# Patient Record
Sex: MALE | Race: WHITE | Employment: OTHER | URBAN - METROPOLITAN AREA
[De-identification: names, ages, dates, MRNs, and addresses within clinical notes are randomized per-mention and may not be internally consistent; named-entity substitution may affect disease eponyms.]

---

## 2017-12-04 ENCOUNTER — OFFICE VISIT (OUTPATIENT)
Dept: INTERNAL MEDICINE CLINIC | Age: 72
End: 2017-12-04

## 2017-12-04 VITALS
SYSTOLIC BLOOD PRESSURE: 122 MMHG | HEIGHT: 69 IN | TEMPERATURE: 98.5 F | WEIGHT: 246.6 LBS | DIASTOLIC BLOOD PRESSURE: 80 MMHG | BODY MASS INDEX: 36.53 KG/M2

## 2017-12-04 DIAGNOSIS — J20.9 ACUTE BRONCHITIS, UNSPECIFIED ORGANISM: Primary | ICD-10-CM

## 2017-12-04 RX ORDER — CEFUROXIME AXETIL 250 MG/1
250 TABLET ORAL 2 TIMES DAILY
Qty: 20 TAB | Refills: 0 | Status: SHIPPED | OUTPATIENT
Start: 2017-12-04 | End: 2018-01-10 | Stop reason: ALTCHOICE

## 2017-12-04 NOTE — PROGRESS NOTES
This note will not be viewable in [address] Andres Amos is a 67 y.o. male and presents with Cough  . Subjective:  Mr. Acevedo Aw since with complaints of an upper respiratory infection ongoing over the last week with the development of a deeply congested cough. The cough is been productive of a purulent phlegm. He denies fevers, chills, sore throat, wheezing, shortness of breath or pleuritic pain. The patient denies any rhinorrhea. He is yet to have his influenza vaccination. He has always declined pneumococcal vaccinations. Past Medical History:   Diagnosis Date    Colon polyp     Elevated CPK     Hyperlipidemia     Hypertension     Obesity     Psoriasis      Past Surgical History:   Procedure Laterality Date    HX HERNIA REPAIR       No Known Allergies  Current Outpatient Prescriptions   Medication Sig Dispense Refill    cefUROXime (CEFTIN) 250 mg tablet Take 1 Tab by mouth two (2) times a day. 20 Tab 0    verapamil (CALAN) 240 mg capsule Take 240 mg by mouth daily.  bumetanide (BUMEX) 0.5 mg tablet Take 0.5 mg by mouth daily.  lisinopril (PRINIVIL, ZESTRIL) 20 mg tablet Take 20 mg by mouth daily.  fenofibrate micronized (LOFIBRA) 200 mg capsule Take 200 mg by mouth every morning.  ezetimibe (ZETIA) 10 mg tablet Take 10 mg by mouth.  clobetasol (TEMOVATE) 0.05 % topical cream Apply  to affected area two (2) times a day.  omega-3 fatty acids Cap Take 1,000 mg by mouth.  aspirin 81 mg tablet Take 81 mg by mouth.  trimethobenzamide (TIGAN) 250 mg Cap Take 250 mg by mouth three (3) times daily as needed.          Social History     Social History    Marital status:      Spouse name: N/A    Number of children: N/A    Years of education: N/A     Social History Main Topics    Smoking status: Former Smoker    Smokeless tobacco: Never Used    Alcohol use 0.0 oz/week    Drug use: None    Sexual activity: Not Asked     Other Topics Concern    None     Social History Narrative     Family History   Problem Relation Age of Onset    Cancer Mother      colon       Review of Systems  Constitutional: negative for fevers, chills, anorexia and weight loss  Eyes:   negative for visual disturbance and irritation  ENT:   Positive for some sinus congestion and post nasal drainage. Respiratory:  Positive for cough and chest congestion without wheezing  CV:   negative for chest pain, palpitations, lower extremity edema  GI:   negative for nausea, vomiting, diarrhea, abdominal pain,melena  Integumentary: negative for rash and pruritus  Neurological:  negative for headaches, dizziness, vertigo, memory problems and gait       Objective:  Visit Vitals    /80 (BP 1 Location: Left arm, BP Patient Position: Sitting)    Temp 98.5 °F (36.9 °C) (Oral)    Ht 5' 9\" (1.753 m)    Wt 246 lb 9.6 oz (111.9 kg)    BMI 36.42 kg/m2     Body mass index is 36.42 kg/(m^2). Physical Exam:   General appearance - alert, ill appearing, and in no distress  Mental status - alert, oriented to person, place, and time  EYE-ANNA, EOMI, conjuctiva clear. No lid swelling or purulent drainage  ENT- TM's clear without A/F level. Pharynx slightly erythematous with drainage noted  Nose - normal and patent, no erythema,  Neck - supple, no significant adenopathy   Chest - Coarse upper airway rhonchi present without wheezing   Heart - normal rate, regular rhythm, normal S1, S2, no murmurs, rubs, clicks or gallops   Skin-No rash appreciated  Neuro -alert, oriented, normal speech, no focal findings. Assessment/Plan:  Diagnoses and all orders for this visit:    Acute bronchitis, unspecified organism  -     cefUROXime (CEFTIN) 250 mg tablet; Take 1 Tab by mouth two (2) times a day., Normal, Disp-20 Tab, R-0        Other Instructions: Body mass index is 36.42. Dietary counseling is given along with a printed patient handout.     Mucinex as directed    Increase po fluids    Influenza vaccination to be obtained in 10 days time    Follow-up Disposition:  Return if symptoms worsen or fail to improve. I have reviewed with the patient details of the assessment and plan and all questions were answered. Relevent patient education was performed. An After Visit Summary was printed and given to the patient.     Divya Ried MD

## 2017-12-04 NOTE — MR AVS SNAPSHOT
Visit Information Date & Time Provider Department Dept. Phone Encounter #  
 12/4/2017 10:10 AM Dina Stuart MD [facility] [id number] Follow-up Instructions Return if symptoms worsen or fail to improve. Your Appointments 1/10/2018  1:00 PM  
FOLLOW UP 10 with Dina Stuart MD  
[address] ([address]) Appt Note: 6 mo fu  
 [address] [id number] [address] [facility] [address] Upcoming Health Maintenance Date Due Hepatitis C Screening 1945 DTaP/Tdap/Td series (1 - Tdap) 3/23/1966 FOBT Q 1 YEAR AGE 50-75 3/23/1995 ZOSTER VACCINE AGE 60> 1/23/2005 GLAUCOMA SCREENING Q2Y 3/23/2010 MEDICARE YEARLY EXAM 3/23/2010 Influenza Age 5 to Adult 8/1/2017 Pneumococcal 65+ Low/Medium Risk (2 of 2 - PPSV23) 12/4/2018 Allergies as of 12/4/2017  Review Complete On: 12/4/2017 By: Dina Stuart MD  
 No Known Allergies Current Immunizations  Never Reviewed No immunizations on file. Not reviewed this visit You Were Diagnosed With   
  
 Codes Comments Acute bronchitis, unspecified organism    -  Primary ICD-10-CM: J20.9 ICD-9-CM: 466.0 Vitals BP Temp Height(growth percentile) Weight(growth percentile) BMI Smoking Status 122/80 (BP 1 Location: Left arm, BP Patient Position: Sitting) 98.5 °F (36.9 °C) (Oral) 5' 9\" (1.753 m) 246 lb 9.6 oz (111.9 kg) 36.42 kg/m2 Former Smoker Vitals History BMI and BSA Data Body Mass Index Body Surface Area  
 36.42 kg/m 2 2.33 m 2 Preferred Pharmacy Pharmacy Name Phone [address], [address] [place] [address] [phone] Your Updated Medication List  
  
   
This list is accurate as of: 12/4/17 10:29 AM.  Always use your most recent med list.  
  
  
  
  
 aspirin 81 mg tablet Take 81 mg by mouth. bumetanide 0.5 mg tablet Commonly known as:  Analilia Oas Take 0.5 mg by mouth daily. cefUROXime 250 mg tablet Commonly known as:  CEFTIN Take 1 Tab by mouth two (2) times a day. clobetasol 0.05 % topical cream  
Commonly known as:  Sherryll Stalling Apply  to affected area two (2) times a day. fenofibrate micronized 200 mg capsule Commonly known as:  LOFIBRA Take 200 mg by mouth every morning. lisinopril 20 mg tablet Commonly known as:  Ora Bee Take 20 mg by mouth daily. omega-3 fatty acids Cap Take 1,000 mg by mouth. trimethobenzamide 250 mg Cap Commonly known as:  Regloria Hughs Take 250 mg by mouth three (3) times daily as needed. verapamil  mg ER capsule Commonly known as:  Wiliam Katos Take 240 mg by mouth daily. ZETIA 10 mg tablet Generic drug:  ezetimibe Take 10 mg by mouth. Prescriptions Sent to Pharmacy Refills  
 cefUROXime (CEFTIN) 250 mg tablet 0 Sig: Take 1 Tab by mouth two (2) times a day. Class: Normal  
 Pharmacy: [address], [address] [place] [address] #: [phone] Route: Oral  
  
Follow-up Instructions Return if symptoms worsen or fail to improve. Patient Instructions Learning About Cutting Calories How do calories affect your weight? Food gives your body energy. Energy from the food you eat is measured in calories. This energy keeps your heart beating, your brain active, and your muscles working. Your body needs a certain number of calories each day. After your body uses the calories it needs, it stores extra calories as fat. To lose weight safely, you have to eat fewer calories while eating in a healthy way. How many calories do you need each day? The more active you are, the more calories you need. When you are less active, you need fewer calories.  How many calories you need each day also depends on several things, including your age and whether you are male or female. Here are some general guidelines for adults: 
· Less active women and older adults need 1,600 to 2,000 calories each day. · Active women and less active men need 2,000 to 2,400 calories each day. · Active men need 2,400 to 3,000 calories each day. How can you cut calories and eat healthy meals? Whole grains, vegetables and fruits, and dried beans are good lower-calorie foods. They give you lots of nutrients and fiber. And they fill you up. Sweets, energy drinks, and soda pop are high in calories. They give you few nutrients and no fiber. Try to limit soda pop, fruit juice, and energy drinks. Drink water instead. Some fats can be part of a healthy diet. But cutting back on fats from highly processed foods like fast foods and many snack foods is a good way to lower the calories in your diet. Also, use smaller amounts of fats like butter, margarine, salad dressing, and mayonnaise. Add fresh garlic, lemon, or herbs to your meals to add flavor without adding fat. Meats and dairy products can be a big source of hidden fats. Try to choose lean or low-fat versions of these products. Fat-free cookies, candies, chips, and frozen treats can still be high in sugar and calories. Some fat-free foods have more calories than regular ones. Eat fat-free treats in moderation, as you would other foods. If your favorite foods are high in fat, salt, sugar, or calories, limit how often you eat them. Eat smaller servings, or look for healthy substitutes. Fill up on fruits, vegetables, and whole grains. Eating at home · Use meat as a side dish instead of as the main part of your meal. 
· Try main dishes that use whole wheat pasta, brown rice, dried beans, or vegetables. · Find ways to cook with little or no fat, such as broiling, steaming, or grilling. · Use cooking spray instead of oil.  If you use oil, use a monounsaturated oil, such as canola or olive oil. · Trim fat from meats before you cook them. · Drain off fat after you brown the meat or while you roast it. · Chill soups and stews after you cook them. Then skim the fat off the top after it hardens. Eating out · Order foods that are broiled or poached rather than fried or breaded. · Cut back on the amount of butter or margarine that you use on bread. · Order sauces, gravies, and salad dressings on the side, and use only a little. · When you order pasta, choose tomato sauce rather than cream sauce. · Ask for salsa with your baked potato instead of sour cream, butter, cheese, or rosado. · Order meals in a small size instead of upgrading to a large. · Share an entree, or take part of your food home to eat as another meal. 
· Share appetizers and desserts. Where can you learn more? Go to http://ricci-hector.info/. Enter 99 [id number] in the search box to learn more about \"Learning About Cutting Calories. \" Current as of: May 12, 2017 Content Version: 11.4 © 3932-8777 KISSmetrics. Care instructions adapted under license by OneMorePallet (which disclaims liability or warranty for this information). If you have questions about a medical condition or this instruction, always ask your healthcare professional. [address] any warranty or liability for your use of this information. Introducing [facility] & HEALTH SERVICES! Dear Gennaro Dinero: Thank you for requesting a Magoosh account. Our records indicate that you already have an active Magoosh account. You can access your account anytime at https://Real Estate Direct. Evol[employer]/Real Estate Direct Did you know that you can access your hospital and ER discharge instructions at any time in Magoosh? You can also review all of your test results from your hospital stay or ER visit. Additional Information If you have questions, please visit the Frequently Asked Questions section of the textmetix website at [email or web address] GoGroceries Business [email or web address]/. Remember, textmetix is NOT to be used for urgent needs. For medical emergencies, dial 911. Now available from your iPhone and Android! Please provide this summary of care documentation to your next provider. Your primary care clinician is listed as LOU Marcus. If you have any questions after today's visit, please call [phone].

## 2017-12-04 NOTE — PATIENT INSTRUCTIONS

## 2017-12-04 NOTE — PROGRESS NOTES
Norberto Saldana is a 67 y.o. male presenting for Cough  . 1. Have you been to the ER, urgent care clinic since your last visit? Hospitalized since your last visit? No    2. Have you seen or consulted any other health care providers outside of the [address] [place] [address] since your last visit? Include any pap smears or colon screening. No    Fall Risk Assessment, last 12 mths 12/4/2017   Able to walk? Yes   Fall in past 12 months? No         Abuse Screening Questionnaire 12/4/2017   Do you ever feel afraid of your partner? N   Are you in a relationship with someone who physically or mentally threatens you? N   Is it safe for you to go home? Y       PHQ over the last two weeks 12/4/2017   Little interest or pleasure in doing things Not at all   Feeling down, depressed or hopeless Not at all   Total Score PHQ 2 0       There are no discontinued medications.

## 2018-01-03 PROBLEM — K63.5 COLON POLYP: Status: ACTIVE | Noted: 2018-01-03

## 2018-01-03 PROBLEM — L40.9 PSORIASIS: Status: ACTIVE | Noted: 2018-01-03

## 2018-01-03 PROBLEM — R74.8 ELEVATED CPK: Status: ACTIVE | Noted: 2018-01-03

## 2018-01-03 PROBLEM — I10 HYPERTENSION: Status: ACTIVE | Noted: 2018-01-03

## 2018-01-03 RX ORDER — LANOLIN ALCOHOL/MO/W.PET/CERES
400 CREAM (GRAM) TOPICAL DAILY
COMMUNITY

## 2018-01-03 RX ORDER — METRONIDAZOLE 7.5 MG/G
CREAM TOPICAL 2 TIMES DAILY
Status: ON HOLD | COMMUNITY
End: 2019-05-29

## 2018-01-03 RX ORDER — DOXYCYCLINE 100 MG/1
100 CAPSULE ORAL 2 TIMES DAILY
COMMUNITY
End: 2018-01-10 | Stop reason: ALTCHOICE

## 2018-01-10 ENCOUNTER — OFFICE VISIT (OUTPATIENT)
Dept: INTERNAL MEDICINE CLINIC | Age: 73
End: 2018-01-10

## 2018-01-10 VITALS
SYSTOLIC BLOOD PRESSURE: 122 MMHG | HEART RATE: 91 BPM | HEIGHT: 69 IN | WEIGHT: 249.4 LBS | BODY MASS INDEX: 36.94 KG/M2 | OXYGEN SATURATION: 98 % | DIASTOLIC BLOOD PRESSURE: 68 MMHG

## 2018-01-10 DIAGNOSIS — I10 ESSENTIAL HYPERTENSION: Primary | ICD-10-CM

## 2018-01-10 DIAGNOSIS — E78.2 MIXED HYPERLIPIDEMIA: ICD-10-CM

## 2018-01-10 DIAGNOSIS — E66.9 OBESITY (BMI 30-39.9): ICD-10-CM

## 2018-01-10 DIAGNOSIS — Z87.19 HISTORY OF PANCREATITIS: ICD-10-CM

## 2018-01-10 LAB
BACTERIA UA POCT, BACTPOCT: NORMAL
BILIRUB UR QL STRIP: NEGATIVE
CASTS UA POCT: 0
CLUE CELLS, CLUEPOCT: NEGATIVE
CRYSTALS UA POCT, CRYSPOCT: NEGATIVE
EPITHELIAL CELLS POCT: NORMAL
GLUCOSE UR-MCNC: NEGATIVE MG/DL
KETONES P FAST UR STRIP-MCNC: NEGATIVE MG/DL
MUCUS UA POCT, MUCPOCT: NORMAL
PH UR STRIP: 6 [PH] (ref 5–7)
PROT UR QL STRIP: NORMAL
RBC UA POCT, RBCPOCT: NORMAL
SP GR UR STRIP: 1.02 (ref 1.01–1.02)
TRICH UA POCT, TRICHPOC: NEGATIVE
UA UROBILINOGEN AMB POC: NORMAL (ref 0.2–1)
URINALYSIS CLARITY POC: CLEAR
URINALYSIS COLOR POC: NORMAL
URINE BLOOD POC: NEGATIVE
URINE CULT COMMENT, POCT: NORMAL
URINE LEUKOCYTES POC: NEGATIVE
URINE NITRITES POC: NEGATIVE
WBC UA POCT, WBCPOCT: NORMAL
YEAST UA POCT, YEASTPOC: NEGATIVE

## 2018-01-10 RX ORDER — FENOFIBRATE 160 MG/1
160 TABLET ORAL DAILY
Qty: 90 TAB | Refills: 3 | Status: SHIPPED | OUTPATIENT
Start: 2018-01-10 | End: 2019-01-24 | Stop reason: SDUPTHER

## 2018-01-10 RX ORDER — LISINOPRIL 20 MG/1
20 TABLET ORAL DAILY
Qty: 90 TAB | Refills: 3 | Status: SHIPPED | OUTPATIENT
Start: 2018-01-10 | End: 2019-01-24 | Stop reason: SDUPTHER

## 2018-01-10 RX ORDER — VERAPAMIL HYDROCHLORIDE 240 MG/1
240 TABLET, FILM COATED, EXTENDED RELEASE ORAL DAILY
Qty: 90 TAB | Refills: 3 | Status: SHIPPED | OUTPATIENT
Start: 2018-01-10 | End: 2019-01-24 | Stop reason: SDUPTHER

## 2018-01-10 RX ORDER — BUMETANIDE 0.5 MG/1
1 TABLET ORAL DAILY
Qty: 90 TAB | Refills: 3 | Status: SHIPPED | OUTPATIENT
Start: 2018-01-10 | End: 2019-01-24 | Stop reason: SDUPTHER

## 2018-01-10 NOTE — PATIENT INSTRUCTIONS

## 2018-01-10 NOTE — PROGRESS NOTES
Purvi Cordero is a 67 y.o. male presenting for Hypertension (6 mo fu)  . 1. Have you been to the ER, urgent care clinic since your last visit? Hospitalized since your last visit? No    2. Have you seen or consulted any other health care providers outside of the [address] [place] [address] since your last visit? Include any pap smears or colon screening. No    Fall Risk Assessment, last 12 mths 12/4/2017   Able to walk? Yes   Fall in past 12 months? No         Abuse Screening Questionnaire 12/4/2017   Do you ever feel afraid of your partner? N   Are you in a relationship with someone who physically or mentally threatens you? N   Is it safe for you to go home?  Y       PHQ over the last two weeks 12/4/2017   Little interest or pleasure in doing things Not at all   Feeling down, depressed or hopeless Not at all   Total Score PHQ 2 0       Medications Discontinued During This Encounter   Medication Reason    fenofibrate micronized (LOFIBRA) 200 mg capsule

## 2018-01-10 NOTE — MR AVS SNAPSHOT
Visit Information Date & Time Provider Department Dept. Phone Encounter #  
 1/10/2018  1:00 PM An Richey MD [address] [place] [address] [facility] [phone] [id number] Follow-up Instructions Return in about 6 months (around 7/10/2018). Follow-up and Disposition History Upcoming Health Maintenance Date Due Hepatitis C Screening 1945 DTaP/Tdap/Td series (1 - Tdap) 3/23/1966 FOBT Q 1 YEAR AGE 50-75 3/23/1995 ZOSTER VACCINE AGE 60> 1/23/2005 GLAUCOMA SCREENING Q2Y 3/23/2010 MEDICARE YEARLY EXAM 3/23/2010 Pneumococcal 65+ Low/Medium Risk (2 of 2 - PPSV23) 12/4/2018 Allergies as of 1/10/2018  Review Complete On: 1/10/2018 By: An Richey MD  
 No Known Allergies Current Immunizations  Never Reviewed Name Date Influenza Vaccine 11/1/2015 Not reviewed this visit You Were Diagnosed With   
  
 Codes Comments Essential hypertension    -  Primary ICD-10-CM: I10 
ICD-9-CM: 401.9 Mixed hyperlipidemia     ICD-10-CM: E78.2 ICD-9-CM: 272.2 Obesity (BMI 30-39. 9)     ICD-10-CM: E66.9 ICD-9-CM: 278.00 History of pancreatitis     ICD-10-CM: Z87.19 ICD-9-CM: V12.79 Vitals BP Pulse Height(growth percentile) Weight(growth percentile) SpO2 BMI  
 122/68 (BP 1 Location: Left arm, BP Patient Position: Sitting) 91 5' 9\" (1.753 m) 249 lb 6.4 oz (113.1 kg) 98% 36.83 kg/m2 Smoking Status Former Smoker BMI and BSA Data Body Mass Index Body Surface Area  
 36.83 kg/m 2 2.35 m 2 Preferred Pharmacy Pharmacy Name Phone [address], [address] [place] [address] [phone] Your Updated Medication List  
  
   
This list is accurate as of: 1/10/18  1:24 PM.  Always use your most recent med list.  
  
  
  
  
 aspirin 81 mg tablet Take 81 mg by mouth. bumetanide 0.5 mg tablet Commonly known as:  Velna Neon Take 2 Tabs by mouth daily. clobetasol 0.05 % topical cream  
Commonly known as:  Steff Haughton Apply  to affected area two (2) times a day. fenofibrate 160 mg tablet Commonly known as:  LOFIBRA Take 1 Tab by mouth daily. lisinopril 20 mg tablet Commonly known as:  Kristel Cockayne Take 1 Tab by mouth daily. magnesium oxide 400 mg tablet Commonly known as:  MAG-OX Take 400 mg by mouth daily. METROCREAM 0.75 % topical cream  
Generic drug:  metroNIDAZOLE Apply  to affected area two (2) times a day. Use a thin layer to affected areas after washing  
  
 omega-3 fatty acids Cap Take 1,000 mg by mouth.  
  
 potassium 99 mg tablet Take 99 mg by mouth daily. verapamil  mg CR tablet Commonly known as:  CALAN-SR Take 1 Tab by mouth daily. Prescriptions Sent to Pharmacy Refills  
 verapamil ER (CALAN-SR) 240 mg CR tablet 3 Sig: Take 1 Tab by mouth daily. Class: Normal  
 Pharmacy: [facility] [address] Ph #: [phone] Route: Oral  
 lisinopril (PRINIVIL, ZESTRIL) 20 mg tablet 3 Sig: Take 1 Tab by mouth daily. Class: Normal  
 Pharmacy: [address] Ph #: [phone] Route: Oral  
 fenofibrate (LOFIBRA) 160 mg tablet 3 Sig: Take 1 Tab by mouth daily. Class: Normal  
 Pharmacy: [facility] [address] Ph #: [phone] Route: Oral  
 bumetanide (BUMEX) 0.5 mg tablet 3 Sig: Take 2 Tabs by mouth daily. Class: Normal  
 Pharmacy: [address] Ph #: [phone] Route: Oral  
  
We Performed the Following AMB POC COMPLETE CBC,AUTOMATED ENTER [id number] CPT(R)] AMB POC COMPREHENSIVE METABOLIC PANEL [06341 CPT(R)] AMB POC LIPID PROFILE [44255 CPT(R)] AMB POC TSH [70594 CPT(R)] AMB POC URINALYSIS DIP STICK AUTO W/ MICRO  [75576 CPT(R)] MT COLLECTION VENOUS BLOOD,VENIPUNCTURE [id number] CPT(R)] Follow-up Instructions Return in about 6 months (around 7/10/2018). Patient Instructions Learning About Cutting Calories How do calories affect your weight? Food gives your body energy. Energy from the food you eat is measured in calories. This energy keeps your heart beating, your brain active, and your muscles working. Your body needs a certain number of calories each day. After your body uses the calories it needs, it stores extra calories as fat. To lose weight safely, you have to eat fewer calories while eating in a healthy way. How many calories do you need each day? The more active you are, the more calories you need. When you are less active, you need fewer calories. How many calories you need each day also depends on several things, including your age and whether you are male or female. Here are some general guidelines for adults: 
· Less active women and older adults need 1,600 to 2,000 calories each day. · Active women and less active men need 2,000 to 2,400 calories each day. · Active men need 2,400 to 3,000 calories each day. How can you cut calories and eat healthy meals? Whole grains, vegetables and fruits, and dried beans are good lower-calorie foods. They give you lots of nutrients and fiber. And they fill you up. Sweets, energy drinks, and soda pop are high in calories. They give you few nutrients and no fiber. Try to limit soda pop, fruit juice, and energy drinks. Drink water instead. Some fats can be part of a healthy diet. But cutting back on fats from highly processed foods like fast foods and many snack foods is a good way to lower the calories in your diet. Also, use smaller amounts of fats like butter, margarine, salad dressing, and mayonnaise. Add fresh garlic, lemon, or herbs to your meals to add flavor without adding fat. Meats and dairy products can be a big source of hidden fats. Try to choose lean or low-fat versions of these products. Fat-free cookies, candies, chips, and frozen treats can still be high in sugar and calories. Some fat-free foods have more calories than regular ones. Eat fat-free treats in moderation, as you would other foods. If your favorite foods are high in fat, salt, sugar, or calories, limit how often you eat them. Eat smaller servings, or look for healthy substitutes. Fill up on fruits, vegetables, and whole grains. Eating at home · Use meat as a side dish instead of as the main part of your meal. 
· Try main dishes that use whole wheat pasta, brown rice, dried beans, or vegetables. · Find ways to cook with little or no fat, such as broiling, steaming, or grilling. · Use cooking spray instead of oil. If you use oil, use a monounsaturated oil, such as canola or olive oil. · Trim fat from meats before you cook them. · Drain off fat after you brown the meat or while you roast it. · Chill soups and stews after you cook them. Then skim the fat off the top after it hardens. Eating out · Order foods that are broiled or poached rather than fried or breaded. · Cut back on the amount of butter or margarine that you use on bread. · Order sauces, gravies, and salad dressings on the side, and use only a little. · When you order pasta, choose tomato sauce rather than cream sauce. · Ask for salsa with your baked potato instead of sour cream, butter, cheese, or rosado. · Order meals in a small size instead of upgrading to a large. · Share an entree, or take part of your food home to eat as another meal. 
· Share appetizers and desserts. Where can you learn more? Go to http://ricci-hector.info/. Enter 99 [id number] in the search box to learn more about \"Learning About Cutting Calories. \" Current as of: May 12, 2017 Content Version: 11.4 © 1234-2613 Healthwise, Incorporated. Care instructions adapted under license by Neotropix (which disclaims liability or warranty for this information). If you have questions about a medical condition or this instruction, always ask your healthcare professional. [address] any warranty or liability for your use of this information. Patient Instructions History Introducing [facility] & HEALTH SERVICES! Dear Rogelio Elliott: Thank you for requesting a K121 account. Our records indicate that you already have an active K121 account. You can access your account anytime at https://Virgin Mobile Central [employer] [place]. CTS Media/Virgin Mobile [employer] & [place] Did you know that you can access your hospital and ER discharge instructions at any time in K121? You can also review all of your test results from your hospital stay or ER visit. Additional Information If you have questions, please visit the Frequently Asked Questions section of the K121 website at [email or web address] Mobile [employer] & [place]/. Remember, K121 is NOT to be used for urgent needs. For medical emergencies, dial 911. Now available from your iPhone and Android! Please provide this summary of care documentation to your next provider. Your primary care clinician is listed as LOU Marcus. If you have any questions after today's visit, please call [phone].

## 2018-01-10 NOTE — PROGRESS NOTES
This note will not be viewable in [address] Miguel Bernard is a 67 y.o. male and presents with Hypertension (6 mo fu)  . Subjective:  Patient presents to the office today in follow-up of multiple medical problems. Patient has hypertension which is been controlled on verapamil, lisinopril and Bumex. He is tolerating this without dizziness, lower extremity edema, muscle cramping, cough, swelling swelling or rash. Patient denies any headaches, numbness, tingling or focal neurological problems. He is a hyperlipidemia currently on fenofibrate therapy. Denies muscle soreness or GI upset. He denies any exertional chest pains or claudication. He has a prior history of pancreatitis. A lot of this is been related to alcohol usage where he drinks a large amount of beer during the course of the day. The patient denies any recurrent abdominal pain or symptoms related to his previous pancreatitis. The patient remains obese with a body mass index of 36.83 and is on no current dietary program or weight reduction program.    Past Medical History:   Diagnosis Date    Colon polyp     Elevated CPK     Hyperlipidemia     Hypertension     Psoriasis      Past Surgical History:   Procedure Laterality Date    HX COLONOSCOPY  02/20/2014    HX HERNIA REPAIR       No Known Allergies  Current Outpatient Prescriptions   Medication Sig Dispense Refill    verapamil ER (CALAN-SR) 240 mg CR tablet Take 1 Tab by mouth daily. 90 Tab 3    lisinopril (PRINIVIL, ZESTRIL) 20 mg tablet Take 1 Tab by mouth daily. 90 Tab 3    fenofibrate (LOFIBRA) 160 mg tablet Take 1 Tab by mouth daily. 90 Tab 3    bumetanide (BUMEX) 0.5 mg tablet Take 2 Tabs by mouth daily. 90 Tab 3    potassium 99 mg tablet Take 99 mg by mouth daily.  magnesium oxide (MAG-OX) 400 mg tablet Take 400 mg by mouth daily.  metroNIDAZOLE (METROCREAM) 0.75 % topical cream Apply  to affected area two (2) times a day.  Use a thin layer to affected areas after washing      clobetasol (TEMOVATE) 0.05 % topical cream Apply  to affected area two (2) times a day.  omega-3 fatty acids Cap Take 1,000 mg by mouth.  aspirin 81 mg tablet Take 81 mg by mouth.          Social History     Social History    Marital status:      Spouse name: N/A    Number of children: N/A    Years of education: N/A     Social History Main Topics    Smoking status: Former Smoker    Smokeless tobacco: Never Used    Alcohol use 0.0 oz/week    Drug use: None    Sexual activity: Not Asked     Other Topics Concern    None     Social History Narrative     Family History   Problem Relation Age of Onset    Cancer Mother      colon       Health Maintenance   Topic Date Due    Hepatitis C Screening  1945    DTaP/Tdap/Td series (1 - Tdap) 03/23/1966    FOBT Q 1 YEAR AGE 50-75  03/23/1995    ZOSTER VACCINE AGE 60>  01/23/2005    GLAUCOMA SCREENING Q2Y  03/23/2010    MEDICARE YEARLY EXAM  03/23/2010    Pneumococcal 65+ Low/Medium Risk (2 of 2 - PPSV23) 12/04/2018    Influenza Age 5 to Adult  Addressed        Review of Systems  Constitutional: negative for fevers, chills, anorexia and weight loss  Eyes:   negative for visual disturbance and irritation  ENT:   negative for tinnitus,sore throat,nasal congestion,ear pain,hoarseness  Respiratory:  negative for cough, hemoptysis, dyspnea,wheezing  CV:   negative for chest pain, palpitations, lower extremity edema  GI:   negative for nausea, vomiting, diarrhea, abdominal pain,melena  Endo:               negative for polyuria,polydipsia,polyphagia,heat intolerance  Genitourinary: negative for frequency, dysuria and hematuria  Integumentary: negative for rash and pruritus  Hematologic:  negative for easy bruising and gum/nose bleeding  Musculoskel: negative for myalgias, arthralgias, back pain, muscle weakness, joint pain  Neurological:  negative for headaches, dizziness, vertigo, memory problems and gait   Behavl/Psych: negative for feelings of anxiety, depression, mood changes  ROS otherwise negative      Objective:  Visit Vitals    /68 (BP 1 Location: Left arm, BP Patient Position: Sitting)    Pulse 91    Ht 5' 9\" (1.753 m)    Wt 249 lb 6.4 oz (113.1 kg)    SpO2 98%    BMI 36.83 kg/m2     Body mass index is 36.83 kg/(m^2). Physical Exam:   General appearance - alert, well appearing, and in no distress  Mental status - alert, oriented to person, place, and time  EYE-ANNA, EOMI,conjunctiva normal bilaterally, lids normal  ENT-ENT exam normal, no neck nodes or sinus tenderness  Nose - normal and patent, no erythema,  Or discharge   Mouth - mucous membranes moist, pharynx normal without lesions  Neck - supple, no significant adenopathy or bruit  Chest - clear to auscultation, no wheezes, rales or rhonchi. Heart - normal rate, regular rhythm, normal S1, S2, no murmurs, rubs, clicks or gallops   Abdomen - soft, nontender, nondistended, no masses or organomegaly  Lymph- no adenopathy palpable  Ext-peripheral pulses normal, no pedal edema, no clubbing or cyanosis  Skin-Warm and dry. no hyperpigmentation, vitiligo, or suspicious lesions  Neuro -alert, oriented, normal speech, no focal findings or movement disorder noted      Assessment/Plan:  Diagnoses and all orders for this visit:    Essential hypertension  -     verapamil ER (CALAN-SR) 240 mg CR tablet; Take 1 Tab by mouth daily. , Normal, Disp-90 Tab, R-3  -     lisinopril (PRINIVIL, ZESTRIL) 20 mg tablet; Take 1 Tab by mouth daily. , Normal, Disp-90 Tab, R-3  -     bumetanide (BUMEX) 0.5 mg tablet; Take 2 Tabs by mouth daily. , Normal, Disp-90 Tab, R-3  -     AMB POC COMPLETE CBC,AUTOMATED ENTER  -     AMB POC COMPREHENSIVE METABOLIC PANEL  -     ID COLLECTION VENOUS BLOOD,VENIPUNCTURE  -     AMB POC URINALYSIS DIP STICK AUTO W/ MICRO     Mixed hyperlipidemia  -     fenofibrate (LOFIBRA) 160 mg tablet; Take 1 Tab by mouth daily. , Normal, Disp-90 Tab, R-3  -     AMB POC LIPID PROFILE  -     AMB POC TSH    Obesity (BMI 30-39. 9)    History of pancreatitis        Other instructions: The patient's medications are reviewed and reconciled. No change in his current medical regimen is made. Dietary counseling given along with printed patient education. A reduction in alcohol ingestion is recommended. Await results of multiple labs. Follow-up 6 months    Follow-up Disposition:  Return in about 6 months (around 7/10/2018). I have reviewed with the patient details of the assessment and plan and all questions were answered. Relevent patient education was performed. The most recent lab findings were reviewed with the patient. An After Visit Summary was printed and given to the patient.     Francine Johnson MD

## 2018-01-11 LAB
ALBUMIN SERPL-MCNC: 4.5 G/DL (ref 3.9–5.4)
ALKALINE PHOS POC: 56 U/L (ref 38–126)
ALT SERPL-CCNC: 43 U/L (ref 9–52)
AST SERPL-CCNC: 36 U/L (ref 14–36)
BUN BLD-MCNC: 16 MG/DL (ref 9–20)
CALCIUM BLD-MCNC: 9.7 MG/DL (ref 8.4–10.2)
CHLORIDE BLD-SCNC: 103 MMOL/L (ref 98–107)
CHOLEST SERPL-MCNC: 214 MG/DL (ref 0–200)
CO2 POC: 29 MMOL/L (ref 22–32)
CREAT BLD-MCNC: 0.7 MG/DL (ref 0.8–1.5)
EGFR (POC): 94.3
GLUCOSE POC: 92 MG/DL (ref 75–110)
GRAN# POC: 5.1 K/UL (ref 2–7.8)
GRAN% POC: 66.7 % (ref 37–92)
HCT VFR BLD CALC: 45.9 % (ref 37–51)
HDLC SERPL-MCNC: 66 MG/DL (ref 35–130)
HGB BLD-MCNC: 15.1 G/DL (ref 12–18)
LDL CHOLESTEROL POC: 117.8 MG/DL (ref 0–130)
LY# POC: 2 K/UL (ref 0.6–4.1)
LY% POC: 28 % (ref 10–58.5)
MCH RBC QN: 30.5 PG (ref 26–32)
MCHC RBC-ENTMCNC: 32.8 G/DL (ref 30–36)
MCV RBC: 93 FL (ref 80–97)
MID #, POC: 0.3 K/UL (ref 0–1.8)
MID% POC: 5.3 % (ref 0.1–24)
PLATELET # BLD: 207 K/UL (ref 140–440)
POTASSIUM SERPL-SCNC: 4.4 MMOL/L (ref 3.6–5)
PROT SERPL-MCNC: 7.4 G/DL (ref 6.3–8.2)
RBC # BLD: 4.93 M/UL (ref 4.2–6.3)
SODIUM SERPL-SCNC: 144 MMOL/L (ref 137–145)
TCHOL/HDL RATIO (POC): 3.2 (ref 0–4)
TOTAL BILIRUBIN POC: 1 MG/DL (ref 0.2–1.3)
TRIGL SERPL-MCNC: 151 MG/DL (ref 0–200)
TSH BLD-ACNC: 1.76 UIU/ML (ref 0.4–4.2)
VLDLC SERPL CALC-MCNC: 30.2 MG/DL
WBC # BLD: 7.4 K/UL (ref 4.1–10.9)

## 2018-07-25 ENCOUNTER — OFFICE VISIT (OUTPATIENT)
Dept: INTERNAL MEDICINE CLINIC | Age: 73
End: 2018-07-25

## 2018-07-25 VITALS
SYSTOLIC BLOOD PRESSURE: 110 MMHG | OXYGEN SATURATION: 96 % | WEIGHT: 247.2 LBS | HEIGHT: 69 IN | DIASTOLIC BLOOD PRESSURE: 60 MMHG | HEART RATE: 88 BPM | BODY MASS INDEX: 36.61 KG/M2

## 2018-07-25 DIAGNOSIS — Z00.00 INITIAL MEDICARE ANNUAL WELLNESS VISIT: Primary | ICD-10-CM

## 2018-07-25 DIAGNOSIS — E78.2 MIXED HYPERLIPIDEMIA: ICD-10-CM

## 2018-07-25 DIAGNOSIS — E66.9 OBESITY (BMI 30-39.9): ICD-10-CM

## 2018-07-25 DIAGNOSIS — Z87.19 HISTORY OF PANCREATITIS: ICD-10-CM

## 2018-07-25 DIAGNOSIS — Z11.59 NEED FOR HEPATITIS C SCREENING TEST: ICD-10-CM

## 2018-07-25 DIAGNOSIS — I10 ESSENTIAL HYPERTENSION: ICD-10-CM

## 2018-07-25 NOTE — PATIENT INSTRUCTIONS
The best way to stay healthy is to live a healthy lifestyle. A healthy lifestyle includes regular exercise, eating a well-balanced diet, keeping a healthy weight and not smoking. Regular physical exams and screening tests are another important way to take care of yourself. Preventive exams provided by health care providers can find health problems early when treatment works best and can keep you from getting certain diseases or illnesses. Preventive services include exams, lab tests, screenings, shots, monitoring and information to help you take care of your own health. All people over 65 should have a pneumonia shot. Pneumonia shots are usually only needed once in a lifetime unless your doctor decides differently. In addition to your physical exam, some screening tests are recommended:    All people over 65 should have a yearly flu shot. People over 65 are at medium to high risk for Hepatitis B. Three shots are needed for complete protection. Bone mass measurement (dexa scan) is recommended every two years. Diabetes Mellitus screening is recommended every year. Glaucoma is an eye disease caused by high pressure in the eye. An eye exam is recommended every year. Cardiovascular screening tests that check your cholesterol and other blood fat (lipid) levels are recommended every five years. Colorectal Cancer screening tests help to find pre-cancerous polyps (growths in the colon) so they can be removed before they turn into cancer. Tests ordered for screening depend on your personal and family history risk factors. Prostate Cancer Screening (annually up to age 76)    Screening for breast cancer is recommended yearly with a Mammogram.    Screening for cervical and vaginal cancer is recommended with a pelvic and Pap test every two years.  However if you have had an abnormal pap in the past  three years or at high risk for cervical or vaginal cancer Medicare will cover a pap test and a pelvic exam every year. Here is a list of your current Health Maintenance items with a due date:  Health Maintenance Due   Topic Date Due    Hepatitis C Test  1945    DTaP/Tdap/Td  (1 - Tdap) 03/23/1966    Stool testing for trace blood  03/23/1995    Shingles Vaccine  01/23/2005    Glaucoma Screening   03/23/2010    Annual Well Visit  03/14/2018          Learning About Cutting Calories  How do calories affect your weight? Food gives your body energy. Energy from the food you eat is measured in calories. This energy keeps your heart beating, your brain active, and your muscles working. Your body needs a certain number of calories each day. After your body uses the calories it needs, it stores extra calories as fat. To lose weight safely, you have to eat fewer calories while eating in a healthy way. How many calories do you need each day? The more active you are, the more calories you need. When you are less active, you need fewer calories. How many calories you need each day also depends on several things, including your age and whether you are male or female. Here are some general guidelines for adults:  · Less active women and older adults need 1,600 to 2,000 calories each day. · Active women and less active men need 2,000 to 2,400 calories each day. · Active men need 2,400 to 3,000 calories each day. How can you cut calories and eat healthy meals? Whole grains, vegetables and fruits, and dried beans are good lower-calorie foods. They give you lots of nutrients and fiber. And they fill you up. Sweets, energy drinks, and soda pop are high in calories. They give you few nutrients and no fiber. Try to limit soda pop, fruit juice, and energy drinks. Drink water instead. Some fats can be part of a healthy diet. But cutting back on fats from highly processed foods like fast foods and many snack foods is a good way to lower the calories in your diet.  Also, use smaller amounts of fats like butter, margarine, salad dressing, and mayonnaise. Add fresh garlic, lemon, or herbs to your meals to add flavor without adding fat. Meats and dairy products can be a big source of hidden fats. Try to choose lean or low-fat versions of these products. Fat-free cookies, candies, chips, and frozen treats can still be high in sugar and calories. Some fat-free foods have more calories than regular ones. Eat fat-free treats in moderation, as you would other foods. If your favorite foods are high in fat, salt, sugar, or calories, limit how often you eat them. Eat smaller servings, or look for healthy substitutes. Fill up on fruits, vegetables, and whole grains. Eating at home  · Use meat as a side dish instead of as the main part of your meal.  · Try main dishes that use whole wheat pasta, brown rice, dried beans, or vegetables. · Find ways to cook with little or no fat, such as broiling, steaming, or grilling. · Use cooking spray instead of oil. If you use oil, use a monounsaturated oil, such as canola or olive oil. · Trim fat from meats before you cook them. · Drain off fat after you brown the meat or while you roast it. · Chill soups and stews after you cook them. Then skim the fat off the top after it hardens. Eating out  · Order foods that are broiled or poached rather than fried or breaded. · Cut back on the amount of butter or margarine that you use on bread. · Order sauces, gravies, and salad dressings on the side, and use only a little. · When you order pasta, choose tomato sauce rather than cream sauce. · Ask for salsa with your baked potato instead of sour cream, butter, cheese, or rosado. · Order meals in a small size instead of upgrading to a large. · Share an entree, or take part of your food home to eat as another meal.  · Share appetizers and desserts. Where can you learn more? Go to http://rosa.info/.   Enter 99 [id number] in the search box to learn more about \"Learning About Cutting Calories. \"  Current as of: May 12, 2017  Content Version: 11.7  © 3440-5569 [employer]. Care instructions adapted under license by [employer] (which disclaims liability or warranty for this information). If you have questions about a medical condition or this instruction, always ask your healthcare professional. [place] [address] any warranty or liability for your use of this information. Advance Directives: Care Instructions  Your Care Instructions  An advance directive is a legal way to state your wishes at the end of your life. It tells your family and your doctor what to do if you can no longer say what you want. There are two main types of advance directives. You can change them any time that your wishes change. · A living will tells your family and your doctor your wishes about life support and other treatment. · A durable power of  for health care lets you name a person to make treatment decisions for you when you can't speak for yourself. This person is called a health care agent. If you do not have an advance directive, decisions about your medical care may be made by a doctor or a  who doesn't know you. It may help to think of an advance directive as a gift to the people who care for you. If you have one, they won't have to make tough decisions by themselves. Follow-up care is a key part of your treatment and safety. Be sure to make and go to all appointments, and call your doctor if you are having problems. It's also a good idea to know your test results and keep a list of the medicines you take. How can you care for yourself at home? · Discuss your wishes with your loved ones and your doctor. This way, there are no surprises. · Many states have a unique form. Or you might use a universal form that has been approved by many states. This kind of form can sometimes be completed and stored online.  Your electronic copy will then be available wherever you have a connection to the Internet. In most cases, doctors will respect your wishes even if you have a form from a different state. · You don't need a  to do an advance directive. But you may want to get legal advice. · Think about these questions when you prepare an advance directive:  ¨ Who do you want to make decisions about your medical care if you are not able to? Many people choose a family member or close friend. ¨ Do you know enough about life support methods that might be used? If not, talk to your doctor so you understand. ¨ What are you most afraid of that might happen? You might be afraid of having pain, losing your independence, or being kept alive by machines. ¨ Where would you prefer to die? Choices include your home, a hospital, or a nursing home. ¨ Would you like to have information about hospice care to support you and your family? ¨ Do you want to donate organs when you die? ¨ Do you want certain [employer] practices performed before you die? If so, put your wishes in the advance directive. · Read your advance directive every year, and make changes as needed. When should you call for help? Be sure to contact your doctor if you have any questions. Where can you learn more? Go to http://ricci-hector.info/. Enter R264 in the search box to learn more about \"Advance Directives: Care Instructions. \"  Current as of: October 6, 2017  Content Version: 11.7  © 6962-4756 [employer]. Care instructions adapted under license by [employer] (which disclaims liability or warranty for this information). If you have questions about a medical condition or this instruction, always ask your healthcare professional. [address] any warranty or liability for your use of this information.

## 2018-07-25 NOTE — PROGRESS NOTES
The best way to stay healthy is to live a healthy lifestyle. A healthy lifestyle includes regular exercise, eating a well-balanced diet, keeping a healthy weight and not smoking. Regular physical exams and screening tests are another important way to take care of yourself. Preventive exams provided by health care providers can find health problems early when treatment works best and can keep you from getting certain diseases or illnesses. Preventive services include exams, lab tests, screenings, shots, monitoring and information to help you take care of your own health. All people over 65 should have a pneumonia shot. Pneumonia shots are usually only needed once in a lifetime unless your doctor decides differently. In addition to your physical exam, some screening tests are recommended:    All people over 65 should have a yearly flu shot. People over 65 are at medium to high risk for Hepatitis B. Three shots are needed for complete protection. Bone mass measurement (dexa scan) is recommended every two years. Diabetes Mellitus screening is recommended every year. Glaucoma is an eye disease caused by high pressure in the eye. An eye exam is recommended every year. Cardiovascular screening tests that check your cholesterol and other blood fat (lipid) levels are recommended every five years. Colorectal Cancer screening tests help to find pre-cancerous polyps (growths in the colon) so they can be removed before they turn into cancer. Tests ordered for screening depend on your personal and family history risk factors. Prostate Cancer Screening (annually up to age 76)    Screening for breast cancer is recommended yearly with a Mammogram.    Screening for cervical and vaginal cancer is recommended with a pelvic and Pap test every two years.  However if you have had an abnormal pap in the past  three years or at high risk for cervical or vaginal cancer Medicare will cover a pap test and a pelvic exam every year.      Here is a list of your current Health Maintenance items with a due date:  Health Maintenance Due   Topic Date Due    Hepatitis C Test  1945    DTaP/Tdap/Td  (1 - Tdap) 03/23/1966    Stool testing for trace blood  03/23/1995    Shingles Vaccine  01/23/2005    Glaucoma Screening   03/23/2010    Annual Well Visit  03/14/2018

## 2018-07-25 NOTE — MR AVS SNAPSHOT
[address] [place] [address] [address] [address] [phone] Patient: Katherine Min MRN: [id number] Visit Information Date & Time Provider Department Dept. Phone Encounter #  
 7/25/2018  1:00 PM Arash Acuna MD [facility] [id number] Follow-up Instructions Return in about 6 months (around 1/25/2019). Upcoming Health Maintenance Date Due Hepatitis C Screening 1945 DTaP/Tdap/Td series (1 - Tdap) 3/23/1966 FOBT Q 1 YEAR AGE 50-75 3/23/1995 ZOSTER VACCINE AGE 60> 1/23/2005 GLAUCOMA SCREENING Q2Y 3/23/2010 MEDICARE YEARLY EXAM 3/14/2018 Influenza Age 5 to Adult 8/1/2018 Pneumococcal 65+ Low/Medium Risk (2 of 2 - PPSV23) 12/4/2018 Allergies as of 7/25/2018  Review Complete On: 7/25/2018 By: Arash Acuna MD  
 No Known Allergies Current Immunizations  Never Reviewed Name Date Influenza Vaccine 11/1/2015 Not reviewed this visit You Were Diagnosed With   
  
 Codes Comments Initial Medicare annual wellness visit    -  Primary ICD-10-CM: Z00.00 ICD-9-CM: V70.0 Essential hypertension     ICD-10-CM: I10 
ICD-9-CM: 401.9 Mixed hyperlipidemia     ICD-10-CM: E78.2 ICD-9-CM: 272.2 Obesity (BMI 30-39. 9)     ICD-10-CM: E66.9 ICD-9-CM: 278.00 History of pancreatitis     ICD-10-CM: Z87.19 ICD-9-CM: V12.79 Need for hepatitis C screening test     ICD-10-CM: Z11.59 
ICD-9-CM: V73.89 Vitals BP Pulse Height(growth percentile) Weight(growth percentile) SpO2 BMI  
 110/60 88 5' 9\" (1.753 m) 247 lb 3.2 oz (112.1 kg) 96% 36.51 kg/m2 Smoking Status Former Smoker Vitals History BMI and BSA Data Body Mass Index Body Surface Area  
 36.51 kg/m 2 2.34 m 2 Preferred Pharmacy Pharmacy Name Phone [address], [address] [facility] [place] [phone] Your Updated Medication List  
  
   
This list is accurate as of 7/25/18  1:20 PM.  Always use your most recent med list.  
  
  
  
  
 aspirin 81 mg tablet Take 81 mg by mouth. bumetanide 0.5 mg tablet Commonly known as:  Barnie Pulse Take 2 Tabs by mouth daily. clobetasol 0.05 % topical cream  
Commonly known as:  Dacia Nestle Apply  to affected area two (2) times a day. fenofibrate 160 mg tablet Commonly known as:  LOFIBRA Take 1 Tab by mouth daily. lisinopril 20 mg tablet Commonly known as:  Kala [place] Take 1 Tab by mouth daily. magnesium oxide 400 mg tablet Commonly known as:  MAG-OX Take 400 mg by mouth daily. METROCREAM 0.75 % topical cream  
Generic drug:  metroNIDAZOLE Apply  to affected area two (2) times a day. Use a thin layer to affected areas after washing  
  
 omega-3 fatty acids Cap Take 1,000 mg by mouth.  
  
 potassium 99 mg tablet Take 99 mg by mouth daily. verapamil  mg CR tablet Commonly known as:  CALAN-SR Take 1 Tab by mouth daily. We Performed the Following HEPATITIS C AB [97799 CPT(R)] Follow-up Instructions Return in about 6 months (around 1/25/2019). Patient Instructions The best way to stay healthy is to live a healthy lifestyle. A healthy lifestyle includes regular exercise, eating a well-balanced diet, keeping a healthy weight and not smoking. Regular physical exams and screening tests are another important way to take care of yourself. Preventive exams provided by health care providers can find health problems early when treatment works best and can keep you from getting certain diseases or illnesses. Preventive services include exams, lab tests, screenings, shots, monitoring and information to help you take care of your own health. All people over 65 should have a pneumonia shot. Pneumonia shots are usually only needed once in a lifetime unless your doctor decides differently. In addition to your physical exam, some screening tests are recommended: 
 
All people over 65 should have a yearly flu shot. People over 65 are at medium to high risk for Hepatitis B. Three shots are needed for complete protection. Bone mass measurement (dexa scan) is recommended every two years. Diabetes Mellitus screening is recommended every year. Glaucoma is an eye disease caused by high pressure in the eye. An eye exam is recommended every year. Cardiovascular screening tests that check your cholesterol and other blood fat (lipid) levels are recommended every five years. Colorectal Cancer screening tests help to find pre-cancerous polyps (growths in the colon) so they can be removed before they turn into cancer. Tests ordered for screening depend on your personal and family history risk factors. Prostate Cancer Screening (annually up to age 76) Screening for breast cancer is recommended yearly with a Mammogram. 
 
Screening for cervical and vaginal cancer is recommended with a pelvic and Pap test every two years. However if you have had an abnormal pap in the past  three years or at high risk for cervical or vaginal cancer Medicare will cover a pap test and a pelvic exam every year. Here is a list of your current Health Maintenance items with a due date: 
Health Maintenance Due Topic Date Due  
 Hepatitis C Test  1945  
 DTaP/Tdap/Td  (1 - Tdap) 03/23/1966  Stool testing for trace blood  03/23/1995  Shingles Vaccine  01/23/2005  Glaucoma Screening   03/23/2010 [address] [place] [address] Annual Well Visit  03/14/2018 Learning About Cutting Calories How do calories affect your weight? Food gives your body energy.  Energy from the food you eat is measured in calories. This energy keeps your heart beating, your brain active, and your muscles working. Your body needs a certain number of calories each day. After your body uses the calories it needs, it stores extra calories as fat. To lose weight safely, you have to eat fewer calories while eating in a healthy way. How many calories do you need each day? The more active you are, the more calories you need. When you are less active, you need fewer calories. How many calories you need each day also depends on several things, including your age and whether you are male or female. Here are some general guidelines for adults: 
· Less active women and older adults need 1,600 to 2,000 calories each day. · Active women and less active men need 2,000 to 2,400 calories each day. · Active men need 2,400 to 3,000 calories each day. How can you cut calories and eat healthy meals? Whole grains, vegetables and fruits, and dried beans are good lower-calorie foods. They give you lots of nutrients and fiber. And they fill you up. Sweets, energy drinks, and soda pop are high in calories. They give you few nutrients and no fiber. Try to limit soda pop, fruit juice, and energy drinks. Drink water instead. Some fats can be part of a healthy diet. But cutting back on fats from highly processed foods like fast foods and many snack foods is a good way to lower the calories in your diet. Also, use smaller amounts of fats like butter, margarine, salad dressing, and mayonnaise. Add fresh garlic, lemon, or herbs to your meals to add flavor without adding fat. Meats and dairy products can be a big source of hidden fats. Try to choose lean or low-fat versions of these products. Fat-free cookies, candies, chips, and frozen treats can still be high in sugar and calories. Some fat-free foods have more calories than regular ones. Eat fat-free treats in moderation, as you would other foods. If your favorite foods are high in fat, salt, sugar, or calories, limit how often you eat them. Eat smaller servings, or look for healthy substitutes. Fill up on fruits, vegetables, and whole grains. Eating at home · Use meat as a side dish instead of as the main part of your meal. 
· Try main dishes that use whole wheat pasta, brown rice, dried beans, or vegetables. · Find ways to cook with little or no fat, such as broiling, steaming, or grilling. · Use cooking spray instead of oil. If you use oil, use a monounsaturated oil, such as canola or olive oil. · Trim fat from meats before you cook them. · Drain off fat after you brown the meat or while you roast it. · Chill soups and stews after you cook them. Then skim the fat off the top after it hardens. Eating out · Order foods that are broiled or poached rather than fried or breaded. · Cut back on the amount of butter or margarine that you use on bread. · Order sauces, gravies, and salad dressings on the side, and use only a little. · When you order pasta, choose tomato sauce rather than cream sauce. · Ask for salsa with your baked potato instead of sour cream, butter, cheese, or rosado. · Order meals in a small size instead of upgrading to a large. · Share an entree, or take part of your food home to eat as another meal. 
· Share appetizers and desserts. Where can you learn more? Go to http://ricci-hectro.info/. Enter 99 [id number] in the search box to learn more about \"Learning About Cutting Calories. \" Current as of: May 12, 2017 Content Version: 11.7 © 8490-7720 [employer]edu, [employer]. Care instructions adapted under license by [employer] (which disclaims liability or warranty for this information). If you have questions about a medical condition or this instruction, always ask your healthcare professional. [address] any warranty or liability for your use of this information. Advance Directives: Care Instructions Your Care Instructions An advance directive is a legal way to state your wishes at the end of your life. It tells your family and your doctor what to do if you can no longer say what you want. There are two main types of advance directives. You can change them any time that your wishes change. · A living will tells your family and your doctor your wishes about life support and other treatment. · A durable power of  for health care lets you name a person to make treatment decisions for you when you can't speak for yourself. This person is called a health care agent. If you do not have an advance directive, decisions about your medical care may be made by a doctor or a  who doesn't know you. It may help to think of an advance directive as a gift to the people who care for you. If you have one, they won't have to make tough decisions by themselves. Follow-up care is a key part of your treatment and safety. Be sure to make and go to all appointments, and call your doctor if you are having problems. It's also a good idea to know your test results and keep a list of the medicines you take. How can you care for yourself at home? · Discuss your wishes with your loved ones and your doctor. This way, there are no surprises. · Many states have a unique form. Or you might use a universal form that has been approved by many states. This kind of form can sometimes be completed and stored online. Your electronic copy will then be available wherever you have a connection to the Internet. In most cases, doctors will respect your wishes even if you have a form from a different state. · You don't need a  to do an advance directive. But you may want to get legal advice. · Think about these questions when you prepare an advance directive: ¨ Who do you want to make decisions about your medical care if you are not able to? Many people choose a family member or close friend. ¨ Do you know enough about life support methods that might be used? If not, talk to your doctor so you understand. ¨ What are you most afraid of that might happen? You might be afraid of having pain, losing your independence, or being kept alive by machines. ¨ Where would you prefer to die? Choices include your home, a hospital, or a nursing home. ¨ Would you like to have information about hospice care to support you and your family? ¨ Do you want to donate organs when you die? ¨ Do you want certain Gnosticist practices performed before you die? If so, put your wishes in the advance directive. · Read your advance directive every year, and make changes as needed. When should you call for help? Be sure to contact your doctor if you have any questions. Where can you learn more? Go to http://ricci-hector.info/. Enter R264 in the search box to learn more about \"Advance Directives: Care Instructions. \" Current as of: October 6, 2017 Content Version: 11.7 © 2775-3438 [employer]. Care instructions adapted under license by [employer] (which disclaims liability or warranty for this information). If you have questions about a medical condition or this instruction, always ask your healthcare professional. [address] any warranty or liability for your use of this information. Introducing [facility]! Dear Sabiha Silveira: Thank you for requesting a SoMoLend account. Our records indicate that you already have an active SoMoLend account. You can access your account anytime at [email or web address] Did you know that you can access your hospital and ER discharge instructions at any time in SoMoLend? You can also review all of your test results from your hospital stay or ER visit. Additional Information If you have questions, please visit the Frequently Asked Questions section of the [email or web address] website at [email or web address] Remember, Corvil is NOT to be used for urgent needs. For medical emergencies, dial 911. Now available from your iPhone and Android! Please provide this summary of care documentation to your next provider. Your primary care clinician is listed as LOU Marcus. If you have any questions after today's visit, please call [phone].

## 2018-07-25 NOTE — PROGRESS NOTES
This note will not be viewable in [address] Alfred Arcos is a 68 y.o. male who presents for an Initial Medicare Annual Wellness Exam (AWV) and follow up of chronic medical conditions. The patient states he has not had a Medicare wellness exam in the past    I have reviewed the patient's medical history in detail and updated the computerized patient record. History     Subjective:  Mr. Dominique Pastrana presents to the office today for Medicare wellness exam and follow-up of his medical problems. The patient has hypertension. He remains on Bumex verapamil and lisinopril. He is tolerating this without dizziness, muscle cramping, cough, swelling, dizziness. He has had no headaches, numbness, tingling or focal neurological problems. He has a hyperlipidemia for which he remains on fenofibrate. The patient tolerates this without GI upset. He has no history of coronary artery disease and denies exertional chest pains or claudication. There is a history of pancreatitis. He continues to drink more than 14 beers per week. He states that he will always be a drinker. He has had no recurrent abdominal pain. Patient Active Problem List   Diagnosis Code    History of pancreatitis Z87.19    Sepsis(995.91) A41.9    UTI (urinary tract infection) N39.0    Obesity (BMI 30-39. 9) E66.9    Hyperlipidemia E78.5    Alcohol abuse F10.10    Colon polyp K63.5    Hypertension I10    Elevated CPK R74.8    Psoriasis L40.9       Patient Care Team:  Jose Blanca MD as PCP - General (Internal Medicine)  Silvia Zurita MD (Dermatology)    Past Medical History:   Diagnosis Date    Colon polyp     Elevated CPK     Hyperlipidemia     Hypertension     Psoriasis      Past Surgical History:   Procedure Laterality Date    HX COLONOSCOPY  02/20/2014    HX HERNIA REPAIR       No Known Allergies  Current Outpatient Prescriptions   Medication Sig Dispense Refill    verapamil ER (CALAN-SR) 240 mg CR tablet Take 1 Tab by mouth daily. 90 Tab 3    lisinopril (PRINIVIL, ZESTRIL) 20 mg tablet Take 1 Tab by mouth daily. 90 Tab 3    fenofibrate (LOFIBRA) 160 mg tablet Take 1 Tab by mouth daily. 90 Tab 3    bumetanide (BUMEX) 0.5 mg tablet Take 2 Tabs by mouth daily. 90 Tab 3    potassium 99 mg tablet Take 99 mg by mouth daily.  magnesium oxide (MAG-OX) 400 mg tablet Take 400 mg by mouth daily.  metroNIDAZOLE (METROCREAM) 0.75 % topical cream Apply  to affected area two (2) times a day. Use a thin layer to affected areas after washing      clobetasol (TEMOVATE) 0.05 % topical cream Apply  to affected area two (2) times a day.  omega-3 fatty acids Cap Take 1,000 mg by mouth.  aspirin 81 mg tablet Take 81 mg by mouth.          Social History     Social History    Marital status:      Spouse name: N/A    Number of children: N/A    Years of education: N/A     Social History Main Topics    Smoking status: Former Smoker    Smokeless tobacco: Never Used    Alcohol use 0.0 oz/week    Drug use: None    Sexual activity: Not Asked     Other Topics Concern    None     Social History Narrative     Family History   Problem Relation Age of Onset    Cancer Mother      colon     Health Maintenance   Topic Date Due    Hepatitis C Screening  1945    DTaP/Tdap/Td series (1 - Tdap) 03/23/1966    FOBT Q 1 YEAR AGE 50-75  03/23/1995    ZOSTER VACCINE AGE 60>  01/23/2005    GLAUCOMA SCREENING Q2Y  03/23/2010    MEDICARE YEARLY EXAM  03/14/2018    Influenza Age 9 to Adult  08/01/2018    Pneumococcal 65+ Low/Medium Risk (2 of 2 - PPSV23) 12/04/2018          Review of Systems  Constitutional: negative for fevers, chills, anorexia and weight loss  Eyes:   negative for visual disturbance and irritation  ENT:   negative for tinnitus,sore throat,nasal congestion,ear pain,hoarseness  Respiratory:  negative for cough, hemoptysis, dyspnea,wheezing  CV:   negative for chest pain, palpitations, lower extremity edema  GI:   negative for nausea, vomiting, diarrhea, abdominal pain,melena  Endo:               negative for polyuria,polydipsia,polyphagia,heat intolerance  Genitourinary: negative for frequency, dysuria and hematuria  Integumentary: negative for rash and pruritus  Hematologic:  negative for easy bruising and gum/nose bleeding  Musculoskel: negative for myalgias, arthralgias, back pain, muscle weakness, joint pain  Neurological:  negative for headaches, dizziness, vertigo, memory problems and gait   Behavl/Psych: negative for feelings of anxiety, depression, mood changes  ROS otherwise negative      Depression Risk Factor Screening:     PHQ over the last two weeks 7/25/2018   Little interest or pleasure in doing things Not at all   Feeling down, depressed, irritable, or hopeless Not at all   Total Score PHQ 2 0       Alcohol Risk Factor Screening: You average more than 14 drinks a week. Functional Ability and Level of Safety:   Hearing Loss  Hearing is good. Activities of Daily Living  ADL Assessment 7/25/2018   Feeding yourself No Help Needed   Getting from bed to chair No Help Needed   Getting dressed No Help Needed   Bathing or showering No Help Needed   Walk across the room (includes cane/walker) No Help Needed   Using the telphone No Help Needed   Taking your medications No Help Needed   Preparing meals No Help Needed   Managing money (expenses/bills) No Help Needed   Moderately strenuous housework (laundry) No Help Needed   Shopping for personal items (toiletries/medicines) No Help Needed   Shopping for groceries No Help Needed   Driving No Help Needed   Climbing a flight of stairs No Help Needed   Getting to places beyond walking distances No Help Needed       Fall Risk  Fall Risk Assessment, last 12 mths 7/25/2018   Able to walk? Yes   Fall in past 12 months? No       Abuse Screen  Abuse Screening Questionnaire 7/25/2018   Do you ever feel afraid of your partner?  N   Are you in a relationship with someone who physically or mentally threatens you? N   Is it safe for you to go home? Y       Cognitive Screening   Evaluation of Cognitive Function:  Has your family/caregiver stated any concerns about your memory: no    Physical Exam     Visit Vitals    /60    Pulse 88    Ht 5' 9\" (1.753 m)    Wt 247 lb 3.2 oz (112.1 kg)    SpO2 96%    BMI 36.51 kg/m2     Body mass index is 36.51 kg/(m^2). General appearance - alert, well appearing, and in no distress  Mental status - alert, oriented to person, place, and time  EYE-ANNA, EOMI,conjunctiva normal bilaterally, lids normal  ENT-ENT exam normal, no neck nodes or sinus tenderness  Nose - normal and patent, no erythema,  Or discharge   Mouth - mucous membranes moist, pharynx normal without lesions  Neck - supple, no significant adenopathy or bruit  Chest - clear to auscultation, no wheezes, rales or rhonchi. Heart - normal rate, regular rhythm, normal S1, S2, no murmurs, rubs, clicks or gallops   Abdomen - soft, nontender, nondistended, no masses or organomegaly  Lymph- no adenopathy palpable  Ext-peripheral pulses normal, no pedal edema, no clubbing or cyanosis  Skin-Warm and dry. no hyperpigmentation, vitiligo, or suspicious lesions  Neuro -alert, oriented, normal speech, no focal findings or movement disorder noted    Assessment/Plan   [place] education and counseling provided:  Age appropriate evidence-based preventive care recommendations based on today's review and evaluation; including relevant cancer screening guidelines, and vaccination recommendations. An After Visit Summary was printed and given to the patient which information about these guidelines, and a personalized schedule for health maintenance items. Whe appropriate and with patient agreement, orders noted below were placed to complete missing health maintenance items.       Additional Plan for follow up chronic medical conditions includes:  Diagnoses and all orders for this visit:    Initial Medicare annual wellness visit    Essential hypertension    Mixed hyperlipidemia    Obesity (BMI 30-39. 9)    History of pancreatitis    Need for hepatitis C screening test  -     HEPATITIS C AB          Other instructions: The patient's medications are reviewed and reconciled. No change in his current medical regimen is made. Body mass index is 36.51 and dietary counseling along with printed patient education is given    Advanced care planning discussion occurred today. CDC recommended hepatitis C screening will be obtained today. The patient has had a glaucoma screening within the last year and will obtain a copy of his eye specialist office note for his record. Age recommended vaccinations were discussed and he is in need of a Tdap vaccination and shingles vaccination. He plans on getting these at his pharmacy. Colonoscopy was done in the last 2 years and we will track down his colonoscopy report for his health maintenance record. Have reviewed laboratory studies done on January 10 with the patient. Follow-up in 6 months    Follow-up Disposition:  Return in about 6 months (around 1/25/2019). I have reviewed with the patient details of the assessment and plan and all questions were answered. Relevent patient education was performed. The most recent lab findings were reviewed with the patient.     Sonia Barnes MD

## 2018-07-26 LAB — HCV AB S/CO SERPL IA: <0.1 S/CO RATIO (ref 0–0.9)

## 2019-01-24 ENCOUNTER — OFFICE VISIT (OUTPATIENT)
Dept: INTERNAL MEDICINE CLINIC | Age: 74
End: 2019-01-24

## 2019-01-24 VITALS
RESPIRATION RATE: 20 BRPM | TEMPERATURE: 98.3 F | HEART RATE: 86 BPM | OXYGEN SATURATION: 93 % | SYSTOLIC BLOOD PRESSURE: 134 MMHG | BODY MASS INDEX: 35.99 KG/M2 | DIASTOLIC BLOOD PRESSURE: 84 MMHG | HEIGHT: 69 IN | WEIGHT: 243 LBS

## 2019-01-24 DIAGNOSIS — E66.9 OBESITY (BMI 30-39.9): ICD-10-CM

## 2019-01-24 DIAGNOSIS — I10 ESSENTIAL HYPERTENSION: Primary | ICD-10-CM

## 2019-01-24 DIAGNOSIS — E78.2 MIXED HYPERLIPIDEMIA: ICD-10-CM

## 2019-01-24 LAB
BACTERIA UA POCT, BACTPOCT: ABNORMAL
BILIRUB UR QL STRIP: NEGATIVE
CASTS UA POCT: NEGATIVE
CLUE CELLS, CLUEPOCT: NEGATIVE
CRYSTALS UA POCT, CRYSPOCT: NEGATIVE
EPITHELIAL CELLS POCT: ABNORMAL
GLUCOSE UR-MCNC: NEGATIVE MG/DL
GRAN# POC: 4.5 K/UL (ref 2–7.8)
GRAN% POC: 66.9 % (ref 37–92)
HCT VFR BLD CALC: 43.8 % (ref 37–51)
HGB BLD-MCNC: 15.1 G/DL (ref 12–18)
KETONES P FAST UR STRIP-MCNC: NEGATIVE MG/DL
LY# POC: 1.8 K/UL (ref 0.6–4.1)
LY% POC: 27.1 % (ref 10–58.5)
MCH RBC QN: 31.2 PG (ref 26–32)
MCHC RBC-ENTMCNC: 34.3 G/DL (ref 30–36)
MCV RBC: 91 FL (ref 80–97)
MID #, POC: 0.4 K/UL (ref 0–1.8)
MID% POC: 6 % (ref 0.1–24)
MUCUS UA POCT, MUCPOCT: ABNORMAL
PH UR STRIP: 5 [PH] (ref 5–7)
PLATELET # BLD: 212 K/UL (ref 140–440)
PROT UR QL STRIP: ABNORMAL
RBC # BLD: 4.83 M/UL (ref 4.2–6.3)
RBC UA POCT, RBCPOCT: 0
SP GR UR STRIP: 1.03 (ref 1.01–1.02)
TRICH UA POCT, TRICHPOC: NEGATIVE
UA UROBILINOGEN AMB POC: ABNORMAL (ref 0.2–1)
URINALYSIS CLARITY POC: ABNORMAL
URINALYSIS COLOR POC: ABNORMAL
URINE BLOOD POC: NEGATIVE
URINE CULT COMMENT, POCT: ABNORMAL
URINE LEUKOCYTES POC: NEGATIVE
URINE NITRITES POC: NEGATIVE
WBC # BLD: 6.7 K/UL (ref 4.1–10.9)
WBC UA POCT, WBCPOCT: 0
YEAST UA POCT, YEASTPOC: NEGATIVE

## 2019-01-24 RX ORDER — LISINOPRIL 20 MG/1
20 TABLET ORAL DAILY
Qty: 90 TAB | Refills: 3 | Status: SHIPPED | OUTPATIENT
Start: 2019-01-24 | End: 2020-03-02 | Stop reason: SDUPTHER

## 2019-01-24 RX ORDER — FENOFIBRATE 160 MG/1
160 TABLET ORAL DAILY
Qty: 90 TAB | Refills: 3 | Status: SHIPPED | OUTPATIENT
Start: 2019-01-24 | End: 2020-03-02 | Stop reason: SDUPTHER

## 2019-01-24 RX ORDER — BUMETANIDE 0.5 MG/1
1 TABLET ORAL DAILY
Qty: 90 TAB | Refills: 3 | Status: SHIPPED | OUTPATIENT
Start: 2019-01-24 | End: 2020-03-02 | Stop reason: SDUPTHER

## 2019-01-24 RX ORDER — VERAPAMIL HYDROCHLORIDE 240 MG/1
240 TABLET, FILM COATED, EXTENDED RELEASE ORAL DAILY
Qty: 90 TAB | Refills: 3 | Status: SHIPPED | OUTPATIENT
Start: 2019-01-24 | End: 2020-03-02 | Stop reason: SDUPTHER

## 2019-01-24 NOTE — PATIENT INSTRUCTIONS

## 2019-01-24 NOTE — PROGRESS NOTES
Joce Tom is a 68 y.o. male presenting for Hypertension (6 mo fu) Veronica Mobley 1. Have you been to the ER, urgent care clinic since your last visit? Hospitalized since your last visit? No 
 
2. Have you seen or consulted any other health care providers outside of the [address] [place] [address] since your last visit? Include any pap smears or colon screening. Yes When: 6/2018 Where: dermatologist Reason for visit: routine follow up Fall Risk Assessment, last 12 mths 1/24/2019 Able to walk? Yes Fall in past 12 months? No  
 
 
 
Abuse Screening Questionnaire 1/24/2019 Do you ever feel afraid of your partner? Pete Larsen Are you in a relationship with someone who physically or mentally threatens you? Pete Larsen Is it safe for you to go home? Y  
 
 
PHQ over the last two weeks 1/24/2019 Little interest or pleasure in doing things Not at all Feeling down, depressed, irritable, or hopeless Not at all Total Score PHQ 2 0 There are no discontinued medications.

## 2019-01-24 NOTE — PROGRESS NOTES
This note will not be viewable in [address] Subjective:  
 
Mr. Sierra Barkley presents to the office today in follow-up of his medical issues. The patient has hypertension and remains on verapamil lisinopril and Bumex. He tolerates the regimen without cough, dizziness, muscle cramping. He has had no headaches, numbness, tingling or focal neurological problems. He has hyperlipidemia for which he is on fenofibrate. He tolerates this without any GI upset. He has no history of coronary artery disease and denies exertional chest pain or claudication. Past Medical History:  
Diagnosis Date  Colon polyp  Elevated CPK  Hyperlipidemia  Hypertension  Psoriasis Past Surgical History:  
Procedure Laterality Date  HX COLONOSCOPY  02/20/2014  HX HERNIA REPAIR No Known Allergies Current Outpatient Medications Medication Sig Dispense Refill  verapamil ER (CALAN-SR) 240 mg CR tablet Take 1 Tab by mouth daily. 90 Tab 3  
 lisinopril (PRINIVIL, ZESTRIL) 20 mg tablet Take 1 Tab by mouth daily. 90 Tab 3  
 fenofibrate (LOFIBRA) 160 mg tablet Take 1 Tab by mouth daily. 90 Tab 3  
 bumetanide (BUMEX) 0.5 mg tablet Take 2 Tabs by mouth daily. 90 Tab 3  varicella-zoster [facility] ADJUVANT COMPONENT-PF) injection 1 Each by IntraMUSCular route once for 1 dose. 1 Each 1  
 diphtheria-pertussis, acellular,-tetanus (BOOSTRIX TDAP) 2.5-8-5 Lf-mcg-Lf/0.5mL susp susp 0.5 mL by IntraMUSCular route once for 1 dose. 0.5 mL 0  
 potassium 99 mg tablet Take 99 mg by mouth daily.  magnesium oxide (MAG-OX) 400 mg tablet Take 400 mg by mouth daily.  metroNIDAZOLE (METROCREAM) 0.75 % topical cream Apply  to affected area two (2) times a day. Use a thin layer to affected areas after washing  clobetasol (TEMOVATE) 0.05 % topical cream Apply  to affected area two (2) times a day.  omega-3 fatty acids Cap Take 500 mg by mouth.  aspirin 81 mg tablet Take 81 mg by mouth every other day. Social History Socioeconomic History  Marital status:  Spouse name: Not on file  Number of children: Not on file  Years of education: Not on file  Highest education level: Not on file Tobacco Use  Smoking status: Former Smoker  Smokeless tobacco: Never Used Substance and Sexual Activity  Alcohol use: Yes Alcohol/week: 12.0 oz Types: 20 Cans of beer per week  Drug use: No  
 
Family History Problem Relation Age of Onset  Cancer Mother   
     colon Review of Systems: 
GEN: no weight loss, weight gain, fatigue or night sweats CV: no PND, orthopnea, or palpitations Resp: no dyspnea on exertion, no cough Abd: no nausea, vomiting or diarrhea EXT: denies edema, claudication Endocrine: no hair loss, excessive thirst or polyuria Neurological ROS: no TIA or stroke symptoms ROS otherwise negative Objective:  
 
Visit Vitals /84 (BP 1 Location: Right arm, BP Patient Position: Sitting) Pulse 86 Temp 98.3 °F (36.8 °C) (Oral) Resp 20 Ht 5' 9\" (1.753 m) Wt 243 lb (110.2 kg) SpO2 93% BMI 35.88 kg/m² Body mass index is 35.88 kg/m². General:   alert, cooperative and no distress Eyes: conjunctivae/sclerae clear. PERRL, EOM's intact Mouth:  No oral lesions, no pharyngeal erythema, no exudates Neck: Trachea midline, no thyromegaly, no bruits Heart: S1 and S2 normal,no murmurs noted Lungs: Clear to auscultation bilaterally, no increased work of breathing Abdomen: Soft, nontender. Normal bowel sounds Extremities: No edema or cyanosis Neuro: ..alert, oriented x3,speech normal in context and clarity, cranial nerves II-XII intact,motor strength: full proximally and distally,gait: normal 
reflexes: full and symmetric Physical exam otherwise negative Assessment/Plan:  
 
Diagnoses and all orders for this visit: 
 
Essential hypertension -     verapamil ER (CALAN-SR) 240 mg CR tablet; Take 1 Tab by mouth daily. , Normal, Disp-90 Tab, R-3 
-     lisinopril (PRINIVIL, ZESTRIL) 20 mg tablet; Take 1 Tab by mouth daily. , Normal, Disp-90 Tab, R-3 
-     bumetanide (BUMEX) 0.5 mg tablet; Take 2 Tabs by mouth daily. , Normal, Disp-90 Tab, R-3 
-     AMB POC COMPLETE CBC,AUTOMATED ENTER 
-     COLLECTION VENOUS BLOOD,VENIPUNCTURE 
-     AMB POC URINALYSIS DIP STICK AUTO W/ MICRO  
-     METABOLIC PANEL, COMPREHENSIVE Mixed hyperlipidemia 
-     fenofibrate (LOFIBRA) 160 mg tablet; Take 1 Tab by mouth daily. , Normal, Disp-90 Tab, R-3 
-     LIPID PANEL 
-     TSH 3RD GENERATION Obesity (BMI 30-39. 9) Other orders 
-     varicella-zoster [facility] ADJUVANT COMPONENT-PF) injection; 1 Each by IntraMUSCular route once for 1 dose., Normal, Disp-1 Each, R-1 
-     diphtheria-pertussis, acellular,-tetanus (BOOSTRIX TDAP) 2.5-8-5 Lf-mcg-Lf/0.5mL susp susp; 0.5 mL by IntraMUSCular route once for 1 dose., Normal, Disp-0.5 mL, R-0 Other instructions: The patient's medications are reviewed and reconciled. No change in his current medical regimen is made. Body mass index is 35.9 and dietary counseling along with printed patient education is given Vaccines for Boostrix and Shingrix were referred to his pharmacy to be administered to him. Await results of multiple labs Follow-up 6 months Follow-up Disposition: 
Return in about 6 months (around 7/24/2019).  
 
Preet Duncan MD

## 2019-01-25 LAB
ALBUMIN SERPL-MCNC: 4.7 G/DL (ref 3.5–4.8)
ALBUMIN/GLOB SERPL: 1.7 {RATIO} (ref 1.2–2.2)
ALP SERPL-CCNC: 58 IU/L (ref 39–117)
ALT SERPL-CCNC: 32 IU/L (ref 0–44)
AST SERPL-CCNC: 32 IU/L (ref 0–40)
BILIRUB SERPL-MCNC: 0.7 MG/DL (ref 0–1.2)
BUN SERPL-MCNC: 13 MG/DL (ref 8–27)
BUN/CREAT SERPL: 18 (ref 10–24)
CALCIUM SERPL-MCNC: 9.7 MG/DL (ref 8.6–10.2)
CHLORIDE SERPL-SCNC: 102 MMOL/L (ref 96–106)
CHOLEST SERPL-MCNC: 253 MG/DL (ref 100–199)
CO2 SERPL-SCNC: 25 MMOL/L (ref 20–29)
CREAT SERPL-MCNC: 0.71 MG/DL (ref 0.76–1.27)
GLOBULIN SER CALC-MCNC: 2.8 G/DL (ref 1.5–4.5)
GLUCOSE SERPL-MCNC: 103 MG/DL (ref 65–99)
HDLC SERPL-MCNC: 73 MG/DL
LDLC SERPL CALC-MCNC: 157 MG/DL (ref 0–99)
POTASSIUM SERPL-SCNC: 4.4 MMOL/L (ref 3.5–5.2)
PROT SERPL-MCNC: 7.5 G/DL (ref 6–8.5)
SODIUM SERPL-SCNC: 144 MMOL/L (ref 134–144)
TRIGL SERPL-MCNC: 115 MG/DL (ref 0–149)
TSH SERPL DL<=0.005 MIU/L-ACNC: 2.59 UIU/ML (ref 0.45–4.5)
VLDLC SERPL CALC-MCNC: 23 MG/DL (ref 5–40)

## 2019-05-22 NOTE — PERIOP NOTES
[facility]  Ambulatory Surgery Unit  Pre-operative Instructions for Endo Procedures    Procedure Date  Wednesday, May 29, 2019            Tentative Arrival Time 0645      1. On the day of your procedure, please report to the Ambulatory Surgery Unit Registration Desk and sign in at your designated time. The Ambulatory Surgery Unit is located in [facility] on the [facility] side of the [facility] across from the [address] [place] [address]. Please have all of your health insurance cards and a photo ID. 2. You must have someone with you to drive you home, as you should not drive a car for 24 hours following anesthesia. Please make arrangements for a responsible adult friend or family member to stay with you for at least the first 24 hours after your procedure. 3. Do not have anything to eat or drink (including water, gum, mints, coffee, juice) after 11:59 PM, Tuesday. This may not apply to medications prescribed by your physician. (Please note below the special instructions with medications to take the morning of your procedure.)    4. If applicable, follow the clear liquid diet and bowel prep instructions provided by your physician's office. If you do not have this information, or have any questions, please contact your physician's office. 5. We recommend you do not drink any alcoholic beverages for 24 hours before and after your procedure. 6. Contact your surgeons office for instructions on the following medications: non-steroidal anti-inflammatory drugs (i.e. Advil, Aleve), vitamins, and supplements. (Some surgeons will want you to stop these medications prior to surgery and others may allow you to take them)   **If you are currently taking Plavix, Coumadin, Aspirin and/or other blood-thinning agents, contact your surgeon for instructions. ** Your surgeon will partner with the physician prescribing these medications to determine if it is safe to stop or if you need to continue taking. Please do not stop taking these medications without instructions from your surgeon. 7. In an effort to help prevent surgical site infection, we ask that you shower with an anti-bacterial soap (i.e. Dial or Safeguard) on the morning of your procedure. Do not apply any lotions, powders, or deodorants after showering. 8. Wear comfortable clothes. Wear glasses instead of contacts. Do not bring any jewelry or money (other than copays or fees as instructed). Do not wear make-up, particularly mascara, the morning of your procedure. Wear your hair loose or down, no ponytails, buns, gregory pins or clips. All body piercings must be removed. 9. You should understand that if you do not follow these instructions your procedure may be cancelled. If your physical condition changes (i.e. fever, cold or flu) please contact your surgeon as soon as possible. 10. It is important that you be on time. If a situation occurs where you may be late, or if you have any questions or problems, please call [phone]. 11. Your procedure time may be subject to change. You will receive a phone call the day prior to confirm your arrival time. Special Instructions: Take all medications and inhalers, as prescribed, on the morning of surgery with a sip of water. I understand a pre-operative phone call will be made to verify my procedure time. In the event that I am not available, I give permission for a message to be left on my answering service and/or with another person?       yes    Preop instructions reviewed  Pt verbalized understanding.      ___________________      ___________________      ___________________  (Signature of Patient)          (Witness)                   (Date and Time)

## 2019-05-22 NOTE — ADVANCED PRACTICE NURSE
LEVI 6 in PAT phone assessment. Pt admits to snoring loud enough to be heard through a closed door, but denies ever having been advised that he has witnessed apnea while sleeping or ever having been referred for a sleep apnea evaluation. Results faxed to PCP for review and further recommendations regarding sleep apnea evaluation. Confirmation of fax received.

## 2019-05-22 NOTE — PERIOP NOTES
gunnar sent to Constantine Blair NP, LEVI score 6, Dr. Arcelia Baltazar. Called Alon Cole and requested prep be called into [facility] on [employer], she took information and will call it in.

## 2019-05-28 ENCOUNTER — ANESTHESIA EVENT (OUTPATIENT)
Dept: SURGERY | Age: 74
End: 2019-05-28
Payer: MEDICARE

## 2019-05-28 RX ORDER — SODIUM CHLORIDE, SODIUM LACTATE, POTASSIUM CHLORIDE, CALCIUM CHLORIDE 600; 310; 30; 20 MG/100ML; MG/100ML; MG/100ML; MG/100ML
25 INJECTION, SOLUTION INTRAVENOUS CONTINUOUS
Status: CANCELLED | OUTPATIENT
Start: 2019-05-28

## 2019-05-28 RX ORDER — SODIUM CHLORIDE 0.9 % (FLUSH) 0.9 %
5-40 SYRINGE (ML) INJECTION EVERY 8 HOURS
Status: CANCELLED | OUTPATIENT
Start: 2019-05-28

## 2019-05-28 RX ORDER — ONDANSETRON 2 MG/ML
4 INJECTION INTRAMUSCULAR; INTRAVENOUS AS NEEDED
Status: CANCELLED | OUTPATIENT
Start: 2019-05-28

## 2019-05-28 RX ORDER — SODIUM CHLORIDE 0.9 % (FLUSH) 0.9 %
5-40 SYRINGE (ML) INJECTION AS NEEDED
Status: CANCELLED | OUTPATIENT
Start: 2019-05-28

## 2019-05-28 RX ORDER — FENTANYL CITRATE 50 UG/ML
25 INJECTION, SOLUTION INTRAMUSCULAR; INTRAVENOUS
Status: CANCELLED | OUTPATIENT
Start: 2019-05-28

## 2019-05-28 RX ORDER — DIPHENHYDRAMINE HYDROCHLORIDE 50 MG/ML
12.5 INJECTION, SOLUTION INTRAMUSCULAR; INTRAVENOUS AS NEEDED
Status: CANCELLED | OUTPATIENT
Start: 2019-05-28 | End: 2019-05-28

## 2019-05-29 ENCOUNTER — ANESTHESIA (OUTPATIENT)
Dept: SURGERY | Age: 74
End: 2019-05-29
Payer: MEDICARE

## 2019-05-29 ENCOUNTER — HOSPITAL ENCOUNTER (OUTPATIENT)
Age: 74
Setting detail: OUTPATIENT SURGERY
Discharge: HOME | End: 2019-05-29
Attending: COLON & RECTAL SURGERY | Admitting: COLON & RECTAL SURGERY
Payer: MEDICARE

## 2019-05-29 VITALS
RESPIRATION RATE: 25 BRPM | WEIGHT: 245 LBS | HEART RATE: 64 BPM | TEMPERATURE: 98 F | DIASTOLIC BLOOD PRESSURE: 101 MMHG | BODY MASS INDEX: 36.29 KG/M2 | HEIGHT: 69 IN | SYSTOLIC BLOOD PRESSURE: 151 MMHG | OXYGEN SATURATION: 95 %

## 2019-05-29 PROBLEM — Z86.010 ENCOUNTER FOR COLONOSCOPY DUE TO HISTORY OF ADENOMATOUS COLONIC POLYPS: Status: ACTIVE | Noted: 2019-05-29

## 2019-05-29 PROBLEM — Z12.11 ENCOUNTER FOR COLONOSCOPY DUE TO HISTORY OF ADENOMATOUS COLONIC POLYPS: Status: ACTIVE | Noted: 2019-05-29

## 2019-05-29 PROCEDURE — 77030021352 HC CBL LD SYS DISP COVD -B: Performed by: COLON & RECTAL SURGERY

## 2019-05-29 PROCEDURE — 74011250636 HC RX REV CODE- 250/636

## 2019-05-29 PROCEDURE — 77030020255 HC SOL INJ LR 1000ML BG: Performed by: COLON & RECTAL SURGERY

## 2019-05-29 PROCEDURE — 76210000040 HC AMBSU PH I REC FIRST 0.5 HR: Performed by: COLON & RECTAL SURGERY

## 2019-05-29 PROCEDURE — 76210000046 HC AMBSU PH II REC FIRST 0.5 HR: Performed by: COLON & RECTAL SURGERY

## 2019-05-29 PROCEDURE — 76060000073 HC AMB SURG ANES FIRST 0.5 HR: Performed by: COLON & RECTAL SURGERY

## 2019-05-29 PROCEDURE — 76030000002 HC AMB SURG OR TIME FIRST 0.: Performed by: COLON & RECTAL SURGERY

## 2019-05-29 PROCEDURE — 74011250636 HC RX REV CODE- 250/636: Performed by: ANESTHESIOLOGY

## 2019-05-29 RX ORDER — SODIUM CHLORIDE 0.9 % (FLUSH) 0.9 %
5-40 SYRINGE (ML) INJECTION AS NEEDED
Status: DISCONTINUED | OUTPATIENT
Start: 2019-05-29 | End: 2019-05-29 | Stop reason: HOSPADM

## 2019-05-29 RX ORDER — LIDOCAINE HYDROCHLORIDE 10 MG/ML
0.1 INJECTION, SOLUTION EPIDURAL; INFILTRATION; INTRACAUDAL; PERINEURAL AS NEEDED
Status: DISCONTINUED | OUTPATIENT
Start: 2019-05-29 | End: 2019-05-29 | Stop reason: HOSPADM

## 2019-05-29 RX ORDER — PROPOFOL 10 MG/ML
INJECTION, EMULSION INTRAVENOUS AS NEEDED
Status: DISCONTINUED | OUTPATIENT
Start: 2019-05-29 | End: 2019-05-29 | Stop reason: HOSPADM

## 2019-05-29 RX ORDER — LIDOCAINE HYDROCHLORIDE 20 MG/ML
INJECTION, SOLUTION EPIDURAL; INFILTRATION; INTRACAUDAL; PERINEURAL AS NEEDED
Status: DISCONTINUED | OUTPATIENT
Start: 2019-05-29 | End: 2019-05-29 | Stop reason: HOSPADM

## 2019-05-29 RX ORDER — SODIUM CHLORIDE 0.9 % (FLUSH) 0.9 %
5-40 SYRINGE (ML) INJECTION EVERY 8 HOURS
Status: DISCONTINUED | OUTPATIENT
Start: 2019-05-29 | End: 2019-05-29 | Stop reason: HOSPADM

## 2019-05-29 RX ORDER — SODIUM CHLORIDE, SODIUM LACTATE, POTASSIUM CHLORIDE, CALCIUM CHLORIDE 600; 310; 30; 20 MG/100ML; MG/100ML; MG/100ML; MG/100ML
25 INJECTION, SOLUTION INTRAVENOUS CONTINUOUS
Status: DISCONTINUED | OUTPATIENT
Start: 2019-05-29 | End: 2019-05-29 | Stop reason: HOSPADM

## 2019-05-29 RX ADMIN — PROPOFOL 100 MG: 10 INJECTION, EMULSION INTRAVENOUS at 08:29

## 2019-05-29 RX ADMIN — PROPOFOL 100 MG: 10 INJECTION, EMULSION INTRAVENOUS at 08:33

## 2019-05-29 RX ADMIN — PROPOFOL 90 MG: 10 INJECTION, EMULSION INTRAVENOUS at 08:44

## 2019-05-29 RX ADMIN — LIDOCAINE HYDROCHLORIDE 40 MG: 20 INJECTION, SOLUTION EPIDURAL; INFILTRATION; INTRACAUDAL; PERINEURAL at 08:29

## 2019-05-29 RX ADMIN — SODIUM CHLORIDE, SODIUM LACTATE, POTASSIUM CHLORIDE, AND CALCIUM CHLORIDE 25 ML/HR: 600; 310; 30; 20 INJECTION, SOLUTION INTRAVENOUS at 07:29

## 2019-05-29 NOTE — ANESTHESIA PREPROCEDURE EVALUATION
Relevant Problems   No relevant active problems       Anesthetic History   No history of anesthetic complications            Review of Systems / Medical History  Patient summary reviewed, nursing notes reviewed and pertinent labs reviewed    Pulmonary  Within defined limits              Comments: Former smoker  LEVI score 6   Neuro/Psych   Within defined limits           Cardiovascular    Hypertension          Hyperlipidemia    Exercise tolerance: >4 METS     GI/Hepatic/Renal  Within defined limits              Endo/Other        Obesity     Other Findings   Comments: ETOH abuse  Psoriasis           Physical Exam    Airway  Mallampati: II  TM Distance: 4 - 6 cm  Neck ROM: normal range of motion, short neck   Mouth opening: Normal     Cardiovascular    Rhythm: regular  Rate: normal         Dental    Dentition: Caps/crowns and Bridges     Pulmonary  Breath sounds clear to auscultation               Abdominal  GI exam deferred       Other Findings            Anesthetic Plan    ASA: 2  Anesthesia type: general and total IV anesthesia          Induction: Intravenous  Anesthetic plan and risks discussed with: Patient

## 2019-05-29 NOTE — INTERVAL H&P NOTE
H&P Update: 
Melissa Verdin was seen and examined. History and physical has been reviewed. Significant clinical changes have occurred as noted:  ENT clear;  Cor regular;  Lungs clear;  Abdomen obese, benign;  Rectal pending c-scope; Extremities and neuro WNL.

## 2019-05-29 NOTE — PERIOP NOTES
Permission received to review discharge instructions and discuss private health information with Radha Dumont, wife. Gave pt warm blanket.

## 2019-05-29 NOTE — PERIOP NOTES
BP slighty higher-pt holding onto air-stated he wanted to go to BR to let out. Pt. Alert. Denies pain or chill. Discharge instructions reviewed with caregiver and patient. Allowed and answered questions. Tolerating PO fluids. Both state ready for discharge. 1277 Pt escorted to BR to get ret of flatus-still feeling full.  0930 /98 after BR and encouraged pt to check BP when can outside of hospital. Discharged to car without incident

## 2019-05-29 NOTE — ANESTHESIA POSTPROCEDURE EVALUATION
Procedure(s):  COLONOSCOPY.     general, total IV anesthesia    Anesthesia Post Evaluation      Multimodal analgesia: multimodal analgesia not used between 6 hours prior to anesthesia start to PACU discharge  Patient location during evaluation: bedside  Patient participation: complete - patient participated  Level of consciousness: awake and alert  Pain score: 0  Airway patency: patent  Anesthetic complications: no  Cardiovascular status: acceptable  Respiratory status: acceptable  Hydration status: acceptable  Post anesthesia nausea and vomiting:  none      Vitals Value Taken Time   /92 5/29/2019  9:00 AM   Temp 36.4 °C (97.6 °F) 5/29/2019  8:52 AM   Pulse 65 5/29/2019  9:00 AM   Resp 21 5/29/2019  9:00 AM   SpO2 95 % 5/29/2019  9:00 AM

## 2019-05-29 NOTE — DISCHARGE INSTRUCTIONS
[facility]  [id number]  1945    COLON DISCHARGE INSTRUCTIONS  Discomfort:  Redness at IV site- apply warm compress to area; if redness or soreness persist- contact your physician  There may be a slight amount of blood passed from the rectum  Gaseous discomfort- walking, belching will help relieve any discomfort  You may not operate a vehicle for 24 hours  You may not engage in an occupation involving machinery or appliances for rest of today  You may not drink alcoholic beverages for at least 24 hours  Avoid making any critical decisions for at least 24 hour  DIET:   Regular diet. - however -  remember your colon is empty and a heavy meal will produce gas. Avoid these foods:  vegetables, fried / greasy foods, carbonated drinks for today     ACTIVITY:  You may not resume your normal daily activities; it is recommended that you spend the remainder of the day resting -  avoid any strenuous activity. CALL M.D. ANY SIGN OF:   Increasing pain, nausea, vomiting  Abdominal distension (swelling)  New increased bleeding (oral or rectal)  Fever (chills)  Pain in chest area  Bloody discharge from nose or mouth  Shortness of breath    You may  take any Advil, Aspirin, Ibuprofen, Motrin, Aleve, or Goodys or Tylenol as needed for pain. Post procedure diagnosis:  DIVERTICULOSIS  Follow-up Instructions:   Call Dr. Freddie Pop if any questions  Telephone #  [phone]  Additional instructions ;  followup c-scope in 5 years. DO NOT TAKE SLEEPING MEDICATIONS OR ANTIANXIETY MEDICATIONS WHILE TAKING NARCOTIC PAIN MEDICATIONS,  ESPECIALLY THE NIGHT OF ANESTHESIA. CPAP PATIENTS BE SURE TO WEAR MACHINE WHENEVER NAPPING OR SLEEPING.     DISCHARGE SUMMARY from Nurse    The following personal items collected during your admission are returned to you:   Dental Appliance: Dental Appliances: None  Vision: Visual Aid: Glasses, At home  Hearing Aid:    Jewelry:    Clothing:    Other Valuables:    Valuables sent to safe: PATIENT INSTRUCTIONS:    After General Anesthesia or Intravenous Sedation, for 24 hours or while taking prescription Narcotics:        Someone should be with you for the next 24 hours. For your own safety, a responsible adult must drive you home. · Limit your activities  · Recommended activity: Rest today, up with assistance today. Do not climb stairs or shower unattended for the next 24 hours. · Please start with a soft bland diet and advance as tolerated (no nausea) to regular diet. · If you have a sore throat you should try the following: fluids, warm salt water gargles, or throat lozenges. If it does not improve after several days please follow up with your primary physician. · Do not drive and operate hazardous machinery  · Do not make important personal or business decisions  · Do  not drink alcoholic beverages  · If you have not urinated within 8 hours after discharge, please contact your surgeon on call. Report the following to your surgeon:  · Excessive pain, swelling, redness or odor of or around the surgical area  · Temperature over 100.5  · Nausea and vomiting lasting longer than 4 hours or if unable to take medications      · You will receive a Post Operative Call from one of the Recovery Room Nurses on the day after your surgery to check on you. It is very important for us to know how you are recovering after your surgery. If you have an issue or need to speak with someone, please call your surgeon, do not wait for the post operative call. · You may receive an e-mail or letter in the mail from Sammy regarding your experience with us in the Ambulatory Surgery Unit. Your feedback is valuable to us and we appreciate your participation in the survey. · If the above instructions are not adequate, please contact YUE Vega, RN Perianesthesia Manager at [phone]. If you are having problems after your surgery, call the physician at his office number.     · We wish you a speedy recovery ? What to do at Home:      *  Please give a list of your current medications to your Primary Care Provider. *  Please update this list whenever your medications are discontinued, doses are      changed, or new medications (including over-the-counter products) are added. *  Please carry medication information at all times in case of emergency situations. These are general instructions for a healthy lifestyle:    No smoking/ No tobacco products/ Avoid exposure to second hand smoke    Surgeon General's Warning:  Quitting smoking now greatly reduces serious risk to your health. Obesity, smoking, and sedentary lifestyle greatly increases your risk for illness    A healthy diet, regular physical exercise & weight monitoring are important for maintaining a healthy lifestyle    You may be retaining fluid if you have a history of heart failure or if you experience any of the following symptoms:  Weight gain of 3 pounds or more overnight or 5 pounds in a week, increased swelling in our hands or feet or shortness of breath while lying flat in bed. Please call your doctor as soon as you notice any of these symptoms; do not wait until your next office visit. Recognize signs and symptoms of STROKE:    B - Balance  E - Eyes    F-  Face looks uneven    A-  Arms unable to move or move even    S-  Speech slurred or non-existent    T-  Time-call 911 as soon as signs and symptoms begin-DO NOT go       Back to bed or wait to see if you get better-TIME IS BRAIN. If you have not received your influenza and/or pneumococcal vaccine, please follow up with your primary care physician. The discharge information has been reviewed with the {PATIENT PARENT GUARDIAN:94768}. The {PATIENT PARENT GUARDIAN:84823} verbalized understanding.                       Shannan Mcleod  [id number]  1945        DISCHARGE SUMMARY from Nurse    The following personal items collected during your admission are returned to you:   Dental Appliance: Dental Appliances: None  Vision: Visual Aid: Glasses, At home  Hearing Aid:    Jewelry:    Clothing:    Other Valuables:    Valuables sent to safe:

## 2019-05-29 NOTE — PERIOP NOTES
Fermin Medina  1945  [id number]    Situation:  Verbal report given from: RN and CRNA  Procedure: Procedure(s):  COLONOSCOPY    Background:    Preoperative diagnosis: HISTORY OF POLYPS    Postoperative diagnosis: DIVERTICULOSIS    :  Dr. Werner Aguila    Assistant(s): Circ-1: Ad Roper RN  Scrub Tech-1: Adi QUEZADA    Specimens: * No specimens in log *    Assessment:  Intra-procedure medications   Propofol       Anesthesia gave intra-procedure sedation and medications, see anesthesia flow sheet     Intravenous fluids: LR@ KVO     Vital signs stable> BP better. States he saw primary in Feb and BP OK    Abdominal assessment: round and soft and hear bowel sounds      Recommendation:    Permission to share finding with wife yes    All side rails up, bed in low position, wheels locked. Nurse at bedside.

## 2019-05-29 NOTE — H&P
[address]  HISTORY AND PHYSICAL    Name:  Oscar Meier  MR#:  [id number]  :  1945  ACCOUNT #:  [de-identified]  ADMIT DATE:  2019        CHIEF COMPLAINT:  Personal history of colonic polyps and diverticulosis, now for interval followup colonoscopy. HISTORY OF PRESENT ILLNESS:  This patient is a 27-year-old gentleman with a personal history of colonic polyps. He is also known to have had diverticulosis. He is in now for followup interval exam.    PAST MEDICAL HISTORY:  Significant for previous history of hypercholesterolemia. He has got hypertension and some cardiac rate issues. He has had an umbilical hernia repair. ALLERGIES:  NONE. MEDICATIONS:  Have included verapamil, bumetanide, lisinopril, fenofibrate, doxycycline, clobetasol, aspirin, and various vitamins. HABITS:  He drinks socially. He does not smoke. FAMILY HISTORY:  Noncontributory. REVIEW OF SYSTEMS:  Negative. PHYSICAL EXAMINATION:  Pending. ASSESSMENT:  A 27-year-old gentleman with personal history of colonic polyps, now in for followup colonoscopy. PLAN:  We will move forward with colonoscopy today. He is aware of the risks of the procedure including bleeding, perforation, and the risk of missing small polyps. He understands and is agreeable, and we will move forward with exam today.       Dannielle Lindsey MD      [id number]  D:  2019 21:11  T:  2019 23:41  JOB #:  [id number]  CC:  MD Letty Whitten MD

## 2019-05-29 NOTE — BRIEF OP NOTE
BRIEF OPERATIVE NOTE    Date of Procedure: 5/29/2019   Preoperative Diagnosis: HISTORY OF POLYPS  Postoperative Diagnosis: DIVERTICULOSIS    Procedure(s):  COLONOSCOPY  Surgeon(s) and Role:     * Ronda Pearce MD - Primary         Surgical Assistant: None    Surgical Staff:  Circ-1: Linda Jung RN  Scrub Tech-1: Nathaly Coreas  Event Time In Time Out   Incision Start 0831    Incision Close 0544      Anesthesia: MAC   Estimated Blood Loss: None  Specimens: * No specimens in log *   Findings: left colonic diverticulosis; No new polyps   Complications: none.   Implants: * No implants in log *

## 2019-05-30 NOTE — OP NOTES
[address]  OPERATIVE REPORT    Name:  Campos Badillo  MR#:  [id number]  :  1945  ACCOUNT #:  [de-identified]  DATE OF SERVICE:  2019      PREOPERATIVE DIAGNOSES:  History of colonic polyps and diverticulosis; now for followup exam in view of previous history of polyps. POSTOPERATIVE DIAGNOSES:  History of colonic polyps and diverticulosis, with no new polyps on today's exam; descending and sigmoid diverticulosis stable. PROCEDURE PERFORMED:  Total colonoscopy to cecum. SURGEON:  Ruba Gomez MD    ASSISTANT:  None. ANESTHESIA:  IV sedation with propofol per Anesthesia. COMPLICATIONS:  None. SPECIMENS REMOVED:  None. IMPLANTS:  None. ESTIMATED BLOOD LOSS:  None. INDICATIONS:  The patient is a 49-year-old white male, in for a 5-year interval screening colonoscopy for a history of colonic polyps. He is aware of the risks of the procedure including bleeding, perforation, and the risk of missing small polyps. He understands and is agreeable to proceed, and we will move forward with exam today. PROCEDURE:  After uneventful induction of IV sedation, the patient was placed in the left lateral position and the pediatric 180 stiff endoscope passed through the colon to the cecal cap without difficulty. Position was confirmed with light reflex and local anatomic landmarks. Cecal cap and ileocecal valve were normal.  Prep was excellent. Photograph was obtained of the appendiceal orifice and the ileocecal valve simply to document location and anatomy. Scope was slowly and carefully retrieved. The ascending and transverse colon were completely normal.  The descending and sigmoid were pockmarked with moderate diverticulosis, but no inflammation, polyps, or AV malformations were noted. Scope was returned to the rectum which was unremarkable. Anal canal was negative. Air was aspirated. Scope was removed and the exam terminated.   The patient tolerated the exam well, remained stable, and left with a benign abdomen. He has to undergo followup exam in 5 years given his past history of polyps.       Tania Barnhart MD      [id number]  D:  05/29/2019 9:08  T:  05/29/2019 9:14  JOB #:  [id number]  CC:  MD Klaus Dozier MD

## 2019-08-26 ENCOUNTER — OFFICE VISIT (OUTPATIENT)
Dept: INTERNAL MEDICINE CLINIC | Age: 74
End: 2019-08-26

## 2019-08-26 VITALS
TEMPERATURE: 98 F | BODY MASS INDEX: 36.56 KG/M2 | RESPIRATION RATE: 18 BRPM | HEART RATE: 81 BPM | HEIGHT: 69 IN | DIASTOLIC BLOOD PRESSURE: 82 MMHG | WEIGHT: 246.8 LBS | OXYGEN SATURATION: 97 % | SYSTOLIC BLOOD PRESSURE: 134 MMHG

## 2019-08-26 DIAGNOSIS — E78.2 MIXED HYPERLIPIDEMIA: ICD-10-CM

## 2019-08-26 DIAGNOSIS — I10 ESSENTIAL HYPERTENSION: ICD-10-CM

## 2019-08-26 DIAGNOSIS — Z00.00 MEDICARE ANNUAL WELLNESS VISIT, SUBSEQUENT: Primary | ICD-10-CM

## 2019-08-26 RX ORDER — DOXYCYCLINE 100 MG/1
100 CAPSULE ORAL
COMMUNITY
End: 2020-03-02 | Stop reason: ALTCHOICE

## 2019-08-26 RX ORDER — CEFUROXIME AXETIL 250 MG/1
250 TABLET ORAL
COMMUNITY
End: 2020-03-02 | Stop reason: ALTCHOICE

## 2019-08-26 RX ORDER — METRONIDAZOLE 7.5 MG/G
CREAM TOPICAL 2 TIMES DAILY
COMMUNITY
End: 2021-01-22 | Stop reason: ALTCHOICE

## 2019-08-26 NOTE — PROGRESS NOTES
Chief Complaint   Patient presents with    Annual Wellness Visit    Follow Up Chronic Condition     6 mo fu       Depression Risk Factor Screening:     3 most recent PHQ Screens 1/24/2019   Little interest or pleasure in doing things Not at all   Feeling down, depressed, irritable, or hopeless Not at all   Total Score PHQ 2 0       Functional Ability and Level of Safety:     Activities of Daily Living  ADL Assessment 8/26/2019   Feeding yourself No Help Needed   Getting from bed to chair No Help Needed   Getting dressed No Help Needed   Bathing or showering No Help Needed   Walk across the room (includes cane/walker) No Help Needed   Using the telphone No Help Needed   Taking your medications No Help Needed   Preparing meals No Help Needed   Managing money (expenses/bills) No Help Needed   Moderately strenuous housework (laundry) No Help Needed   Shopping for personal items (toiletries/medicines) No Help Needed   Shopping for groceries No Help Needed   Driving No Help Needed   Climbing a flight of stairs No Help Needed   Getting to places beyond walking distances No Help Needed       Fall Risk  Fall Risk Assessment, last 12 mths 1/24/2019   Able to walk? Yes   Fall in past 12 months? No       Abuse Screen  Abuse Screening Questionnaire 1/24/2019   Do you ever feel afraid of your partner? N   Are you in a relationship with someone who physically or mentally threatens you? N   Is it safe for you to go home?  Y         Patient Care Team   Patient Care Team:  Lenin Partida MD as PCP - General (Internal Medicine)  Gladys Callahan MD (Dermatology)

## 2019-08-26 NOTE — PROGRESS NOTES
This note will not be viewable in [address] Shanelle Ruiz is a 76 y.o. male and presents with Annual Wellness Visit and Follow Up Chronic Condition (6 mo fu)  . Subjective:  Mr. Jo-Ann Jacobo presents to the office today for a Medicare wellness check and follow-up of medical issues. The patient has hypertension and remains on verapamil, lisinopril and Bumex. He supplements this with potassium as well. He denies any lower extremity edema, muscle cramping or orthostatic dizziness. He has had no headaches, numbness, tingling or focal neurological problems. He has hypercholesterolemia currently on fenofibrate. His last LDL was 157. We have tried to maintain him off of statin drugs due to his chronic alcohol usage and prior history of pancreatitis. He has no history of coronary artery disease and denies exertional chest pains or claudication. Past Medical History:   Diagnosis Date    At risk for sleep apnea 05/22/2019    during phone assessment for PAT, LEVI 6    Colon polyp     Elevated CPK     Hyperlipidemia     Hypertension     Psoriasis     Rosacea      Past Surgical History:   Procedure Laterality Date    COLONOSCOPY N/A 5/29/2019    COLONOSCOPY performed by [facility]hav[facility]h [facility] at [facility]    HX COLONOSCOPY  02/20/2014    HX HERNIA REPAIR      umbilical     No Known Allergies  Current Outpatient Medications   Medication Sig Dispense Refill    doxycycline (MONODOX) 100 mg capsule Take 100 mg by mouth two (2) times daily as needed.  metroNIDAZOLE (METROCREAM) 0.75 % topical cream Apply  to affected area two (2) times a day. Use a thin layer to affected areas after washing      cefUROXime (CEFTIN) 250 mg tablet Take 250 mg by mouth two (2) times daily as needed.  verapamil ER (CALAN-SR) 240 mg CR tablet Take 1 Tab by mouth daily. 90 Tab 3    lisinopril (PRINIVIL, ZESTRIL) 20 mg tablet Take 1 Tab by mouth daily.  90 Tab 3    fenofibrate (LOFIBRA) 160 mg tablet Take 1 Tab by mouth daily. 90 Tab 3    bumetanide (BUMEX) 0.5 mg tablet Take 2 Tabs by mouth daily. (Patient taking differently: Take 0.5 mg by mouth daily.) 90 Tab 3    potassium 99 mg tablet Take 198 mg by mouth daily.  magnesium oxide (MAG-OX) 400 mg tablet Take 400 mg by mouth daily.  clobetasol (TEMOVATE) 0.05 % topical cream Apply  to affected area two (2) times a day.  omega-3 fatty acids Cap Take 500 mg by mouth daily.  aspirin 81 mg tablet Take 81 mg by mouth four (4) days a week. Social History     Socioeconomic History    Marital status:      Spouse name: Not on file    Number of children: Not on file    Years of education: Not on file    Highest education level: Not on file   Tobacco Use    Smoking status: Former Smoker     Last attempt to quit:      Years since quittin.6    Smokeless tobacco: Never Used   Substance and Sexual Activity    Alcohol use:  Yes     Alcohol/week: 20.0 standard drinks     Types: 20 Cans of beer per week    Drug use: Never     Family History   Problem Relation Age of Onset    Cancer Mother         colon       Health Maintenance   Topic Date Due    Pneumococcal 65+ years (1 of 2 - PCV13) 2010    Shingrix Vaccine Age 50> (2 of 2) 2019    MEDICARE YEARLY EXAM  2019    Influenza Age 9 to Adult  2019    GLAUCOMA SCREENING Q2Y  2020    COLONOSCOPY  2024    DTaP/Tdap/Td series (2 - Td) 2029    Hepatitis C Screening  Completed        Review of Systems  Constitutional: negative for fevers, chills, anorexia and weight loss  Eyes:   negative for visual disturbance and irritation  ENT:   negative for tinnitus,sore throat,nasal congestion,ear pain,hoarseness  Respiratory:  negative for cough, hemoptysis, dyspnea,wheezing  CV:   negative for chest pain, palpitations, lower extremity edema  GI:   negative for nausea, vomiting, diarrhea, abdominal pain,melena  Endo:               negative for polyuria,polydipsia,polyphagia,heat intolerance  Genitourinary: negative for frequency, dysuria and hematuria  Integumentary: negative for rash and pruritus  Hematologic:  negative for easy bruising and gum/nose bleeding  Musculoskel: negative for myalgias, arthralgias, back pain, muscle weakness, joint pain  Neurological:  negative for headaches, dizziness, vertigo, memory problems and gait   Behavl/Psych: negative for feelings of anxiety, depression, mood changes  ROS otherwise negative      Objective:  Visit Vitals  /82 (BP 1 Location: Left arm, BP Patient Position: Sitting)   Pulse 81   Temp 98 °F (36.7 °C) (Oral)   Resp 18   Ht 5' 9\" (1.753 m)   Wt 246 lb 12.8 oz (111.9 kg)   SpO2 97%   BMI 36.45 kg/m²     Body mass index is 36.45 kg/m². Physical Exam:   General appearance - alert, well appearing, and in no distress  Mental status - alert, oriented to person, place, and time  EYE-ANNA, EOMI,conjunctiva normal bilaterally, lids normal  ENT-ENT exam normal, no neck nodes or sinus tenderness  Nose - normal and patent, no erythema,  Or discharge   Mouth - mucous membranes moist, pharynx normal without lesions  Neck - supple, no significant adenopathy or bruit  Chest - clear to auscultation, no wheezes, rales or rhonchi. Heart - normal rate, regular rhythm, normal S1, S2, no murmurs, rubs, clicks or gallops   Abdomen - soft, nontender, nondistended, no masses or organomegaly  Lymph- no adenopathy palpable  Ext-peripheral pulses normal, no pedal edema, no clubbing or cyanosis  Skin-Warm and dry. no hyperpigmentation, vitiligo, or suspicious lesions  Neuro -alert, oriented, normal speech, no focal findings or movement disorder noted    In addition this patient is seen for AWV  as detailed below:     This is a Subsequent Medicare Annual Wellness Exam (AWV) (Performed 12 months after IPPE or effective date of Medicare Part B enrollment)    I have reviewed the patient's medical history in detail and updated the computerized patient record. Problem list reviewed with patient and risk factors discussed. PSH, SH, FH, Medications and HM issues also reviewed and discussed. Depression screen, fall risk assessment, functional abilities and ACP also reviewed and discussed as above and below. Depression Risk Factor Screening:     3 most recent PHQ Screens 1/24/2019   Little interest or pleasure in doing things Not at all   Feeling down, depressed, irritable, or hopeless Not at all   Total Score PHQ 2 0     Alcohol Risk Factor Screening: You do not drink alcohol or very rarely. Functional Ability and Level of Safety:   Hearing Loss  Hearing is good. Activities of Daily Living  The home contains: no safety equipment. Patient does total self care    Fall Risk  Fall Risk Assessment, last 12 mths 1/24/2019   Able to walk? Yes   Fall in past 12 months? No       Abuse Screen  Patient is not abused    Cognitive Screening   Evaluation of Cognitive Function:  Has your family/caregiver stated any concerns about your memory: no  Normal    Patient Care Team   Patient Care Team:  Delaney Molina MD as PCP - General (Internal Medicine)  Edna Love MD (Dermatology)    Assessment/Plan   Education and counseling provided:  Are appropriate based on today's review and evaluation    Assessment/Plan:   Impressions:      ICD-10-CM ICD-9-CM    1. Medicare annual wellness visit, subsequent Z00.00 V70.0    2. Essential hypertension I10 401.9    3. Mixed hyperlipidemia E78.2 272.2         Plan:  1. Continue present meds  2. Lifestyle modifications including Na restriction, low carb/fat diet, weight reduction and exercise (at least a walking program). Follow-up and Dispositions    · Return in about 6 months (around 2/26/2020). No orders of the defined types were placed in this encounter.       Severiano Lira MD   Assessment/Plan:  Diagnoses and all orders for this visit:    Medicare annual wellness visit, subsequent    Essential hypertension    Mixed hyperlipidemia        Health Maintenance Due   Topic Date Due    Pneumococcal 65+ years (1 of 2 - PCV13) 03/23/2010    Shingrix Vaccine Age 50> (2 of 2) 06/20/2019    MEDICARE YEARLY EXAM  07/26/2019    Influenza Age 9 to Adult  08/01/2019       Other instructions: The patient's medications were reviewed and reconciled. No change in his current medical regimen is made. Body mass index is 36.5 and dietary counseling along with printed patient education is given    Health maintenance issues were reviewed and are up-to-date. Age-appropriate vaccinations were reviewed and he will receive an influenza and Prevnar 13 in November prior to going on a trip to [place]. The patient has received 1 of the Shingrix vaccinations and is awaiting the second. Labs from 1/24 were reviewed with the patient today. Follow-up 6 months    Follow-up and Dispositions    · Return in about 6 months (around 2/26/2020). I have reviewed with the patient details of the assessment and plan and all questions were answered. Relevent patient education was performed. The most recent lab findings were reviewed with the patient. An After Visit Summary was printed and given to the patient.     Ann Marsh MD

## 2019-08-26 NOTE — PATIENT INSTRUCTIONS
The best way to stay healthy is to live a healthy lifestyle. A healthy lifestyle includes regular exercise, eating a well-balanced diet, keeping a healthy weight and not smoking. Regular physical exams and screening tests are another important way to take care of yourself. Preventive exams provided by health care providers can find health problems early when treatment works best and can keep you from getting certain diseases or illnesses. Preventive services include exams, lab tests, screenings, shots, monitoring and information to help you take care of your own health. All people over 65 should have a pneumonia shot. Pneumonia shots are usually only needed once in a lifetime unless your doctor decides differently. In addition to your physical exam, some screening tests are recommended:    All people over 65 should have a yearly flu shot. People over 65 are at medium to high risk for Hepatitis B. Three shots are needed for complete protection. Bone mass measurement (dexa scan) is recommended every two years. Diabetes Mellitus screening is recommended every year. Glaucoma is an eye disease caused by high pressure in the eye. An eye exam is recommended every year. Cardiovascular screening tests that check your cholesterol and other blood fat (lipid) levels are recommended every five years. Colorectal Cancer screening tests help to find pre-cancerous polyps (growths in the colon) so they can be removed before they turn into cancer. Tests ordered for screening depend on your personal and family history risk factors. Prostate Cancer Screening (annually up to age 76)    Screening for breast cancer is recommended yearly with a Mammogram.    Screening for cervical and vaginal cancer is recommended with a pelvic and Pap test every two years.  However if you have had an abnormal pap in the past  three years or at high risk for cervical or vaginal cancer Medicare will cover a pap test and a pelvic exam every year. Here is a list of your current Health Maintenance items with a due date:  Health Maintenance Due   Topic Date Due    Pneumococcal Vaccine (1 of 2 - PCV13) 03/23/2010    Shingles Vaccine (2 of 2) 06/20/2019    Annual Well Visit  07/26/2019    Flu Vaccine  08/01/2019          Learning About Cutting Calories  How do calories affect your weight? Food gives your body energy. Energy from the food you eat is measured in calories. This energy keeps your heart beating, your brain active, and your muscles working. Your body needs a certain number of calories each day. After your body uses the calories it needs, it stores extra calories as fat. To lose weight safely, you have to eat fewer calories while eating in a healthy way. How many calories do you need each day? The more active you are, the more calories you need. When you are less active, you need fewer calories. How many calories you need each day also depends on several things, including your age and whether you are male or female. Here are some general guidelines for adults:  · Less active women and older adults need 1,600 to 2,000 calories each day. · Active women and less active men need 2,000 to 2,400 calories each day. · Active men need 2,400 to 3,000 calories each day. How can you cut calories and eat healthy meals? Whole grains, vegetables and fruits, and dried beans are good lower-calorie foods. They give you lots of nutrients and fiber. And they fill you up. Sweets, energy drinks, and soda pop are high in calories. They give you few nutrients and no fiber. Try to limit soda pop, fruit juice, and energy drinks. Drink water instead. Some fats can be part of a healthy diet. But cutting back on fats from highly processed foods like fast foods and many snack foods is a good way to lower the calories in your diet. Also, use smaller amounts of fats like butter, margarine, salad dressing, and mayonnaise.  Add fresh garlic, lemon, or herbs to your meals to add flavor without adding fat. Meats and dairy products can be a big source of hidden fats. Try to choose lean or low-fat versions of these products. Fat-free cookies, candies, chips, and frozen treats can still be high in sugar and calories. Some fat-free foods have more calories than regular ones. Eat fat-free treats in moderation, as you would other foods. If your favorite foods are high in fat, salt, sugar, or calories, limit how often you eat them. Eat smaller servings, or look for healthy substitutes. Fill up on fruits, vegetables, and whole grains. Eating at home  · Use meat as a side dish instead of as the main part of your meal.  · Try main dishes that use whole wheat pasta, brown rice, dried beans, or vegetables. · Find ways to cook with little or no fat, such as broiling, steaming, or grilling. · Use cooking spray instead of oil. If you use oil, use a monounsaturated oil, such as canola or olive oil. · Trim fat from meats before you cook them. · Drain off fat after you brown the meat or while you roast it. · Chill soups and stews after you cook them. Then skim the fat off the top after it hardens. Eating out  · Order foods that are broiled or poached rather than fried or breaded. · Cut back on the amount of butter or margarine that you use on bread. · Order sauces, gravies, and salad dressings on the side, and use only a little. · When you order pasta, choose tomato sauce rather than cream sauce. · Ask for salsa with your baked potato instead of sour cream, butter, cheese, or rosado. · Order meals in a small size instead of upgrading to a large. · Share an entree, or take part of your food home to eat as another meal.  · Share appetizers and desserts. Where can you learn more? Go to http://rosa.info/. Enter 99 [id number] in the search box to learn more about \"Learning About Cutting Calories. \"  Current as of: November 7, 2018  Content Version: 12.1  © 2216-1074 [employer]. Care instructions adapted under license by [employer] (which disclaims liability or warranty for this information). If you have questions about a medical condition or this instruction, always ask your healthcare professional. [address] any warranty or liability for your use of this information.

## 2019-09-20 PROBLEM — Z12.11 ENCOUNTER FOR COLONOSCOPY DUE TO HISTORY OF ADENOMATOUS COLONIC POLYPS: Status: RESOLVED | Noted: 2019-05-29 | Resolved: 2019-09-20

## 2019-09-20 PROBLEM — Z86.010 ENCOUNTER FOR COLONOSCOPY DUE TO HISTORY OF ADENOMATOUS COLONIC POLYPS: Status: RESOLVED | Noted: 2019-05-29 | Resolved: 2019-09-20

## 2020-03-02 ENCOUNTER — OFFICE VISIT (OUTPATIENT)
Dept: INTERNAL MEDICINE CLINIC | Age: 75
End: 2020-03-02

## 2020-03-02 VITALS
DIASTOLIC BLOOD PRESSURE: 76 MMHG | HEART RATE: 72 BPM | BODY MASS INDEX: 36.43 KG/M2 | WEIGHT: 246 LBS | OXYGEN SATURATION: 95 % | SYSTOLIC BLOOD PRESSURE: 126 MMHG | RESPIRATION RATE: 18 BRPM | TEMPERATURE: 98 F | HEIGHT: 69 IN

## 2020-03-02 DIAGNOSIS — E78.2 MIXED HYPERLIPIDEMIA: ICD-10-CM

## 2020-03-02 DIAGNOSIS — I10 ESSENTIAL HYPERTENSION: ICD-10-CM

## 2020-03-02 DIAGNOSIS — N39.0 URINARY TRACT INFECTION WITHOUT HEMATURIA, SITE UNSPECIFIED: Primary | ICD-10-CM

## 2020-03-02 PROBLEM — R74.8 ELEVATED CPK: Chronic | Status: ACTIVE | Noted: 2018-01-03

## 2020-03-02 LAB
A-G RATIO,AGRAT: 1.5 RATIO
ALBUMIN SERPL-MCNC: 4.6 G/DL (ref 3.9–5.4)
ALP SERPL-CCNC: 51 U/L (ref 38–126)
ALT SERPL-CCNC: 33 U/L (ref 0–50)
ANION GAP SERPL CALC-SCNC: 10 MMOL/L
AST SERPL W P-5'-P-CCNC: 38 U/L (ref 14–36)
BACTERIA,BACTU: ABNORMAL
BILIRUB SERPL-MCNC: 1.3 MG/DL (ref 0.2–1.3)
BILIRUB UR QL: NEGATIVE
BUN SERPL-MCNC: 14 MG/DL (ref 9–20)
BUN/CREATININE RATIO,BUCR: 20 RATIO
CALCIUM SERPL-MCNC: 9.2 MG/DL (ref 8.4–10.2)
CHLORIDE SERPL-SCNC: 103 MMOL/L (ref 98–107)
CHOL/HDL RATIO,CHHD: 4 RATIO (ref 0–4)
CHOLEST SERPL-MCNC: 243 MG/DL (ref 0–200)
CLARITY: CLEAR
CO2 SERPL-SCNC: 26 MMOL/L (ref 22–32)
COLOR UR: ABNORMAL
CREAT SERPL-MCNC: 0.7 MG/DL (ref 0.8–1.5)
GLOBULIN,GLOB: 3
GLUCOSE 24H UR-MRATE: NEGATIVE G/(24.H)
GLUCOSE SERPL-MCNC: 104 MG/DL (ref 75–110)
HDLC SERPL-MCNC: 67 MG/DL (ref 35–130)
HGB UR QL STRIP: ABNORMAL
KETONES UR QL STRIP.AUTO: ABNORMAL
LDL/HDL RATIO,LDHD: 2 RATIO
LDLC SERPL CALC-MCNC: 141 MG/DL (ref 0–130)
LEUKOCYTE ESTERASE: ABNORMAL
MUCUS,MUCUS: ABNORMAL
NITRITE UR QL STRIP.AUTO: NEGATIVE
PH UR STRIP: 5 [PH] (ref 5–7)
POTASSIUM SERPL-SCNC: 4 MMOL/L (ref 3.6–5)
PROT SERPL-MCNC: 7.6 G/DL (ref 6.3–8.2)
PROT UR STRIP-MCNC: ABNORMAL MG/DL
RBC #/AREA URNS HPF: ABNORMAL #/HPF
SODIUM SERPL-SCNC: 139 MMOL/L (ref 137–145)
SP GR UR REFRACTOMETRY: 1.02 (ref 1–1.03)
TRIGL SERPL-MCNC: 175 MG/DL (ref 0–200)
UROBILINOGEN UR QL STRIP.AUTO: NEGATIVE
VLDLC SERPL CALC-MCNC: 35 MG/DL
WBC URNS QL MICRO: ABNORMAL #/HPF

## 2020-03-02 RX ORDER — BUMETANIDE 0.5 MG/1
0.5 TABLET ORAL DAILY
Qty: 90 TAB | Refills: 3 | Status: ON HOLD | OUTPATIENT
Start: 2020-03-02 | End: 2021-02-12 | Stop reason: SDUPTHER

## 2020-03-02 RX ORDER — VERAPAMIL HYDROCHLORIDE 240 MG/1
240 TABLET, FILM COATED, EXTENDED RELEASE ORAL DAILY
Qty: 90 TAB | Refills: 3 | Status: SHIPPED | OUTPATIENT
Start: 2020-03-02 | End: 2021-05-25

## 2020-03-02 RX ORDER — FENOFIBRATE 160 MG/1
160 TABLET ORAL DAILY
Qty: 90 TAB | Refills: 3 | Status: SHIPPED | OUTPATIENT
Start: 2020-03-02 | End: 2021-05-25

## 2020-03-02 RX ORDER — LISINOPRIL 20 MG/1
20 TABLET ORAL DAILY
Qty: 90 TAB | Refills: 3 | Status: SHIPPED | OUTPATIENT
Start: 2020-03-02 | End: 2021-05-25

## 2020-03-02 NOTE — PROGRESS NOTES
Xochilt So is a 76 y.o. male     Chief Complaint   Patient presents with    Hypertension     6 month follow up    Cholesterol Problem     6 month follow up       Visit Vitals  /74 (BP 1 Location: Left arm, BP Patient Position: Sitting)   Pulse 72   Temp 98 °F (36.7 °C) (Oral)   Resp 18   Ht 5' 9\" (1.753 m)   Wt 246 lb (111.6 kg)   SpO2 95%   BMI 36.33 kg/m²       Health Maintenance Due   Topic Date Due    Pneumococcal 65+ years (1 of 1 - PPSV23) 03/23/2010    Influenza Age 5 to Adult  08/01/2019    GLAUCOMA SCREENING Q2Y  01/25/2020       1. Have you been to the ER, urgent care clinic since your last visit? Hospitalized since your last visit? No    2. Have you seen or consulted any other health care providers outside of the [address] [place] [address] since your last visit? Include any pap smears or colon screening.  No

## 2020-03-02 NOTE — PATIENT INSTRUCTIONS

## 2020-03-02 NOTE — PROGRESS NOTES
This note will not be viewable in [address] Subjective:     Mr. Ansley Chaidez presents to the office today in general medical follow-up. The patient has hypertension currently on Bumex, lisinopril, verapamil and also takes a potassium supplement and magnesium supplement. He tolerates this regimen without orthostatic dizziness, muscle cramping, cough, palpitations. He has had no headaches, numbness, tingling or focal neurological problems. He remains on fenofibrate and fish oil for his hyperlipidemia. He does take a prophylactic aspirin. He has no history of vascular disease and denies exertional chest pains or claudication. Past Medical History:   Diagnosis Date    At risk for sleep apnea 05/22/2019    during phone assessment for PAT, LEVI 6    Colon polyp     Elevated CPK     Hyperlipidemia     Hypertension     Psoriasis     Rosacea      Past Surgical History:   Procedure Laterality Date    COLONOSCOPY N/A 5/29/2019    COL[facility] performed [facility] at [facility]    HX COLONOSCOPY  02/20/2014    HX HERNIA REPAIR      umbilical     No Known Allergies  Current Outpatient Medications   Medication Sig Dispense Refill    bumetanide (BUMEX) 0.5 mg tablet Take 1 Tab by mouth daily. 90 Tab 3    fenofibrate (LOFIBRA) 160 mg tablet Take 1 Tab by mouth daily. 90 Tab 3    lisinopril (PRINIVIL, ZESTRIL) 20 mg tablet Take 1 Tab by mouth daily. 90 Tab 3    verapamil ER (CALAN-SR) 240 mg CR tablet Take 1 Tab by mouth daily. 90 Tab 3    metroNIDAZOLE (METROCREAM) 0.75 % topical cream Apply  to affected area two (2) times a day. Use a thin layer to affected areas after washing      potassium 99 mg tablet Take 198 mg by mouth daily.  magnesium oxide (MAG-OX) 400 mg tablet Take 400 mg by mouth daily.  clobetasol (TEMOVATE) 0.05 % topical cream Apply  to affected area two (2) times a day.  omega-3 fatty acids Cap Take 500 mg by mouth daily.       aspirin 81 mg tablet Take 81 mg by mouth four (4) days a week. Social History     Socioeconomic History    Marital status:      Spouse name: Not on file    Number of children: Not on file    Years of education: Not on file    Highest education level: Not on file   Tobacco Use    Smoking status: Former Smoker     Packs/day: 1.00     Years: 20.00     Pack years: 20.00     Last attempt to quit:      Years since quittin.1    Smokeless tobacco: Never Used   Substance and Sexual Activity    Alcohol use: Yes     Alcohol/week: 20.0 standard drinks     Types: 20 Cans of beer per week    Drug use: Never     Family History   Problem Relation Age of Onset    Cancer Mother         colon       Review of Systems:  GEN: no weight loss, weight gain, fatigue or night sweats  CV: no PND, orthopnea, or palpitations  Resp: no dyspnea on exertion, no cough  Abd: no nausea, vomiting or diarrhea  EXT: denies edema, claudication  Endocrine: no hair loss, excessive thirst or polyuria  Neurological ROS: no TIA or stroke symptoms  ROS otherwise negative      Objective:     Visit Vitals  /76   Pulse 72   Temp 98 °F (36.7 °C) (Oral)   Resp 18   Ht 5' 9\" (1.753 m)   Wt 246 lb (111.6 kg)   SpO2 95%   BMI 36.33 kg/m²     Body mass index is 36.33 kg/m². General:   alert, cooperative and no distress   Eyes: conjunctivae/sclerae clear. PERRL, EOM's intact   Mouth:  No oral lesions, no pharyngeal erythema, no exudates   Neck: Trachea midline, no thyromegaly, no bruits   Heart: S1 and S2 normal,no murmurs noted    Lungs: Clear to auscultation bilaterally, no increased work of breathing   Abdomen: Soft, nontender.   Normal bowel sounds   Extremities: No edema or cyanosis   Neuro: ..alert, oriented x3,speech normal in context and clarity, cranial nerves II-XII intact,motor strength: full proximally and distally,gait: normal  reflexes: full and symmetric     Physical exam otherwise negative         Assessment/Plan:     Diagnoses and all orders for this visit:    Essential hypertension  -     bumetanide (BUMEX) 0.5 mg tablet; Take 1 Tab by mouth daily. , Normal, Disp-90 Tab, R-3  -     lisinopril (PRINIVIL, ZESTRIL) 20 mg tablet; Take 1 Tab by mouth daily. , Normal, Disp-90 Tab, R-3  -     verapamil ER (CALAN-SR) 240 mg CR tablet; Take 1 Tab by mouth daily. , Normal, Disp-90 Tab, R-3  -     CBC WITH AUTOMATED DIFF  -     COLLECTION VENOUS BLOOD,VENIPUNCTURE  -     METABOLIC PANEL, COMPREHENSIVE  -     URINALYSIS W/MICROSCOPIC    Mixed hyperlipidemia  -     fenofibrate (LOFIBRA) 160 mg tablet; Take 1 Tab by mouth daily. , Normal, Disp-90 Tab, R-3  -     LIPID PANEL  -     TSH 3RD GENERATION        Other instructions: The patient's medications were reviewed and reconciled. No change in his current medical regimen will be made. Body mass index is 36.3 and dietary counseling along with printed patient education is given    Await results of multiple labs    Follow-up in 6 months    Follow-up and Dispositions    · Return in about 6 months (around 9/2/2020). Rashaun Adrian MD    Please note that this dictation was completed with [employer], the computer voice recognition software. Quite often unanticipated grammatical, syntax, homophones, and other interpretive errors are inadvertently transcribed by the computer software. Please disregard these errors. Please excuse any errors that have escaped final proofreading.

## 2020-03-03 LAB
BASOPHILS # BLD AUTO: 0.1 X10E3/UL (ref 0–0.2)
BASOPHILS NFR BLD AUTO: 1 %
EOSINOPHIL # BLD AUTO: 0.1 X10E3/UL (ref 0–0.4)
EOSINOPHIL NFR BLD AUTO: 1 %
ERYTHROCYTE [DISTWIDTH] IN BLOOD BY AUTOMATED COUNT: 13.4 % (ref 11.6–15.4)
HCT VFR BLD AUTO: 46 % (ref 37.5–51)
HGB BLD-MCNC: 15.5 G/DL (ref 13–17.7)
IMM GRANULOCYTES # BLD AUTO: 0 X10E3/UL (ref 0–0.1)
IMM GRANULOCYTES NFR BLD AUTO: 0 %
LYMPHOCYTES # BLD AUTO: 1.8 X10E3/UL (ref 0.7–3.1)
LYMPHOCYTES NFR BLD AUTO: 25 %
MCH RBC QN AUTO: 29.9 PG (ref 26.6–33)
MCHC RBC AUTO-ENTMCNC: 33.7 G/DL (ref 31.5–35.7)
MCV RBC AUTO: 89 FL (ref 79–97)
MONOCYTES # BLD AUTO: 0.6 X10E3/UL (ref 0.1–0.9)
MONOCYTES NFR BLD AUTO: 9 %
NEUTROPHILS # BLD AUTO: 4.6 X10E3/UL (ref 1.4–7)
NEUTROPHILS NFR BLD AUTO: 64 %
PLATELET # BLD AUTO: 198 X10E3/UL (ref 150–450)
RBC # BLD AUTO: 5.18 X10E6/UL (ref 4.14–5.8)
TSH SERPL DL<=0.05 MIU/L-ACNC: 2 UIU/ML (ref 0.34–5.6)
WBC # BLD AUTO: 7.3 X10E3/UL (ref 3.4–10.8)

## 2020-03-04 LAB — BACTERIA UR CULT: NORMAL

## 2020-08-31 ENCOUNTER — OFFICE VISIT (OUTPATIENT)
Dept: INTERNAL MEDICINE CLINIC | Age: 75
End: 2020-08-31
Payer: MEDICARE

## 2020-08-31 VITALS
BODY MASS INDEX: 37.03 KG/M2 | RESPIRATION RATE: 20 BRPM | DIASTOLIC BLOOD PRESSURE: 80 MMHG | TEMPERATURE: 97.5 F | HEART RATE: 50 BPM | WEIGHT: 250 LBS | SYSTOLIC BLOOD PRESSURE: 122 MMHG | HEIGHT: 69 IN | OXYGEN SATURATION: 98 %

## 2020-08-31 DIAGNOSIS — E66.9 OBESITY (BMI 30-39.9): Chronic | ICD-10-CM

## 2020-08-31 DIAGNOSIS — R74.8 ELEVATED CPK: Chronic | ICD-10-CM

## 2020-08-31 DIAGNOSIS — Z00.00 MEDICARE ANNUAL WELLNESS VISIT, SUBSEQUENT: Primary | ICD-10-CM

## 2020-08-31 DIAGNOSIS — E78.2 MIXED HYPERLIPIDEMIA: Chronic | ICD-10-CM

## 2020-08-31 DIAGNOSIS — I10 ESSENTIAL HYPERTENSION: Chronic | ICD-10-CM

## 2020-08-31 PROCEDURE — 3017F COLORECTAL CA SCREEN DOC REV: CPT | Performed by: INTERNAL MEDICINE

## 2020-08-31 PROCEDURE — 1101F PT FALLS ASSESS-DOCD LE1/YR: CPT | Performed by: INTERNAL MEDICINE

## 2020-08-31 PROCEDURE — G8754 DIAS BP LESS 90: HCPCS | Performed by: INTERNAL MEDICINE

## 2020-08-31 PROCEDURE — 99213 OFFICE O/P EST LOW 20 MIN: CPT | Performed by: INTERNAL MEDICINE

## 2020-08-31 PROCEDURE — G8432 DEP SCR NOT DOC, RNG: HCPCS | Performed by: INTERNAL MEDICINE

## 2020-08-31 PROCEDURE — G8417 CALC BMI ABV UP PARAM F/U: HCPCS | Performed by: INTERNAL MEDICINE

## 2020-08-31 PROCEDURE — G0444 DEPRESSION SCREEN ANNUAL: HCPCS | Performed by: INTERNAL MEDICINE

## 2020-08-31 PROCEDURE — G8536 NO DOC ELDER MAL SCRN: HCPCS | Performed by: INTERNAL MEDICINE

## 2020-08-31 PROCEDURE — G8752 SYS BP LESS 140: HCPCS | Performed by: INTERNAL MEDICINE

## 2020-08-31 PROCEDURE — G0439 PPPS, SUBSEQ VISIT: HCPCS | Performed by: INTERNAL MEDICINE

## 2020-08-31 PROCEDURE — G8427 DOCREV CUR MEDS BY ELIG CLIN: HCPCS | Performed by: INTERNAL MEDICINE

## 2020-08-31 RX ORDER — CEFUROXIME AXETIL 500 MG/1
TABLET ORAL AS NEEDED
COMMUNITY
End: 2021-01-22 | Stop reason: ALTCHOICE

## 2020-08-31 NOTE — PATIENT INSTRUCTIONS
DASH Diet: Care Instructions Your Care Instructions The DASH diet is an eating plan that can help lower your blood pressure. DASH stands for Dietary Approaches to Stop Hypertension. Hypertension is high blood pressure. The DASH diet focuses on eating foods that are high in calcium, potassium, and magnesium. These nutrients can lower blood pressure. The foods that are highest in these nutrients are fruits, vegetables, low-fat dairy products, nuts, seeds, and legumes. But taking calcium, potassium, and magnesium supplements instead of eating foods that are high in those nutrients does not have the same effect. The DASH diet also includes whole grains, fish, and poultry. The DASH diet is one of several lifestyle changes your doctor may recommend to lower your high blood pressure. Your doctor may also want you to decrease the amount of sodium in your diet. Lowering sodium while following the DASH diet can lower blood pressure even further than just the DASH diet alone. Follow-up care is a key part of your treatment and safety. Be sure to make and go to all appointments, and call your doctor if you are having problems. It's also a good idea to know your test results and keep a list of the medicines you take. How can you care for yourself at home? Following the DASH diet · Eat 4 to 5 servings of fruit each day. A serving is 1 medium-sized piece of fruit, ½ cup chopped or canned fruit, 1/4 cup dried fruit, or 4 ounces (½ cup) of fruit juice. Choose fruit more often than fruit juice. · Eat 4 to 5 servings of vegetables each day. A serving is 1 cup of lettuce or raw leafy vegetables, ½ cup of chopped or cooked vegetables, or 4 ounces (½ cup) of vegetable juice. Choose vegetables more often than vegetable juice. · Get 2 to 3 servings of low-fat and fat-free dairy each day. A serving is 8 ounces of milk, 1 cup of yogurt, or 1 ½ ounces of cheese. · Eat 6 to 8 servings of grains each day. A serving is 1 slice of bread, 1 ounce of dry cereal, or ½ cup of cooked rice, pasta, or cooked cereal. Try to choose whole-grain products as much as possible. · Limit lean meat, poultry, and fish to 2 servings each day. A serving is 3 ounces, about the size of a deck of cards. · Eat 4 to 5 servings of nuts, seeds, and legumes (cooked dried beans, lentils, and split peas) each week. A serving is 1/3 cup of nuts, 2 tablespoons of seeds, or ½ cup of cooked beans or peas. · Limit fats and oils to 2 to 3 servings each day. A serving is 1 teaspoon of vegetable oil or 2 tablespoons of salad dressing. · Limit sweets and added sugars to 5 servings or less a week. A serving is 1 tablespoon jelly or jam, ½ cup sorbet, or 1 cup of lemonade. · Eat less than 2,300 milligrams (mg) of sodium a day. If you limit your sodium to 1,500 mg a day, you can lower your blood pressure even more. Tips for success · Start small. Do not try to make dramatic changes to your diet all at once. You might feel that you are missing out on your favorite foods and then be more likely to not follow the plan. Make small changes, and stick with them. Once those changes become habit, add a few more changes. · Try some of the following: ? Make it a goal to eat a fruit or vegetable at every meal and at snacks. This will make it easy to get the recommended amount of fruits and vegetables each day. ? Try yogurt topped with fruit and nuts for a snack or healthy dessert. ? Add lettuce, tomato, cucumber, and onion to sandwiches. ? Combine a ready-made pizza crust with low-fat mozzarella cheese and lots of vegetable toppings. Try using tomatoes, squash, spinach, broccoli, carrots, cauliflower, and onions. ? Have a variety of cut-up vegetables with a low-fat dip as an appetizer instead of chips and dip. ? Sprinkle sunflower seeds or chopped almonds over salads.  Or try adding chopped walnuts or almonds to cooked vegetables. ? Try some vegetarian meals using beans and peas. Add garbanzo or kidney beans to salads. Make burritos and tacos with mashed thompson beans or black beans. Where can you learn more? Go to http://www.gray.com/ Enter U619 in the search box to learn more about \"DASH Diet: Care Instructions. \" Current as of: December 16, 2019               Content Version: 12.5 © 0277-2339 [employer]. Care instructions adapted under license by [employer] (which disclaims liability or warranty for this information). If you have questions about a medical condition or this instruction, always ask your healthcare professional. [address] any warranty or liability for your use of this information. The best way to stay healthy is to live a healthy lifestyle. A healthy lifestyle includes regular exercise, eating a well-balanced diet, keeping a healthy weight and not smoking. Regular physical exams and screening tests are another important way to take care of yourself. Preventive exams provided by health care providers can find health problems early when treatment works best and can keep you from getting certain diseases or illnesses. Preventive services include exams, lab tests, screenings, shots, monitoring and information to help you take care of your own health. All people over 65 should have a pneumonia shot. Pneumonia shots are usually only needed once in a lifetime unless your doctor decides differently. In addition to your physical exam, some screening tests are recommended: 
 
All people over 65 should have a yearly flu shot. People over 65 are at medium to high risk for Hepatitis B. Three shots are needed for complete protection. Bone mass measurement (dexa scan) is recommended every two years. Diabetes Mellitus screening is recommended every year. Glaucoma is an eye disease caused by high pressure in the eye. An eye exam is recommended every year. Cardiovascular screening tests that check your cholesterol and other blood fat (lipid) levels are recommended every five years. Colorectal Cancer screening tests help to find pre-cancerous polyps (growths in the colon) so they can be removed before they turn into cancer. Tests ordered for screening depend on your personal and family history risk factors. Prostate Cancer Screening (annually up to age 76) Screening for breast cancer is recommended yearly with a Mammogram. 
 
Screening for cervical and vaginal cancer is recommended with a pelvic and Pap test every two years. However if you have had an abnormal pap in the past  three years or at high risk for cervical or vaginal cancer Medicare will cover a pap test and a pelvic exam every year. Here is a list of your current Health Maintenance items with a due date: 
Health Maintenance Due Topic Date Due  Pneumococcal Vaccine (1 of 1 - PPSV23) 03/23/2010  Glaucoma Screening   01/25/2020 Tae Dockery Annual Well Visit  08/26/2020

## 2020-08-31 NOTE — PROGRESS NOTES
Chief Complaint   Patient presents with    Follow Up Chronic Condition     6 mo fu    Annual Wellness Visit       Depression Risk Factor Screening:     3 most recent PHQ Screens 3/2/2020   Little interest or pleasure in doing things Not at all   Feeling down, depressed, irritable, or hopeless Not at all   Total Score PHQ 2 0       Functional Ability and Level of Safety:     Activities of Daily Living  ADL Assessment 3/2/2020   Feeding yourself No Help Needed   Getting from bed to chair No Help Needed   Getting dressed No Help Needed   Bathing or showering No Help Needed   Walk across the room (includes cane/walker) No Help Needed   Using the telphone No Help Needed   Taking your medications No Help Needed   Preparing meals No Help Needed   Managing money (expenses/bills) No Help Needed   Moderately strenuous housework (laundry) No Help Needed   Shopping for personal items (toiletries/medicines) No Help Needed   Shopping for groceries No Help Needed   Driving No Help Needed   Climbing a flight of stairs No Help Needed   Getting to places beyond walking distances No Help Needed       Fall Risk  Fall Risk Assessment, last 12 mths 3/2/2020   Able to walk? Yes   Fall in past 12 months? No       Abuse Screen  Abuse Screening Questionnaire 3/2/2020   Do you ever feel afraid of your partner? N   Are you in a relationship with someone who physically or mentally threatens you? N   Is it safe for you to go home?  Y         Patient Care Team   Patient Care Team:  Marii Edwards MD as PCP - General (Internal Medicine)  Marii Edwards MD as PCP - REHABILITATION [facility] Empaneled Provider  Will Carrillo MD (Dermatology)

## 2020-08-31 NOTE — PROGRESS NOTES
This note will not be viewable in [address] Lenka Caballero is a 76 y.o. male and presents with Follow Up Chronic Condition (6 mo fu) and Annual Wellness Visit  . Subjective:  Mr. Og Valdes returns to the office today for Medicare wellness check and follow-up of multiple medical problems. The patient has hypertension and currently is on Bumex, lisinopril and verapamil. He does take a potassium supplement as well. The patient tolerates this regimen without cough, rash, muscle cramping, orthostatic dizziness or lower extremity edema. He denies headaches, numbness, tingling or focal neurological problems. His hyperlipidemia is currently managed on fenofibrate and omega-3 fatty acids. He has had elevated CPK levels in the past and we have avoided using statin drugs. He has no history of coronary artery disease and denies exertional chest pains or claudication. He has a prior history of pancreatitis related to alcohol abuse and states that he has cut back considerably drinking no more than 9-10 beers per week. Past Medical History:   Diagnosis Date    At risk for sleep apnea 05/22/2019    during phone assessment for PAT, LEVI 6    Colon polyp     Elevated CPK     Hyperlipidemia     Hypertension     Psoriasis     Rosacea      Past Surgical History:   Procedure Laterality Date    COLONOSCOPY N/A 5/29/2019    COLON[facility] AM[facility]    HX COLONOSCOPY  02/20/2014    HX HERNIA REPAIR      umbilical     No Known Allergies  Current Outpatient Medications   Medication Sig Dispense Refill    cefUROXime (CEFTIN) 500 mg tablet Take  by mouth as needed (rosacea).  bumetanide (BUMEX) 0.5 mg tablet Take 1 Tab by mouth daily. 90 Tab 3    fenofibrate (LOFIBRA) 160 mg tablet Take 1 Tab by mouth daily. 90 Tab 3    lisinopril (PRINIVIL, ZESTRIL) 20 mg tablet Take 1 Tab by mouth daily.  90 Tab 3    verapamil ER (CALAN-SR) 240 mg CR tablet Take 1 Tab by mouth daily. 90 Tab 3    metroNIDAZOLE (METROCREAM) 0.75 % topical cream Apply  to affected area two (2) times a day. Use a thin layer to affected areas after washing      potassium 99 mg tablet Take 198 mg by mouth daily.  magnesium oxide (MAG-OX) 400 mg tablet Take 400 mg by mouth daily.  clobetasol (TEMOVATE) 0.05 % topical cream Apply  to affected area two (2) times a day.  omega-3 fatty acids Cap Take 500 mg by mouth daily.  aspirin 81 mg tablet Take 81 mg by mouth four (4) days a week. Social History     Socioeconomic History    Marital status:      Spouse name: Not on file    Number of children: Not on file    Years of education: Not on file    Highest education level: Not on file   Tobacco Use    Smoking status: Former Smoker     Packs/day: 1.00     Years: 20.00     Pack years: 20.00     Last attempt to quit:      Years since quittin.6    Smokeless tobacco: Never Used   Substance and Sexual Activity    Alcohol use:  Yes     Alcohol/week: 20.0 standard drinks     Types: 20 Cans of beer per week    Drug use: Never     Family History   Problem Relation Age of Onset    Cancer Mother         colon       Health Maintenance   Topic Date Due    Pneumococcal 65+ years (1 of 1 - PPSV23) 2010    Medicare Yearly Exam  2020    GLAUCOMA SCREENING Q2Y  2022    Lipid Screen  2025    DTaP/Tdap/Td series (2 - Td) 2029    Hepatitis C Screening  Completed    AAA Screening 73-69 YO Male Smoking Patients  Completed    Shingrix Vaccine Age 50>  Completed        Review of Systems  Constitutional: negative for fevers, chills, anorexia and weight loss  Eyes:   negative for visual disturbance and irritation  ENT:   negative for tinnitus,sore throat,nasal congestion,ear pain,hoarseness  Respiratory:  negative for cough, hemoptysis, dyspnea,wheezing  CV:   negative for chest pain, palpitations, lower extremity edema  GI:   negative for nausea, vomiting, diarrhea, abdominal pain,melena  Endo:               negative for polyuria,polydipsia,polyphagia,heat intolerance  Genitourinary: negative for frequency, dysuria and hematuria  Integumentary: negative for rash and pruritus  Hematologic:  negative for easy bruising and gum/nose bleeding  Musculoskel: negative for myalgias, arthralgias, back pain, muscle weakness, joint pain  Neurological:  negative for headaches, dizziness, vertigo, memory problems and gait   Behavl/Psych: negative for feelings of anxiety, depression, mood changes  ROS otherwise negative      Objective:  Visit Vitals  /80 (BP 1 Location: Left arm, BP Patient Position: Sitting)   Pulse (!) 50   Temp 97.5 °F (36.4 °C) (Oral)   Resp 20   Ht 5' 9\" (1.753 m)   Wt 250 lb (113.4 kg)   SpO2 98%   BMI 36.92 kg/m²     Body mass index is 36.92 kg/m². Physical Exam:   General appearance - alert, well appearing, and in no distress  Mental status - alert, oriented to person, place, and time  EYE-ANNA, EOMI,conjunctiva normal bilaterally, lids normal  ENT-ENT exam normal, no neck nodes or sinus tenderness  Nose - normal and patent, no erythema,  Or discharge   Mouth - mucous membranes moist, pharynx normal without lesions  Neck - supple, no significant adenopathy or bruit  Chest - clear to auscultation, no wheezes, rales or rhonchi. Heart - normal rate, regular rhythm, normal S1, S2, no murmurs, rubs, clicks or gallops   Abdomen - soft, nontender, nondistended, no masses or organomegaly  Lymph- no adenopathy palpable  Ext-peripheral pulses normal, no pedal edema, no clubbing or cyanosis  Skin-Warm and dry. no hyperpigmentation, vitiligo, or suspicious lesions  Neuro -alert, oriented, normal speech, no focal findings or movement disorder noted    In addition this patient is seen for AWV  as detailed below:     This is a Subsequent Medicare Annual Wellness Exam (AWV) (Performed 12 months after IPPE or effective date of Medicare Part B enrollment)    I have reviewed the patient's medical history in detail and updated the computerized patient record. Problem list reviewed with patient and risk factors discussed. PSH, SH, FH, Medications and HM issues also reviewed and discussed. Depression screen, fall risk assessment, functional abilities and ACP also reviewed and discussed as above and below. Depression Risk Factor Screening:     3 most recent PHQ Screens 3/2/2020   Little interest or pleasure in doing things Not at all   Feeling down, depressed, irritable, or hopeless Not at all   Total Score PHQ 2 0     Alcohol Risk Factor Screening: You do not drink alcohol or very rarely. Functional Ability and Level of Safety:   Hearing Loss  Hearing is good. Activities of Daily Living  The home contains: no safety equipment. Patient does total self care    Fall Risk  Fall Risk Assessment, last 12 mths 3/2/2020   Able to walk? Yes   Fall in past 12 months? No       Abuse Screen  Patient is not abused    Cognitive Screening   Evaluation of Cognitive Function:  Has your family/caregiver stated any concerns about your memory: no  Normal    Patient Care Team   Patient Care Team:  Bernardo Ibarra MD as PCP - General (Internal Medicine)  Bernardo Ibarra MD as PCP - [address] [place] [address] [facility]  Lesia Vazquez MD (Dermatology)    Assessment/Plan   Education and counseling provided:  Are appropriate based on today's review and evaluation    Assessment/Plan:   Impressions:      ICD-10-CM ICD-9-CM    1. Medicare annual wellness visit, subsequent  Z00.00 V70.0    2. Essential hypertension  I10 401.9    3. Mixed hyperlipidemia  E78.2 272.2    4. Elevated CPK  R74.8 790.5    5. Obesity (BMI 30-39. 9)  E66.9 278.00         Plan:  1. Continue present meds  2. Lifestyle modifications including Na restriction, low carb/fat diet, weight reduction and exercise (at least a walking program).           Follow-up and Dispositions    · Return in about 6 months (around 2/28/2021). No orders of the defined types were placed in this encounter. Tanja Campbell MD   Assessment/Plan:  Diagnoses and all orders for this visit:    Medicare annual wellness visit, subsequent    Essential hypertension    Mixed hyperlipidemia    Elevated CPK    Obesity (BMI 30-39. 9)        Health Maintenance Due   Topic Date Due    Pneumococcal 65+ years (1 of 1 - PPSV23) 03/23/2010    Medicare Yearly Exam  08/26/2020       Other instructions: The patient's medications were reviewed and reconciled. No change in his current medical regimen will be made. A no added salt, prudent diet is encouraged    Health maintenance issues were reviewed and are up-to-date    Age-appropriate vaccinations were reviewed and he will receive a Pneumovax 23 when he receives his influenza vaccination in a month    Labs from 3/02 were reviewed with the patient today    Follow-up in 6 months    Follow-up and Dispositions    · Return in about 6 months (around 2/28/2021). I have reviewed with the patient details of the assessment and plan and all questions were answered. Relevent patient education was performed. The most recent lab findings were reviewed with the patient. An After Visit Summary was printed and given to the patient. Tanja Campbell MD    Please note that this dictation was completed with [employer], the computer voice recognition software. Quite often unanticipated grammatical, syntax, homophones, and other interpretive errors are inadvertently transcribed by the computer software. Please disregard these errors. Please excuse any errors that have escaped final proofreading.

## 2020-11-10 ENCOUNTER — OFFICE VISIT (OUTPATIENT)
Dept: INTERNAL MEDICINE CLINIC | Age: 75
End: 2020-11-10
Payer: MEDICARE

## 2020-11-10 VITALS
SYSTOLIC BLOOD PRESSURE: 124 MMHG | WEIGHT: 249 LBS | RESPIRATION RATE: 20 BRPM | HEIGHT: 69 IN | DIASTOLIC BLOOD PRESSURE: 74 MMHG | HEART RATE: 72 BPM | BODY MASS INDEX: 36.88 KG/M2 | TEMPERATURE: 98.6 F | OXYGEN SATURATION: 93 %

## 2020-11-10 DIAGNOSIS — H25.13 CATARACT, NUCLEAR SCLEROTIC SENILE, BILATERAL: ICD-10-CM

## 2020-11-10 DIAGNOSIS — Z01.818 PREOPERATIVE CLEARANCE: Primary | ICD-10-CM

## 2020-11-10 DIAGNOSIS — E78.2 MIXED HYPERLIPIDEMIA: Chronic | ICD-10-CM

## 2020-11-10 DIAGNOSIS — I10 ESSENTIAL HYPERTENSION: Chronic | ICD-10-CM

## 2020-11-10 DIAGNOSIS — E66.9 OBESITY (BMI 30-39.9): Chronic | ICD-10-CM

## 2020-11-10 PROCEDURE — G8754 DIAS BP LESS 90: HCPCS | Performed by: INTERNAL MEDICINE

## 2020-11-10 PROCEDURE — G8752 SYS BP LESS 140: HCPCS | Performed by: INTERNAL MEDICINE

## 2020-11-10 PROCEDURE — G8417 CALC BMI ABV UP PARAM F/U: HCPCS | Performed by: INTERNAL MEDICINE

## 2020-11-10 PROCEDURE — 1101F PT FALLS ASSESS-DOCD LE1/YR: CPT | Performed by: INTERNAL MEDICINE

## 2020-11-10 PROCEDURE — G8427 DOCREV CUR MEDS BY ELIG CLIN: HCPCS | Performed by: INTERNAL MEDICINE

## 2020-11-10 PROCEDURE — 99214 OFFICE O/P EST MOD 30 MIN: CPT | Performed by: INTERNAL MEDICINE

## 2020-11-10 PROCEDURE — G8432 DEP SCR NOT DOC, RNG: HCPCS | Performed by: INTERNAL MEDICINE

## 2020-11-10 PROCEDURE — G8536 NO DOC ELDER MAL SCRN: HCPCS | Performed by: INTERNAL MEDICINE

## 2020-11-10 PROCEDURE — 3017F COLORECTAL CA SCREEN DOC REV: CPT | Performed by: INTERNAL MEDICINE

## 2020-11-10 NOTE — PROGRESS NOTES
Yoon Lemos is a 76 y.o. male presenting for Pre-op Exam  .     1. Have you been to the ER, urgent care clinic since your last visit? Hospitalized since your last visit? No    2. Have you seen or consulted any other health care providers outside of the [address] [place] [address] since your last visit? Include any pap smears or colon screening. Eye Dr Sofia Luevano, last 12 mths 3/2/2020   Able to walk? Yes   Fall in past 12 months? No         Abuse Screening Questionnaire 3/2/2020   Do you ever feel afraid of your partner? N   Are you in a relationship with someone who physically or mentally threatens you? N   Is it safe for you to go home? Y       3 most recent PHQ Screens 3/2/2020   Little interest or pleasure in doing things Not at all   Feeling down, depressed, irritable, or hopeless Not at all   Total Score PHQ 2 0       There are no discontinued medications.

## 2020-11-10 NOTE — PATIENT INSTRUCTIONS
DASH Diet: Care Instructions Your Care Instructions The DASH diet is an eating plan that can help lower your blood pressure. DASH stands for Dietary Approaches to Stop Hypertension. Hypertension is high blood pressure. The DASH diet focuses on eating foods that are high in calcium, potassium, and magnesium. These nutrients can lower blood pressure. The foods that are highest in these nutrients are fruits, vegetables, low-fat dairy products, nuts, seeds, and legumes. But taking calcium, potassium, and magnesium supplements instead of eating foods that are high in those nutrients does not have the same effect. The DASH diet also includes whole grains, fish, and poultry. The DASH diet is one of several lifestyle changes your doctor may recommend to lower your high blood pressure. Your doctor may also want you to decrease the amount of sodium in your diet. Lowering sodium while following the DASH diet can lower blood pressure even further than just the DASH diet alone. Follow-up care is a key part of your treatment and safety. Be sure to make and go to all appointments, and call your doctor if you are having problems. It's also a good idea to know your test results and keep a list of the medicines you take. How can you care for yourself at home? Following the DASH diet · Eat 4 to 5 servings of fruit each day. A serving is 1 medium-sized piece of fruit, ½ cup chopped or canned fruit, 1/4 cup dried fruit, or 4 ounces (½ cup) of fruit juice. Choose fruit more often than fruit juice. · Eat 4 to 5 servings of vegetables each day. A serving is 1 cup of lettuce or raw leafy vegetables, ½ cup of chopped or cooked vegetables, or 4 ounces (½ cup) of vegetable juice. Choose vegetables more often than vegetable juice. · Get 2 to 3 servings of low-fat and fat-free dairy each day. A serving is 8 ounces of milk, 1 cup of yogurt, or 1 ½ ounces of cheese. · Eat 6 to 8 servings of grains each day. A serving is 1 slice of bread, 1 ounce of dry cereal, or ½ cup of cooked rice, pasta, or cooked cereal. Try to choose whole-grain products as much as possible. · Limit lean meat, poultry, and fish to 2 servings each day. A serving is 3 ounces, about the size of a deck of cards. · Eat 4 to 5 servings of nuts, seeds, and legumes (cooked dried beans, lentils, and split peas) each week. A serving is 1/3 cup of nuts, 2 tablespoons of seeds, or ½ cup of cooked beans or peas. · Limit fats and oils to 2 to 3 servings each day. A serving is 1 teaspoon of vegetable oil or 2 tablespoons of salad dressing. · Limit sweets and added sugars to 5 servings or less a week. A serving is 1 tablespoon jelly or jam, ½ cup sorbet, or 1 cup of lemonade. · Eat less than 2,300 milligrams (mg) of sodium a day. If you limit your sodium to 1,500 mg a day, you can lower your blood pressure even more. Tips for success · Start small. Do not try to make dramatic changes to your diet all at once. You might feel that you are missing out on your favorite foods and then be more likely to not follow the plan. Make small changes, and stick with them. Once those changes become habit, add a few more changes. · Try some of the following: ? Make it a goal to eat a fruit or vegetable at every meal and at snacks. This will make it easy to get the recommended amount of fruits and vegetables each day. ? Try yogurt topped with fruit and nuts for a snack or healthy dessert. ? Add lettuce, tomato, cucumber, and onion to sandwiches. ? Combine a ready-made pizza crust with low-fat mozzarella cheese and lots of vegetable toppings. Try using tomatoes, squash, spinach, broccoli, carrots, cauliflower, and onions. ? Have a variety of cut-up vegetables with a low-fat dip as an appetizer instead of chips and dip. ? Sprinkle sunflower seeds or chopped almonds over salads.  Or try adding chopped walnuts or almonds to cooked vegetables. ? Try some vegetarian meals using beans and peas. Add garbanzo or kidney beans to salads. Make burritos and tacos with mashed thompson beans or black beans. Where can you learn more? Go to http://www.gray.com/ Enter E357 in the search box to learn more about \"DASH Diet: Care Instructions. \" Current as of: December 16, 2019               Content Version: 12.6 © 0752-9149 [employer]. Care instructions adapted under license by [employer] (which disclaims liability or warranty for this information). If you have questions about a medical condition or this instruction, always ask your healthcare professional. [address] any warranty or liability for your use of this information.

## 2020-11-10 NOTE — PROGRESS NOTES
History:   Mr. Hermila Ferrara is a 51-year-old male referred to our office today by Dr. Aliza Jama in medical consultation for preoperative medical clearance prior to having staged bilateral cataract surgery performed with the left eye to be done on November 18th and the right eye to be done December 2nd at the [facility] ambulatory surgery center. The patient gives a history of having a retinal hole discovered 2 years ago. Patient states that this has been treated conservatively over this time but it was noted that he had progressive changes in the size related to the development of cataracts. He notes that he has difficulty with reading small print and things are mildly out of focus. He has had no nighttime glare. He denies any glaucoma or macular degeneration. Patient now presents for correction of his cataracts. He denies any eye pain or drainage. His medical history is pertinent for hypertension which is managed on lisinopril, verapamil and Bumex. Control has been good and he has had no endorgan damage. He denies any problems with his medication. He has hyperlipidemia currently on fenofibrate. In addition he does take fish oil. The patient denies any GI upset. He has no history of vascular disease and denies exertional chest pains or claudication. The patient has no known allergies and denies latex sensitivity or Band-Aid adhesive reactions. He has never had problems with anesthesia. Review of systems otherwise negative is noted.     Latex Allergy:NO    History of anesthesia reaction: NO:     History of PE/DVT:NO:       Past Medical History:   Diagnosis Date    At risk for sleep apnea 05/22/2019    during phone assessment for PAT, LEVI 6    Colon polyp     Elevated CPK     Hyperlipidemia     Hypertension     Psoriasis     Rosacea      Past Surgical History:   Procedure Laterality Date    COLONOSCOPY N/A 5/29/2019    COLONOSCOPY performed by Jelena Martinez MD at [facility]    HX COLONOSCOPY  2014    HX HERNIA REPAIR      umbilical     No Known Allergies  Current Outpatient Medications   Medication Sig Dispense Refill    cefUROXime (CEFTIN) 500 mg tablet Take  by mouth as needed (rosacea).  bumetanide (BUMEX) 0.5 mg tablet Take 1 Tab by mouth daily. 90 Tab 3    fenofibrate (LOFIBRA) 160 mg tablet Take 1 Tab by mouth daily. 90 Tab 3    lisinopril (PRINIVIL, ZESTRIL) 20 mg tablet Take 1 Tab by mouth daily. 90 Tab 3    verapamil ER (CALAN-SR) 240 mg CR tablet Take 1 Tab by mouth daily. 90 Tab 3    metroNIDAZOLE (METROCREAM) 0.75 % topical cream Apply  to affected area two (2) times a day. Use a thin layer to affected areas after washing      potassium 99 mg tablet Take 198 mg by mouth daily.  magnesium oxide (MAG-OX) 400 mg tablet Take 400 mg by mouth daily.  clobetasol (TEMOVATE) 0.05 % topical cream Apply  to affected area two (2) times a day.  omega-3 fatty acids Cap Take 500 mg by mouth daily.  aspirin 81 mg tablet Take 81 mg by mouth four (4) days a week. Social History     Socioeconomic History    Marital status:      Spouse name: Not on file    Number of children: Not on file    Years of education: Not on file    Highest education level: Not on file   Tobacco Use    Smoking status: Former Smoker     Packs/day: 1.00     Years: 20.00     Pack years: 20.00     Last attempt to quit:      Years since quittin.8    Smokeless tobacco: Never Used   Substance and Sexual Activity    Alcohol use: Yes     Alcohol/week: 20.0 standard drinks     Types: 20 Cans of beer per week    Drug use: Never     Family History   Problem Relation Age of Onset    Cancer Mother         colon       Reviewed PmHx, RxHx, FmHx, SocHx, AllgHx and updated and dated in the chart.     Review of Systems  Constitutional: negative for fevers, chills, anorexia and weight loss  Eyes:   Positive for visual disturbance without irritation  ENT:   negative for tinnitus,sore throat,nasal congestion,ear pain,hoarseness  Respiratory:  negative for cough, hemoptysis, dyspnea,wheezing  CV:   negative for chest pain, palpitations, lower extremity edema  GI:   negative for nausea, vomiting, diarrhea, abdominal pain,melena  Endo:               negative for polyuria,polydipsia,polyphagia,heat intolerance  Genitourinary: negative for frequency, dysuria and hematuria  Integumentary: negative for rash and pruritus  Hematologic:  negative for easy bruising and gum/nose bleeding  Musculoskel: negative for myalgias, arthralgias, back pain, muscle weakness, joint pain  Neurological:  negative for headaches, dizziness, vertigo, memory problems and gait   Behavl/Psych: negative for feelings of anxiety, depression, mood changes      Objective:     Vitals:    11/10/20 1257   BP: 124/74   Pulse: 72   Resp: 20   Temp: 98.6 °F (37 °C)   TempSrc: Oral   SpO2: 93%   Weight: 249 lb (112.9 kg)   Height: 5' 9\" (1.753 m)     Body mass index is 36.77 kg/m². Physical Examination: General appearance - alert, obese, and in no distress and oriented to person, place, and time  Mental status - alert, oriented to person, place, and time, normal mood, behavior, speech, dress, motor activity, and thought processes  Eyes - pupils equal and reactive, extraocular eye movements intact, sclera anicteric, . No discharge eye redness or discharge noted.   Bilateral upper eyelid ptosis present  Ears - bilateral TM's and external ear canals normal, right ear normal, left ear normal  Mouth - mucous membranes moist, pharynx normal without lesions and tongue normal  Neck - supple, no significant adenopathy  Lymphatics - no palpable lymphadenopathy  Chest - clear to auscultation, no wheezes, rales or rhonchi,   Heart - normal rate, regular rhythm, normal S1, S2, no murmurs, rubs, clicks or gallops  Abdomen - soft, nontender, nondistended, no masses or organomegaly  Back exam - full range of motion, no tenderness, palpable spasm or pain on motion  Neurological - alert, oriented, normal speech, no focal findings or movement disorder noted, neck supple without rigidity, cranial nerves II through XII intact, DTR's normal and symmetric, motor and sensory grossly normal bilaterally  Musculoskeletal - no joint tenderness, deformity or swelling  Extremities - peripheral pulses normal, no pedal edema, no clubbing or cyanosis  Skin - normal coloration and turgor, no rashes, no suspicious skin lesions noted  Physical exam otherwise negative    Assessment/ Plan:   Diagnoses and all orders for this visit:    Preoperative clearance    Cataract, nuclear sclerotic senile, bilateral    Essential hypertension    Mixed hyperlipidemia    Obesity (BMI 30-39. 9)        Other instructions: The patient's medications were reviewed and reconciled. No change in his current medical regimen will be made. The patient is medically stable, at low cardiac risk, and cleared for the surgeries as scheduled. Follow-up here as previously appointed    Follow-up and Dispositions    · Return for As previously scheduled. I have discussed the diagnosis with the patient and the intended plan as seen in the above orders. The patient has received an after-visit summary and questions were answered concerning future plans. Pt conveyed understanding of plan. Jr Canela MD    Please note that this dictation was completed with [employer], the computer voice recognition software. Quite often unanticipated grammatical, syntax, homophones, and other interpretive errors are inadvertently transcribed by the computer software. Please disregard these errors. Please excuse any errors that have escaped final proofreading.

## 2020-12-01 ENCOUNTER — TELEPHONE (OUTPATIENT)
Dept: INTERNAL MEDICINE CLINIC | Age: 75
End: 2020-12-01

## 2020-12-01 NOTE — TELEPHONE ENCOUNTER
Patient states he has an infection in his r foot. He took the temperature of his foot with a hand held thermometer and it is 100.5. He has eye surgery tomorrow and would like to come in today.      [phone]

## 2021-01-01 ENCOUNTER — OFFICE VISIT (OUTPATIENT)
Dept: INTERNAL MEDICINE CLINIC | Age: 76
End: 2021-01-01
Payer: MEDICARE

## 2021-01-01 VITALS
RESPIRATION RATE: 14 BRPM | TEMPERATURE: 98 F | BODY MASS INDEX: 37.18 KG/M2 | OXYGEN SATURATION: 95 % | SYSTOLIC BLOOD PRESSURE: 124 MMHG | HEART RATE: 89 BPM | HEIGHT: 69 IN | DIASTOLIC BLOOD PRESSURE: 80 MMHG | WEIGHT: 251 LBS

## 2021-01-01 DIAGNOSIS — R74.8 ELEVATED CPK: Chronic | ICD-10-CM

## 2021-01-01 DIAGNOSIS — E78.2 MIXED HYPERLIPIDEMIA: ICD-10-CM

## 2021-01-01 DIAGNOSIS — E78.2 MIXED HYPERLIPIDEMIA: Chronic | ICD-10-CM

## 2021-01-01 DIAGNOSIS — Z87.19 HISTORY OF PANCREATITIS: ICD-10-CM

## 2021-01-01 DIAGNOSIS — I10 PRIMARY HYPERTENSION: Primary | Chronic | ICD-10-CM

## 2021-01-01 DIAGNOSIS — I10 ESSENTIAL HYPERTENSION: ICD-10-CM

## 2021-01-01 DIAGNOSIS — E66.9 OBESITY (BMI 30-39.9): Chronic | ICD-10-CM

## 2021-01-01 DIAGNOSIS — E66.01 SEVERE OBESITY (BMI 35.0-35.9 WITH COMORBIDITY) (HCC): ICD-10-CM

## 2021-01-01 LAB
ALBUMIN SERPL-MCNC: 4 G/DL (ref 3.5–5)
ALBUMIN/GLOB SERPL: 1.3 {RATIO} (ref 1.1–2.2)
ALP SERPL-CCNC: 58 U/L (ref 45–117)
ALT SERPL-CCNC: 38 U/L (ref 12–78)
ANION GAP SERPL CALC-SCNC: 5 MMOL/L (ref 5–15)
APPEARANCE UR: ABNORMAL
AST SERPL-CCNC: 29 U/L (ref 15–37)
BACTERIA URNS QL MICRO: NEGATIVE /HPF
BASOPHILS # BLD: 0.1 K/UL (ref 0–0.1)
BASOPHILS NFR BLD: 1 % (ref 0–1)
BILIRUB SERPL-MCNC: 0.5 MG/DL (ref 0.2–1)
BILIRUB UR QL: NEGATIVE
BUN SERPL-MCNC: 16 MG/DL (ref 6–20)
BUN/CREAT SERPL: 22 (ref 12–20)
CALCIUM SERPL-MCNC: 9.1 MG/DL (ref 8.5–10.1)
CHLORIDE SERPL-SCNC: 105 MMOL/L (ref 97–108)
CHOLEST SERPL-MCNC: 226 MG/DL
CK SERPL-CCNC: 337 U/L (ref 39–308)
CO2 SERPL-SCNC: 30 MMOL/L (ref 21–32)
COLOR UR: ABNORMAL
CREAT SERPL-MCNC: 0.72 MG/DL (ref 0.7–1.3)
DIFFERENTIAL METHOD BLD: NORMAL
EOSINOPHIL # BLD: 0.2 K/UL (ref 0–0.4)
EOSINOPHIL NFR BLD: 3 % (ref 0–7)
EPITH CASTS URNS QL MICRO: ABNORMAL /LPF
ERYTHROCYTE [DISTWIDTH] IN BLOOD BY AUTOMATED COUNT: 12.4 % (ref 11.5–14.5)
GLOBULIN SER CALC-MCNC: 3 G/DL (ref 2–4)
GLUCOSE SERPL-MCNC: 106 MG/DL (ref 65–100)
GLUCOSE UR STRIP.AUTO-MCNC: NEGATIVE MG/DL
HCT VFR BLD AUTO: 42.4 % (ref 36.6–50.3)
HDLC SERPL-MCNC: 51 MG/DL
HDLC SERPL: 4.4 {RATIO} (ref 0–5)
HGB BLD-MCNC: 14.2 G/DL (ref 12.1–17)
HGB UR QL STRIP: NEGATIVE
IMM GRANULOCYTES # BLD AUTO: 0 K/UL (ref 0–0.04)
IMM GRANULOCYTES NFR BLD AUTO: 0 % (ref 0–0.5)
KETONES UR QL STRIP.AUTO: ABNORMAL MG/DL
LDLC SERPL CALC-MCNC: 149.4 MG/DL (ref 0–100)
LEUKOCYTE ESTERASE UR QL STRIP.AUTO: NEGATIVE
LYMPHOCYTES # BLD: 1.8 K/UL (ref 0.8–3.5)
LYMPHOCYTES NFR BLD: 25 % (ref 12–49)
MCH RBC QN AUTO: 30.8 PG (ref 26–34)
MCHC RBC AUTO-ENTMCNC: 33.5 G/DL (ref 30–36.5)
MCV RBC AUTO: 92 FL (ref 80–99)
MONOCYTES # BLD: 0.7 K/UL (ref 0–1)
MONOCYTES NFR BLD: 10 % (ref 5–13)
MUCOUS THREADS URNS QL MICRO: ABNORMAL /LPF
NEUTS SEG # BLD: 4.4 K/UL (ref 1.8–8)
NEUTS SEG NFR BLD: 61 % (ref 32–75)
NITRITE UR QL STRIP.AUTO: NEGATIVE
NRBC # BLD: 0 K/UL (ref 0–0.01)
NRBC BLD-RTO: 0 PER 100 WBC
PH UR STRIP: 5 [PH] (ref 5–8)
PLATELET # BLD AUTO: 228 K/UL (ref 150–400)
PMV BLD AUTO: 9.4 FL (ref 8.9–12.9)
POTASSIUM SERPL-SCNC: 4 MMOL/L (ref 3.5–5.1)
PROT SERPL-MCNC: 7 G/DL (ref 6.4–8.2)
PROT UR STRIP-MCNC: NEGATIVE MG/DL
RBC # BLD AUTO: 4.61 M/UL (ref 4.1–5.7)
RBC #/AREA URNS HPF: ABNORMAL /HPF (ref 0–5)
SODIUM SERPL-SCNC: 140 MMOL/L (ref 136–145)
SP GR UR REFRACTOMETRY: 1.03 (ref 1–1.03)
TRIGL SERPL-MCNC: 128 MG/DL (ref ?–150)
TSH SERPL DL<=0.05 MIU/L-ACNC: 2.45 UIU/ML (ref 0.36–3.74)
UA: UC IF INDICATED,UAUC: ABNORMAL
UROBILINOGEN UR QL STRIP.AUTO: 0.2 EU/DL (ref 0.2–1)
VLDLC SERPL CALC-MCNC: 25.6 MG/DL
WBC # BLD AUTO: 7.2 K/UL (ref 4.1–11.1)
WBC URNS QL MICRO: ABNORMAL /HPF (ref 0–4)

## 2021-01-01 PROCEDURE — G8752 SYS BP LESS 140: HCPCS | Performed by: INTERNAL MEDICINE

## 2021-01-01 PROCEDURE — G8417 CALC BMI ABV UP PARAM F/U: HCPCS | Performed by: INTERNAL MEDICINE

## 2021-01-01 PROCEDURE — 99214 OFFICE O/P EST MOD 30 MIN: CPT | Performed by: INTERNAL MEDICINE

## 2021-01-01 PROCEDURE — G8536 NO DOC ELDER MAL SCRN: HCPCS | Performed by: INTERNAL MEDICINE

## 2021-01-01 PROCEDURE — G8754 DIAS BP LESS 90: HCPCS | Performed by: INTERNAL MEDICINE

## 2021-01-01 PROCEDURE — G8427 DOCREV CUR MEDS BY ELIG CLIN: HCPCS | Performed by: INTERNAL MEDICINE

## 2021-01-01 PROCEDURE — 1101F PT FALLS ASSESS-DOCD LE1/YR: CPT | Performed by: INTERNAL MEDICINE

## 2021-01-01 PROCEDURE — G8510 SCR DEP NEG, NO PLAN REQD: HCPCS | Performed by: INTERNAL MEDICINE

## 2021-01-01 RX ORDER — FENOFIBRATE 160 MG/1
TABLET ORAL
Qty: 90 TABLET | Refills: 0 | Status: SHIPPED | OUTPATIENT
Start: 2021-01-01 | End: 2022-01-01

## 2021-01-01 RX ORDER — VERAPAMIL HYDROCHLORIDE 240 MG/1
TABLET, FILM COATED, EXTENDED RELEASE ORAL
Qty: 90 TABLET | Refills: 0 | Status: SHIPPED | OUTPATIENT
Start: 2021-01-01 | End: 2021-01-01

## 2021-01-01 RX ORDER — FENOFIBRATE 160 MG/1
TABLET ORAL
Qty: 90 TABLET | Refills: 0 | Status: SHIPPED | OUTPATIENT
Start: 2021-01-01 | End: 2021-01-01

## 2021-01-01 RX ORDER — BUMETANIDE 0.5 MG/1
TABLET ORAL
Qty: 90 TABLET | Refills: 0 | Status: SHIPPED | OUTPATIENT
Start: 2021-01-01 | End: 2021-01-01

## 2021-01-01 RX ORDER — BUMETANIDE 0.5 MG/1
TABLET ORAL
Qty: 90 TABLET | Refills: 0 | Status: SHIPPED | OUTPATIENT
Start: 2021-01-01 | End: 2022-01-01

## 2021-01-01 RX ORDER — LISINOPRIL 20 MG/1
TABLET ORAL
Qty: 90 TABLET | Refills: 0 | Status: SHIPPED | OUTPATIENT
Start: 2021-01-01 | End: 2021-01-01

## 2021-01-01 RX ORDER — VERAPAMIL HYDROCHLORIDE 240 MG/1
TABLET, FILM COATED, EXTENDED RELEASE ORAL
Qty: 90 TABLET | Refills: 0 | Status: SHIPPED | OUTPATIENT
Start: 2021-01-01 | End: 2022-01-01

## 2021-01-01 RX ORDER — LISINOPRIL 20 MG/1
TABLET ORAL
Qty: 90 TABLET | Refills: 0 | Status: SHIPPED | OUTPATIENT
Start: 2021-01-01 | End: 2022-01-01

## 2021-01-22 ENCOUNTER — OFFICE VISIT (OUTPATIENT)
Dept: INTERNAL MEDICINE CLINIC | Age: 76
End: 2021-01-22
Payer: MEDICARE

## 2021-01-22 VITALS
WEIGHT: 252.2 LBS | RESPIRATION RATE: 14 BRPM | OXYGEN SATURATION: 93 % | HEIGHT: 69 IN | TEMPERATURE: 98.8 F | SYSTOLIC BLOOD PRESSURE: 124 MMHG | BODY MASS INDEX: 37.36 KG/M2 | DIASTOLIC BLOOD PRESSURE: 82 MMHG | HEART RATE: 83 BPM

## 2021-01-22 DIAGNOSIS — Z01.818 PREOPERATIVE CLEARANCE: Primary | ICD-10-CM

## 2021-01-22 DIAGNOSIS — E66.9 OBESITY (BMI 30-39.9): Chronic | ICD-10-CM

## 2021-01-22 DIAGNOSIS — E78.2 MIXED HYPERLIPIDEMIA: Chronic | ICD-10-CM

## 2021-01-22 DIAGNOSIS — I10 ESSENTIAL HYPERTENSION: Chronic | ICD-10-CM

## 2021-01-22 DIAGNOSIS — H02.403 PTOSIS OF BOTH UPPER EYELIDS: ICD-10-CM

## 2021-01-22 LAB
ANION GAP SERPL CALC-SCNC: 12 MMOL/L
BUN SERPL-MCNC: 16 MG/DL (ref 9–20)
CALCIUM SERPL-MCNC: 9.1 MG/DL (ref 8.4–10.2)
CHLORIDE SERPL-SCNC: 102 MMOL/L (ref 98–107)
CO2 SERPL-SCNC: 30 MMOL/L (ref 22–32)
CREAT SERPL-MCNC: 0.7 MG/DL (ref 0.8–1.5)
GLUCOSE SERPL-MCNC: 181 MG/DL (ref 75–110)
POTASSIUM SERPL-SCNC: 4 MMOL/L (ref 3.6–5)
SODIUM SERPL-SCNC: 144 MMOL/L (ref 137–145)

## 2021-01-22 PROCEDURE — G8536 NO DOC ELDER MAL SCRN: HCPCS | Performed by: INTERNAL MEDICINE

## 2021-01-22 PROCEDURE — G8510 SCR DEP NEG, NO PLAN REQD: HCPCS | Performed by: INTERNAL MEDICINE

## 2021-01-22 PROCEDURE — 99214 OFFICE O/P EST MOD 30 MIN: CPT | Performed by: INTERNAL MEDICINE

## 2021-01-22 PROCEDURE — 93000 ELECTROCARDIOGRAM COMPLETE: CPT | Performed by: INTERNAL MEDICINE

## 2021-01-22 PROCEDURE — 1101F PT FALLS ASSESS-DOCD LE1/YR: CPT | Performed by: INTERNAL MEDICINE

## 2021-01-22 PROCEDURE — G8754 DIAS BP LESS 90: HCPCS | Performed by: INTERNAL MEDICINE

## 2021-01-22 PROCEDURE — 80048 BASIC METABOLIC PNL TOTAL CA: CPT | Performed by: INTERNAL MEDICINE

## 2021-01-22 PROCEDURE — G8752 SYS BP LESS 140: HCPCS | Performed by: INTERNAL MEDICINE

## 2021-01-22 PROCEDURE — G8427 DOCREV CUR MEDS BY ELIG CLIN: HCPCS | Performed by: INTERNAL MEDICINE

## 2021-01-22 PROCEDURE — 3017F COLORECTAL CA SCREEN DOC REV: CPT | Performed by: INTERNAL MEDICINE

## 2021-01-22 PROCEDURE — G8417 CALC BMI ABV UP PARAM F/U: HCPCS | Performed by: INTERNAL MEDICINE

## 2021-01-22 NOTE — PROGRESS NOTES
Reinier Cabrales is a 76 y.o. male presenting for Pre-op Exam  .     1. Have you been to the ER, urgent care clinic since your last visit? Hospitalized since your last visit? [facility] for g[facility] 12-1-20    2. Have you seen or consulted any other health care providers outside of the [address] [place] [address] since your last visit? Include any pap smears or colon screening. No    Fall Risk Assessment, last 12 mths 1/22/2021   Able to walk? Yes   Fall in past 12 months? 0   Do you feel unsteady? 0   Are you worried about falling 0         Abuse Screening Questionnaire 1/22/2021   Do you ever feel afraid of your partner? N   Are you in a relationship with someone who physically or mentally threatens you? N   Is it safe for you to go home? Y       3 most recent PHQ Screens 1/22/2021   Little interest or pleasure in doing things Not at all   Feeling down, depressed, irritable, or hopeless Not at all   Total Score PHQ 2 0       There are no discontinued medications.

## 2021-01-22 NOTE — PROGRESS NOTES
History:   Mr. Nani Pitts is a 30-year-old male referred to our office today by Dr. Farzana Smith in medical consultation for preoperative medical clearance prior to having bilateral eye surgery for upper lid ptosis to be performed on February 1st at the [facility] ambulatory surgery center. The patient has noted problems with failing vision and was initially found to have a right eye macular hole which was evaluated. He continued to note worsening vision and subsequently underwent bilateral cataract surgery with some improvement. The patient has visual field deficits for which ptosis surgery was recommended in order to improve his vision. The patient gives no history of glaucoma, macular degeneration. He has had no eye pain or drainage. His medical history is pertinent for controlled hypertension for which she is on Bumex, lisinopril and verapamil. He does in addition take potassium and magnesium supplements. His blood pressure has been well controlled. In addition he is on fenofibrate and omega-3 fatty acids for his cholesterol. He does take a prophylactic aspirin. He has no history of coronary artery disease and denies exertional chest pains, shortness of breath, palpitations or syncope. He has no known allergies. He denies latex allergy or Band-Aid adhesive sensitivity.     He has never had problems with anesthesia    He denies history of blood clots    Review of systems is otherwise negative is noted    Latex Allergy:NO    History of anesthesia reaction: NO:     History of PE/DVT:NO:       Past Medical History:   Diagnosis Date    At risk for sleep apnea 05/22/2019    during phone assessment for PAT, LEVI 6    Colon polyp     Elevated CPK     Hyperlipidemia     Hypertension     Psoriasis     Rosacea      Past Surgical History:   Procedure Laterality Date    COLONOSCOPY N/A 5/29/2019    COLONOSCOPY performed by Toby Tolentino MD at [address]  COLONOSCOPY 2014    HX HERNIA REPAIR      umbilical     No Known Allergies  Current Outpatient Medications   Medication Sig Dispense Refill    bumetanide (BUMEX) 0.5 mg tablet Take 1 Tab by mouth daily. 90 Tab 3    fenofibrate (LOFIBRA) 160 mg tablet Take 1 Tab by mouth daily. 90 Tab 3    lisinopril (PRINIVIL, ZESTRIL) 20 mg tablet Take 1 Tab by mouth daily. 90 Tab 3    verapamil ER (CALAN-SR) 240 mg CR tablet Take 1 Tab by mouth daily. 90 Tab 3    potassium 99 mg tablet Take 198 mg by mouth daily.  magnesium oxide (MAG-OX) 400 mg tablet Take 400 mg by mouth daily.  omega-3 fatty acids Cap Take 500 mg by mouth daily.  aspirin 81 mg tablet Take 81 mg by mouth four (4) days a week. Social History     Socioeconomic History    Marital status:      Spouse name: Not on file    Number of children: Not on file    Years of education: Not on file    Highest education level: Not on file   Tobacco Use    Smoking status: Former Smoker     Packs/day: 1.00     Years: 20.00     Pack years: 20.00     Quit date: 26     Years since quittin.0    Smokeless tobacco: Never Used   Substance and Sexual Activity    Alcohol use: Yes     Alcohol/week: 20.0 standard drinks     Types: 20 Cans of beer per week    Drug use: Never     Family History   Problem Relation Age of Onset    Cancer Mother         colon       Reviewed PmHx, RxHx, FmHx, SocHx, AllgHx and updated and dated in the chart.     Review of Systems  Constitutional: negative for fevers, chills, anorexia and weight loss  Eyes:   negative for visual disturbance and irritation  ENT:   negative for tinnitus,sore throat,nasal congestion,ear pain,hoarseness  Respiratory:  negative for cough, hemoptysis, dyspnea,wheezing  CV:   negative for chest pain, palpitations, lower extremity edema  GI:   negative for nausea, vomiting, diarrhea, abdominal pain,melena  Endo:               negative for polyuria,polydipsia,polyphagia,heat intolerance  Genitourinary: negative for frequency, dysuria and hematuria  Integumentary: negative for rash and pruritus  Hematologic:  negative for easy bruising and gum/nose bleeding  Musculoskel: negative for myalgias, arthralgias, back pain, muscle weakness, joint pain  Neurological:  negative for headaches, dizziness, vertigo, memory problems and gait   Behavl/Psych: negative for feelings of anxiety, depression, mood changes      Objective:     Vitals:    01/22/21 0920   BP: 124/82   Pulse: 83   Resp: 14   Temp: 98.8 °F (37.1 °C)   TempSrc: Oral   SpO2: 93%   Weight: 252 lb 3.2 oz (114.4 kg)   Height: 5' 9\" (1.753 m)     Body mass index is 37.24 kg/m².     Physical Examination: General appearance - alert, obese, and in no distress and oriented to person, place, and time  Mental status - alert, oriented to person, place, and time, normal mood, behavior, speech, dress, motor activity, and thought processes  Eyes -significant upper eyelid ptosis is present bilaterally  Ears - bilateral TM's and external ear canals normal, right ear normal, left ear normal  Mouth - mucous membranes moist, pharynx normal without lesions and tongue normal  Neck - supple, no significant adenopathy  Lymphatics - no palpable lymphadenopathy  Chest - clear to auscultation, no wheezes, rales or rhonchi,   Heart - normal rate, regular rhythm, normal S1, S2, no murmurs, rubs, clicks or gallops  Abdomen - soft, nontender, nondistended, no masses or organomegaly  Back exam - full range of motion, no tenderness, palpable spasm or pain on motion  Neurological - alert, oriented, normal speech, no focal findings or movement disorder noted, neck supple without rigidity, cranial nerves II through XII intact, DTR's normal and symmetric, motor and sensory grossly normal bilaterally  Musculoskeletal - no joint tenderness, deformity or swelling  Extremities - peripheral pulses normal, no pedal edema, no clubbing or cyanosis  Skin - normal coloration and turgor, no rashes, no suspicious skin lesions noted  Physical exam otherwise negative    Assessment/ Plan:   Diagnoses and all orders for this visit:    Preoperative clearance    Ptosis of both upper eyelids    Essential hypertension  -     AMB POC EKG ROUTINE W/ 12 LEADS, INTER & REP  -     COLLECTION VENOUS BLOOD,VENIPUNCTURE  -     METABOLIC PANEL, BASIC    Mixed hyperlipidemia    Obesity (BMI 30-39. 9)        Other instructions: The patient's medications were reviewed and reconciled. No change in his current medical regimen will be made. EKG reveals frequent PVCs but no acute changes otherwise    BMP obtained. Nonfasting blood sugar is 181 and electrolytes are otherwise stable. The patient is medically stable, at low cardiac risk, and cleared for the surgery as scheduled for February 1st    Follow-up here as previously appointed    Follow-up and Dispositions    · Return for As previously scheduled. I have discussed the diagnosis with the patient and the intended plan as seen in the above orders. The patient has received an after-visit summary and questions were answered concerning future plans. Pt conveyed understanding of plan. Adam Williamson MD    Please note that this dictation was completed with [employer] the [employer] voice recognition software. Quite often unanticipated grammatical, syntax, homophones, and other interpretive errors are inadvertently transcribed by the computer software. Please disregard these errors. Please excuse any errors that have escaped final proofreading.

## 2021-01-22 NOTE — PATIENT INSTRUCTIONS
DASH Diet: Care Instructions Your Care Instructions The DASH diet is an eating plan that can help lower your blood pressure. DASH stands for Dietary Approaches to Stop Hypertension. Hypertension is high blood pressure. The DASH diet focuses on eating foods that are high in calcium, potassium, and magnesium. These nutrients can lower blood pressure. The foods that are highest in these nutrients are fruits, vegetables, low-fat dairy products, nuts, seeds, and legumes. But taking calcium, potassium, and magnesium supplements instead of eating foods that are high in those nutrients does not have the same effect. The DASH diet also includes whole grains, fish, and poultry. The DASH diet is one of several lifestyle changes your doctor may recommend to lower your high blood pressure. Your doctor may also want you to decrease the amount of sodium in your diet. Lowering sodium while following the DASH diet can lower blood pressure even further than just the DASH diet alone. Follow-up care is a key part of your treatment and safety. Be sure to make and go to all appointments, and call your doctor if you are having problems. It's also a good idea to know your test results and keep a list of the medicines you take. How can you care for yourself at home? Following the DASH diet · Eat 4 to 5 servings of fruit each day. A serving is 1 medium-sized piece of fruit, ½ cup chopped or canned fruit, 1/4 cup dried fruit, or 4 ounces (½ cup) of fruit juice. Choose fruit more often than fruit juice. · Eat 4 to 5 servings of vegetables each day. A serving is 1 cup of lettuce or raw leafy vegetables, ½ cup of chopped or cooked vegetables, or 4 ounces (½ cup) of vegetable juice. Choose vegetables more often than vegetable juice. · Get 2 to 3 servings of low-fat and fat-free dairy each day. A serving is 8 ounces of milk, 1 cup of yogurt, or 1 ½ ounces of cheese. · Eat 6 to 8 servings of grains each day. A serving is 1 slice of bread, 1 ounce of dry cereal, or ½ cup of cooked rice, pasta, or cooked cereal. Try to choose whole-grain products as much as possible. · Limit lean meat, poultry, and fish to 2 servings each day. A serving is 3 ounces, about the size of a deck of cards. · Eat 4 to 5 servings of nuts, seeds, and legumes (cooked dried beans, lentils, and split peas) each week. A serving is 1/3 cup of nuts, 2 tablespoons of seeds, or ½ cup of cooked beans or peas. · Limit fats and oils to 2 to 3 servings each day. A serving is 1 teaspoon of vegetable oil or 2 tablespoons of salad dressing. · Limit sweets and added sugars to 5 servings or less a week. A serving is 1 tablespoon jelly or jam, ½ cup sorbet, or 1 cup of lemonade. · Eat less than 2,300 milligrams (mg) of sodium a day. If you limit your sodium to 1,500 mg a day, you can lower your blood pressure even more. Tips for success · Start small. Do not try to make dramatic changes to your diet all at once. You might feel that you are missing out on your favorite foods and then be more likely to not follow the plan. Make small changes, and stick with them. Once those changes become habit, add a few more changes. · Try some of the following: ? Make it a goal to eat a fruit or vegetable at every meal and at snacks. This will make it easy to get the recommended amount of fruits and vegetables each day. ? Try yogurt topped with fruit and nuts for a snack or healthy dessert. ? Add lettuce, tomato, cucumber, and onion to sandwiches. ? Combine a ready-made pizza crust with low-fat mozzarella cheese and lots of vegetable toppings. Try using tomatoes, squash, spinach, broccoli, carrots, cauliflower, and onions. ? Have a variety of cut-up vegetables with a low-fat dip as an appetizer instead of chips and dip. ? Sprinkle sunflower seeds or chopped almonds over salads. Or try adding chopped walnuts or almonds to cooked vegetables. ? Try some vegetarian meals using beans and peas. Add garbanzo or kidney beans to salads. Make burritos and tacos with mashed thompson beans or black beans. Where can you learn more? Go to http://ricci-hector.info/ Enter S305 in the search box to learn more about \"DASH Diet: Care Instructions. \" Current as of: December 16, 2019               Content Version: 12.6 © 5198-5075 [employer]. Care instructions adapted under license by [employer] (which disclaims liability or warranty for this information). If you have questions about a medical condition or this instruction, always ask your healthcare professional. [address] any warranty or liability for your use of this information.

## 2021-02-10 ENCOUNTER — APPOINTMENT (OUTPATIENT)
Dept: CT IMAGING | Age: 76
DRG: 440 | End: 2021-02-10
Attending: EMERGENCY MEDICINE
Payer: MEDICARE

## 2021-02-10 ENCOUNTER — HOSPITAL ENCOUNTER (INPATIENT)
Age: 76
LOS: 1 days | Discharge: HOME | DRG: 440 | End: 2021-02-12
Attending: EMERGENCY MEDICINE | Admitting: GENERAL ACUTE CARE HOSPITAL
Payer: MEDICARE

## 2021-02-10 DIAGNOSIS — I10 HYPERTENSION, UNSPECIFIED TYPE: ICD-10-CM

## 2021-02-10 DIAGNOSIS — K85.90 ACUTE PANCREATITIS, UNSPECIFIED COMPLICATION STATUS, UNSPECIFIED PANCREATITIS TYPE: Primary | ICD-10-CM

## 2021-02-10 DIAGNOSIS — I10 ESSENTIAL HYPERTENSION: ICD-10-CM

## 2021-02-10 LAB
ALBUMIN SERPL-MCNC: 3.7 G/DL (ref 3.5–5)
ALBUMIN/GLOB SERPL: 1 {RATIO} (ref 1.1–2.2)
ALP SERPL-CCNC: 63 U/L (ref 45–117)
ALT SERPL-CCNC: 33 U/L (ref 12–78)
ANION GAP SERPL CALC-SCNC: 8 MMOL/L (ref 5–15)
AST SERPL-CCNC: 20 U/L (ref 15–37)
BASOPHILS # BLD: 0.1 K/UL (ref 0–0.1)
BASOPHILS NFR BLD: 1 % (ref 0–1)
BILIRUB SERPL-MCNC: 0.5 MG/DL (ref 0.2–1)
BUN SERPL-MCNC: 16 MG/DL (ref 6–20)
BUN/CREAT SERPL: 22 (ref 12–20)
CALCIUM SERPL-MCNC: 8.5 MG/DL (ref 8.5–10.1)
CHLORIDE SERPL-SCNC: 102 MMOL/L (ref 97–108)
CO2 SERPL-SCNC: 24 MMOL/L (ref 21–32)
CREAT SERPL-MCNC: 0.72 MG/DL (ref 0.7–1.3)
DIFFERENTIAL METHOD BLD: NORMAL
EOSINOPHIL # BLD: 0 K/UL (ref 0–0.4)
EOSINOPHIL NFR BLD: 0 % (ref 0–7)
ERYTHROCYTE [DISTWIDTH] IN BLOOD BY AUTOMATED COUNT: 12.1 % (ref 11.5–14.5)
GLOBULIN SER CALC-MCNC: 3.7 G/DL (ref 2–4)
GLUCOSE SERPL-MCNC: 154 MG/DL (ref 65–100)
HCT VFR BLD AUTO: 42.3 % (ref 36.6–50.3)
HGB BLD-MCNC: 14.3 G/DL (ref 12.1–17)
IMM GRANULOCYTES # BLD AUTO: 0 K/UL (ref 0–0.04)
IMM GRANULOCYTES NFR BLD AUTO: 0 % (ref 0–0.5)
LIPASE SERPL-CCNC: 2001 U/L (ref 73–393)
LYMPHOCYTES # BLD: 1.9 K/UL (ref 0.8–3.5)
LYMPHOCYTES NFR BLD: 18 % (ref 12–49)
MCH RBC QN AUTO: 30.3 PG (ref 26–34)
MCHC RBC AUTO-ENTMCNC: 33.8 G/DL (ref 30–36.5)
MCV RBC AUTO: 89.6 FL (ref 80–99)
MONOCYTES # BLD: 0.7 K/UL (ref 0–1)
MONOCYTES NFR BLD: 7 % (ref 5–13)
NEUTS SEG # BLD: 7.7 K/UL (ref 1.8–8)
NEUTS SEG NFR BLD: 74 % (ref 32–75)
NRBC # BLD: 0 K/UL (ref 0–0.01)
NRBC BLD-RTO: 0 PER 100 WBC
PLATELET # BLD AUTO: 209 K/UL (ref 150–400)
PMV BLD AUTO: 9 FL (ref 8.9–12.9)
POTASSIUM SERPL-SCNC: 3.7 MMOL/L (ref 3.5–5.1)
PROT SERPL-MCNC: 7.4 G/DL (ref 6.4–8.2)
RBC # BLD AUTO: 4.72 M/UL (ref 4.1–5.7)
SODIUM SERPL-SCNC: 134 MMOL/L (ref 136–145)
WBC # BLD AUTO: 10.4 K/UL (ref 4.1–11.1)

## 2021-02-10 PROCEDURE — 83690 ASSAY OF LIPASE: CPT

## 2021-02-10 PROCEDURE — 74011250636 HC RX REV CODE- 250/636: Performed by: EMERGENCY MEDICINE

## 2021-02-10 PROCEDURE — 74011000636 HC RX REV CODE- 636: Performed by: EMERGENCY MEDICINE

## 2021-02-10 PROCEDURE — 74177 CT ABD & PELVIS W/CONTRAST: CPT

## 2021-02-10 PROCEDURE — 96374 THER/PROPH/DIAG INJ IV PUSH: CPT

## 2021-02-10 PROCEDURE — 36415 COLL VENOUS BLD VENIPUNCTURE: CPT

## 2021-02-10 PROCEDURE — 99285 EMERGENCY DEPT VISIT HI MDM: CPT

## 2021-02-10 PROCEDURE — 96375 TX/PRO/DX INJ NEW DRUG ADDON: CPT

## 2021-02-10 PROCEDURE — 80053 COMPREHEN METABOLIC PANEL: CPT

## 2021-02-10 PROCEDURE — 85025 COMPLETE CBC W/AUTO DIFF WBC: CPT

## 2021-02-10 RX ORDER — ONDANSETRON 2 MG/ML
4 INJECTION INTRAMUSCULAR; INTRAVENOUS
Status: COMPLETED | OUTPATIENT
Start: 2021-02-10 | End: 2021-02-10

## 2021-02-10 RX ORDER — HYDRALAZINE HYDROCHLORIDE 20 MG/ML
20 INJECTION INTRAMUSCULAR; INTRAVENOUS
Status: COMPLETED | OUTPATIENT
Start: 2021-02-10 | End: 2021-02-10

## 2021-02-10 RX ADMIN — IOPAMIDOL 100 ML: 755 INJECTION, SOLUTION INTRAVENOUS at 21:55

## 2021-02-10 RX ADMIN — ONDANSETRON 4 MG: 2 INJECTION INTRAMUSCULAR; INTRAVENOUS at 21:15

## 2021-02-10 RX ADMIN — SODIUM CHLORIDE 500 ML: 9 INJECTION, SOLUTION INTRAVENOUS at 21:11

## 2021-02-10 RX ADMIN — HYDRALAZINE HYDROCHLORIDE 20 MG: 20 INJECTION INTRAMUSCULAR; INTRAVENOUS at 23:14

## 2021-02-11 ENCOUNTER — APPOINTMENT (OUTPATIENT)
Dept: ULTRASOUND IMAGING | Age: 76
DRG: 440 | End: 2021-02-11
Attending: NURSE PRACTITIONER
Payer: MEDICARE

## 2021-02-11 PROBLEM — K85.90 PANCREATITIS: Status: ACTIVE | Noted: 2021-02-11

## 2021-02-11 PROBLEM — K85.90 ACUTE PANCREATITIS: Status: ACTIVE | Noted: 2021-02-11

## 2021-02-11 LAB
ALBUMIN SERPL-MCNC: 3.4 G/DL (ref 3.5–5)
ALBUMIN/GLOB SERPL: 1 {RATIO} (ref 1.1–2.2)
ALP SERPL-CCNC: 51 U/L (ref 45–117)
ALT SERPL-CCNC: 27 U/L (ref 12–78)
AMPHET UR QL SCN: NEGATIVE
ANION GAP SERPL CALC-SCNC: 7 MMOL/L (ref 5–15)
APPEARANCE UR: CLEAR
AST SERPL-CCNC: 19 U/L (ref 15–37)
BACTERIA URNS QL MICRO: NEGATIVE /HPF
BARBITURATES UR QL SCN: NEGATIVE
BASOPHILS # BLD: 0 K/UL (ref 0–0.1)
BASOPHILS NFR BLD: 0 % (ref 0–1)
BENZODIAZ UR QL: NEGATIVE
BILIRUB SERPL-MCNC: 0.8 MG/DL (ref 0.2–1)
BILIRUB UR QL: NEGATIVE
BUN SERPL-MCNC: 11 MG/DL (ref 6–20)
BUN/CREAT SERPL: 16 (ref 12–20)
CALCIUM SERPL-MCNC: 8.2 MG/DL (ref 8.5–10.1)
CANNABINOIDS UR QL SCN: NEGATIVE
CHLORIDE SERPL-SCNC: 103 MMOL/L (ref 97–108)
CHOLEST SERPL-MCNC: 208 MG/DL
CO2 SERPL-SCNC: 26 MMOL/L (ref 21–32)
COCAINE UR QL SCN: NEGATIVE
COLOR UR: ABNORMAL
CREAT SERPL-MCNC: 0.68 MG/DL (ref 0.7–1.3)
CRP SERPL HS-MCNC: >9.5 MG/L
DIFFERENTIAL METHOD BLD: ABNORMAL
DRUG SCRN COMMENT,DRGCM: ABNORMAL
EOSINOPHIL # BLD: 0 K/UL (ref 0–0.4)
EOSINOPHIL NFR BLD: 0 % (ref 0–7)
EPITH CASTS URNS QL MICRO: ABNORMAL /LPF
ERYTHROCYTE [DISTWIDTH] IN BLOOD BY AUTOMATED COUNT: 12.5 % (ref 11.5–14.5)
ETHANOL SERPL-MCNC: <10 MG/DL
GLOBULIN SER CALC-MCNC: 3.3 G/DL (ref 2–4)
GLUCOSE SERPL-MCNC: 149 MG/DL (ref 65–100)
GLUCOSE UR STRIP.AUTO-MCNC: 250 MG/DL
HCT VFR BLD AUTO: 39.5 % (ref 36.6–50.3)
HDLC SERPL-MCNC: 63 MG/DL
HDLC SERPL: 3.3 {RATIO} (ref 0–5)
HGB BLD-MCNC: 13.4 G/DL (ref 12.1–17)
HGB UR QL STRIP: NEGATIVE
HYALINE CASTS URNS QL MICRO: ABNORMAL /LPF (ref 0–5)
IMM GRANULOCYTES # BLD AUTO: 0 K/UL (ref 0–0.04)
IMM GRANULOCYTES NFR BLD AUTO: 0 % (ref 0–0.5)
KETONES UR QL STRIP.AUTO: NEGATIVE MG/DL
LDH SERPL L TO P-CCNC: 161 U/L (ref 85–241)
LDLC SERPL CALC-MCNC: 129.6 MG/DL (ref 0–100)
LEUKOCYTE ESTERASE UR QL STRIP.AUTO: NEGATIVE
LIPASE SERPL-CCNC: 965 U/L (ref 73–393)
LIPID PROFILE,FLP: ABNORMAL
LYMPHOCYTES # BLD: 1 K/UL (ref 0.8–3.5)
LYMPHOCYTES NFR BLD: 10 % (ref 12–49)
MAGNESIUM SERPL-MCNC: 2 MG/DL (ref 1.6–2.4)
MCH RBC QN AUTO: 30.3 PG (ref 26–34)
MCHC RBC AUTO-ENTMCNC: 33.9 G/DL (ref 30–36.5)
MCV RBC AUTO: 89.4 FL (ref 80–99)
METHADONE UR QL: NEGATIVE
MONOCYTES # BLD: 0.9 K/UL (ref 0–1)
MONOCYTES NFR BLD: 9 % (ref 5–13)
NEUTS SEG # BLD: 8.3 K/UL (ref 1.8–8)
NEUTS SEG NFR BLD: 81 % (ref 32–75)
NITRITE UR QL STRIP.AUTO: NEGATIVE
NRBC # BLD: 0 K/UL (ref 0–0.01)
NRBC BLD-RTO: 0 PER 100 WBC
OPIATES UR QL: POSITIVE
PCP UR QL: NEGATIVE
PH UR STRIP: 6.5 [PH] (ref 5–8)
PHOSPHATE SERPL-MCNC: 2.3 MG/DL (ref 2.6–4.7)
PLATELET # BLD AUTO: 183 K/UL (ref 150–400)
PMV BLD AUTO: 9 FL (ref 8.9–12.9)
POTASSIUM SERPL-SCNC: 3.6 MMOL/L (ref 3.5–5.1)
PROT SERPL-MCNC: 6.7 G/DL (ref 6.4–8.2)
PROT UR STRIP-MCNC: NEGATIVE MG/DL
RBC # BLD AUTO: 4.42 M/UL (ref 4.1–5.7)
RBC #/AREA URNS HPF: ABNORMAL /HPF (ref 0–5)
SODIUM SERPL-SCNC: 136 MMOL/L (ref 136–145)
SP GR UR REFRACTOMETRY: 1.01 (ref 1–1.03)
TRIGL SERPL-MCNC: 77 MG/DL (ref ?–150)
UA: UC IF INDICATED,UAUC: ABNORMAL
UROBILINOGEN UR QL STRIP.AUTO: 0.2 EU/DL (ref 0.2–1)
VLDLC SERPL CALC-MCNC: 15.4 MG/DL
WBC # BLD AUTO: 10.4 K/UL (ref 4.1–11.1)
WBC URNS QL MICRO: ABNORMAL /HPF (ref 0–4)

## 2021-02-11 PROCEDURE — 65660000000 HC RM CCU STEPDOWN

## 2021-02-11 PROCEDURE — 74011000258 HC RX REV CODE- 258: Performed by: NURSE PRACTITIONER

## 2021-02-11 PROCEDURE — 82077 ASSAY SPEC XCP UR&BREATH IA: CPT

## 2021-02-11 PROCEDURE — 74011000250 HC RX REV CODE- 250: Performed by: EMERGENCY MEDICINE

## 2021-02-11 PROCEDURE — 85025 COMPLETE CBC W/AUTO DIFF WBC: CPT

## 2021-02-11 PROCEDURE — 74011250636 HC RX REV CODE- 250/636: Performed by: NURSE PRACTITIONER

## 2021-02-11 PROCEDURE — 84100 ASSAY OF PHOSPHORUS: CPT

## 2021-02-11 PROCEDURE — 83735 ASSAY OF MAGNESIUM: CPT

## 2021-02-11 PROCEDURE — 81001 URINALYSIS AUTO W/SCOPE: CPT

## 2021-02-11 PROCEDURE — 83690 ASSAY OF LIPASE: CPT

## 2021-02-11 PROCEDURE — 86141 C-REACTIVE PROTEIN HS: CPT

## 2021-02-11 PROCEDURE — 83615 LACTATE (LD) (LDH) ENZYME: CPT

## 2021-02-11 PROCEDURE — 74011250637 HC RX REV CODE- 250/637: Performed by: NURSE PRACTITIONER

## 2021-02-11 PROCEDURE — 80061 LIPID PANEL: CPT

## 2021-02-11 PROCEDURE — 74011250637 HC RX REV CODE- 250/637: Performed by: EMERGENCY MEDICINE

## 2021-02-11 PROCEDURE — 36415 COLL VENOUS BLD VENIPUNCTURE: CPT

## 2021-02-11 PROCEDURE — 74011250636 HC RX REV CODE- 250/636: Performed by: EMERGENCY MEDICINE

## 2021-02-11 PROCEDURE — 80307 DRUG TEST PRSMV CHEM ANLYZR: CPT

## 2021-02-11 PROCEDURE — 80053 COMPREHEN METABOLIC PANEL: CPT

## 2021-02-11 PROCEDURE — 76700 US EXAM ABDOM COMPLETE: CPT

## 2021-02-11 RX ORDER — ENOXAPARIN SODIUM 100 MG/ML
40 INJECTION SUBCUTANEOUS DAILY
Status: DISCONTINUED | OUTPATIENT
Start: 2021-02-11 | End: 2021-02-12 | Stop reason: HOSPADM

## 2021-02-11 RX ORDER — METRONIDAZOLE 500 MG/100ML
500 INJECTION, SOLUTION INTRAVENOUS
Status: COMPLETED | OUTPATIENT
Start: 2021-02-11 | End: 2021-02-11

## 2021-02-11 RX ORDER — ACETAMINOPHEN 650 MG/1
650 SUPPOSITORY RECTAL
Status: DISCONTINUED | OUTPATIENT
Start: 2021-02-11 | End: 2021-02-12 | Stop reason: HOSPADM

## 2021-02-11 RX ORDER — HYDROMORPHONE HYDROCHLORIDE 1 MG/ML
1 INJECTION, SOLUTION INTRAMUSCULAR; INTRAVENOUS; SUBCUTANEOUS
Status: DISCONTINUED | OUTPATIENT
Start: 2021-02-11 | End: 2021-02-12 | Stop reason: HOSPADM

## 2021-02-11 RX ORDER — POTASSIUM CHLORIDE 750 MG/1
10 TABLET, FILM COATED, EXTENDED RELEASE ORAL DAILY
Status: DISCONTINUED | OUTPATIENT
Start: 2021-02-11 | End: 2021-02-12 | Stop reason: HOSPADM

## 2021-02-11 RX ORDER — BUMETANIDE 0.5 MG/1
0.5 TABLET ORAL DAILY
Status: DISCONTINUED | OUTPATIENT
Start: 2021-02-11 | End: 2021-02-12

## 2021-02-11 RX ORDER — VERAPAMIL HYDROCHLORIDE 240 MG/1
240 TABLET, FILM COATED, EXTENDED RELEASE ORAL DAILY
Status: DISCONTINUED | OUTPATIENT
Start: 2021-02-11 | End: 2021-02-12 | Stop reason: HOSPADM

## 2021-02-11 RX ORDER — HYDRALAZINE HYDROCHLORIDE 20 MG/ML
10 INJECTION INTRAMUSCULAR; INTRAVENOUS
Status: DISCONTINUED | OUTPATIENT
Start: 2021-02-11 | End: 2021-02-12 | Stop reason: HOSPADM

## 2021-02-11 RX ORDER — GLUCOSAM/CHONDRO/HERB 149/HYAL 750-100 MG
1 TABLET ORAL DAILY
Status: DISCONTINUED | OUTPATIENT
Start: 2021-02-11 | End: 2021-02-12 | Stop reason: HOSPADM

## 2021-02-11 RX ORDER — CLONIDINE HYDROCHLORIDE 0.1 MG/1
0.1 TABLET ORAL
Status: COMPLETED | OUTPATIENT
Start: 2021-02-11 | End: 2021-02-11

## 2021-02-11 RX ORDER — FAMOTIDINE 20 MG/1
20 TABLET, FILM COATED ORAL DAILY
Status: DISCONTINUED | OUTPATIENT
Start: 2021-02-11 | End: 2021-02-12 | Stop reason: HOSPADM

## 2021-02-11 RX ORDER — NALOXONE HYDROCHLORIDE 0.4 MG/ML
0.4 INJECTION, SOLUTION INTRAMUSCULAR; INTRAVENOUS; SUBCUTANEOUS AS NEEDED
Status: DISCONTINUED | OUTPATIENT
Start: 2021-02-11 | End: 2021-02-12 | Stop reason: HOSPADM

## 2021-02-11 RX ORDER — METRONIDAZOLE 500 MG/100ML
500 INJECTION, SOLUTION INTRAVENOUS EVERY 6 HOURS
Status: DISCONTINUED | OUTPATIENT
Start: 2021-02-11 | End: 2021-02-11

## 2021-02-11 RX ORDER — SODIUM CHLORIDE 0.9 % (FLUSH) 0.9 %
5-40 SYRINGE (ML) INJECTION AS NEEDED
Status: DISCONTINUED | OUTPATIENT
Start: 2021-02-11 | End: 2021-02-12 | Stop reason: HOSPADM

## 2021-02-11 RX ORDER — GUAIFENESIN 100 MG/5ML
81 LIQUID (ML) ORAL
Status: DISCONTINUED | OUTPATIENT
Start: 2021-02-11 | End: 2021-02-12 | Stop reason: HOSPADM

## 2021-02-11 RX ORDER — SODIUM CHLORIDE, SODIUM LACTATE, POTASSIUM CHLORIDE, CALCIUM CHLORIDE 600; 310; 30; 20 MG/100ML; MG/100ML; MG/100ML; MG/100ML
150 INJECTION, SOLUTION INTRAVENOUS CONTINUOUS
Status: DISCONTINUED | OUTPATIENT
Start: 2021-02-11 | End: 2021-02-12 | Stop reason: HOSPADM

## 2021-02-11 RX ORDER — ACETAMINOPHEN 325 MG/1
650 TABLET ORAL
Status: DISCONTINUED | OUTPATIENT
Start: 2021-02-11 | End: 2021-02-12 | Stop reason: HOSPADM

## 2021-02-11 RX ORDER — ONDANSETRON 2 MG/ML
4 INJECTION INTRAMUSCULAR; INTRAVENOUS
Status: DISCONTINUED | OUTPATIENT
Start: 2021-02-11 | End: 2021-02-12 | Stop reason: HOSPADM

## 2021-02-11 RX ORDER — LISINOPRIL 20 MG/1
20 TABLET ORAL DAILY
Status: DISCONTINUED | OUTPATIENT
Start: 2021-02-11 | End: 2021-02-12 | Stop reason: HOSPADM

## 2021-02-11 RX ORDER — LANOLIN ALCOHOL/MO/W.PET/CERES
400 CREAM (GRAM) TOPICAL DAILY
Status: DISCONTINUED | OUTPATIENT
Start: 2021-02-11 | End: 2021-02-12 | Stop reason: HOSPADM

## 2021-02-11 RX ORDER — FENOFIBRATE 145 MG/1
145 TABLET, COATED ORAL DAILY
Status: DISCONTINUED | OUTPATIENT
Start: 2021-02-11 | End: 2021-02-12 | Stop reason: HOSPADM

## 2021-02-11 RX ORDER — SODIUM CHLORIDE 0.9 % (FLUSH) 0.9 %
5-40 SYRINGE (ML) INJECTION EVERY 8 HOURS
Status: DISCONTINUED | OUTPATIENT
Start: 2021-02-11 | End: 2021-02-12 | Stop reason: HOSPADM

## 2021-02-11 RX ADMIN — BUMETANIDE 0.5 MG: 0.5 TABLET ORAL at 08:53

## 2021-02-11 RX ADMIN — HYDROMORPHONE HYDROCHLORIDE 1 MG: 1 INJECTION, SOLUTION INTRAMUSCULAR; INTRAVENOUS; SUBCUTANEOUS at 08:53

## 2021-02-11 RX ADMIN — METRONIDAZOLE 500 MG: 500 INJECTION, SOLUTION INTRAVENOUS at 04:07

## 2021-02-11 RX ADMIN — OMEGA-3 FATTY ACIDS CAP 1000 MG 1 CAPSULE: 1000 CAP at 08:53

## 2021-02-11 RX ADMIN — Medication 400 MG: at 08:53

## 2021-02-11 RX ADMIN — CEFTRIAXONE 1 G: 1 INJECTION, POWDER, FOR SOLUTION INTRAMUSCULAR; INTRAVENOUS at 03:47

## 2021-02-11 RX ADMIN — CLONIDINE HYDROCHLORIDE 0.1 MG: 0.1 TABLET ORAL at 01:05

## 2021-02-11 RX ADMIN — POTASSIUM CHLORIDE 10 MEQ: 750 TABLET, FILM COATED, EXTENDED RELEASE ORAL at 08:53

## 2021-02-11 RX ADMIN — Medication 10 ML: at 03:42

## 2021-02-11 RX ADMIN — LISINOPRIL 20 MG: 20 TABLET ORAL at 08:53

## 2021-02-11 RX ADMIN — LIDOCAINE HYDROCHLORIDE 40 ML: 20 SOLUTION ORAL; TOPICAL at 01:07

## 2021-02-11 RX ADMIN — FENOFIBRATE 145 MG: 145 TABLET, FILM COATED ORAL at 08:53

## 2021-02-11 RX ADMIN — SODIUM CHLORIDE, POTASSIUM CHLORIDE, SODIUM LACTATE AND CALCIUM CHLORIDE 150 ML/HR: 600; 310; 30; 20 INJECTION, SOLUTION INTRAVENOUS at 12:28

## 2021-02-11 RX ADMIN — METRONIDAZOLE 500 MG: 500 INJECTION, SOLUTION INTRAVENOUS at 09:54

## 2021-02-11 RX ADMIN — METRONIDAZOLE 500 MG: 500 INJECTION, SOLUTION INTRAVENOUS at 05:19

## 2021-02-11 RX ADMIN — VERAPAMIL HYDROCHLORIDE 240 MG: 240 TABLET, FILM COATED, EXTENDED RELEASE ORAL at 08:54

## 2021-02-11 RX ADMIN — HYDROMORPHONE HYDROCHLORIDE 1 MG: 1 INJECTION, SOLUTION INTRAMUSCULAR; INTRAVENOUS; SUBCUTANEOUS at 03:42

## 2021-02-11 RX ADMIN — FAMOTIDINE 20 MG: 20 TABLET, FILM COATED ORAL at 08:53

## 2021-02-11 RX ADMIN — ENOXAPARIN SODIUM 40 MG: 40 INJECTION SUBCUTANEOUS at 08:53

## 2021-02-11 RX ADMIN — ONDANSETRON 4 MG: 2 INJECTION INTRAMUSCULAR; INTRAVENOUS at 09:05

## 2021-02-11 RX ADMIN — Medication 10 ML: at 21:20

## 2021-02-11 RX ADMIN — SODIUM CHLORIDE, POTASSIUM CHLORIDE, SODIUM LACTATE AND CALCIUM CHLORIDE 150 ML/HR: 600; 310; 30; 20 INJECTION, SOLUTION INTRAVENOUS at 20:02

## 2021-02-11 RX ADMIN — SODIUM CHLORIDE, POTASSIUM CHLORIDE, SODIUM LACTATE AND CALCIUM CHLORIDE 150 ML/HR: 600; 310; 30; 20 INJECTION, SOLUTION INTRAVENOUS at 01:05

## 2021-02-11 RX ADMIN — HYDRALAZINE HYDROCHLORIDE 10 MG: 20 INJECTION INTRAMUSCULAR; INTRAVENOUS at 06:28

## 2021-02-11 RX ADMIN — ACETAMINOPHEN 650 MG: 325 TABLET ORAL at 21:20

## 2021-02-11 RX ADMIN — HYDRALAZINE HYDROCHLORIDE 10 MG: 20 INJECTION INTRAMUSCULAR; INTRAVENOUS at 16:09

## 2021-02-11 NOTE — ED NOTES
Pt states he began having upper abdominal pain extending from LUQ to RUQ, nausea approx 2:30 pm today; similar to pancreatitis he had 4-5 years ago. Reports a normal BM just before pain began. Also, states that he had eye surgery a week ago. 2140 - Pt's /105. MD notified.

## 2021-02-11 NOTE — PROGRESS NOTES
Received message from patient's nurse Vaishali Cook stating :    Pt BP elevated, 226/70 he said earlier it was due to pain, however it is still high 204/90. Patient has received pain medication     Discussion / orders:    Hydralazine 10 mg IV every 6 hours as needed for systolic blood pressure more than 539 or diastolic blood pressure more than 100           Please note that this note was dictated using Dragon computer voice recognition software. Quite often unanticipated grammatical, syntax, homophones, and other interpretive errors are inadvertently transcribed by the computer software. Please disregard these errors. Please excuse any errors that have escaped final proofreading.

## 2021-02-11 NOTE — H&P
Hospitalist Admission Note    NAME: Shaye Morley   :  1945   MRN:  [id number]     Date/Time:  2021 1:49 AM    Patient PCP: Cristian Cote MD  ______________________________________________________________________  Given the patient's current clinical presentation, I have a high level of concern for decompensation if discharged from the emergency department. Complex decision making was performed, which includes reviewing the patient's available past medical records, laboratory results, and x-ray films. My assessment of this patient's clinical condition and my plan of care is as follows. Assessment / Plan:  Patient Active Hospital Problem List:     Acute pancreatitis (2021)    Assessment: recurrent acute    Plan:   Gastroenterology consult requested  Metronidazole 1000 mg bolus followed by 500 mg IV every 6 hours  Rocephin 1 g IV every 24 hours  Trend lipase  Check CMP, phosphorus, magnesium, lipid panel, CRP and CBC       Hypertension (1/3/2018)    Assessment: Blood pressure is currently elevated however this is likely secondary to pain as patient does not accept his pain medication    Plan:   Continue home lisinopril 20 mg daily  Nursing to encourage patient to accept pain medication when in pain. Hyperlipidemia (2012)    Assessment: Chronic    Plan:   Continue fenofibrate home medication  Check lipid panel                Code Status: Full code  Surrogate Decision Maker: wife, Radha Morejon [phone]    DVT Prophylaxis: Lovenox  GI Prophylaxis: Pepcid    Activity at baseline: Independent     Lives with: wife      Subjective:     CHIEF COMPLAINT: Abdominal pain    HISTORY OF PRESENT ILLNESS:     Shaye Morley is a 76 y.o. WHITE OR  male medical history including but not limited to hypertension, hyperlipidemia, history of pancreatitis, obesity, who presents with complaint of significant generlized abdominal pain.   Of note is that he does have history of alcohol abuse however he states that he only drinks light beer and no more than 20 cans/week. Patient states that his abdominal pain started around 2 pm yesterday and remained the same intensity of about 7/10. States that he did not take any pain medications. The pain did not improve and it was similar to the pain patient had when he had his last pancreatitis episode and therefore this prompted him to call his wife to pick him up from house and drive him to the emergency room. Patient states that his pain starts in the left upper quadrant and travels down and around to the right lower abdomen. States that since his last pancreatitis episode, he has not followed up with gastroenterology as outpatient. We were asked to admit for work up and evaluation of the above problems. Past Medical History:   Diagnosis Date    At risk for sleep apnea 2019    during phone assessment for PAT, LEVI 6    Colon polyp     Elevated CPK     Hyperlipidemia     Hypertension     Psoriasis     Rosacea         Past Surgical History:   Procedure Laterality Date    COLONOSCOPY N/A 2019    COLON[facility] performed [facility] AM[facility]    HX COLONOSCOPY  2014    HX HERNIA REPAIR      umbilical       Social History     Tobacco Use    Smoking status: Former Smoker     Packs/day: 1.00     Years: 20.00     Pack years: 20.00     Quit date:      Years since quittin.1    Smokeless tobacco: Never Used   Substance Use Topics    Alcohol use: Yes     Alcohol/week: 20.0 standard drinks     Types: 20 Cans of beer per week        Family History   Problem Relation Age of Onset    Cancer Mother         colon     No Known Allergies     Prior to Admission medications    Medication Sig Start Date End Date Taking? Authorizing Provider   bumetanide (BUMEX) 0.5 mg tablet Take 1 Tab by mouth daily.  3/2/20  Yes Rosco Koyanagi, MD   fenofibrate (LOFIBRA) 160 mg tablet Take 1 Tab by mouth daily. 3/2/20  Yes Aruna Ovalles MD   lisinopril (PRINIVIL, ZESTRIL) 20 mg tablet Take 1 Tab by mouth daily. 3/2/20  Yes Aruna Ovalles MD   verapamil ER (CALAN-SR) 240 mg CR tablet Take 1 Tab by mouth daily. 3/2/20  Yes Aruna Ovalles MD   potassium 99 mg tablet Take 198 mg by mouth daily. Yes Provider, Historical   magnesium oxide (MAG-OX) 400 mg tablet Take 400 mg by mouth daily. Yes Provider, Historical   omega-3 fatty acids Cap Take 500 mg by mouth daily. Yes Tonya, MD Jeevan   aspirin 81 mg tablet Take 81 mg by mouth four (4) days a week. Yes Tonya, MD Jeevan       REVIEW OF SYSTEMS:         Review of Systems   Constitutional: Negative for activity change, chills and fever. HENT: Negative for congestion and sore throat. Respiratory: Negative for cough and shortness of breath. Cardiovascular: Negative for chest pain. Gastrointestinal: Positive for abdominal distention and abdominal pain. Negative for blood in stool. Genitourinary: Negative for difficulty urinating, dysuria and hematuria. Musculoskeletal: Negative for arthralgias and back pain. Neurological: Negative for dizziness, light-headedness and headaches. Objective:   VITALS:    Visit Vitals  BP (!) 168/70   Pulse 90   Temp 98.9 °F (37.2 °C)   Resp 20   Ht 5' 9\" (1.753 m)   Wt 115.1 kg (253 lb 12 oz)   SpO2 92%   BMI 37.47 kg/m²           Physical Exam  Constitutional:       Appearance: He is obese. HENT:      Head: Normocephalic and atraumatic. Right Ear: External ear normal.      Left Ear: External ear normal.      Nose: Nose normal.      Mouth/Throat:      Mouth: Mucous membranes are moist.   Eyes:      Conjunctiva/sclera: Conjunctivae normal.   Neck:      Musculoskeletal: Neck supple. Cardiovascular:      Rate and Rhythm: Normal rate and regular rhythm. Pulmonary:      Effort: Pulmonary effort is normal. No respiratory distress.       Breath sounds: Normal breath sounds. No wheezing, rhonchi or rales. Abdominal:      General: Bowel sounds are normal. There is distension (obese). Palpations: Abdomen is soft. Tenderness: There is abdominal tenderness. There is guarding. Musculoskeletal:      Right lower leg: No edema. Left lower leg: No edema. Skin:     General: Skin is warm and dry. Capillary Refill: Capillary refill takes less than 2 seconds. Neurological:      Mental Status: He is alert and oriented to person, place, and time. Psychiatric:         Mood and Affect: Mood normal.         Behavior: Behavior normal. Behavior is cooperative. Procedures: see electronic medical records for all procedures/Xrays and details which were not copied into this note but were reviewed prior to creation of Plan. Recent Imaging studies(If Any)    CXR Results  (Last 48 hours)    None           Echo Results  (Last 48 hours)    None           CT Results  (Last 48 hours)               02/10/21 2155  CT ABD PELV W CONT Final result    Impression:  Acute pancreatitis, less severe than seen on the prior study. Saccular infrarenal aortic aneurysm       Narrative:  EXAM: CT ABD PELV W CONT       INDICATION: Nausea, vomiting, abdominal pain, hx of pancreatitis       COMPARISON: 5/21/2012       CONTRAST: 100 mL of Isovue-370. TECHNIQUE:    Following the uneventful intravenous administration of contrast, thin axial   images were obtained through the abdomen and pelvis. Coronal and sagittal   reconstructions were generated. Oral contrast was not administered. CT dose   reduction was achieved through use of a standardized protocol tailored for this   examination and automatic exposure control for dose modulation. FINDINGS:    LOWER THORAX: No significant abnormality in the incidentally imaged lower chest.   LIVER: No mass. BILIARY TREE: Gallbladder is within normal limits. CBD is not dilated. SPLEEN: within normal limits.    PANCREAS: No mass or ductal dilatation. Moderate amount of the stranding   surrounding the pancreatic tail. ADRENALS: Unremarkable. KIDNEYS: No mass, calculus, or hydronephrosis. Multiple renal cysts. STOMACH: Unremarkable. SMALL BOWEL: No dilatation or wall thickening. COLON: No dilatation or wall thickening. APPENDIX: Normal on coronal image 60   PERITONEUM: No ascites or pneumoperitoneum. RETROPERITONEUM: No lymphadenopathy. Saccular infrarenal aortic aneurysm to an   axial diameter of 3.1 cm in length of  4.2 cm. REPRODUCTIVE ORGANS: Small prostate   URINARY BLADDER: No mass or calculus. BONES: No destructive bone lesion. Disc desiccation at L4-5 and L5-S1. ABDOMINAL WALL: No mass or hernia. ADDITIONAL COMMENTS: N/A                  EKG RESULTS     Procedure Component Value Units Date/Time    AMB POC EKG ROUTINE W/ 12 LEADS, INTER & REP [[id number]] Resulted: 01/22/21 0941    Order Status: Completed Updated: 01/22/21 0942             Recent Microbiology Data(If Any)  . All Micro Results     None             LAB DATA REVIEWED:    Recent Results (from the past 24 hour(s))   CBC WITH AUTOMATED DIFF    Collection Time: 02/10/21  8:19 PM   Result Value Ref Range    WBC 10.4 4.1 - 11.1 K/uL    RBC 4.72 4.10 - 5.70 M/uL    HGB 14.3 12.1 - 17.0 g/dL    HCT 42.3 36.6 - 50.3 %    MCV 89.6 80.0 - 99.0 FL    MCH 30.3 26.0 - 34.0 PG    MCHC 33.8 30.0 - 36.5 g/dL    RDW 12.1 11.5 - 14.5 %    PLATELET 163 218 - 236 K/uL    MPV 9.0 8.9 - 12.9 FL    NRBC 0.0 0  WBC    ABSOLUTE NRBC 0.00 0.00 - 0.01 K/uL    NEUTROPHILS 74 32 - 75 %    LYMPHOCYTES 18 12 - 49 %    MONOCYTES 7 5 - 13 %    EOSINOPHILS 0 0 - 7 %    BASOPHILS 1 0 - 1 %    IMMATURE GRANULOCYTES 0 0.0 - 0.5 %    ABS. NEUTROPHILS 7.7 1.8 - 8.0 K/UL    ABS. LYMPHOCYTES 1.9 0.8 - 3.5 K/UL    ABS. MONOCYTES 0.7 0.0 - 1.0 K/UL    ABS. EOSINOPHILS 0.0 0.0 - 0.4 K/UL    ABS. BASOPHILS 0.1 0.0 - 0.1 K/UL    ABS. IMM.  GRANS. 0.0 0.00 - 0.04 K/UL    DF AUTOMATED METABOLIC PANEL, COMPREHENSIVE    Collection Time: 02/10/21  8:19 PM   Result Value Ref Range    Sodium 134 (L) 136 - 145 mmol/L    Potassium 3.7 3.5 - 5.1 mmol/L    Chloride 102 97 - 108 mmol/L    CO2 24 21 - 32 mmol/L    Anion gap 8 5 - 15 mmol/L    Glucose 154 (H) 65 - 100 mg/dL    BUN 16 6 - 20 MG/DL    Creatinine 0.72 0.70 - 1.30 MG/DL    BUN/Creatinine ratio 22 (H) 12 - 20      GFR est AA >60 >60 ml/min/1.73m2    GFR est non-AA >60 >60 ml/min/1.73m2    Calcium 8.5 8.5 - 10.1 MG/DL    Bilirubin, total 0.5 0.2 - 1.0 MG/DL    ALT (SGPT) 33 12 - 78 U/L    AST (SGOT) 20 15 - 37 U/L    Alk. phosphatase 63 45 - 117 U/L    Protein, total 7.4 6.4 - 8.2 g/dL    Albumin 3.7 3.5 - 5.0 g/dL    Globulin 3.7 2.0 - 4.0 g/dL    A-G Ratio 1.0 (L) 1.1 - 2.2     LIPASE    Collection Time: 02/10/21  8:19 PM   Result Value Ref Range    Lipase 2,001 (H) 73 - 393 U/L                  _______________________________________________________________________  Care Plan discussed with:    Comments   Patient x    Family      RN     Care Manager                    Consultant:      _______________________________________________________________________  Expected  Disposition:   Home with Family x   HH/PT/OT/RN    SNF/LTC    [facility]AVANA    ________________________________________________________________________  TOTAL TIME:  39 Minutes    Critical Care Provided     Minutes non procedure based      Comments    x Reviewed previous records   >50% of visit spent in counseling and coordination of care x Discussion with patient and/or family and questions answered           Patient hemodynamically stable at time of admission    ________________________________________________________________________  Signed: Kiarra Montalvo DNP, ACNP-BC    Please note that this note was dictated using Dragon computer voice recognition software.   Quite often unanticipated grammatical, syntax, homophones, and other interpretive errors are inadvertently transcribed by the computer software. Please disregard these errors. Please excuse any errors that have escaped final proofreading.

## 2021-02-11 NOTE — ED NOTES
0300  TRANSFER - OUT REPORT:    Verbal report given to [address] [place] [address] Rd, RN(name) on Jagdish Yu  being transferred to Gen Surg(unit) for routine progression of care       Report consisted of patients Situation, Background, Assessment and   Recommendations(SBAR). Information from the following report(s) SBAR, Kardex, ED Summary, Intake/Output and MAR was reviewed with the receiving nurse. Lines:   Peripheral IV 02/10/21 Left Forearm (Active)        Opportunity for questions and clarification was provided.       Patient transported with:   Reasult

## 2021-02-11 NOTE — PROGRESS NOTES
End of Shift Note    Bedside shift change report given to Bhupendra Brambila RN (oncoming nurse) by Nelly De La Cruz RN (offgoing nurse). Report included the following information SBAR, Kardex and Recent Results    Shift worked:  7p-7a     Shift summary and any significant changes:     Pt admitted for acute pancreatitis, No skin issues, up ad ailyn, Pain managed with PRN medications.       Concerns for physician to address:  Plan     Zone phone for oncoming shift:   [phone]       Activity:     Number times ambulated in hallways past shift: 0  Number of times OOB to chair past shift: 0    Cardiac:   Cardiac Monitoring: No           Access:   Current line(s): PIV     Genitourinary:   Urinary status: voiding    Respiratory:   O2 Device: Room air  Chronic home O2 use?: NO  Incentive spirometer at bedside: NO     GI:  Last Bowel Movement Date: 02/10/21  Current diet:  DIET NPO  Passing flatus: YES  Tolerating current diet: YES; NPO       Pain Management:   Patient states pain is manageable on current regimen: YES    Skin:     Interventions: increase time out of bed    Patient Safety:  Fall Score:    Interventions: gripper socks and pt to call before getting OOB       Length of Stay:  Expected LOS: - - -  Actual LOS: 0      Nelly De La Cruz RN

## 2021-02-11 NOTE — CONSULTS
GI CONSULTATION NOTE  Aubrey Roy NP  [phone] NP in-hospital cell phone M-F until 4:30  After 5pm or on weekends, please call  for physician on call    NAME: Rolly Moreno   :  1945   MRN:  [id number]   Attending:  Dr. Anibal Katz   Primary GI:  Previously seen by Dr. An Solano; Dr. Lela Kee covering   Date/Time:  2021 9:35 AM  Assessment:   Acute pancreatitis   · + ETOH   · No leukocytosis   · LFT's are WNL  · Lipase 965; down from  on admission   · CT abd/pel:  Acute pancreatitis, less severe than seen on the prior study    HTN  HLD  · Treatment per primary team    Plan:   · Continue IVF   · NPO for now  · Trend lipase   · Pain management PRN  · ETOH cessation  · Symptomatic care per primary team  Plan discussed with Dr. Janeth Bauerer:     HISTORY OF PRESENT ILLNESS:     Rolly Moreno is a 76 y.o. WHITE OR  male medical history including but not limited to hypertension, hyperlipidemia, history of pancreatitis, obesity, who presents with complaint of significant generlized abdominal pain. Of note is that he does have history of alcohol abuse however he states that he only drinks light beer and no more than 20 cans/week.   Patient states that his abdominal pain started around 2 pm yesterday and remained the same intensity of about 7/10. States that he did not take any pain medications. The pain did not improve and it was similar to the pain patient had when he had his last pancreatitis episode and therefore this prompted him to call his wife to pick him up from house and drive him to the emergency room. Patient states that his pain starts in the left upper quadrant and travels down and around to the right lower abdomen. States that since his last pancreatitis episode, he has not followed up with gastroenterology as outpatient.     Past Medical History:   Diagnosis Date    At risk for sleep apnea 2019    during phone assessment for PAT, LEVI 6    Colon polyp     Elevated CPK     Hyperlipidemia     Hypertension     Psoriasis     Rosacea       Past Surgical History:   Procedure Laterality Date    COLONOSCOPY N/A 2019    COLONOSCOPY performed by Kojo [facility]chaca [facility] AM[facility] OR    HX COLONOSCOPY  2014    HX HERNIA REPAIR      umbilical     Social History     Tobacco Use    Smoking status: Former Smoker     Packs/day: 1.00     Years: 20.00     Pack years: 20.00     Quit date:      Years since quittin.1    Smokeless tobacco: Never Used   Substance Use Topics    Alcohol use: Yes     Alcohol/week: 20.0 standard drinks     Types: 20 Cans of beer per week      Family History   Problem Relation Age of Onset    Cancer Mother         colon      No Known Allergies   Prior to Admission medications    Medication Sig Start Date End Date Taking? Authorizing Provider   bumetanide (BUMEX) 0.5 mg tablet Take 1 Tab by mouth daily. 3/2/20  Yes Iona Gutierrez MD   fenofibrate (LOFIBRA) 160 mg tablet Take 1 Tab by mouth daily. 3/2/20  Yes Iona Gutierrez MD   lisinopril (PRINIVIL, ZESTRIL) 20 mg tablet Take 1 Tab by mouth daily. 3/2/20  Yes Iona Gutierrez MD   verapamil ER (CALAN-SR) 240 mg CR tablet Take 1 Tab by mouth daily. 3/2/20  Yes Iona Gutierrez MD   potassium 99 mg tablet Take 198 mg by mouth daily. Yes Provider, Historical   magnesium oxide (MAG-OX) 400 mg tablet Take 400 mg by mouth daily. Yes Provider, Historical   omega-3 fatty acids Cap Take 500 mg by mouth daily. Yes Tonya, MD Jeevan   aspirin 81 mg tablet Take 81 mg by mouth four (4) days a week. Yes Tonya, MD Jeevan       Patient Active Problem List   Diagnosis Code    History of pancreatitis Z87.19    Sepsis(995.91) A41.9    UTI (urinary tract infection) N39.0    Obesity (BMI 30-39. 9) E66.9    Hyperlipidemia E78.5    Alcohol abuse F10.10    Colon polyp K63.5    Hypertension I10    Elevated CPK R74.8    Psoriasis L40.9    Acute pancreatitis K85.90    Pancreatitis K85.90       REVIEW OF SYSTEMS:    Constitutional: negative fever, negative chills, negative weight loss  Eyes:   negative visual changes  ENT:   negative sore throat, tongue or lip swelling   Respiratory:  negative cough, negative dyspnea  Cards:  negative for chest pain, palpitations, lower extremity edema  GI:   See HPI  :  negative for frequency, dysuria  Integument:  negative for rash and pruritus  Heme:  negative for easy bruising and gum/nose bleeding  Musculoskel: negative for myalgias,  back pain and muscle weakness  Neuro: negative for headaches, dizziness, vertigo  Psych:  negative for feelings of anxiety, depression     Objective:   VITALS:    Visit Vitals  BP (!) 157/68 (BP 1 Location: Right lower arm)   Pulse 88   Temp 98.3 °F (36.8 °C)   Resp 18   Ht 5' 9\" (1.753 m)   Wt 115.1 kg (253 lb 12 oz)   SpO2 96%   BMI 37.47 kg/m²       PHYSICAL EXAM:   General:          Pleasant elderly  male. NAD     Head:               Normocephalic, without obvious abnormality, atraumatic. Eyes:               Conjunctivae clear and pale, anicteric sclerae. Pupils are equal  Nose:               Nares normal. No drainage or sinus tenderness. Throat:             Lips, mucosa, and tongue normal.  No Thrush  Neck:               Supple, symmetrical,  no adenopathy, thyroid: non tender  Back:               Symmetric,  No CVA tenderness. Lungs:             CTA bilaterally. No wheezing/rhonchi/rales. Chest wall:      No tenderness or deformity. No Accessory muscle use. Heart:              Regular rate and rhythm,  no murmur, rub or gallop. Abdomen:        Soft, LUQ tenderness. + distended. Bowel sounds normal. No masses  Extremities:     Atraumatic, No cyanosis. No edema. No clubbing  Skin:                Texture, turgor normal. No rashes/lesions/jaundice  Psych:             Good insight. Not depressed. Not anxious or agitated. Neurologic:      EOMs intact.  No facial asymmetry. No aphasia or slurred speech. Normal                        strength, A/O X 3. LAB DATA REVIEWED:    Recent Results (from the past 24 hour(s))   CBC WITH AUTOMATED DIFF    Collection Time: 02/10/21  8:19 PM   Result Value Ref Range    WBC 10.4 4.1 - 11.1 K/uL    RBC 4.72 4.10 - 5.70 M/uL    HGB 14.3 12.1 - 17.0 g/dL    HCT 42.3 36.6 - 50.3 %    MCV 89.6 80.0 - 99.0 FL    MCH 30.3 26.0 - 34.0 PG    MCHC 33.8 30.0 - 36.5 g/dL    RDW 12.1 11.5 - 14.5 %    PLATELET 847 878 - 851 K/uL    MPV 9.0 8.9 - 12.9 FL    NRBC 0.0 0  WBC    ABSOLUTE NRBC 0.00 0.00 - 0.01 K/uL    NEUTROPHILS 74 32 - 75 %    LYMPHOCYTES 18 12 - 49 %    MONOCYTES 7 5 - 13 %    EOSINOPHILS 0 0 - 7 %    BASOPHILS 1 0 - 1 %    IMMATURE GRANULOCYTES 0 0.0 - 0.5 %    ABS. NEUTROPHILS 7.7 1.8 - 8.0 K/UL    ABS. LYMPHOCYTES 1.9 0.8 - 3.5 K/UL    ABS. MONOCYTES 0.7 0.0 - 1.0 K/UL    ABS. EOSINOPHILS 0.0 0.0 - 0.4 K/UL    ABS. BASOPHILS 0.1 0.0 - 0.1 K/UL    ABS. IMM. GRANS. 0.0 0.00 - 0.04 K/UL    DF AUTOMATED     METABOLIC PANEL, COMPREHENSIVE    Collection Time: 02/10/21  8:19 PM   Result Value Ref Range    Sodium 134 (L) 136 - 145 mmol/L    Potassium 3.7 3.5 - 5.1 mmol/L    Chloride 102 97 - 108 mmol/L    CO2 24 21 - 32 mmol/L    Anion gap 8 5 - 15 mmol/L    Glucose 154 (H) 65 - 100 mg/dL    BUN 16 6 - 20 MG/DL    Creatinine 0.72 0.70 - 1.30 MG/DL    BUN/Creatinine ratio 22 (H) 12 - 20      GFR est AA >60 >60 ml/min/1.73m2    GFR est non-AA >60 >60 ml/min/1.73m2    Calcium 8.5 8.5 - 10.1 MG/DL    Bilirubin, total 0.5 0.2 - 1.0 MG/DL    ALT (SGPT) 33 12 - 78 U/L    AST (SGOT) 20 15 - 37 U/L    Alk.  phosphatase 63 45 - 117 U/L    Protein, total 7.4 6.4 - 8.2 g/dL    Albumin 3.7 3.5 - 5.0 g/dL    Globulin 3.7 2.0 - 4.0 g/dL    A-G Ratio 1.0 (L) 1.1 - 2.2     LIPASE    Collection Time: 02/10/21  8:19 PM   Result Value Ref Range    Lipase 2,001 (H) 73 - 393 U/L   LD    Collection Time: 02/11/21  4:54 AM   Result Value Ref Range     85 - 241 U/L   LIPASE    Collection Time: 02/11/21  4:54 AM   Result Value Ref Range    Lipase 965 (H) 73 - 207 U/L   METABOLIC PANEL, COMPREHENSIVE    Collection Time: 02/11/21  4:54 AM   Result Value Ref Range    Sodium 136 136 - 145 mmol/L    Potassium 3.6 3.5 - 5.1 mmol/L    Chloride 103 97 - 108 mmol/L    CO2 26 21 - 32 mmol/L    Anion gap 7 5 - 15 mmol/L    Glucose 149 (H) 65 - 100 mg/dL    BUN 11 6 - 20 MG/DL    Creatinine 0.68 (L) 0.70 - 1.30 MG/DL    BUN/Creatinine ratio 16 12 - 20      GFR est AA >60 >60 ml/min/1.73m2    GFR est non-AA >60 >60 ml/min/1.73m2    Calcium 8.2 (L) 8.5 - 10.1 MG/DL    Bilirubin, total 0.8 0.2 - 1.0 MG/DL    ALT (SGPT) 27 12 - 78 U/L    AST (SGOT) 19 15 - 37 U/L    Alk. phosphatase 51 45 - 117 U/L    Protein, total 6.7 6.4 - 8.2 g/dL    Albumin 3.4 (L) 3.5 - 5.0 g/dL    Globulin 3.3 2.0 - 4.0 g/dL    A-G Ratio 1.0 (L) 1.1 - 2.2     CBC WITH AUTOMATED DIFF    Collection Time: 02/11/21  4:54 AM   Result Value Ref Range    WBC 10.4 4.1 - 11.1 K/uL    RBC 4.42 4.10 - 5.70 M/uL    HGB 13.4 12.1 - 17.0 g/dL    HCT 39.5 36.6 - 50.3 %    MCV 89.4 80.0 - 99.0 FL    MCH 30.3 26.0 - 34.0 PG    MCHC 33.9 30.0 - 36.5 g/dL    RDW 12.5 11.5 - 14.5 %    PLATELET 950 126 - 187 K/uL    MPV 9.0 8.9 - 12.9 FL    NRBC 0.0 0  WBC    ABSOLUTE NRBC 0.00 0.00 - 0.01 K/uL    NEUTROPHILS 81 (H) 32 - 75 %    LYMPHOCYTES 10 (L) 12 - 49 %    MONOCYTES 9 5 - 13 %    EOSINOPHILS 0 0 - 7 %    BASOPHILS 0 0 - 1 %    IMMATURE GRANULOCYTES 0 0.0 - 0.5 %    ABS. NEUTROPHILS 8.3 (H) 1.8 - 8.0 K/UL    ABS. LYMPHOCYTES 1.0 0.8 - 3.5 K/UL    ABS. MONOCYTES 0.9 0.0 - 1.0 K/UL    ABS. EOSINOPHILS 0.0 0.0 - 0.4 K/UL    ABS. BASOPHILS 0.0 0.0 - 0.1 K/UL    ABS. IMM.  GRANS. 0.0 0.00 - 0.04 K/UL    DF AUTOMATED     ETHYL ALCOHOL    Collection Time: 02/11/21  4:54 AM   Result Value Ref Range    ALCOHOL(ETHYL),SERUM <10 <10 MG/DL   MAGNESIUM    Collection Time: 02/11/21  4:54 AM   Result Value Ref Range    Magnesium 2.0 1.6 - 2.4 mg/dL   PHOSPHORUS    Collection Time: 02/11/21  4:54 AM   Result Value Ref Range    Phosphorus 2.3 (L) 2.6 - 4.7 MG/DL   URINALYSIS W/ REFLEX CULTURE    Collection Time: 02/11/21  5:28 AM    Specimen: Urine   Result Value Ref Range    Color YELLOW/STRAW      Appearance CLEAR CLEAR      Specific gravity 1.015 1.003 - 1.030      pH (UA) 6.5 5.0 - 8.0      Protein Negative NEG mg/dL    Glucose 250 (A) NEG mg/dL    Ketone Negative NEG mg/dL    Bilirubin Negative NEG      Blood Negative NEG      Urobilinogen 0.2 0.2 - 1.0 EU/dL    Nitrites Negative NEG      Leukocyte Esterase Negative NEG      UA:UC IF INDICATED CULTURE NOT INDICATED BY UA RESULT CNI      WBC 0-4 0 - 4 /hpf    RBC 0-5 0 - 5 /hpf    Epithelial cells FEW FEW /lpf    Bacteria Negative NEG /hpf    Hyaline cast 0-2 0 - 5 /lpf   DRUG SCREEN, URINE    Collection Time: 02/11/21  5:28 AM   Result Value Ref Range    AMPHETAMINES Negative NEG      BARBITURATES Negative NEG      BENZODIAZEPINES Negative NEG      COCAINE Negative NEG      METHADONE Negative NEG      OPIATES Positive (A) NEG      PCP(PHENCYCLIDINE) Negative NEG      THC (TH-CANNABINOL) Negative NEG      Drug screen comment (NOTE)        IMAGING RESULTS:  I have personally reviewed the imaging reports      Total time spent with patient: 50 minutes ________________________________________________________________________  Care Plan discussed with:  Patient x   Family     RN               Consultant:  Hospitalist      CT  2/11/2021:  ________________________________________________________________________    ___________________________________________________  Consulting Provider: Flora Peck NP      2/11/2021  9:35 AM

## 2021-02-11 NOTE — PROGRESS NOTES
Nutrition Note    Consult received for education, chart reviewed. Pt is NPO for pancreatitis. No specific type of education mentioned in consult and no mention of nutrition education in providers progress notes. For now will hold off on education as pt is NPO and reassess tomorrow should diet be advanced. Would assume Low Fat education given pancreatitis?      Electronically signed by Wing Daryl RD, [address]  on 2/11/2021 at 3:17 PM    Contact: [id number]

## 2021-02-11 NOTE — ACP (ADVANCE CARE PLANNING)
Advance Care Planning Note      NAME: Beronica Okeefe   :  1945   MRN:  [id number]     Date/Time:  2021 1:55 AM    Active Diagnoses:  Hospital Problems  Date Reviewed: 2021          Codes Class Noted POA    Hypertension (Chronic) ICD-10-CM: I10  ICD-9-CM: 401.9  1/3/2018 Yes        Hyperlipidemia (Chronic) ICD-10-CM: E78.5  ICD-9-CM: 272.4  2012 Yes        Acute pancreatitis ICD-10-CM: K85.90  ICD-9-CM: 464.2  2021 Unknown              These active diagnoses are of sufficient risk that focused discussion on advance care planning is indicated in order to allow the patient to thoughtfully consider personal goals of care, and if situations arise that prevent the ability to personally give input, to ensure appropriate representation of their personal desires for different levels and aggressiveness of care. Discussion:   Code status addressed and wants to be a Full Code. Patient wants central line and vasopressors if needed. Patient would also want a feeding tube, if needed, for nutritional support. States however that he does not want tube feeding if this is the only measure to prolong his life if he is in a vegetative state. Patient  would like to assign his wife  Alicia Hernandez [phone] as the surrogate decision maker. Persons present and participating in discussion: Wisam Gambino ACNP      Time Spent:   Total time spent face-to-face in education and discussion:   18  minutes.          Wisam Koo DNP, KERI-BC   Hospitalist

## 2021-02-11 NOTE — PROGRESS NOTES
End of Shift Note    Bedside shift change report given to [address] [place] [address] (oncoming nurse) by Davie Torres RN (offgoing nurse). Report included the following information SBAR, Kardex, ED Summary, Procedure Summary, Intake/Output, MAR, Accordion and Recent Results    Shift worked:  7a-7p     Shift summary and any significant changes:     Pt had Abd US, Pain med x 1, nausea med x 1. Concerns for physician to address:  none     Zone phone for oncoming shift:   [phone]       Activity:  Activity Level: Up ad ailyn  Number times ambulated in hallways past shift: 0  Number of times OOB to chair past shift: 0    Cardiac:   Cardiac Monitoring: No           Access:   Current line(s): PIV     Genitourinary:   Urinary status: voiding    Respiratory:   O2 Device: Room air  Chronic home O2 use?: NO  Incentive spirometer at bedside: NO     GI:  Last Bowel Movement Date: 02/10/21  Current diet:  DIET NPO  Passing flatus: YES  Tolerating current diet: YES  % Diet Eaten: 0 %    Pain Management:   Patient states pain is manageable on current regimen: YES    Skin:  Shahzad Score: 21  Interventions: increase time out of bed    Patient Safety:  Fall Score:  Total Score: 1  Interventions: gripper socks       Length of Stay:  Expected LOS: - - -  Actual LOS: 0      Davie Torres RN

## 2021-02-12 VITALS
BODY MASS INDEX: 37.58 KG/M2 | OXYGEN SATURATION: 94 % | DIASTOLIC BLOOD PRESSURE: 82 MMHG | TEMPERATURE: 98.4 F | WEIGHT: 253.75 LBS | RESPIRATION RATE: 18 BRPM | HEIGHT: 69 IN | SYSTOLIC BLOOD PRESSURE: 158 MMHG | HEART RATE: 83 BPM

## 2021-02-12 PROBLEM — I71.40 ABDOMINAL AORTIC ANEURYSM (AAA) WITHOUT RUPTURE: Status: ACTIVE | Noted: 2021-02-12

## 2021-02-12 LAB
ANION GAP SERPL CALC-SCNC: 5 MMOL/L (ref 5–15)
BUN SERPL-MCNC: 7 MG/DL (ref 6–20)
BUN/CREAT SERPL: 11 (ref 12–20)
CALCIUM SERPL-MCNC: 8.4 MG/DL (ref 8.5–10.1)
CHLORIDE SERPL-SCNC: 106 MMOL/L (ref 97–108)
CO2 SERPL-SCNC: 26 MMOL/L (ref 21–32)
CREAT SERPL-MCNC: 0.61 MG/DL (ref 0.7–1.3)
ERYTHROCYTE [DISTWIDTH] IN BLOOD BY AUTOMATED COUNT: 12.8 % (ref 11.5–14.5)
GLUCOSE SERPL-MCNC: 127 MG/DL (ref 65–100)
HCT VFR BLD AUTO: 39.3 % (ref 36.6–50.3)
HGB BLD-MCNC: 12.9 G/DL (ref 12.1–17)
LIPASE SERPL-CCNC: 431 U/L (ref 73–393)
MCH RBC QN AUTO: 30 PG (ref 26–34)
MCHC RBC AUTO-ENTMCNC: 32.8 G/DL (ref 30–36.5)
MCV RBC AUTO: 91.4 FL (ref 80–99)
NRBC # BLD: 0 K/UL (ref 0–0.01)
NRBC BLD-RTO: 0 PER 100 WBC
PLATELET # BLD AUTO: 156 K/UL (ref 150–400)
PMV BLD AUTO: 9.3 FL (ref 8.9–12.9)
POTASSIUM SERPL-SCNC: 3.6 MMOL/L (ref 3.5–5.1)
RBC # BLD AUTO: 4.3 M/UL (ref 4.1–5.7)
SODIUM SERPL-SCNC: 137 MMOL/L (ref 136–145)
WBC # BLD AUTO: 12.1 K/UL (ref 4.1–11.1)

## 2021-02-12 PROCEDURE — 74011250636 HC RX REV CODE- 250/636: Performed by: NURSE PRACTITIONER

## 2021-02-12 PROCEDURE — 83690 ASSAY OF LIPASE: CPT

## 2021-02-12 PROCEDURE — 36415 COLL VENOUS BLD VENIPUNCTURE: CPT

## 2021-02-12 PROCEDURE — 80048 BASIC METABOLIC PNL TOTAL CA: CPT

## 2021-02-12 PROCEDURE — 74011250637 HC RX REV CODE- 250/637: Performed by: NURSE PRACTITIONER

## 2021-02-12 PROCEDURE — 85027 COMPLETE CBC AUTOMATED: CPT

## 2021-02-12 RX ORDER — ONDANSETRON 4 MG/1
4 TABLET, ORALLY DISINTEGRATING ORAL
Qty: 30 TAB | Refills: 0 | Status: SHIPPED | OUTPATIENT
Start: 2021-02-12 | End: 2021-03-14

## 2021-02-12 RX ORDER — BUMETANIDE 1 MG/1
1 TABLET ORAL DAILY
Status: DISCONTINUED | OUTPATIENT
Start: 2021-02-12 | End: 2021-02-12 | Stop reason: HOSPADM

## 2021-02-12 RX ORDER — POTASSIUM CHLORIDE 750 MG/1
10 TABLET, FILM COATED, EXTENDED RELEASE ORAL DAILY
Qty: 30 TAB | Refills: 0 | Status: SHIPPED | OUTPATIENT
Start: 2021-02-13 | End: 2021-03-15

## 2021-02-12 RX ORDER — FAMOTIDINE 20 MG/1
20 TABLET, FILM COATED ORAL DAILY
Qty: 30 TAB | Refills: 0 | Status: SHIPPED | OUTPATIENT
Start: 2021-02-13 | End: 2021-03-15

## 2021-02-12 RX ORDER — BUMETANIDE 0.5 MG/1
1 TABLET ORAL DAILY
Qty: 90 TAB | Refills: 3 | Status: SHIPPED
Start: 2021-02-12 | End: 2021-03-14

## 2021-02-12 RX ADMIN — FAMOTIDINE 20 MG: 20 TABLET, FILM COATED ORAL at 08:55

## 2021-02-12 RX ADMIN — VERAPAMIL HYDROCHLORIDE 240 MG: 240 TABLET, FILM COATED, EXTENDED RELEASE ORAL at 08:55

## 2021-02-12 RX ADMIN — OMEGA-3 FATTY ACIDS CAP 1000 MG 1 CAPSULE: 1000 CAP at 08:55

## 2021-02-12 RX ADMIN — Medication 400 MG: at 08:55

## 2021-02-12 RX ADMIN — LISINOPRIL 20 MG: 20 TABLET ORAL at 08:55

## 2021-02-12 RX ADMIN — Medication 10 ML: at 09:00

## 2021-02-12 RX ADMIN — SODIUM CHLORIDE, POTASSIUM CHLORIDE, SODIUM LACTATE AND CALCIUM CHLORIDE 150 ML/HR: 600; 310; 30; 20 INJECTION, SOLUTION INTRAVENOUS at 08:59

## 2021-02-12 RX ADMIN — ENOXAPARIN SODIUM 40 MG: 40 INJECTION SUBCUTANEOUS at 08:55

## 2021-02-12 RX ADMIN — POTASSIUM CHLORIDE 10 MEQ: 750 TABLET, FILM COATED, EXTENDED RELEASE ORAL at 08:55

## 2021-02-12 RX ADMIN — FENOFIBRATE 145 MG: 145 TABLET, FILM COATED ORAL at 08:55

## 2021-02-12 RX ADMIN — HYDRALAZINE HYDROCHLORIDE 10 MG: 20 INJECTION INTRAMUSCULAR; INTRAVENOUS at 01:16

## 2021-02-12 RX ADMIN — BUMETANIDE 1 MG: 1 TABLET ORAL at 08:55

## 2021-02-12 NOTE — PROGRESS NOTES
Hospital follow-up PCP transitional care appointment has been scheduled with Dr. Giovanny Contreras for Thursday, 2/18/21 at 11:10 a.m. Pending patient discharge.   Trevor Sullivan, Care Management Specialist.

## 2021-02-12 NOTE — PROGRESS NOTES
Nutrition Education    · Verbally reviewed information with Patient  · Educated on Low fat diet per pancreatitis. · Written educational materials provided. · Contact name and number provided. · Refer to Patient Education activity for more details.     Electronically signed by Douglas Sommer RD on 2/12/2021 at 2:07 PM

## 2021-02-12 NOTE — PROGRESS NOTES
Transition of Care Plan:    Disposition:  Plan is Home once stable. MD indicated that if Pt tolerates diet then we may DC later today. Follow up appointments:CM will email CM Specialist to make PCP appt with Dr Deysi Thompson in one week. Transportation at Discharge: Wife can transport Pt home in car. DME needed:n/a  IM Medicare letter: n/a. Pt was admitted on 2/11/21. If Pt DC after 2/13/21. Brianna Billing please deliver IM Letter. Caregiver Contact: Wife: Abbie Davalos: Home: [phone] Cell: [phone]    2 PM   No further CM needs. Wife is here and able to transport Pt home in car. CM let RN know that Pt is ready for DC. Reason for Admission:   Pt was admitted on 2/11/21 d/t ABD Pain. Dx of Pancreatitis. RUR Score:          12           Plan for utilizing home health:      N/a at this time. PCP: First and Last name:  Adi Taylor   Name of Practice:    Are you a current patient: Yes/No: yes   Approximate date of last visit: 3 weeks ago Can you participate in a virtual visit with your PCP: yes                    Current Advanced Directive/Advance Care Plan:     Healthcare Decision Maker:   Beryle Alanis (ACP) Conversation      Date of Conversation: 2/12/21  Conducted with: Patient with Decision Making Capacity    Healthcare Decision Maker: Wife: Abbie Davalos: Home: [phone] Cell: [phone]    Content/Action Overview:   DECLINED ACP conversation - will revisit periodically   Reviewed DNR/DNI and patient elects Full Code (Attempt Resuscitation)    Length of Voluntary ACP Conversation in minutes:  <16 minutes (Non-Billable)    Roderick Ervin     Pt is alert and oriented. Pt lives with wife in two story home with 4 JOSSE and 13 steps to the second floor. Pt has a Son that lives in [facility] and [address] [place] [address] Avenue lives in [address] [place] [address]. DTR: Pavan Hayes: [phone]  Pt has no DME  Pt has no HH or SNF experience. Pt I W ADLs and IADLs. Drives. Pharmacy: [facility]. Pt has prescription drug coverage. . no concerns paying for medications. Wife can transport                 CM will continue to monitor discharge plan.      [facility]  Ext [phone]

## 2021-02-12 NOTE — PROGRESS NOTES
GI PROGRESS NOTE  Dina Beal NP  [phone] NP in-hospital cell phone M-F until 4:30  After 5pm or on weekends, please call  for physician on call    NAME:Fermin Palacios :1945 NBP:[id number]   ATTG: Dr. Chuck Coello   PCP: Estelita Velasquez MD  Date/Time:  2021 11:17 AM   Primary GI: Previously seen by Dr. Radha Sandhu; Dr. Magali Delgado covering  Reason for following: Pancreatitis     Assessment:     Acute pancreatitis   · + ETOH   · WBC 12.1  · LFT's are WNL  · Lipase 431; down from  on admission   · CT abd/pel:  Acute pancreatitis, less severe than seen on the prior study     HTN  HLD  · Treatment per primary team     Plan:     · Tolerated diet  · Pain management PRN  · ETOH cessation   · Symptomatic care per primary team  · Nothing further to add from a GI standpoint. GI signing-off. Please call with any questions. Thank you for this consult. *Plan of care discussed with Dr. Magali Delgado      Subjective:   Discussed with RN events overnight. Patient reports feeling better this AM.  States he tolerated breakfast and if he can tolerate lunch he will be able to discharge. Denies pain. Educated again about importance of ETOH cessation. Patient verbalized understanding. Review of Systems:  Symptom Y/N Comments  Symptom Y/N Comments   Fever/Chills n   Chest Pain n    Cough n   Headaches n    Sputum n   Joint Pain n    SOB/GUZMAN n   Pruritis/Rash n    Tolerating Diet y   Other       Could NOT obtain due to:      Objective:   VITALS:   Last 24hrs VS reviewed since prior progress note.  Most recent are:  Visit Vitals  BP (!) 158/82 (BP 1 Location: Right arm, BP Patient Position: At rest)   Pulse 83   Temp 98.4 °F (36.9 °C)   Resp 18   Ht 5' 9\" (1.753 m)   Wt 115.1 kg (253 lb 12 oz)   SpO2 94%   BMI 37.47 kg/m²       Intake/Output Summary (Last 24 hours) at 2021 1117  Last data filed at 2021 1000  Gross per 24 hour   Intake 3135 ml   Output 1925 ml   Net 1210 ml     PHYSICAL EXAM:  General: Pleasant elderly [place] male. NAD   HEENT: NC, Atraumatic. Anicteric sclerae. Lungs:  CTA Bilaterally. No Wheezing/Rhonchi/Rales. Heart:  Regular  rhythm,  No murmur, No Rubs, No Gallops  Abdomen: Soft, + distended, Non tender. +Bowel sounds, no HSM  Extremities: No c/c/e  Neurologic:  Alert and oriented X 3. No acute neurological distress   Psych:   Good insight. Not anxious nor agitated. Lab and Radiology Data Reviewed: (see below)    Medications Reviewed: (see below)  PMH/SH reviewed - no change compared to H&P  ________________________________________________________________________  Total time spent with patient: 25 minutes ________________________________________________________________________  Care Plan discussed with:  Patient x   Family     RN               Consultant:  Hospitalist      Terry Luciano NP     Procedures: see electronic medical records for all procedures/Xrays and details which were not copied into this note but were reviewed prior to creation of Plan. LABS:  Recent Labs     02/12/21 0436 02/11/21 0454   WBC 12.1* 10.4   HGB 12.9 13.4   HCT 39.3 39.5    183     Recent Labs     02/12/21  0436 02/11/21  0454 02/10/21  2019    136 134*   K 3.6 3.6 3.7    103 102   CO2 26 26 24   BUN 7 11 16   CREA 0.61* 0.68* 0.72   * 149* 154*   CA 8.4* 8.2* 8.5   MG  --  2.0  --    PHOS  --  2.3*  --      Recent Labs     02/12/21  0436 02/11/21  0454 02/10/21  2019   AP  --  51 63   TP  --  6.7 7.4   ALB  --  3.4* 3.7   GLOB  --  3.3 3.7   LPSE 431* 965* 2,001*     No results for input(s): INR, PTP, APTT, INREXT in the last 72 hours. No results for input(s): FE, TIBC, PSAT, FERR in the last 72 hours. No results found for: FOL, RBCF  No results for input(s): PH, PCO2, PO2 in the last 72 hours. No results for input(s): CPK, CKMB in the last 72 hours.     No lab exists for component: TROPONINI  Lab Results   Component Value Date/Time    Color YELLOW/STRAW 02/11/2021 05:28 AM    Appearance CLEAR 02/11/2021 05:28 AM    Specific gravity 1.015 02/11/2021 05:28 AM    Specific gravity 1.026 05/31/2012 04:03 PM    pH (UA) 6.5 02/11/2021 05:28 AM    Protein Negative 02/11/2021 05:28 AM    Glucose 250 (A) 02/11/2021 05:28 AM    Ketone Negative 02/11/2021 05:28 AM    Bilirubin Negative 02/11/2021 05:28 AM    Urobilinogen 0.2 02/11/2021 05:28 AM    Nitrites Negative 02/11/2021 05:28 AM    Leukocyte Esterase Negative 02/11/2021 05:28 AM    Epithelial cells FEW 02/11/2021 05:28 AM    Bacteria Negative 02/11/2021 05:28 AM    WBC 0-4 02/11/2021 05:28 AM    RBC 0-5 02/11/2021 05:28 AM       MEDICATIONS:  Current Facility-Administered Medications   Medication Dose Route Frequency    bumetanide (BUMEX) tablet 1 mg  1 mg Oral DAILY    lactated Ringers infusion  150 mL/hr IntraVENous CONTINUOUS    acetaminophen (TYLENOL) tablet 650 mg  650 mg Oral Q6H PRN    aspirin chewable tablet 81 mg  81 mg Oral Once per day on Mon Tue Wed Thu    fenofibrate nanocrystallized (TRICOR) tablet 145 mg  145 mg Oral DAILY    lisinopriL (PRINIVIL, ZESTRIL) tablet 20 mg  20 mg Oral DAILY    magnesium oxide (MAG-OX) tablet 400 mg  400 mg Oral DAILY    omega 3-DHA-EPA-fish oil 1,000 mg (120 mg-180 mg) capsule 1 Cap  1 Cap Oral DAILY    potassium chloride SR (KLOR-CON 10) tablet 10 mEq  10 mEq Oral DAILY    verapamil ER (CALAN-SR) tablet 240 mg  240 mg Oral DAILY    sodium chloride (NS) flush 5-40 mL  5-40 mL IntraVENous Q8H    sodium chloride (NS) flush 5-40 mL  5-40 mL IntraVENous PRN    acetaminophen (TYLENOL) suppository 650 mg  650 mg Rectal Q4H PRN    HYDROmorphone (PF) (DILAUDID) injection 1 mg  1 mg IntraVENous Q4H PRN    naloxone (NARCAN) injection 0.4 mg  0.4 mg IntraVENous PRN    ondansetron (ZOFRAN) injection 4 mg  4 mg IntraVENous Q4H PRN    enoxaparin (LOVENOX) injection 40 mg  40 mg SubCUTAneous DAILY    famotidine (PEPCID) tablet 20 mg  20 mg Oral DAILY    hydrALAZINE (APRESOLINE) 20 mg/mL injection 10 mg  10 mg IntraVENous Q6H PRN

## 2021-02-12 NOTE — PROGRESS NOTES
End of Shift Note    Bedside shift change report given to LOU Grion (oncoming nurse) by Meagan Duarte (offgoing nurse). Report included the following information SBAR    Shift worked:  7p-7a     Shift summary and any significant changes:     Pt received hydralazine for SBP > 160. Patient received tylenol for a temperatue of 100.2. Concerns for physician to address:  none     Zone phone for oncoming shift:   [phone]       Activity:  Activity Level: Up ad ailyn  Number times ambulated in hallways past shift: 0  Number of times OOB to chair past shift: 0    Cardiac:   Cardiac Monitoring: No           Access:   Current line(s): PIV     Genitourinary:   Urinary status: voiding    Respiratory:   O2 Device: Room air  Chronic home O2 use?: NO  Incentive spirometer at bedside: NO     GI:  Last Bowel Movement Date: 02/10/21  Current diet:  DIET NPO  Passing flatus: YES  Tolerating current diet: YES  % Diet Eaten: 0 %    Pain Management:   Patient states pain is manageable on current regimen: YES    Skin:  Shahzad Score: 20  Interventions: increase time out of bed    Patient Safety:  Fall Score:  Total Score: 1  Interventions: gripper socks       Length of Stay:  Expected LOS: - - -  Actual LOS: [facility]

## 2021-02-12 NOTE — DISCHARGE SUMMARY
Hospitalist Discharge Summary     Patient ID:  Gavin Neff  [id number]  76 y.o.  1945  2/10/2021    PCP on record: Rafa Bucio MD    Admit date: 2/10/2021  Discharge date and time: 2/12/2021    DISCHARGE DIAGNOSIS:    Active Hospital Problems    Diagnosis Date Noted    Hypertension 01/03/2018     Priority: 1 - One    Alcohol abuse 06/01/2012     Priority: 1 - One    Hyperlipidemia 06/01/2012     Priority: 2 - Two    Abdominal aortic aneurysm (AAA) without rupture ([address] 02/12/2021    Acute pancreatitis 02/11/2021   ;    CONSULTATIONS:  IP CONSULT TO HOSPITALIST  IP CONSULT TO GASTROENTEROLOGY    Excerpted HPI from H&P of Marco A Mancilla MD:  HISTORY OF PRESENT ILLNESS:     Gavin Neff is a 76 y.o. WHITE OR  male medical history including but not limited to hypertension, hyperlipidemia, history of pancreatitis, obesity, who presents with complaint of significant generlized abdominal pain. Of note is that he does have history of alcohol abuse however he states that he only drinks light beer and no more than 20 cans/week.     Patient states that his abdominal pain started around 2 pm yesterday and remained the same intensity of about 7/10. States that he did not take any pain medications. The pain did not improve and it was similar to the pain patient had when he had his last pancreatitis episode and therefore this prompted him to call his wife to pick him up from house and drive him to the emergency room. Patient states that his pain starts in the left upper quadrant and travels down and around to the right lower abdomen. States that since his last pancreatitis episode, he has not followed up with gastroenterology as outpatient.    ______________________________________________________________________  DISCHARGE SUMMARY/HOSPITAL COURSE:  for full details see H&P, daily progress notes, labs, consult notes. [age]cc[age] y.o.  Male was admitted to [facility] on 2/10/2021 and treated for the following medical complaints:     Acute pancreatitis (2/11/2021) resolved     Plan:    Gastroenterology recc no further work up needed    DC Metronidazole and Rocephin  Yesterday not infectious   Trend lipase 2001 >>[id number]>>431 pain resolved   Check CMP, phosphorus, magnesium, lipid panel, CRP and CBC        Hypertension (1/3/2018)    Assessment: Blood pressure is currently elevated however this is likely secondary to pain as patient does not accept his pain medication    Plan:   Continue home lisinopril 20 mg daily  BP improved to 150s from 200 on admission           Hyperlipidemia (6/1/2012)    Assessment: Chronic    Plan:   Continue fenofibrate home medication  Check lipid panel   Tchol 208  Trigly  77  HDL   63  LDL   129.6       Aortic Aneurysm   - noted on CT 3.1 cm   - pt made aware can follow up with PCP        Alcohol cessation discussed with pt . Alcohol Anonymous - [phone]      CT scan    IMPRESSION  Acute pancreatitis, less severe than seen on the prior study. Saccular infrarenal aortic aneurysm   Saccular infrarenal aortic aneurysm to an  axial diameter of 3.1 cm in length of  4.2 cm.      _______________________________________________________________________  Patient seen and examined by me on discharge day. Pertinent Findings:  Gen:    Not in distress  Chest: Clear lungs  CVS:   S1S2. No edema  Abd:  Soft, not distended, not tender  Neuro:  Alert, Oriented x 4  _______________________________________________________________________  DISCHARGE MEDICATIONS:   Current Discharge Medication List      START taking these medications    Details   famotidine (PEPCID) 20 mg tablet Take 1 Tab by mouth daily for 30 days. Indications: gastroesophageal reflux disease  Qty: 30 Tab, Refills: 0      ondansetron (ZOFRAN ODT) 4 mg disintegrating tablet Take 1 Tab by mouth every eight (8) hours as needed for Nausea or Vomiting for up to 30 days.   Qty: 30 Tab, Refills: 0 potassium chloride SR (KLOR-CON 10) 10 mEq tablet Take 1 Tab by mouth daily for 30 days. Qty: 30 Tab, Refills: 0         CONTINUE these medications which have CHANGED    Details   bumetanide (BUMEX) 0.5 mg tablet Take 2 Tabs by mouth daily for 30 days. Indications: visible water retention  Qty: 90 Tab, Refills: 3    Associated Diagnoses: Essential hypertension         CONTINUE these medications which have NOT CHANGED    Details   fenofibrate (LOFIBRA) 160 mg tablet Take 1 Tab by mouth daily. Qty: 90 Tab, Refills: 3    Associated Diagnoses: Mixed hyperlipidemia      lisinopril (PRINIVIL, ZESTRIL) 20 mg tablet Take 1 Tab by mouth daily. Qty: 90 Tab, Refills: 3    Associated Diagnoses: Essential hypertension      verapamil ER (CALAN-SR) 240 mg CR tablet Take 1 Tab by mouth daily. Qty: 90 Tab, Refills: 3    Associated Diagnoses: Essential hypertension      magnesium oxide (MAG-OX) 400 mg tablet Take 400 mg by mouth daily. omega-3 fatty acids Cap Take 500 mg by mouth daily. aspirin 81 mg tablet Take 81 mg by mouth four (4) days a week. STOP taking these medications       potassium 99 mg tablet Comments:   Reason for Stopping:                 Patient Follow Up Instructions: Activity: Activity as tolerated  Diet: Soft diet  Wound Care: None needed    Follow-up with PCP in 1 week.   Follow-up tests/labs  Lipase     Follow-up Information     Follow up With Specialties Details Why Contact Info    Naya Larios MD Internal Medicine Schedule an appointment as soon as possible for a visit on 2/18/2021 for PCP post hospital follow up appt Thursday, 2/18/21 at 11:10 a.mDebbie Villarreal  [phone]          ________________________________________________________________    Risk of deterioration: Low    Condition at Discharge:  Stable  __________________________________________________________________    Disposition  Home with family, no needs    ____________________________________________________________________    Code Status: Full Code  ___________________________________________________________________      Total time in minutes spent coordinating this discharge (includes going over instructions, follow-up, prescriptions, and preparing report for sign off to her PCP) :  >30 minutes    Signed:  Abigail A. Leanne Habermann, NP

## 2021-02-12 NOTE — PROGRESS NOTES
Pt is discharged. Pt collected all belongings. PIV removed. I have reviewed discharge instructions with the patient and spouse. The patient and spouse verbalized understanding. Pt unwilling to wait for transport volunteer and ambulated with wife to lobby for discharge.

## 2021-02-13 NOTE — ED PROVIDER NOTES
EMERGENCY DEPARTMENT HISTORY AND PHYSICAL EXAM      Date: 2/10/2021  Patient Name: Denia Dukes    History of Presenting Illness     Chief Complaint   Patient presents with    Abdominal Pain     Patient ambulatory to  triage w c/o abdominal pain onset this afternoon, reports h/o pancreatitis       History Provided By: Patient    HPI: Denia Dukes, 76 y.o. male presents to the ED with cc of abdominal pain. , Pt presents to the ED by POV for eval of abd pain. Pt states sudden onset across the upper abdomen described as dull ache, rated 7/10 with no alleviating factors. Pain worsened with any attempt at oral intake. He began with nausea and vomiting and is now having dry heaves. There has been no fever or chills. He denies any CP or SOA. There has been no diarrhea. He denies any coffee ground emesis, melena, hematochezia. He denies any urinary symptoms. Pt states one prior occurrence of pancreatitis and this is how it felt then. There are no other complaints, changes, or physical findings at this time. PCP: Estelita Velasquez MD    No current facility-administered medications on file prior to encounter. Current Outpatient Medications on File Prior to Encounter   Medication Sig Dispense Refill    fenofibrate (LOFIBRA) 160 mg tablet Take 1 Tab by mouth daily. 90 Tab 3    lisinopril (PRINIVIL, ZESTRIL) 20 mg tablet Take 1 Tab by mouth daily. 90 Tab 3    verapamil ER (CALAN-SR) 240 mg CR tablet Take 1 Tab by mouth daily. 90 Tab 3    magnesium oxide (MAG-OX) 400 mg tablet Take 400 mg by mouth daily.  omega-3 fatty acids Cap Take 500 mg by mouth daily.  aspirin 81 mg tablet Take 81 mg by mouth four (4) days a week.          Past History     Past Medical History:  Past Medical History:   Diagnosis Date    At risk for sleep apnea 05/22/2019    during phone assessment for PAT, LEVI 6    Colon polyp     Elevated CPK     Hyperlipidemia     Hypertension     Psoriasis     Rosacea        Past Surgical History:  Past Surgical History:   Procedure Laterality Date    COLONOSCOPY N/A 2019    COLONOS[facility]qu[facility] AM[facility]    HX COLONOSCOPY  2014    HX HERNIA REPAIR      umbilical       Family History:  Family History   Problem Relation Age of Onset    Cancer Mother         colon       Social History:  Social History     Tobacco Use    Smoking status: Former Smoker     Packs/day: 1.00     Years: 20.00     Pack years: 20.00     Quit date:      Years since quittin.1    Smokeless tobacco: Never Used   Substance Use Topics    Alcohol use: Yes     Alcohol/week: 20.0 standard drinks     Types: 20 Cans of beer per week    Drug use: Never       Allergies:  No Known Allergies      Review of Systems   Review of Systems   Constitutional: Negative. Negative for appetite change, chills, fatigue and fever. HENT: Negative. Negative for congestion, rhinorrhea, sinus pressure and sore throat. Eyes: Negative. Respiratory: Negative. Negative for cough, choking, chest tightness, shortness of breath and wheezing. Cardiovascular: Negative. Negative for chest pain, palpitations and leg swelling. Gastrointestinal: Positive for abdominal pain, nausea and vomiting. Negative for constipation and diarrhea. Endocrine: Negative. Genitourinary: Negative. Negative for difficulty urinating, dysuria, flank pain and urgency. Musculoskeletal: Negative. Skin: Negative. Neurological: Negative. Negative for dizziness, speech difficulty, weakness, light-headedness, numbness and headaches. Psychiatric/Behavioral: Negative. All other systems reviewed and are negative. Physical Exam   Physical Exam  Vitals signs and nursing note reviewed. Constitutional:       General: He is not in acute distress. Appearance: He is well-developed. He is obese. He is not diaphoretic. HENT:      Head: Normocephalic and atraumatic.       Mouth/Throat: Pharynx: No oropharyngeal exudate. Comments: Dry mucous membranes  Eyes:      Extraocular Movements: Extraocular movements intact. Conjunctiva/sclera: Conjunctivae normal.      Pupils: Pupils are equal, round, and reactive to light. Comments: Mild erythema surrounding eyes- recent surgical procedure    Neck:      Musculoskeletal: Normal range of motion and neck supple. Vascular: No JVD. Trachea: No tracheal deviation. Cardiovascular:      Rate and Rhythm: Normal rate and regular rhythm. Heart sounds: Normal heart sounds. No murmur. Pulmonary:      Effort: Pulmonary effort is normal. No respiratory distress. Breath sounds: Normal breath sounds. No stridor. No wheezing or rales. Chest:      Chest wall: No tenderness. Abdominal:      General: Bowel sounds are decreased. There is no distension. Palpations: Abdomen is soft. Tenderness: There is generalized abdominal tenderness. There is no guarding or rebound. Musculoskeletal: Normal range of motion. General: No tenderness. Skin:     General: Skin is warm and dry. Capillary Refill: Capillary refill takes less than 2 seconds. Neurological:      Mental Status: He is alert and oriented to person, place, and time. Cranial Nerves: No cranial nerve deficit. Comments: No gross motor or sensory deficits    Psychiatric:         Behavior: Behavior normal.         Diagnostic Study Results     Labs -     Contains abnormal data URINALYSIS W/ REFLEX CULTURE (Final result)   Component (Lab Inquiry)  Collection Time Result Time COLOR APPRN [facility] GEOFFREY PROTU   02/11/21 05:28:00 02/11/21 06:28:32 YELLOW/STRAW   Color Reference Range:. ..  CLEAR 1.015 6.5 Negative       Collection Time Result Time GLUCU KETU BILU BLDU UROU   02/11/21 05:28:00 02/11/21 06:28:32 250Abnormal  Negative Negative Negative 0.2       Collection Time Result Time GEORGINA LEUKU UAUC WBCU RBCU   02/11/21 05:28:00 02/11/21 06:28:32 Negative Negative CULTURE NOT INDICATED BY UA RESULT 0-4 0-5       Collection Time Result Time EPSU BACTU HYCST   02/11/21 05:28:00 02/11/21 70:84:12 FEW   Epithelial cell catego. .. Negative 0-2       Final result                        CBC WITH AUTOMATED DIFF (Final result)   Component (Lab Inquiry)  Collection Time Result Time WBC RBC HGB HCT MCV   02/10/21 20:19:00 02/10/21 20:35:13 10.4 4.72 14.3 42.3 89.6       Collection Time Result Time MCH MCHC RDW PLT MPLV   02/10/21 20:19:00 02/10/21 20:35:13 30.3 33.8 12.1 209 9.0       Collection Time Result Time NRBC ANRBC GRANS LYMPH MONOS   02/10/21 20:19:00 02/10/21 20:35:13 0.0 0.00 74 18 7       Collection Time Result Time EOS BASOS IG ABG ABL   02/10/21 20:19:00 02/10/21 20:35:13 0 1 0 7.7 1.9       Collection Time Result Time ABM CLIFFORD ABB AIG DF   02/10/21 20:19:00 02/10/21 20:35:13 0.7 0.0 0.1 0.0 AUTOMATED         Final result                          Contains abnormal data METABOLIC PANEL, COMPREHENSIVE (Final result)   Component (Lab Inquiry)  Collection Time Result Time NA K CL CO2 AGAP   02/10/21 20:19:00 02/10/21 20:58:38 134Low  3.7 102 24 8       Collection Time Result Time GLU BUN CREA BUCR GFRAA   02/10/21 20:19:00 02/10/21 20:58:38 154High  16 0.72 22High  >60       Collection Time Result Time GFRNA CA TBIL GPT SGOT   02/10/21 20:19:00 02/10/21 20:58:38 >60   Estimated GFR is calcu. .. 8.5 0.5 33 20       Collection Time Result Time AP TP ALB GLOB AGRAT   02/10/21 20:19:00 02/10/21 20:58:38 63 7.4 3.7 3.7 1.0Low        Final result                        Contains abnormal data LIPASE (Final result)   Component (Lab Inquiry)  Collection Time Result Time LPSE   02/10/21 20:19:00 02/10/21 20:58:38 2,001High        Final result                        Radiologic Studies -   US ABD COMP   Final Result   Gallbladder polyps without apparent cholelithiasis. No biliary   dilation.          CT ABD PELV W CONT   Final Result   Acute pancreatitis, less severe than seen on the prior study. Saccular infrarenal aortic aneurysm        CT Results  (Last 48 hours)    None        CXR Results  (Last 48 hours)    None          Medical Decision Making   I am the first provider for this patient. I reviewed the vital signs, available nursing notes, past medical history, past surgical history, family history and social history. Vital Signs-Reviewed the patient's vital signs. Records Reviewed: Nursing Notes, Old Medical Records, Previous Radiology Studies and Previous Laboratory Studies    Provider Notes (Medical Decision Making):   DDx- Pancreatitis, Constipation, dehydration, electrolyte abnormality, Colitis     ED Course:   Initial assessment performed. The patients presenting problems have been discussed, and they are in agreement with the care plan formulated and outlined with them. I have encouraged them to ask questions as they arise throughout their visit. PT PRESENTS WITH N/V.and abdominal pain c/w Acute Pancreatitis. Will admit for hydration, NPO status. Consult:   Case discussed with hospitalist. Pt will be evaluated and admitted. Disposition:  Admit       Diagnosis     Clinical Impression:   1. Acute pancreatitis, unspecified complication status, unspecified pancreatitis type    2. Hypertension, unspecified type    3. Essential hypertension        Attestations:    Kyree Batista, DO    Please note that this dictation was completed with [employer], the computer voice recognition software. Quite often unanticipated grammatical, syntax, homophones, and other interpretive errors are inadvertently transcribed by the computer software. Please disregard these errors. Please excuse any errors that have escaped final proofreading. Thank you.

## 2021-02-15 ENCOUNTER — PATIENT OUTREACH (OUTPATIENT)
Dept: CASE MANAGEMENT | Age: 76
End: 2021-02-15

## 2021-02-15 NOTE — ACP (ADVANCE CARE PLANNING)
2-15-21: Patient states he does have a current ACP document, and states he will consider taking this document to his PCP office to have it scanned into his chart.

## 2021-02-15 NOTE — PROGRESS NOTES
Patient was admitted to [facility] on 2-10-21 and discharged on 2-12-21 for:    DISCHARGE DIAGNOSIS:           Active Hospital Problems     Diagnosis Date Noted   • Hypertension 01/03/2018       Priority: 1 - One   • Alcohol abuse 06/01/2012       Priority: 1 - One   • Hyperlipidemia 06/01/2012       Priority: 2 - Two   • Abdominal aortic aneurysm (AAA) without rupture (HCC) 02/12/2021   • Acute pancreatitis 02/11/2021       Outreach made within 2 business days of discharge: Yes    Discharge Challenges to be reviewed by the provider:   Additional needs identified to be addressed with provider: no  - Patient denies fever/chills and denies nausea/vomiting since discharge on 2-12-21. Patient denies pain at this time. Patient states he is utilizing a regular diet at home and states his appetite is good. Patient has no red flags to report at this time.   - Patient states he has not had any falls int he last twelve months.     COVID-19 related testing was not completed during this admission.    Method of communication with provider : chart routing       Component      Latest Ref Rng & Units 2/12/2021 2/11/2021 2/10/2021           4:36 AM  4:54 AM  8:19 PM   WBC      4.1 - 11.1 K/uL 12.1 (H) 10.4 10.4     Component      Latest Ref Rng & Units 2/11/2021 2/10/2021           4:54 AM  8:19 PM   NEUTROPHILS      32 - 75 % 81 (H) 74   LYMPHOCYTES      12 - 49 % 10 (L) 18     Component      Latest Ref Rng & Units 2/11/2021 2/10/2021           4:54 AM  8:19 PM   ABS. NEUTROPHILS      1.8 - 8.0 K/UL 8.3 (H) 7.7     Component      Latest Ref Rng & Units 2/12/2021 2/11/2021 2/11/2021 2/11/2021           4:36 AM  4:54 AM  4:54 AM  4:54 AM   Glucose      65 - 100 mg/dL 127 (H)   149 (H)     Component      Latest Ref Rng & Units 2/10/2021 1/22/2021           8:19 PM  9:54 AM   Glucose      65 - 100 mg/dL 154 (H) 181 (H)     Component      Latest Ref Rng & Units 2/12/2021 2/11/2021 2/11/2021 2/11/2021           4:36  AM  4:54 AM  4:54 AM  4:54 AM   Creatinine      0.70 - 1.30 MG/DL 0.61 (L)   0.68 (L)   BUN/Creatinine ratio      12 - 20   11 (L)   16   GFR est AA      >60 ml/min/1.73m2 >60   >60   GFR est non-AA      >60 ml/min/1.73m2 >60   >60   Calcium      8.5 - 10.1 MG/DL 8.4 (L)   8.2 (L)     Component      Latest Ref Rng & Units 2/10/2021 1/22/2021           8:19 PM  9:54 AM   Creatinine      0.70 - 1.30 MG/DL 0.72 0.7 (L)   BUN/Creatinine ratio      12 - 20   22 (H)    GFR est AA      >60 ml/min/1.73m2 >60 >60   GFR est non-AA      >60 ml/min/1.73m2 >60 >60   Calcium      8.5 - 10.1 MG/DL 8.5 9.1     Component      Latest Ref Rng & Units 2/11/2021 2/10/2021           4:54 AM  8:19 PM   Albumin      3.5 - 5.0 g/dL 3.4 (L) 3.7     Component      Latest Ref Rng & Units 2/11/2021 2/10/2021           4:54 AM  8:19 PM   A-G Ratio      1.1 - 2.2   1.0 (L) 1.0 (L)     Component      Latest Ref Rng & Units 2/11/2021           4:54 AM   Phosphorus      2.6 - 4.7 MG/DL 2.3 (L)     Component      Latest Ref Rng & Units 2/11/2021           4:54 AM   Cholesterol, total      <200 MG/ (H)     Component      Latest Ref Rng & Units 2/11/2021           4:54 AM   LDL, calculated      0 - 100 MG/.6 (H)     Component      Latest Ref Rng & Units 2/12/2021 2/11/2021 2/11/2021 2/11/2021           4:36 AM  4:54 AM  4:54 AM  4:54 AM   Lipase      73 - 393 U/L 431 (H)   965 (H)     Component      Latest Ref Rng & Units 2/11/2021 2/11/2021 2/10/2021           4:54 AM  4:54 AM  8:19 PM   Lipase      73 - 393 U/L   2,001 (H)     Component      Latest Ref Rng & Units 2/11/2021           5:28 AM   Glucose      NEG mg/dL 250 (A)     Component      Latest Ref Rng & Units 2/11/2021           5:28 AM   OPIATES      NEG   Positive (A)     Advance Care Planning:   Does patient have an Advance Directive:  yes; reviewed and current .   Patient states he does have a current ACP document, and states he will consider taking this document to his PCP office to have it scanned into his chart. Was this a readmission? no   Patient stated reason for the admission: \"I had this pain that came up immediately, and I've had this kind of pain before. \"   Patients top risk factors for readmission: medical condition  Interventions to address risk factors: Education provided regarding signs/symptoms of pancreatitis, patient verbalized an understanding. Care Transition Nurse (CTN) contacted the patient by telephone to perform post hospital discharge assessment. Verified name and  with patient as identifiers. Provided introduction to self, and explanation of the CTN role. CTN reviewed discharge instructions, medical action plan and red flags with patient who verbalized understanding. Patient given an opportunity to ask questions and does not have any further questions or concerns at this time. The patient agrees to contact the PCP office for questions related to their healthcare. Medication reconciliation was performed with patient, who verbalizes understanding of administration of home medications. Advised obtaining a 90-day supply of all daily and as-needed medications. Referral to Pharm D needed: no     Home Health/Outpatient orders at discharge: [address]: n/a  Date of initial visit: [facility] ordered at discharge: none  [address] received: n/a    Covid Risk Education    Patient has following risk factors of: acute pancreatitis. Education provided regarding infection prevention, and signs and symptoms of COVID-19 and when to seek medical attention with patient who verbalized understanding. Discussed exposure protocols and quarantine From CDC: Are you at higher risk for severe illness?  and given an opportunity for questions and concerns. The patient agrees to contact the COVID-19 hotline [phone] or PCP office for questions related to COVID-19.      For more information on steps you can take to protect yourself, see CDC's How to Protect Yourself     Patient/family/caregiver given information for [employer] and agrees to enroll no  Patient's preferred e-mail: declines  Patient's preferred phone number: declines    Discussed follow-up appointments. If no appointment was previously scheduled, appointment scheduling offered: yes  [facility] follow up appointment(s):   Future Appointments   Date Time Provider Ed Bonilla   2/18/2021 11:10 AM Akosua Lazo MD [facility] BS AMB   3/15/2021  1:00 PM Akosua Lazo MD [facility] AMB     Non-BS follow up appointment(s): None noted at this time. Plan for follow-up call in 10-14 days based on severity of symptoms and risk factors. CTN provided contact information for future needs. Goals Addressed                 This Visit's Progress     Attends follow-up appointments as directed. 2-15-21: Patient has [place] [facility] appointment scheduled with Dr. Ramses Vivas. Vinod/PCP on 2-18-21 and patient states he plans to keep this appointment as scheduled. Patient states he drives himself to/from appointments as needed. Luzmaria Cash Understands red flags post discharge. 2-15-21: Red flags of pancreatitis reviewed with patient and patient verbalized an understanding. Patient denies fever/chills and denies nausea/vomiting since discharge on 2-12-21. Patient denies pain at this time. Patient states he is utilizing a regular diet at home and states his appetite is good. Patient has no red flags to report at this time. Care Transitions Nurse will review red flags again on next phone conversation with patient.  Deng Villagran

## 2021-02-19 ENCOUNTER — OFFICE VISIT (OUTPATIENT)
Dept: INTERNAL MEDICINE CLINIC | Age: 76
End: 2021-02-19
Payer: MEDICARE

## 2021-02-19 VITALS
BODY MASS INDEX: 36.08 KG/M2 | TEMPERATURE: 97.7 F | HEART RATE: 86 BPM | HEIGHT: 69 IN | WEIGHT: 243.6 LBS | OXYGEN SATURATION: 98 % | DIASTOLIC BLOOD PRESSURE: 80 MMHG | RESPIRATION RATE: 20 BRPM | SYSTOLIC BLOOD PRESSURE: 122 MMHG

## 2021-02-19 DIAGNOSIS — E78.2 MIXED HYPERLIPIDEMIA: Chronic | ICD-10-CM

## 2021-02-19 DIAGNOSIS — I10 ESSENTIAL HYPERTENSION: Chronic | ICD-10-CM

## 2021-02-19 DIAGNOSIS — I71.40 ABDOMINAL AORTIC ANEURYSM (AAA) WITHOUT RUPTURE: ICD-10-CM

## 2021-02-19 DIAGNOSIS — E66.9 OBESITY (BMI 30-39.9): Chronic | ICD-10-CM

## 2021-02-19 DIAGNOSIS — K85.20 ALCOHOL-INDUCED ACUTE PANCREATITIS WITHOUT INFECTION OR NECROSIS: Primary | ICD-10-CM

## 2021-02-19 LAB
ERYTHROCYTE [DISTWIDTH] IN BLOOD BY AUTOMATED COUNT: 12.9 %
HCT VFR BLD AUTO: 42.4 % (ref 37–51)
HGB BLD-MCNC: 14.2 G/DL (ref 12–18)
LYMPHOCYTES ABSOLUTE: 1.6 K/UL (ref 0.6–4.1)
LYMPHOCYTES NFR BLD: 24.5 % (ref 10–58.5)
MCH RBC QN AUTO: 30.7 PG (ref 26–32)
MCHC RBC AUTO-ENTMCNC: 33.5 G/DL (ref 30–36)
MCV RBC AUTO: 91.8 FL (ref 80–97)
MONOCYTES ABS-DIF,2141: 0.4 K/UL (ref 0–1.8)
MONOCYTES NFR BLD: 7 % (ref 0.1–24)
NEUTROPHILS # BLD: 68.5 % (ref 37–92)
NEUTROPHILS ABS,2156: 4.4 K/UL (ref 2–7.8)
PLATELET # BLD AUTO: 294 K/UL (ref 140–440)
RBC # BLD AUTO: 4.62 M/UL (ref 4.2–6.3)
WBC # BLD AUTO: 6.4 K/UL (ref 4.1–10.9)

## 2021-02-19 PROCEDURE — 85025 COMPLETE CBC W/AUTO DIFF WBC: CPT | Performed by: INTERNAL MEDICINE

## 2021-02-19 PROCEDURE — 36415 COLL VENOUS BLD VENIPUNCTURE: CPT | Performed by: INTERNAL MEDICINE

## 2021-02-19 PROCEDURE — 99496 TRANSJ CARE MGMT HIGH F2F 7D: CPT | Performed by: INTERNAL MEDICINE

## 2021-02-19 PROCEDURE — G8427 DOCREV CUR MEDS BY ELIG CLIN: HCPCS | Performed by: INTERNAL MEDICINE

## 2021-02-19 NOTE — PATIENT INSTRUCTIONS
Pancreatitis: Care Instructions 
Your Care Instructions 
  
The pancreas is an organ behind the stomach. It makes hormones and enzymes to help your body digest food. 
But if these enzymes attack the pancreas, it can get inflamed. This is called pancreatitis. Most cases are caused by gallstones or by heavy alcohol use. 
If you take care of yourself at home, it will help you get better. It will also help you avoid more problems with your pancreas. 
Follow-up care is a key part of your treatment and safety. Be sure to make and go to all appointments, and call your doctor if you are having problems. It's also a good idea to know your test results and keep a list of the medicines you take. 
How can you care for yourself at home? 
· Drink clear liquids and eat bland foods until you feel better. [place] foods include rice, dry toast, and crackers. They also include bananas and applesauce. 
· Eat a low-fat diet until your doctor says your pancreas is healed. 
· Do not drink alcohol. Tell your doctor if you need help to quit. Counseling, support groups, and sometimes medicines can help you stay sober. 
· Be safe with medicines. Read and follow all instructions on the label. 
? If the doctor gave you a prescription medicine for pain, take it as prescribed. 
? If you are not taking a prescription pain medicine, ask your doctor if you can take an over-the-counter medicine. 
· If your doctor prescribed antibiotics, take them as directed. Do not stop taking them just because you feel better. You need to take the full course of antibiotics. 
· Get extra rest until you feel better. 
To prevent future problems with your pancreas 
· Do not drink alcohol. 
· Tell your doctors and pharmacist that you've had pancreatitis. They can help you avoid medicines that may cause this problem again. 
When should you call for help? 
 Call 911 anytime you think you may need emergency care. For example, call if: 
   · You vomit blood or what looks like coffee grounds.  
  · Your stools are maroon or very bloody. Call your doctor now or seek immediate medical care if: 
  · You have new or worse belly pain.  
  · Your stools are black and look like tar, or they have streaks of blood.  
  · You are vomiting. Watch closely for changes in your health, and be sure to contact your doctor if: 
  · You do not get better as expected. Where can you learn more? Go to http://www.gray.com/ Enter V788 in the search box to learn more about \"Pancreatitis: Care Instructions. \" Current as of: April 15, 2020               Content Version: 12.6 © 5873-6078 [employer]. Care instructions adapted under license by [employer] (which disclaims liability or warranty for this information). If you have questions about a medical condition or this instruction, always ask your healthcare professional. [address] any warranty or liability for your use of this information.

## 2021-02-19 NOTE — PROGRESS NOTES
Maureen Stringer is a 76 y.o. male presenting for Transitions Of Care  . 1. Have you been to the ER, urgent care clinic since your last visit? Hospitalized since your last visit? Admit date: 2/10/2021  Discharge date and time: 2/12/2021    2. Have you seen or consulted any other health care providers outside of the [address] [place] [address] since your last visit? Include any pap smears or colon screening. No    Fall Risk Assessment, last 12 mths 2/15/2021   Able to walk? Yes   Fall in past 12 months? 0   Do you feel unsteady? 0   Are you worried about falling 0         Abuse Screening Questionnaire 1/22/2021   Do you ever feel afraid of your partner? N   Are you in a relationship with someone who physically or mentally threatens you? N   Is it safe for you to go home? Y       3 most recent PHQ Screens 2/15/2021   Little interest or pleasure in doing things Not at all   Feeling down, depressed, irritable, or hopeless Not at all   Total Score PHQ 2 0       There are no discontinued medications.

## 2021-02-19 NOTE — PROGRESS NOTES
Aidan Singh is a 76 y.o. male and presents with Transitions Of Care  . Subjective:  Mr. Alessandro Orantes returns to the office today and transition of care subsequent to a hospitalization from 2/10 until 2/12 at [facility] when he was admitted for acute pancreatitis. The patient has a prior history of pancreatitis 9 years ago. The patient does drink up to 20 beers per week and had been drinking a fair amount of beer prior to his first episode. He has had no recurrence until this month. The patient  presented with significant abdominal pain with lipase of 2001. He had never sought gastroenterology follow-up after the first episode of pancreatitis. Patient's blood pressure was quite elevated upon admission. He refused to take pain medication. A CT scan of the abdomen revealed a 3.1 cm abdominal aortic aneurysm. His lipid profile was acceptable. His lipase trended down quickly and abdominal pain resolved. He was discharged home once he was felt to be medically stable. The patient has had no recurrence of abdominal pain since that time and remains on his medications as outlined. Past Medical History:   Diagnosis Date    At risk for sleep apnea 05/22/2019    during phone assessment for PAT, LEVI 6    Colon polyp     Elevated CPK     Hyperlipidemia     Hypertension     Psoriasis     Rosacea      Past Surgical History:   Procedure Laterality Date    COLONOSCOPY N/A 5/29/2019    COLONOSCOPY performed by Kojo Menchaca MD at [facility] AM[facility]UL[facility]    HX COLONOSCOPY  02/20/2014    HX HERNIA REPAIR      umbilical     No Known Allergies  Current Outpatient Medications   Medication Sig Dispense Refill    bumetanide (BUMEX) 0.5 mg tablet Take 2 Tabs by mouth daily for 30 days. Indications: visible water retention 90 Tab 3    famotidine (PEPCID) 20 mg tablet Take 1 Tab by mouth daily for 30 days.  Indications: gastroesophageal reflux disease 30 Tab 0    ondansetron (ZOFRAN ODT) 4 mg disintegrating tablet Take 1 Tab by mouth every eight (8) hours as needed for Nausea or Vomiting for up to 30 days. 30 Tab 0    potassium chloride SR (KLOR-CON 10) 10 mEq tablet Take 1 Tab by mouth daily for 30 days. 30 Tab 0    fenofibrate (LOFIBRA) 160 mg tablet Take 1 Tab by mouth daily. 90 Tab 3    lisinopril (PRINIVIL, ZESTRIL) 20 mg tablet Take 1 Tab by mouth daily. 90 Tab 3    verapamil ER (CALAN-SR) 240 mg CR tablet Take 1 Tab by mouth daily. 90 Tab 3    magnesium oxide (MAG-OX) 400 mg tablet Take 400 mg by mouth daily.  omega-3 fatty acids Cap Take 500 mg by mouth daily.  aspirin 81 mg tablet Take 81 mg by mouth four (4) days a week. Social History     Socioeconomic History    Marital status:      Spouse name: Not on file    Number of children: Not on file    Years of education: Not on file    Highest education level: Not on file   Tobacco Use    Smoking status: Former Smoker     Packs/day: 1.00     Years: 20.00     Pack years: 20.00     Quit date: 26     Years since quittin.1    Smokeless tobacco: Never Used   Substance and Sexual Activity    Alcohol use:  Yes     Alcohol/week: 20.0 standard drinks     Types: 20 Cans of beer per week    Drug use: Never     Family History   Problem Relation Age of Onset    Cancer Mother         colon       Health Maintenance   Topic Date Due    COVID-19 Vaccine (1 of 2) 1961    Medicare Yearly Exam  2021    GLAUCOMA SCREENING Q2Y  2022    Colorectal Cancer Screening Combo  2024    Lipid Screen  2026    DTaP/Tdap/Td series (2 - Td) 2029    Hepatitis C Screening  Completed    AAA Screening 73-69 YO Male Smoking Patients  Completed    Shingrix Vaccine Age 50>  Completed    Flu Vaccine  Completed    Pneumococcal 65+ years  Completed        Review of Systems  Constitutional: negative for fevers, chills, anorexia and weight loss  Eyes:   negative for visual disturbance and irritation  ENT: negative for tinnitus,sore throat,nasal congestion,ear pain,hoarseness  Respiratory:  negative for cough, hemoptysis, dyspnea,wheezing  CV:   negative for chest pain, palpitations, lower extremity edema  GI:   negative for nausea, vomiting, diarrhea, abdominal pain,melena  Endo:               negative for polyuria,polydipsia,polyphagia,heat intolerance  Genitourinary: negative for frequency, dysuria and hematuria  Integumentary: negative for rash and pruritus  Hematologic:  negative for easy bruising and gum/nose bleeding  Musculoskel: negative for myalgias, arthralgias, back pain, muscle weakness, joint pain  Neurological:  negative for headaches, dizziness, vertigo, memory problems and gait   Behavl/Psych: negative for feelings of anxiety, depression, mood changes  ROS otherwise negative      Objective:  Visit Vitals  /80 (BP 1 Location: Right upper arm, BP Patient Position: Sitting, BP Cuff Size: Large adult)   Pulse 86   Temp 97.7 °F (36.5 °C) (Oral)   Resp 20   Ht 5' 9\" (1.753 m)   Wt 243 lb 9.6 oz (110.5 kg)   SpO2 98%   BMI 35.97 kg/m²     Body mass index is 35.97 kg/m². Physical Exam:   General appearance - alert, well appearing, and in no distress  Mental status - alert, oriented to person, place, and time  EYE-ANNA, EOMI,conjunctiva normal bilaterally, lids normal  ENT-ENT exam normal, no neck nodes or sinus tenderness  Nose - normal and patent, no erythema,  Or discharge   Mouth - mucous membranes moist, pharynx normal without lesions  Neck - supple, no significant adenopathy or bruit  Chest - clear to auscultation, no wheezes, rales or rhonchi. Heart - normal rate, regular rhythm, normal S1, S2, no murmurs, rubs, clicks or gallops   Abdomen - soft, nontender, nondistended, no masses or organomegaly  Lymph- no adenopathy palpable  Ext-peripheral pulses normal, no pedal edema, no clubbing or cyanosis  Skin-Warm and dry.  no hyperpigmentation, vitiligo, or suspicious lesions  Neuro -alert, oriented, normal speech, no focal findings or movement disorder noted      Assessment/Plan:  Diagnoses and all orders for this visit:    Alcohol-induced acute pancreatitis without infection or necrosis  -     COLLECTION VENOUS BLOOD,VENIPUNCTURE  -     CBC WITH AUTOMATED DIFF  -     METABOLIC PANEL, COMPREHENSIVE  -     AMYLASE  -     LIPASE    Essential hypertension    Mixed hyperlipidemia    Obesity (BMI 30-39. 9)    Abdominal aortic aneurysm (AAA) without rupture ([address]        Other instructions: The patient's medications were reviewed and reconciled. No change in his current medical regimen will be made. No added salt diet is encouraged    Discussed his alcohol intake and patient states that he will likely continue to drink beer as this is a cultural thing as he attends a lot of [employer] functions and has a lot of friends for which his social life is made around sharing and drinking beer. We discussed the potential for problems with recurrent pancreatitis. Patient is aware that pancreatitis can lead to death as well. I have recommended yearly CT scans of his abdomen in regards to his AAA as his ultrasound did not show it due to his obese stomach. Await results of follow-up labs    Follow-up in 3 to 4 months    Follow-up and Dispositions    · Return for As previously scheduled. I have reviewed with the patient details of the assessment and plan and all questions were answered. Relevent patient education was performed. The most recent lab findings were reviewed with the patient. An After Visit Summary was printed and given to the patient. Roseanne Ackerman MD    Please note that this dictation was completed with [employer], the computer voice recognition software. Quite often unanticipated grammatical, syntax, homophones, and other interpretive errors are inadvertently transcribed by the computer software. Please disregard these errors.   Please excuse any errors that have escaped final proofreading.

## 2021-02-20 LAB
ALBUMIN SERPL-MCNC: 4.1 G/DL (ref 3.7–4.7)
ALBUMIN/GLOB SERPL: 1.5 {RATIO} (ref 1.2–2.2)
ALP SERPL-CCNC: 68 IU/L (ref 39–117)
ALT SERPL-CCNC: 41 IU/L (ref 0–44)
AMYLASE SERPL-CCNC: 52 U/L (ref 31–110)
AST SERPL-CCNC: 30 IU/L (ref 0–40)
BILIRUB SERPL-MCNC: 0.4 MG/DL (ref 0–1.2)
BUN SERPL-MCNC: 15 MG/DL (ref 8–27)
BUN/CREAT SERPL: 18 (ref 10–24)
CALCIUM SERPL-MCNC: 8.8 MG/DL (ref 8.6–10.2)
CHLORIDE SERPL-SCNC: 102 MMOL/L (ref 96–106)
CO2 SERPL-SCNC: 23 MMOL/L (ref 20–29)
CREAT SERPL-MCNC: 0.82 MG/DL (ref 0.76–1.27)
GLOBULIN SER CALC-MCNC: 2.7 G/DL (ref 1.5–4.5)
GLUCOSE SERPL-MCNC: 168 MG/DL (ref 65–99)
LIPASE SERPL-CCNC: 44 U/L (ref 13–78)
POTASSIUM SERPL-SCNC: 4.1 MMOL/L (ref 3.5–5.2)
PROT SERPL-MCNC: 6.8 G/DL (ref 6–8.5)
SODIUM SERPL-SCNC: 141 MMOL/L (ref 134–144)

## 2021-05-21 ENCOUNTER — OFFICE VISIT (OUTPATIENT)
Dept: INTERNAL MEDICINE CLINIC | Age: 76
End: 2021-05-21
Payer: MEDICARE

## 2021-05-21 VITALS
DIASTOLIC BLOOD PRESSURE: 78 MMHG | RESPIRATION RATE: 20 BRPM | WEIGHT: 253.6 LBS | HEART RATE: 61 BPM | HEIGHT: 69 IN | SYSTOLIC BLOOD PRESSURE: 130 MMHG | OXYGEN SATURATION: 95 % | BODY MASS INDEX: 37.56 KG/M2 | TEMPERATURE: 98.1 F

## 2021-05-21 DIAGNOSIS — I10 ESSENTIAL HYPERTENSION: Primary | Chronic | ICD-10-CM

## 2021-05-21 DIAGNOSIS — Z87.19 HISTORY OF PANCREATITIS: ICD-10-CM

## 2021-05-21 DIAGNOSIS — E66.9 OBESITY (BMI 30-39.9): Chronic | ICD-10-CM

## 2021-05-21 DIAGNOSIS — E78.2 MIXED HYPERLIPIDEMIA: Chronic | ICD-10-CM

## 2021-05-21 PROCEDURE — 1101F PT FALLS ASSESS-DOCD LE1/YR: CPT | Performed by: INTERNAL MEDICINE

## 2021-05-21 PROCEDURE — G8427 DOCREV CUR MEDS BY ELIG CLIN: HCPCS | Performed by: INTERNAL MEDICINE

## 2021-05-21 PROCEDURE — G8432 DEP SCR NOT DOC, RNG: HCPCS | Performed by: INTERNAL MEDICINE

## 2021-05-21 PROCEDURE — G8417 CALC BMI ABV UP PARAM F/U: HCPCS | Performed by: INTERNAL MEDICINE

## 2021-05-21 PROCEDURE — 99214 OFFICE O/P EST MOD 30 MIN: CPT | Performed by: INTERNAL MEDICINE

## 2021-05-21 PROCEDURE — G8752 SYS BP LESS 140: HCPCS | Performed by: INTERNAL MEDICINE

## 2021-05-21 PROCEDURE — G8536 NO DOC ELDER MAL SCRN: HCPCS | Performed by: INTERNAL MEDICINE

## 2021-05-21 PROCEDURE — G8754 DIAS BP LESS 90: HCPCS | Performed by: INTERNAL MEDICINE

## 2021-05-21 RX ORDER — BUMETANIDE 0.5 MG/1
0.5 TABLET ORAL DAILY
COMMUNITY
End: 2021-05-25

## 2021-05-21 NOTE — PROGRESS NOTES
Mayra Chaidez is a 68 y.o. male presenting for Follow Up Chronic Condition (6 mo fu)  . 1. Have you been to the ER, urgent care clinic since your last visit? Hospitalized since your last visit? No    2. Have you seen or consulted any other health care providers outside of the [address] [place] [address] since your last visit? Include any pap smears or colon screening. Eye     [facility], last 12 mths 2/15/2021   Able to walk? Yes   Fall in past 12 months? 0   Do you feel unsteady? 0   Are you worried about falling 0         Abuse Screening Questionnaire 1/22/2021   Do you ever feel afraid of your partner? N   Are you in a relationship with someone who physically or mentally threatens you? N   Is it safe for you to go home? Y       3 most recent PHQ Screens 2/15/2021   Little interest or pleasure in doing things Not at all   Feeling down, depressed, irritable, or hopeless Not at all   Total Score PHQ 2 0       There are no discontinued medications.

## 2021-05-21 NOTE — PROGRESS NOTES
Subjective:     Mr. Padmini Leary returns to the office today in follow-up of multiple medical problems. The patient has hypertension and currently takes Bumex, lisinopril and verapamil. This is supplemented with a potassium and magnesium supplement. He denies any dizzy spells, muscle cramping, cough. He does note mild of lower extremity edema especially during the summer months. He denies headaches, numbness, tingling or focal neurological problems. He has a hyperlipidemia currently on fenofibrate. He supplements this with omega-3 fatty acids. He has previously been on statin therapy but had elevated CPK levels. He has no history of vascular disease and denies exertional chest pains or claudication. He has a history of recurrent pancreatitis. Likely culprit is the amount of beer that he drinks on a daily basis. He has had at least 2 cases the second of which was not as bad as the first.  He has not had any evidence of diabetes to date. He is currently asymptomatic. Past Medical History:   Diagnosis Date    At risk for sleep apnea 05/22/2019    during phone assessment for PAT, LEVI 6    Colon polyp     Elevated CPK     Hyperlipidemia     Hypertension     Psoriasis     Rosacea      Past Surgical History:   Procedure Laterality Date    COLONOSCOPY N/A 5/29/2019    COLONOSCOPY performed by Robert [facility]man [facility] at [facility] AM[facility]UL[facility] OR    HX COLONOSCOPY  02/20/2014    HX HERNIA REPAIR      umbilical     No Known Allergies  Current Outpatient Medications   Medication Sig Dispense Refill    potassium (POTASSIMIN PO) Take  by mouth daily.  bumetanide (BUMEX) 0.5 mg tablet Take 0.5 mg by mouth daily.  fenofibrate (LOFIBRA) 160 mg tablet Take 1 Tab by mouth daily. 90 Tab 3    lisinopril (PRINIVIL, ZESTRIL) 20 mg tablet Take 1 Tab by mouth daily. 90 Tab 3    verapamil ER (CALAN-SR) 240 mg CR tablet Take 1 Tab by mouth daily.  90 Tab 3    magnesium oxide (MAG-OX) 400 mg tablet Take 400 mg by mouth daily.  omega-3 fatty acids Cap Take 500 mg by mouth daily.  aspirin 81 mg tablet Take 81 mg by mouth four (4) days a week. Social History     Socioeconomic History    Marital status:      Spouse name: Not on file    Number of children: Not on file    Years of education: Not on file    Highest education level: Not on file   Tobacco Use    Smoking status: Former Smoker     Packs/day: 1.00     Years: 20.00     Pack years: 20.00     Quit date: 26     Years since quittin.4    Smokeless tobacco: Never Used   Vaping Use    Vaping Use: Never used   Substance and Sexual Activity    Alcohol use: Yes     Alcohol/week: 20.0 standard drinks     Types: 20 Cans of beer per week    Drug use: Never     Social Determinants of Health     Financial Resource Strain:     Difficulty of Paying Living Expenses:    Food Insecurity:     Worried About Running Out of Food in the Last Year:     [address] in the Last Year:    Transportation Needs:     Lack of Transportation (Medical):      Lack of Transportation (Non-Medical):    Physical Activity:     Days of Exercise per Week:     Minutes of Exercise per Session:    Stress:     Feeling of Stress :    Social Connections:     Frequency of Communication with Friends and Family:     Frequency of Social Gatherings with Friends and Family:     Attends Orthodox Services:     Active Member of Clubs or Organizations:     Attends Club or Organization Meetings:     Marital Status:      Family History   Problem Relation Age of Onset    Cancer Mother         colon       Review of Systems:  GEN: no weight loss, weight gain, fatigue or night sweats  CV: no PND, orthopnea, or palpitations  Resp: no dyspnea on exertion, no cough  Abd: no nausea, vomiting or diarrhea  EXT: denies edema, claudication  Endocrine: no hair loss, excessive thirst or polyuria  Neurological ROS: no TIA or stroke symptoms  ROS otherwise negative      Objective: Visit Vitals  /78 (BP 1 Location: Left upper arm, BP Patient Position: Sitting, BP Cuff Size: Adult)   Pulse 61   Temp 98.1 °F (36.7 °C) (Oral)   Resp 20   Ht 5' 9\" (1.753 m)   Wt 253 lb 9.6 oz (115 kg)   SpO2 95%   BMI 37.45 kg/m²     Body mass index is 37.45 kg/m². General:   alert, cooperative and no distress   Eyes: conjunctivae/sclerae clear. PERRL, EOM's intact   Mouth:  No oral lesions, no pharyngeal erythema, no exudates   Neck: Trachea midline, no thyromegaly, no bruits   Heart: S1 and S2 normal,no murmurs noted    Lungs: Clear to auscultation bilaterally, no increased work of breathing   Abdomen: Soft, nontender. Normal bowel sounds   Extremities: No edema or cyanosis   Neuro: ..alert, oriented x3,speech normal in context and clarity, cranial nerves II-XII intact,motor strength: full proximally and distally,gait: normal  reflexes: full and symmetric     Physical exam otherwise negative         Assessment/Plan:     Diagnoses and all orders for this visit:    Essential hypertension    Mixed hyperlipidemia    History of pancreatitis    Obesity (BMI 30-39. 9)        Other instructions: The patient's medications were reviewed and reconciled. No change in his current medical regimen will be made. A no added salt, prudent diet is encouraged    Weight loss encouraged with body mass index of 37.5    Labs from February were reviewed with the patient today. Patient is up-to-date on all health care maintenance issues and did have a Covid19 vaccination series earlier this year. Follow-up in 6 months    Follow-up and Dispositions    · Return in about 6 months (around 11/21/2021). Mike Nix MD    Please note that this dictation was completed with isocket, the computer voice recognition software. Quite often unanticipated grammatical, syntax, homophones, and other interpretive errors are inadvertently transcribed by the computer software. Please disregard these errors.   Please excuse any errors that have escaped final proofreading.

## 2021-05-25 DIAGNOSIS — E78.2 MIXED HYPERLIPIDEMIA: ICD-10-CM

## 2021-05-25 DIAGNOSIS — I10 ESSENTIAL HYPERTENSION: ICD-10-CM

## 2021-05-25 RX ORDER — BUMETANIDE 0.5 MG/1
TABLET ORAL
Qty: 90 TABLET | Refills: 0 | Status: SHIPPED | OUTPATIENT
Start: 2021-05-25 | End: 2021-01-01

## 2021-05-25 RX ORDER — VERAPAMIL HYDROCHLORIDE 240 MG/1
TABLET, FILM COATED, EXTENDED RELEASE ORAL
Qty: 90 TABLET | Refills: 0 | Status: SHIPPED | OUTPATIENT
Start: 2021-05-25 | End: 2021-01-01

## 2021-05-25 RX ORDER — LISINOPRIL 20 MG/1
TABLET ORAL
Qty: 90 TABLET | Refills: 0 | Status: SHIPPED | OUTPATIENT
Start: 2021-05-25 | End: 2021-01-01

## 2021-05-25 RX ORDER — FENOFIBRATE 160 MG/1
TABLET ORAL
Qty: 90 TABLET | Refills: 0 | Status: SHIPPED | OUTPATIENT
Start: 2021-05-25 | End: 2021-01-01

## 2021-12-16 NOTE — PROGRESS NOTES
Noé Blanca is a 68 y.o. male presenting for Follow Up Chronic Condition (6 mo fu)  . 1. Have you been to the ER, urgent care clinic since your last visit? Hospitalized since your last visit? No    2. Have you seen or consulted any other health care providers outside of the [address] [place] [address] since your last visit? Include any pap smears or colon screening. Eye Dr Sierra Licona, last 12 mths 2/15/2021   Able to walk? Yes   Fall in past 12 months? 0   Do you feel unsteady? 0   Are you worried about falling 0         Abuse Screening Questionnaire 1/22/2021   Do you ever feel afraid of your partner? N   Are you in a relationship with someone who physically or mentally threatens you? N   Is it safe for you to go home? Y       3 most recent PHQ Screens 12/16/2021   Little interest or pleasure in doing things Not at all   Feeling down, depressed, irritable, or hopeless Not at all   Total Score PHQ 2 0       There are no discontinued medications.

## 2021-12-16 NOTE — PATIENT INSTRUCTIONS
DASH Diet: Care Instructions  Your Care Instructions     The DASH diet is an eating plan that can help lower your blood pressure. DASH stands for Dietary Approaches to Stop Hypertension. Hypertension is high blood pressure. The DASH diet focuses on eating foods that are high in calcium, potassium, and magnesium. These nutrients can lower blood pressure. The foods that are highest in these nutrients are fruits, vegetables, low-fat dairy products, nuts, seeds, and legumes. But taking calcium, potassium, and magnesium supplements instead of eating foods that are high in those nutrients does not have the same effect. The DASH diet also includes whole grains, fish, and poultry. The DASH diet is one of several lifestyle changes your doctor may recommend to lower your high blood pressure. Your doctor may also want you to decrease the amount of sodium in your diet. Lowering sodium while following the DASH diet can lower blood pressure even further than just the DASH diet alone. Follow-up care is a key part of your treatment and safety. Be sure to make and go to all appointments, and call your doctor if you are having problems. It's also a good idea to know your test results and keep a list of the medicines you take. How can you care for yourself at home? Following the DASH diet  · Eat 4 to 5 servings of fruit each day. A serving is 1 medium-sized piece of fruit, ½ cup chopped or canned fruit, 1/4 cup dried fruit, or 4 ounces (½ cup) of fruit juice. Choose fruit more often than fruit juice. · Eat 4 to 5 servings of vegetables each day. A serving is 1 cup of lettuce or raw leafy vegetables, ½ cup of chopped or cooked vegetables, or 4 ounces (½ cup) of vegetable juice. Choose vegetables more often than vegetable juice. · Get 2 to 3 servings of low-fat and fat-free dairy each day. A serving is 8 ounces of milk, 1 cup of yogurt, or 1 ½ ounces of cheese. · Eat 6 to 8 servings of grains each day.  A serving is 1 slice of bread, 1 ounce of dry cereal, or ½ cup of cooked rice, pasta, or cooked cereal. Try to choose whole-grain products as much as possible. · Limit lean meat, poultry, and fish to 2 servings each day. A serving is 3 ounces, about the size of a deck of cards. · Eat 4 to 5 servings of nuts, seeds, and legumes (cooked dried beans, lentils, and split peas) each week. A serving is 1/3 cup of nuts, 2 tablespoons of seeds, or ½ cup of cooked beans or peas. · Limit fats and oils to 2 to 3 servings each day. A serving is 1 teaspoon of vegetable oil or 2 tablespoons of salad dressing. · Limit sweets and added sugars to 5 servings or less a week. A serving is 1 tablespoon jelly or jam, ½ cup sorbet, or 1 cup of lemonade. · Eat less than 2,300 milligrams (mg) of sodium a day. If you limit your sodium to 1,500 mg a day, you can lower your blood pressure even more. · Be aware that all of these are the suggested number of servings for people who eat 1,800 to 2,000 calories a day. Your recommended number of servings may be different if you need more or fewer calories. Tips for success  · Start small. Do not try to make dramatic changes to your diet all at once. You might feel that you are missing out on your favorite foods and then be more likely to not follow the plan. Make small changes, and stick with them. Once those changes become habit, add a few more changes. · Try some of the following:  ? Make it a goal to eat a fruit or vegetable at every meal and at snacks. This will make it easy to get the recommended amount of fruits and vegetables each day. ? Try yogurt topped with fruit and nuts for a snack or healthy dessert. ? Add lettuce, tomato, cucumber, and onion to sandwiches. ? Combine a ready-made pizza crust with low-fat mozzarella cheese and lots of vegetable toppings. Try using tomatoes, squash, spinach, broccoli, carrots, cauliflower, and onions. ?  Have a variety of cut-up vegetables with a low-fat dip as an appetizer instead of chips and dip. ? Sprinkle sunflower seeds or chopped almonds over salads. Or try adding chopped walnuts or almonds to cooked vegetables. ? Try some vegetarian meals using beans and peas. Add garbanzo or kidney beans to salads. Make burritos and tacos with mashed thompson beans or black beans. Where can you learn more? Go to http://www.rivera.com/  Enter H967 in the search box to learn more about \"DASH Diet: Care Instructions. \"  Current as of: April 29, 2021               Content Version: 13.0  © 8145-0108 [employer]. Care instructions adapted under license by [employer] (which disclaims liability or warranty for this information). If you have questions about a medical condition or this instruction, always ask your healthcare professional. [address] any warranty or liability for your use of this information.

## 2021-12-16 NOTE — PROGRESS NOTES
Subjective:     Mr. Uma Alva returns to the office today in follow-up of multiple medical problems. The patient has hypertension currently managed on Bumex, lisinopril, verapamil and supplemented with potassium and magnesium. He tolerates this regimen without orthostatic dizziness or muscle cramping. Denies headaches, numbness, tingling or focal neurological problems. Jhoan Spies is being used to control his hyperlipidemia along with fish oil. He tolerates this without GI upset. He has no history of vascular disease and denies exertional chest pains or claudication. Patient has a history of recurrent pancreatitis related to beer drinking. He recently was on a cruise and had no problems. He denies any abdominal pain or GI upset. Past Medical History:   Diagnosis Date    At risk for sleep apnea 05/22/2019    during phone assessment for PAT, LEVI 6    Colon polyp     Elevated CPK     Hyperlipidemia     Hypertension     Psoriasis     Rosacea      Past Surgical History:   Procedure Laterality Date    COLONOSCOPY N/A 5/29/2019    COLONOSCOPY performed by Trey Apple MD at [facility] AMBULATORY OR    HX COLONOSCOPY  02/20/2014    HX HERNIA REPAIR      umbilical     No Known Allergies  Current Outpatient Medications   Medication Sig Dispense Refill    bumetanide (BUMEX) 0.5 mg tablet TAKE ONE TABLET BY MOUTH EVERY DAY 90 Tablet 0    fenofibrate (LOFIBRA) 160 mg tablet TAKE ONE TABLET BY MOUTH EVERY DAY 90 Tablet 0    lisinopriL (PRINIVIL, ZESTRIL) 20 mg tablet TAKE ONE TABLET BY MOUTH EVERY DAY 90 Tablet 0    verapamil ER (CALAN-SR) 240 mg CR tablet TAKE ONE TABLET BY MOUTH EVERY DAY 90 Tablet 0    potassium (POTASSIMIN PO) Take  by mouth daily.  magnesium oxide (MAG-OX) 400 mg tablet Take 400 mg by mouth daily.  omega-3 fatty acids Cap Take 500 mg by mouth daily.  aspirin 81 mg tablet Take 81 mg by mouth four (4) days a week.        Social History     Socioeconomic History    Marital status:    Tobacco Use    Smoking status: Former Smoker     Packs/day: 1.00     Years: 20.00     Pack years: 20.00     Quit date:      Years since quittin.9    Smokeless tobacco: Never Used   Vaping Use    Vaping Use: Never used   Substance and Sexual Activity    Alcohol use: Yes     Alcohol/week: 20.0 standard drinks     Types: 20 Cans of beer per week    Drug use: Never     Family History   Problem Relation Age of Onset    Cancer Mother         colon       Review of Systems:  GEN: no weight loss, weight gain, fatigue or night sweats  CV: no PND, orthopnea, or palpitations  Resp: no dyspnea on exertion, no cough  Abd: no nausea, vomiting or diarrhea  EXT: denies edema, claudication  Endocrine: no hair loss, excessive thirst or polyuria  Neurological ROS: no TIA or stroke symptoms  ROS otherwise negative      Objective:     Visit Vitals  /80 (BP 1 Location: Left upper arm, BP Patient Position: Sitting, BP Cuff Size: Adult)   Pulse 89   Temp 98 °F (36.7 °C) (Oral)   Resp 14   Ht 5' 9\" (1.753 m)   Wt 251 lb (113.9 kg)   SpO2 95%   BMI 37.07 kg/m²     Body mass index is 37.07 kg/m². General:   alert, cooperative and no distress   Eyes: conjunctivae/sclerae clear. PERRL, EOM's intact   Mouth:  No oral lesions, no pharyngeal erythema, no exudates   Neck: Trachea midline, no thyromegaly, no bruits   Heart: S1 and S2 normal,no murmurs noted    Lungs: Clear to auscultation bilaterally, no increased work of breathing   Abdomen: Soft, nontender.   Normal bowel sounds   Extremities: No edema or cyanosis   Neuro: ..alert, oriented x3,speech normal in context and clarity, cranial nerves II-XII intact,motor strength: full proximally and distally,gait: normal  reflexes: full and symmetric     Physical exam otherwise negative         Assessment/Plan:     Diagnoses and all orders for this visit:    Primary hypertension  -     COLLECTION VENOUS BLOOD,VENIPUNCTURE  -     CBC WITH AUTOMATED DIFF; Future  -     METABOLIC PANEL, COMPREHENSIVE; Future  -     URINALYSIS W/ REFLEX CULTURE; Future    Mixed hyperlipidemia  -     LIPID PANEL; Future  -     TSH 3RD GENERATION; Future    Elevated CPK  -     CK; Future    History of pancreatitis    Obesity (BMI 30-39. 9)    Severe obesity (BMI 35.0-35.9 with comorbidity) ([facility] [address]        Other instructions: This medications were reviewed and reconciled. No change in his current medical regimen will be made. A no added salt, prudent diet is encouraged    Weight loss strongly recommended with body mass index of 37    Patient is up-to-date on Covid and influenza vaccinations    Await results of multiple labs    Follow-up in 6 months    Follow-up and Dispositions    · Return in about 6 months (around 6/16/2022). Ezekiel Boykin MD    Please note that this dictation was completed with [employer], the computer voice recognition software. Quite often unanticipated grammatical, syntax, homophones, and other interpretive errors are inadvertently transcribed by the computer software. Please disregard these errors. Please excuse any errors that have escaped final proofreading.

## 2022-01-01 ENCOUNTER — APPOINTMENT (OUTPATIENT)
Dept: GENERAL RADIOLOGY | Age: 77
DRG: 871 | End: 2022-01-01
Attending: STUDENT IN AN ORGANIZED HEALTH CARE EDUCATION/TRAINING PROGRAM
Payer: MEDICARE

## 2022-01-01 ENCOUNTER — HOME CARE VISIT (OUTPATIENT)
Dept: HOSPICE | Facility: HOSPICE | Age: 77
End: 2022-01-01
Payer: MEDICARE

## 2022-01-01 ENCOUNTER — APPOINTMENT (OUTPATIENT)
Dept: MRI IMAGING | Age: 77
DRG: 551 | End: 2022-01-01
Attending: INTERNAL MEDICINE
Payer: MEDICARE

## 2022-01-01 ENCOUNTER — HOSPITAL ENCOUNTER (OUTPATIENT)
Dept: CT IMAGING | Age: 77
Discharge: HOME | End: 2022-04-04
Attending: INTERNAL MEDICINE
Payer: MEDICARE

## 2022-01-01 ENCOUNTER — APPOINTMENT (OUTPATIENT)
Dept: ULTRASOUND IMAGING | Age: 77
DRG: 871 | End: 2022-01-01
Attending: INTERNAL MEDICINE
Payer: MEDICARE

## 2022-01-01 ENCOUNTER — TELEPHONE (OUTPATIENT)
Dept: INTERNAL MEDICINE CLINIC | Age: 77
End: 2022-01-01

## 2022-01-01 ENCOUNTER — APPOINTMENT (OUTPATIENT)
Dept: CT IMAGING | Age: 77
DRG: 551 | End: 2022-01-01
Attending: EMERGENCY MEDICINE
Payer: MEDICARE

## 2022-01-01 ENCOUNTER — TELEPHONE (OUTPATIENT)
Dept: ONCOLOGY | Age: 77
End: 2022-01-01

## 2022-01-01 ENCOUNTER — HOSPITAL ENCOUNTER (OUTPATIENT)
Dept: MRI IMAGING | Age: 77
Discharge: HOME | End: 2022-06-16
Attending: STUDENT IN AN ORGANIZED HEALTH CARE EDUCATION/TRAINING PROGRAM
Payer: MEDICARE

## 2022-01-01 ENCOUNTER — APPOINTMENT (OUTPATIENT)
Dept: MRI IMAGING | Age: 77
DRG: 551 | End: 2022-01-01
Attending: GENERAL ACUTE CARE HOSPITAL
Payer: MEDICARE

## 2022-01-01 ENCOUNTER — APPOINTMENT (OUTPATIENT)
Dept: INTERNAL MEDICINE CLINIC | Age: 77
End: 2022-01-01

## 2022-01-01 ENCOUNTER — APPOINTMENT (OUTPATIENT)
Dept: INFUSION THERAPY | Age: 77
End: 2022-01-01

## 2022-01-01 ENCOUNTER — HOME CARE VISIT (OUTPATIENT)
Dept: SCHEDULING | Facility: HOME HEALTH | Age: 77
End: 2022-01-01
Payer: MEDICARE

## 2022-01-01 ENCOUNTER — TELEPHONE (OUTPATIENT)
Dept: PALLATIVE CARE | Age: 77
End: 2022-01-01

## 2022-01-01 ENCOUNTER — OFFICE VISIT (OUTPATIENT)
Dept: ONCOLOGY | Age: 77
End: 2022-01-01
Payer: MEDICARE

## 2022-01-01 ENCOUNTER — APPOINTMENT (OUTPATIENT)
Dept: NON INVASIVE DIAGNOSTICS | Age: 77
DRG: 551 | End: 2022-01-01
Attending: INTERNAL MEDICINE
Payer: MEDICARE

## 2022-01-01 ENCOUNTER — HOSPITAL ENCOUNTER (INPATIENT)
Age: 77
LOS: 4 days | Discharge: HOSPICE | DRG: 871 | End: 2022-07-21
Attending: STUDENT IN AN ORGANIZED HEALTH CARE EDUCATION/TRAINING PROGRAM | Admitting: INTERNAL MEDICINE
Payer: MEDICARE

## 2022-01-01 ENCOUNTER — HOSPITAL ENCOUNTER (OUTPATIENT)
Dept: INFUSION THERAPY | Age: 77
End: 2022-01-01

## 2022-01-01 ENCOUNTER — DOCUMENTATION ONLY (OUTPATIENT)
Dept: ONCOLOGY | Age: 77
End: 2022-01-01

## 2022-01-01 ENCOUNTER — HOSPITAL ENCOUNTER (OUTPATIENT)
Dept: PET IMAGING | Age: 77
Discharge: HOME | End: 2022-06-07
Attending: STUDENT IN AN ORGANIZED HEALTH CARE EDUCATION/TRAINING PROGRAM
Payer: MEDICARE

## 2022-01-01 ENCOUNTER — OFFICE VISIT (OUTPATIENT)
Dept: INTERNAL MEDICINE CLINIC | Age: 77
End: 2022-01-01
Payer: MEDICARE

## 2022-01-01 ENCOUNTER — HOSPITAL ENCOUNTER (OUTPATIENT)
Dept: INTERVENTIONAL RADIOLOGY/VASCULAR | Age: 77
Discharge: HOME | End: 2022-06-06
Attending: STUDENT IN AN ORGANIZED HEALTH CARE EDUCATION/TRAINING PROGRAM | Admitting: STUDENT IN AN ORGANIZED HEALTH CARE EDUCATION/TRAINING PROGRAM
Payer: MEDICARE

## 2022-01-01 ENCOUNTER — HOSPITAL ENCOUNTER (OUTPATIENT)
Dept: INFUSION THERAPY | Age: 77
Discharge: HOME | End: 2022-06-20
Payer: MEDICARE

## 2022-01-01 ENCOUNTER — HOSPICE ADMISSION (OUTPATIENT)
Dept: HOSPICE | Facility: HOSPICE | Age: 77
End: 2022-01-01
Payer: MEDICARE

## 2022-01-01 ENCOUNTER — HOSPITAL ENCOUNTER (OUTPATIENT)
Dept: CT IMAGING | Age: 77
Discharge: HOME | End: 2022-06-09
Attending: STUDENT IN AN ORGANIZED HEALTH CARE EDUCATION/TRAINING PROGRAM
Payer: MEDICARE

## 2022-01-01 ENCOUNTER — HOSPITAL ENCOUNTER (OUTPATIENT)
Dept: INFUSION THERAPY | Age: 77
Discharge: HOME | End: 2022-06-22
Payer: MEDICARE

## 2022-01-01 ENCOUNTER — HOSPITAL ENCOUNTER (OUTPATIENT)
Dept: ULTRASOUND IMAGING | Age: 77
Discharge: HOME | End: 2022-06-09
Attending: STUDENT IN AN ORGANIZED HEALTH CARE EDUCATION/TRAINING PROGRAM
Payer: MEDICARE

## 2022-01-01 ENCOUNTER — HOSPITAL ENCOUNTER (OUTPATIENT)
Dept: MRI IMAGING | Age: 77
Discharge: HOME | End: 2022-05-20
Attending: INTERNAL MEDICINE
Payer: MEDICARE

## 2022-01-01 ENCOUNTER — HOSPITAL ENCOUNTER (INPATIENT)
Age: 77
LOS: 4 days | Discharge: NURSING, REHAB OR LONG-TERM CARE | DRG: 551 | End: 2022-07-08
Attending: EMERGENCY MEDICINE | Admitting: INTERNAL MEDICINE
Payer: MEDICARE

## 2022-01-01 ENCOUNTER — OFFICE VISIT (OUTPATIENT)
Dept: SURGERY | Age: 77
End: 2022-01-01
Payer: MEDICARE

## 2022-01-01 ENCOUNTER — PATIENT OUTREACH (OUTPATIENT)
Dept: CASE MANAGEMENT | Age: 77
End: 2022-01-01

## 2022-01-01 VITALS
DIASTOLIC BLOOD PRESSURE: 61 MMHG | WEIGHT: 211 LBS | OXYGEN SATURATION: 95 % | HEART RATE: 98 BPM | BODY MASS INDEX: 31.25 KG/M2 | SYSTOLIC BLOOD PRESSURE: 95 MMHG | TEMPERATURE: 98.5 F | RESPIRATION RATE: 16 BRPM | HEIGHT: 69 IN

## 2022-01-01 VITALS
OXYGEN SATURATION: 92 % | HEIGHT: 69 IN | TEMPERATURE: 97.7 F | BODY MASS INDEX: 34.13 KG/M2 | HEART RATE: 81 BPM | WEIGHT: 230.4 LBS | SYSTOLIC BLOOD PRESSURE: 138 MMHG | DIASTOLIC BLOOD PRESSURE: 74 MMHG

## 2022-01-01 VITALS
TEMPERATURE: 97.9 F | HEIGHT: 69 IN | DIASTOLIC BLOOD PRESSURE: 63 MMHG | OXYGEN SATURATION: 93 % | HEART RATE: 91 BPM | SYSTOLIC BLOOD PRESSURE: 101 MMHG | BODY MASS INDEX: 32.58 KG/M2 | WEIGHT: 220 LBS

## 2022-01-01 VITALS
TEMPERATURE: 98.6 F | RESPIRATION RATE: 16 BRPM | DIASTOLIC BLOOD PRESSURE: 71 MMHG | WEIGHT: 225 LBS | BODY MASS INDEX: 33.33 KG/M2 | HEART RATE: 74 BPM | SYSTOLIC BLOOD PRESSURE: 127 MMHG | HEIGHT: 69 IN | OXYGEN SATURATION: 95 %

## 2022-01-01 VITALS
WEIGHT: 208.56 LBS | RESPIRATION RATE: 21 BRPM | HEIGHT: 69 IN | OXYGEN SATURATION: 93 % | TEMPERATURE: 98.5 F | SYSTOLIC BLOOD PRESSURE: 115 MMHG | HEART RATE: 87 BPM | BODY MASS INDEX: 30.89 KG/M2 | DIASTOLIC BLOOD PRESSURE: 64 MMHG

## 2022-01-01 VITALS
WEIGHT: 242 LBS | OXYGEN SATURATION: 96 % | TEMPERATURE: 97.3 F | HEART RATE: 65 BPM | DIASTOLIC BLOOD PRESSURE: 80 MMHG | SYSTOLIC BLOOD PRESSURE: 138 MMHG | HEIGHT: 69 IN | BODY MASS INDEX: 35.84 KG/M2 | RESPIRATION RATE: 16 BRPM

## 2022-01-01 VITALS
OXYGEN SATURATION: 100 % | SYSTOLIC BLOOD PRESSURE: 113 MMHG | WEIGHT: 215.9 LBS | HEART RATE: 106 BPM | HEIGHT: 69 IN | TEMPERATURE: 97.8 F | BODY MASS INDEX: 31.98 KG/M2 | RESPIRATION RATE: 16 BRPM | DIASTOLIC BLOOD PRESSURE: 76 MMHG

## 2022-01-01 VITALS
DIASTOLIC BLOOD PRESSURE: 69 MMHG | OXYGEN SATURATION: 91 % | HEART RATE: 86 BPM | SYSTOLIC BLOOD PRESSURE: 122 MMHG | RESPIRATION RATE: 26 BRPM | TEMPERATURE: 98.1 F

## 2022-01-01 VITALS
DIASTOLIC BLOOD PRESSURE: 48 MMHG | SYSTOLIC BLOOD PRESSURE: 104 MMHG | RESPIRATION RATE: 28 BRPM | TEMPERATURE: 98.2 F | OXYGEN SATURATION: 83 % | HEART RATE: 142 BPM

## 2022-01-01 VITALS
HEART RATE: 102 BPM | SYSTOLIC BLOOD PRESSURE: 125 MMHG | DIASTOLIC BLOOD PRESSURE: 77 MMHG | RESPIRATION RATE: 28 BRPM | TEMPERATURE: 97.9 F

## 2022-01-01 VITALS
TEMPERATURE: 97.8 F | OXYGEN SATURATION: 100 % | HEIGHT: 69 IN | HEART RATE: 106 BPM | SYSTOLIC BLOOD PRESSURE: 113 MMHG | DIASTOLIC BLOOD PRESSURE: 74 MMHG | SYSTOLIC BLOOD PRESSURE: 123 MMHG | WEIGHT: 215 LBS | BODY MASS INDEX: 31.84 KG/M2 | RESPIRATION RATE: 18 BRPM | WEIGHT: 224 LBS | DIASTOLIC BLOOD PRESSURE: 76 MMHG | BODY MASS INDEX: 33.18 KG/M2 | RESPIRATION RATE: 18 BRPM | HEART RATE: 94 BPM | TEMPERATURE: 98 F | OXYGEN SATURATION: 94 % | HEIGHT: 69 IN

## 2022-01-01 VITALS
OXYGEN SATURATION: 95 % | RESPIRATION RATE: 18 BRPM | HEART RATE: 77 BPM | DIASTOLIC BLOOD PRESSURE: 74 MMHG | SYSTOLIC BLOOD PRESSURE: 121 MMHG

## 2022-01-01 VITALS
TEMPERATURE: 97.9 F | DIASTOLIC BLOOD PRESSURE: 94 MMHG | RESPIRATION RATE: 28 BRPM | HEART RATE: 59 BPM | SYSTOLIC BLOOD PRESSURE: 130 MMHG

## 2022-01-01 VITALS
RESPIRATION RATE: 16 BRPM | SYSTOLIC BLOOD PRESSURE: 112 MMHG | OXYGEN SATURATION: 94 % | HEART RATE: 78 BPM | TEMPERATURE: 97.9 F | DIASTOLIC BLOOD PRESSURE: 68 MMHG

## 2022-01-01 VITALS
SYSTOLIC BLOOD PRESSURE: 130 MMHG | OXYGEN SATURATION: 91 % | RESPIRATION RATE: 20 BRPM | DIASTOLIC BLOOD PRESSURE: 80 MMHG | HEART RATE: 51 BPM

## 2022-01-01 VITALS
BODY MASS INDEX: 35.95 KG/M2 | WEIGHT: 242.7 LBS | HEART RATE: 83 BPM | OXYGEN SATURATION: 92 % | DIASTOLIC BLOOD PRESSURE: 82 MMHG | TEMPERATURE: 98.6 F | HEIGHT: 69 IN | SYSTOLIC BLOOD PRESSURE: 130 MMHG

## 2022-01-01 VITALS — WEIGHT: 224 LBS | HEIGHT: 69 IN | BODY MASS INDEX: 33.18 KG/M2

## 2022-01-01 DIAGNOSIS — K22.89 ESOPHAGEAL MASS: ICD-10-CM

## 2022-01-01 DIAGNOSIS — R59.1 LYMPHADENOPATHY: Primary | ICD-10-CM

## 2022-01-01 DIAGNOSIS — C15.9 MALIGNANT NEOPLASM OF ESOPHAGUS, UNSPECIFIED LOCATION (HCC): ICD-10-CM

## 2022-01-01 DIAGNOSIS — Z00.00 ROUTINE ADULT HEALTH MAINTENANCE: Primary | ICD-10-CM

## 2022-01-01 DIAGNOSIS — I71.40 ABDOMINAL AORTIC ANEURYSM (AAA) WITHOUT RUPTURE: Primary | ICD-10-CM

## 2022-01-01 DIAGNOSIS — R93.5 ABNORMAL FINDINGS ON DIAGNOSTIC IMAGING OF OTHER ABDOMINAL REGIONS, INCLUDING RETROPERITONEUM: ICD-10-CM

## 2022-01-01 DIAGNOSIS — C15.5 MALIGNANT NEOPLASM OF LOWER THIRD OF ESOPHAGUS (HCC): Primary | ICD-10-CM

## 2022-01-01 DIAGNOSIS — U07.1 COVID-19 VIRUS INFECTION: ICD-10-CM

## 2022-01-01 DIAGNOSIS — M54.12 CERVICAL RADICULOPATHY: ICD-10-CM

## 2022-01-01 DIAGNOSIS — R29.898 LEFT ARM WEAKNESS: ICD-10-CM

## 2022-01-01 DIAGNOSIS — G89.3 CANCER RELATED PAIN: Primary | ICD-10-CM

## 2022-01-01 DIAGNOSIS — G89.3 CANCER RELATED PAIN: ICD-10-CM

## 2022-01-01 DIAGNOSIS — C16.9 GASTRIC ADENOCARCINOMA (HCC): ICD-10-CM

## 2022-01-01 DIAGNOSIS — C16.9 GASTRIC ADENOCARCINOMA (HCC): Primary | ICD-10-CM

## 2022-01-01 DIAGNOSIS — E78.2 MIXED HYPERLIPIDEMIA: Chronic | ICD-10-CM

## 2022-01-01 DIAGNOSIS — J18.9 HCAP (HEALTHCARE-ASSOCIATED PNEUMONIA): Primary | ICD-10-CM

## 2022-01-01 DIAGNOSIS — C15.9 MALIGNANT NEOPLASM OF ESOPHAGUS, UNSPECIFIED LOCATION (HCC): Primary | ICD-10-CM

## 2022-01-01 DIAGNOSIS — I71.40 ABDOMINAL AORTIC ANEURYSM (AAA) WITHOUT RUPTURE: ICD-10-CM

## 2022-01-01 DIAGNOSIS — Z51.11 ENCOUNTER FOR ANTINEOPLASTIC CHEMOTHERAPY: Primary | ICD-10-CM

## 2022-01-01 DIAGNOSIS — E66.01 SEVERE OBESITY (BMI 35.0-39.9) WITH COMORBIDITY (HCC): ICD-10-CM

## 2022-01-01 DIAGNOSIS — K22.89 ESOPHAGEAL MASS: Primary | ICD-10-CM

## 2022-01-01 DIAGNOSIS — Z00.00 MEDICARE ANNUAL WELLNESS VISIT, SUBSEQUENT: ICD-10-CM

## 2022-01-01 DIAGNOSIS — I10 PRIMARY HYPERTENSION: ICD-10-CM

## 2022-01-01 DIAGNOSIS — Z23 ENCOUNTER FOR IMMUNIZATION: ICD-10-CM

## 2022-01-01 DIAGNOSIS — J96.01 ACUTE RESPIRATORY FAILURE WITH HYPOXIA (HCC): ICD-10-CM

## 2022-01-01 DIAGNOSIS — E78.2 MIXED HYPERLIPIDEMIA: ICD-10-CM

## 2022-01-01 DIAGNOSIS — R59.0 RETROPERITONEAL LYMPHADENOPATHY: ICD-10-CM

## 2022-01-01 DIAGNOSIS — M54.16 LUMBAR RADICULOPATHY: ICD-10-CM

## 2022-01-01 DIAGNOSIS — R53.1 UNILATERAL WEAKNESS: Primary | ICD-10-CM

## 2022-01-01 DIAGNOSIS — I10 ESSENTIAL HYPERTENSION: ICD-10-CM

## 2022-01-01 DIAGNOSIS — F10.10 ALCOHOL ABUSE: ICD-10-CM

## 2022-01-01 LAB
ALBUMIN SERPL-MCNC: 2.1 G/DL (ref 3.5–5)
ALBUMIN SERPL-MCNC: 2.2 G/DL (ref 3.5–5)
ALBUMIN SERPL-MCNC: 2.2 G/DL (ref 3.5–5)
ALBUMIN SERPL-MCNC: 2.3 G/DL (ref 3.5–5)
ALBUMIN SERPL-MCNC: 2.5 G/DL (ref 3.5–5)
ALBUMIN SERPL-MCNC: 2.6 G/DL (ref 3.5–5)
ALBUMIN SERPL-MCNC: 2.7 G/DL (ref 3.5–5)
ALBUMIN SERPL-MCNC: 3 G/DL (ref 3.5–5)
ALBUMIN SERPL-MCNC: 3.6 G/DL (ref 3.5–5)
ALBUMIN/GLOB SERPL: 0.5 {RATIO} (ref 1.1–2.2)
ALBUMIN/GLOB SERPL: 0.6 {RATIO} (ref 1.1–2.2)
ALBUMIN/GLOB SERPL: 0.6 {RATIO} (ref 1.1–2.2)
ALBUMIN/GLOB SERPL: 0.7 {RATIO} (ref 1.1–2.2)
ALBUMIN/GLOB SERPL: 0.7 {RATIO} (ref 1.1–2.2)
ALBUMIN/GLOB SERPL: 0.8 {RATIO} (ref 1.1–2.2)
ALBUMIN/GLOB SERPL: 0.9 {RATIO} (ref 1.1–2.2)
ALP SERPL-CCNC: 127 U/L (ref 45–117)
ALP SERPL-CCNC: 129 U/L (ref 45–117)
ALP SERPL-CCNC: 142 U/L (ref 45–117)
ALP SERPL-CCNC: 145 U/L (ref 45–117)
ALP SERPL-CCNC: 175 U/L (ref 45–117)
ALP SERPL-CCNC: 179 U/L (ref 45–117)
ALP SERPL-CCNC: 180 U/L (ref 45–117)
ALP SERPL-CCNC: 204 U/L (ref 45–117)
ALP SERPL-CCNC: 78 U/L (ref 45–117)
ALT SERPL-CCNC: 34 U/L (ref 12–78)
ALT SERPL-CCNC: 37 U/L (ref 12–78)
ALT SERPL-CCNC: 37 U/L (ref 12–78)
ALT SERPL-CCNC: 46 U/L (ref 12–78)
ALT SERPL-CCNC: 47 U/L (ref 12–78)
ALT SERPL-CCNC: 52 U/L (ref 12–78)
ALT SERPL-CCNC: 63 U/L (ref 12–78)
ALT SERPL-CCNC: 67 U/L (ref 12–78)
ALT SERPL-CCNC: 82 U/L (ref 12–78)
ANION GAP SERPL CALC-SCNC: 10 MMOL/L (ref 5–15)
ANION GAP SERPL CALC-SCNC: 6 MMOL/L (ref 5–15)
ANION GAP SERPL CALC-SCNC: 7 MMOL/L (ref 5–15)
ANION GAP SERPL CALC-SCNC: 8 MMOL/L (ref 5–15)
ANION GAP SERPL CALC-SCNC: 8 MMOL/L (ref 5–15)
ANION GAP SERPL CALC-SCNC: 9 MMOL/L (ref 5–15)
ANION GAP SERPL CALC-SCNC: 9 MMOL/L (ref 5–15)
AST SERPL-CCNC: 111 U/L (ref 15–37)
AST SERPL-CCNC: 38 U/L (ref 15–37)
AST SERPL-CCNC: 40 U/L (ref 15–37)
AST SERPL-CCNC: 53 U/L (ref 15–37)
AST SERPL-CCNC: 64 U/L (ref 15–37)
AST SERPL-CCNC: 70 U/L (ref 15–37)
AST SERPL-CCNC: 78 U/L (ref 15–37)
AST SERPL-CCNC: 88 U/L (ref 15–37)
AST SERPL-CCNC: 92 U/L (ref 15–37)
ATRIAL RATE: 110 BPM
ATRIAL RATE: 86 BPM
B PERT DNA SPEC QL NAA+PROBE: NOT DETECTED
BACTERIA SPEC CULT: NORMAL
BASOPHILS # BLD: 0 K/UL (ref 0–0.1)
BASOPHILS # BLD: 0.1 K/UL (ref 0–0.1)
BASOPHILS NFR BLD: 0 % (ref 0–1)
BASOPHILS NFR BLD: 1 % (ref 0–1)
BILIRUB DIRECT SERPL-MCNC: 0.7 MG/DL (ref 0–0.2)
BILIRUB SERPL-MCNC: 0.7 MG/DL (ref 0.2–1)
BILIRUB SERPL-MCNC: 1 MG/DL (ref 0.2–1)
BILIRUB SERPL-MCNC: 1 MG/DL (ref 0.2–1)
BILIRUB SERPL-MCNC: 1.2 MG/DL (ref 0.2–1)
BILIRUB SERPL-MCNC: 1.3 MG/DL (ref 0.2–1)
BILIRUB SERPL-MCNC: 1.4 MG/DL (ref 0.2–1)
BILIRUB SERPL-MCNC: 1.5 MG/DL (ref 0.2–1)
BNP SERPL-MCNC: 443 PG/ML
BORDETELLA PARAPERTUSSIS PCR, BORPAR: NOT DETECTED
BUN SERPL-MCNC: 11 MG/DL (ref 6–20)
BUN SERPL-MCNC: 12 MG/DL (ref 6–20)
BUN SERPL-MCNC: 13 MG/DL (ref 6–20)
BUN SERPL-MCNC: 14 MG/DL (ref 6–20)
BUN SERPL-MCNC: 22 MG/DL (ref 6–20)
BUN SERPL-MCNC: 28 MG/DL (ref 6–20)
BUN SERPL-MCNC: 32 MG/DL (ref 6–20)
BUN SERPL-MCNC: 41 MG/DL (ref 6–20)
BUN SERPL-MCNC: 44 MG/DL (ref 6–20)
BUN SERPL-MCNC: 44 MG/DL (ref 6–20)
BUN/CREAT SERPL: 13 (ref 12–20)
BUN/CREAT SERPL: 19 (ref 12–20)
BUN/CREAT SERPL: 21 (ref 12–20)
BUN/CREAT SERPL: 22 (ref 12–20)
BUN/CREAT SERPL: 24 (ref 12–20)
BUN/CREAT SERPL: 30 (ref 12–20)
BUN/CREAT SERPL: 35 (ref 12–20)
BUN/CREAT SERPL: 36 (ref 12–20)
BUN/CREAT SERPL: 37 (ref 12–20)
BUN/CREAT SERPL: 40 (ref 12–20)
C PNEUM DNA SPEC QL NAA+PROBE: NOT DETECTED
CALCIUM SERPL-MCNC: 8 MG/DL (ref 8.5–10.1)
CALCIUM SERPL-MCNC: 8 MG/DL (ref 8.5–10.1)
CALCIUM SERPL-MCNC: 8.2 MG/DL (ref 8.5–10.1)
CALCIUM SERPL-MCNC: 8.3 MG/DL (ref 8.5–10.1)
CALCIUM SERPL-MCNC: 8.3 MG/DL (ref 8.5–10.1)
CALCIUM SERPL-MCNC: 8.4 MG/DL (ref 8.5–10.1)
CALCIUM SERPL-MCNC: 8.6 MG/DL (ref 8.5–10.1)
CALCIUM SERPL-MCNC: 8.6 MG/DL (ref 8.5–10.1)
CALCIUM SERPL-MCNC: 8.7 MG/DL (ref 8.5–10.1)
CALCIUM SERPL-MCNC: 8.8 MG/DL (ref 8.5–10.1)
CALCULATED P AXIS, ECG09: -20 DEGREES
CALCULATED P AXIS, ECG09: -21 DEGREES
CALCULATED R AXIS, ECG10: -8 DEGREES
CALCULATED R AXIS, ECG10: 13 DEGREES
CALCULATED T AXIS, ECG11: 111 DEGREES
CALCULATED T AXIS, ECG11: 87 DEGREES
CHLORIDE SERPL-SCNC: 100 MMOL/L (ref 97–108)
CHLORIDE SERPL-SCNC: 100 MMOL/L (ref 97–108)
CHLORIDE SERPL-SCNC: 101 MMOL/L (ref 97–108)
CHLORIDE SERPL-SCNC: 102 MMOL/L (ref 97–108)
CHLORIDE SERPL-SCNC: 102 MMOL/L (ref 97–108)
CHLORIDE SERPL-SCNC: 104 MMOL/L (ref 97–108)
CHLORIDE SERPL-SCNC: 108 MMOL/L (ref 97–108)
CHLORIDE SERPL-SCNC: 94 MMOL/L (ref 97–108)
CHLORIDE SERPL-SCNC: 98 MMOL/L (ref 97–108)
CHLORIDE SERPL-SCNC: 99 MMOL/L (ref 97–108)
CHOLEST SERPL-MCNC: 159 MG/DL
CO2 SERPL-SCNC: 25 MMOL/L (ref 21–32)
CO2 SERPL-SCNC: 25 MMOL/L (ref 21–32)
CO2 SERPL-SCNC: 26 MMOL/L (ref 21–32)
CO2 SERPL-SCNC: 26 MMOL/L (ref 21–32)
CO2 SERPL-SCNC: 27 MMOL/L (ref 21–32)
CO2 SERPL-SCNC: 29 MMOL/L (ref 21–32)
CO2 SERPL-SCNC: 29 MMOL/L (ref 21–32)
COVID-19 RAPID TEST, COVR: DETECTED
CREAT BLD-MCNC: 0.7 MG/DL (ref 0.6–1.3)
CREAT SERPL-MCNC: 0.55 MG/DL (ref 0.7–1.3)
CREAT SERPL-MCNC: 0.58 MG/DL (ref 0.7–1.3)
CREAT SERPL-MCNC: 0.73 MG/DL (ref 0.7–1.3)
CREAT SERPL-MCNC: 0.85 MG/DL (ref 0.7–1.3)
CREAT SERPL-MCNC: 0.89 MG/DL (ref 0.7–1.3)
CREAT SERPL-MCNC: 0.94 MG/DL (ref 0.7–1.3)
CREAT SERPL-MCNC: 1.02 MG/DL (ref 0.7–1.3)
CREAT SERPL-MCNC: 1.1 MG/DL (ref 0.7–1.3)
CREAT SERPL-MCNC: 1.16 MG/DL (ref 0.7–1.3)
CREAT SERPL-MCNC: 1.18 MG/DL (ref 0.7–1.3)
CRP SERPL-MCNC: 2.5 MG/DL (ref 0–0.6)
CRP SERPL-MCNC: 3.01 MG/DL (ref 0–0.6)
CRP SERPL-MCNC: 4.79 MG/DL (ref 0–0.6)
CRP SERPL-MCNC: 9.24 MG/DL (ref 0–0.6)
D DIMER PPP FEU-MCNC: 5.05 MG/L FEU (ref 0–0.65)
D DIMER PPP FEU-MCNC: 5.7 MG/L FEU (ref 0–0.65)
D DIMER PPP FEU-MCNC: 6.05 MG/L FEU (ref 0–0.65)
DIAGNOSIS, 93000: NORMAL
DIAGNOSIS, 93000: NORMAL
DIFFERENTIAL METHOD BLD: ABNORMAL
DIFFERENTIAL METHOD BLD: NORMAL
DIFFERENTIAL METHOD BLD: NORMAL
ECHO AV AREA PEAK VELOCITY: 2.8 CM2
ECHO AV AREA VTI: 2.5 CM2
ECHO AV AREA/BSA PEAK VELOCITY: 1.3 CM2/M2
ECHO AV AREA/BSA VTI: 1.2 CM2/M2
ECHO AV MEAN GRADIENT: 6 MMHG
ECHO AV MEAN VELOCITY: 1.1 M/S
ECHO AV PEAK GRADIENT: 11 MMHG
ECHO AV PEAK VELOCITY: 1.7 M/S
ECHO AV VELOCITY RATIO: 0.76
ECHO AV VTI: 31.2 CM
ECHO LV E' LATERAL VELOCITY: 6 CM/S
ECHO LV E' SEPTAL VELOCITY: 8 CM/S
ECHO LVOT AREA: 3.5 CM2
ECHO LVOT AV VTI INDEX: 0.7
ECHO LVOT DIAM: 2.1 CM
ECHO LVOT MEAN GRADIENT: 3 MMHG
ECHO LVOT PEAK GRADIENT: 7 MMHG
ECHO LVOT PEAK VELOCITY: 1.3 M/S
ECHO LVOT STROKE VOLUME INDEX: 35.9 ML/M2
ECHO LVOT SV: 75.8 ML
ECHO LVOT VTI: 21.9 CM
ECHO MV A VELOCITY: 0.92 M/S
ECHO MV AREA VTI: 3.8 CM2
ECHO MV E DECELERATION TIME (DT): 370.8 MS
ECHO MV E VELOCITY: 0.52 M/S
ECHO MV E/A RATIO: 0.57
ECHO MV E/E' LATERAL: 8.67
ECHO MV E/E' RATIO (AVERAGED): 7.58
ECHO MV E/E' SEPTAL: 6.5
ECHO MV LVOT VTI INDEX: 0.92
ECHO MV MAX VELOCITY: 1.1 M/S
ECHO MV MEAN GRADIENT: 1 MMHG
ECHO MV MEAN VELOCITY: 0.6 M/S
ECHO MV PEAK GRADIENT: 5 MMHG
ECHO MV REGURGITANT PEAK GRADIENT: 96 MMHG
ECHO MV REGURGITANT PEAK VELOCITY: 4.9 M/S
ECHO MV VTI: 20.1 CM
ECHO RV INTERNAL DIMENSION: 3.6 CM
EOSINOPHIL # BLD: 0 K/UL (ref 0–0.4)
EOSINOPHIL # BLD: 0 K/UL (ref 0–0.4)
EOSINOPHIL # BLD: 0.1 K/UL (ref 0–0.4)
EOSINOPHIL # BLD: 0.2 K/UL (ref 0–0.4)
EOSINOPHIL # BLD: 0.2 K/UL (ref 0–0.4)
EOSINOPHIL # BLD: 0.3 K/UL (ref 0–0.4)
EOSINOPHIL NFR BLD: 0 % (ref 0–7)
EOSINOPHIL NFR BLD: 1 % (ref 0–7)
EOSINOPHIL NFR BLD: 2 % (ref 0–7)
EOSINOPHIL NFR BLD: 3 % (ref 0–7)
EOSINOPHIL NFR BLD: 3 % (ref 0–7)
ERYTHROCYTE [DISTWIDTH] IN BLOOD BY AUTOMATED COUNT: 12.4 % (ref 11.5–14.5)
ERYTHROCYTE [DISTWIDTH] IN BLOOD BY AUTOMATED COUNT: 12.5 % (ref 11.5–14.5)
ERYTHROCYTE [DISTWIDTH] IN BLOOD BY AUTOMATED COUNT: 13.2 % (ref 11.5–14.5)
ERYTHROCYTE [DISTWIDTH] IN BLOOD BY AUTOMATED COUNT: 13.5 % (ref 11.5–14.5)
ERYTHROCYTE [DISTWIDTH] IN BLOOD BY AUTOMATED COUNT: 13.6 % (ref 11.5–14.5)
ERYTHROCYTE [DISTWIDTH] IN BLOOD BY AUTOMATED COUNT: 13.7 % (ref 11.5–14.5)
ERYTHROCYTE [DISTWIDTH] IN BLOOD BY AUTOMATED COUNT: 13.7 % (ref 11.5–14.5)
ERYTHROCYTE [DISTWIDTH] IN BLOOD BY AUTOMATED COUNT: 14.1 % (ref 11.5–14.5)
ERYTHROCYTE [DISTWIDTH] IN BLOOD BY AUTOMATED COUNT: 14.4 % (ref 11.5–14.5)
ERYTHROCYTE [SEDIMENTATION RATE] IN BLOOD: 28 MM/HR (ref 0–20)
EST. AVERAGE GLUCOSE BLD GHB EST-MCNC: 134 MG/DL
FLUAV H1 2009 PAND RNA SPEC QL NAA+PROBE: NOT DETECTED
FLUAV H1 RNA SPEC QL NAA+PROBE: NOT DETECTED
FLUAV H3 RNA SPEC QL NAA+PROBE: NOT DETECTED
FLUAV SUBTYP SPEC NAA+PROBE: NOT DETECTED
FLUBV RNA SPEC QL NAA+PROBE: NOT DETECTED
GLOBULIN SER CALC-MCNC: 3 G/DL (ref 2–4)
GLOBULIN SER CALC-MCNC: 3.1 G/DL (ref 2–4)
GLOBULIN SER CALC-MCNC: 3.3 G/DL (ref 2–4)
GLOBULIN SER CALC-MCNC: 3.4 G/DL (ref 2–4)
GLOBULIN SER CALC-MCNC: 3.7 G/DL (ref 2–4)
GLOBULIN SER CALC-MCNC: 3.7 G/DL (ref 2–4)
GLOBULIN SER CALC-MCNC: 3.8 G/DL (ref 2–4)
GLOBULIN SER CALC-MCNC: 4.2 G/DL (ref 2–4)
GLOBULIN SER CALC-MCNC: 4.3 G/DL (ref 2–4)
GLUCOSE BLD STRIP.AUTO-MCNC: 119 MG/DL (ref 65–117)
GLUCOSE SERPL-MCNC: 104 MG/DL (ref 65–100)
GLUCOSE SERPL-MCNC: 116 MG/DL (ref 65–100)
GLUCOSE SERPL-MCNC: 121 MG/DL (ref 65–100)
GLUCOSE SERPL-MCNC: 124 MG/DL (ref 65–100)
GLUCOSE SERPL-MCNC: 128 MG/DL (ref 65–100)
GLUCOSE SERPL-MCNC: 136 MG/DL (ref 65–100)
GLUCOSE SERPL-MCNC: 141 MG/DL (ref 65–100)
GLUCOSE SERPL-MCNC: 92 MG/DL (ref 65–100)
GLUCOSE SERPL-MCNC: 94 MG/DL (ref 65–100)
GLUCOSE SERPL-MCNC: 97 MG/DL (ref 65–100)
HADV DNA SPEC QL NAA+PROBE: NOT DETECTED
HBA1C MFR BLD: 6.3 % (ref 4–5.6)
HBV CORE IGM SER QL: NONREACTIVE
HBV SURFACE AB SER QL: NONREACTIVE
HBV SURFACE AB SER-ACNC: 5.36 MIU/ML
HCOV 229E RNA SPEC QL NAA+PROBE: NOT DETECTED
HCOV HKU1 RNA SPEC QL NAA+PROBE: NOT DETECTED
HCOV NL63 RNA SPEC QL NAA+PROBE: NOT DETECTED
HCOV OC43 RNA SPEC QL NAA+PROBE: NOT DETECTED
HCT VFR BLD AUTO: 32.3 % (ref 36.6–50.3)
HCT VFR BLD AUTO: 32.5 % (ref 36.6–50.3)
HCT VFR BLD AUTO: 33.9 % (ref 36.6–50.3)
HCT VFR BLD AUTO: 34.3 % (ref 36.6–50.3)
HCT VFR BLD AUTO: 35.1 % (ref 36.6–50.3)
HCT VFR BLD AUTO: 36.4 % (ref 36.6–50.3)
HCT VFR BLD AUTO: 37.3 % (ref 36.6–50.3)
HCT VFR BLD AUTO: 37.5 % (ref 36.6–50.3)
HCT VFR BLD AUTO: 44.3 % (ref 36.6–50.3)
HDLC SERPL-MCNC: 30 MG/DL
HDLC SERPL: 5.3 {RATIO} (ref 0–5)
HGB BLD-MCNC: 10.9 G/DL (ref 12.1–17)
HGB BLD-MCNC: 11 G/DL (ref 12.1–17)
HGB BLD-MCNC: 11.4 G/DL (ref 12.1–17)
HGB BLD-MCNC: 11.4 G/DL (ref 12.1–17)
HGB BLD-MCNC: 11.9 G/DL (ref 12.1–17)
HGB BLD-MCNC: 12 G/DL (ref 12.1–17)
HGB BLD-MCNC: 12.6 G/DL (ref 12.1–17)
HGB BLD-MCNC: 12.6 G/DL (ref 12.1–17)
HGB BLD-MCNC: 14.2 G/DL (ref 12.1–17)
HMPV RNA SPEC QL NAA+PROBE: NOT DETECTED
HPIV1 RNA SPEC QL NAA+PROBE: NOT DETECTED
HPIV2 RNA SPEC QL NAA+PROBE: NOT DETECTED
HPIV3 RNA SPEC QL NAA+PROBE: NOT DETECTED
HPIV4 RNA SPEC QL NAA+PROBE: NOT DETECTED
IGA SERPL-MCNC: 201 MG/DL (ref 70–400)
IGG SERPL-MCNC: 1080 MG/DL (ref 700–1600)
IGM SERPL-MCNC: 126 MG/DL (ref 40–230)
IMM GRANULOCYTES # BLD AUTO: 0 K/UL (ref 0–0.04)
IMM GRANULOCYTES # BLD AUTO: 0.1 K/UL (ref 0–0.04)
IMM GRANULOCYTES NFR BLD AUTO: 0 % (ref 0–0.5)
IMM GRANULOCYTES NFR BLD AUTO: 1 % (ref 0–0.5)
LACTATE BLD-SCNC: 1.25 MMOL/L (ref 0.4–2)
LDH SERPL L TO P-CCNC: 264 U/L (ref 85–241)
LDLC SERPL CALC-MCNC: 107.8 MG/DL (ref 0–100)
LIPASE SERPL-CCNC: 123 U/L (ref 73–393)
LYMPHOCYTES # BLD: 0.6 K/UL (ref 0.8–3.5)
LYMPHOCYTES # BLD: 0.7 K/UL (ref 0.8–3.5)
LYMPHOCYTES # BLD: 0.9 K/UL (ref 0.8–3.5)
LYMPHOCYTES # BLD: 1 K/UL (ref 0.8–3.5)
LYMPHOCYTES # BLD: 1.1 K/UL (ref 0.8–3.5)
LYMPHOCYTES # BLD: 1.2 K/UL (ref 0.8–3.5)
LYMPHOCYTES # BLD: 1.3 K/UL (ref 0.8–3.5)
LYMPHOCYTES # BLD: 1.7 K/UL (ref 0.8–3.5)
LYMPHOCYTES NFR BLD: 10 % (ref 12–49)
LYMPHOCYTES NFR BLD: 13 % (ref 12–49)
LYMPHOCYTES NFR BLD: 22 % (ref 12–49)
LYMPHOCYTES NFR BLD: 27 % (ref 12–49)
LYMPHOCYTES NFR BLD: 31 % (ref 12–49)
LYMPHOCYTES NFR BLD: 40 % (ref 12–49)
LYMPHOCYTES NFR BLD: 8 % (ref 12–49)
LYMPHOCYTES NFR BLD: 9 % (ref 12–49)
M PNEUMO DNA SPEC QL NAA+PROBE: NOT DETECTED
MCH RBC QN AUTO: 29.1 PG (ref 26–34)
MCH RBC QN AUTO: 29.1 PG (ref 26–34)
MCH RBC QN AUTO: 29.3 PG (ref 26–34)
MCH RBC QN AUTO: 29.5 PG (ref 26–34)
MCH RBC QN AUTO: 29.5 PG (ref 26–34)
MCH RBC QN AUTO: 29.8 PG (ref 26–34)
MCH RBC QN AUTO: 29.9 PG (ref 26–34)
MCH RBC QN AUTO: 29.9 PG (ref 26–34)
MCH RBC QN AUTO: 30.4 PG (ref 26–34)
MCHC RBC AUTO-ENTMCNC: 32.1 G/DL (ref 30–36.5)
MCHC RBC AUTO-ENTMCNC: 33 G/DL (ref 30–36.5)
MCHC RBC AUTO-ENTMCNC: 33.2 G/DL (ref 30–36.5)
MCHC RBC AUTO-ENTMCNC: 33.5 G/DL (ref 30–36.5)
MCHC RBC AUTO-ENTMCNC: 33.6 G/DL (ref 30–36.5)
MCHC RBC AUTO-ENTMCNC: 33.6 G/DL (ref 30–36.5)
MCHC RBC AUTO-ENTMCNC: 33.8 G/DL (ref 30–36.5)
MCHC RBC AUTO-ENTMCNC: 33.9 G/DL (ref 30–36.5)
MCHC RBC AUTO-ENTMCNC: 34.1 G/DL (ref 30–36.5)
MCV RBC AUTO: 86.5 FL (ref 80–99)
MCV RBC AUTO: 86.7 FL (ref 80–99)
MCV RBC AUTO: 87.8 FL (ref 80–99)
MCV RBC AUTO: 87.8 FL (ref 80–99)
MCV RBC AUTO: 88.2 FL (ref 80–99)
MCV RBC AUTO: 88.3 FL (ref 80–99)
MCV RBC AUTO: 88.9 FL (ref 80–99)
MCV RBC AUTO: 88.9 FL (ref 80–99)
MCV RBC AUTO: 94.9 FL (ref 80–99)
MONOCYTES # BLD: 0.4 K/UL (ref 0–1)
MONOCYTES # BLD: 0.5 K/UL (ref 0–1)
MONOCYTES # BLD: 0.5 K/UL (ref 0–1)
MONOCYTES # BLD: 0.7 K/UL (ref 0–1)
MONOCYTES # BLD: 0.8 K/UL (ref 0–1)
MONOCYTES # BLD: 0.9 K/UL (ref 0–1)
MONOCYTES # BLD: 1.3 K/UL (ref 0–1)
MONOCYTES # BLD: 1.6 K/UL (ref 0–1)
MONOCYTES NFR BLD: 10 % (ref 5–13)
MONOCYTES NFR BLD: 11 % (ref 5–13)
MONOCYTES NFR BLD: 11 % (ref 5–13)
MONOCYTES NFR BLD: 17 % (ref 5–13)
MONOCYTES NFR BLD: 20 % (ref 5–13)
MONOCYTES NFR BLD: 21 % (ref 5–13)
MONOCYTES NFR BLD: 8 % (ref 5–13)
MONOCYTES NFR BLD: 9 % (ref 5–13)
NEUTS SEG # BLD: 0.9 K/UL (ref 1.8–8)
NEUTS SEG # BLD: 1.1 K/UL (ref 1.8–8)
NEUTS SEG # BLD: 1.1 K/UL (ref 1.8–8)
NEUTS SEG # BLD: 11.4 K/UL (ref 1.8–8)
NEUTS SEG # BLD: 5.3 K/UL (ref 1.8–8)
NEUTS SEG # BLD: 6.2 K/UL (ref 1.8–8)
NEUTS SEG # BLD: 9.3 K/UL (ref 1.8–8)
NEUTS SEG # BLD: 9.4 K/UL (ref 1.8–8)
NEUTS SEG NFR BLD: 38 % (ref 32–75)
NEUTS SEG NFR BLD: 48 % (ref 32–75)
NEUTS SEG NFR BLD: 48 % (ref 32–75)
NEUTS SEG NFR BLD: 66 % (ref 32–75)
NEUTS SEG NFR BLD: 73 % (ref 32–75)
NEUTS SEG NFR BLD: 76 % (ref 32–75)
NEUTS SEG NFR BLD: 77 % (ref 32–75)
NEUTS SEG NFR BLD: 83 % (ref 32–75)
NRBC # BLD: 0 K/UL (ref 0–0.01)
NRBC BLD-RTO: 0 PER 100 WBC
P-R INTERVAL, ECG05: 144 MS
P-R INTERVAL, ECG05: 150 MS
PLATELET # BLD AUTO: 156 K/UL (ref 150–400)
PLATELET # BLD AUTO: 172 K/UL (ref 150–400)
PLATELET # BLD AUTO: 176 K/UL (ref 150–400)
PLATELET # BLD AUTO: 237 K/UL (ref 150–400)
PLATELET # BLD AUTO: 294 K/UL (ref 150–400)
PLATELET # BLD AUTO: 316 K/UL (ref 150–400)
PLATELET # BLD AUTO: 323 K/UL (ref 150–400)
PLATELET # BLD AUTO: 349 K/UL (ref 150–400)
PLATELET # BLD AUTO: 379 K/UL (ref 150–400)
PMV BLD AUTO: 8.1 FL (ref 8.9–12.9)
PMV BLD AUTO: 8.4 FL (ref 8.9–12.9)
PMV BLD AUTO: 8.9 FL (ref 8.9–12.9)
PMV BLD AUTO: 9 FL (ref 8.9–12.9)
PMV BLD AUTO: 9.1 FL (ref 8.9–12.9)
POTASSIUM SERPL-SCNC: 3.6 MMOL/L (ref 3.5–5.1)
POTASSIUM SERPL-SCNC: 3.6 MMOL/L (ref 3.5–5.1)
POTASSIUM SERPL-SCNC: 3.7 MMOL/L (ref 3.5–5.1)
POTASSIUM SERPL-SCNC: 3.8 MMOL/L (ref 3.5–5.1)
POTASSIUM SERPL-SCNC: 3.9 MMOL/L (ref 3.5–5.1)
POTASSIUM SERPL-SCNC: 3.9 MMOL/L (ref 3.5–5.1)
POTASSIUM SERPL-SCNC: 4 MMOL/L (ref 3.5–5.1)
POTASSIUM SERPL-SCNC: 4.1 MMOL/L (ref 3.5–5.1)
POTASSIUM SERPL-SCNC: 4.2 MMOL/L (ref 3.5–5.1)
POTASSIUM SERPL-SCNC: 4.4 MMOL/L (ref 3.5–5.1)
PROCALCITONIN SERPL-MCNC: 0.19 NG/ML
PROT SERPL-MCNC: 5.5 G/DL (ref 6.4–8.2)
PROT SERPL-MCNC: 5.5 G/DL (ref 6.4–8.2)
PROT SERPL-MCNC: 5.7 G/DL (ref 6.4–8.2)
PROT SERPL-MCNC: 5.8 G/DL (ref 6.4–8.2)
PROT SERPL-MCNC: 5.9 G/DL (ref 6.4–8.2)
PROT SERPL-MCNC: 6.1 G/DL (ref 6.4–8.2)
PROT SERPL-MCNC: 6.6 G/DL (ref 6.4–8.2)
PROT SERPL-MCNC: 7.2 G/DL (ref 6.4–8.2)
PROT SERPL-MCNC: 7.4 G/DL (ref 6.4–8.2)
Q-T INTERVAL, ECG07: 360 MS
Q-T INTERVAL, ECG07: 394 MS
QRS DURATION, ECG06: 94 MS
QRS DURATION, ECG06: 96 MS
QTC CALCULATION (BEZET), ECG08: 471 MS
QTC CALCULATION (BEZET), ECG08: 487 MS
RBC # BLD AUTO: 3.68 M/UL (ref 4.1–5.7)
RBC # BLD AUTO: 3.75 M/UL (ref 4.1–5.7)
RBC # BLD AUTO: 3.86 M/UL (ref 4.1–5.7)
RBC # BLD AUTO: 3.86 M/UL (ref 4.1–5.7)
RBC # BLD AUTO: 4.06 M/UL (ref 4.1–5.7)
RBC # BLD AUTO: 4.12 M/UL (ref 4.1–5.7)
RBC # BLD AUTO: 4.22 M/UL (ref 4.1–5.7)
RBC # BLD AUTO: 4.23 M/UL (ref 4.1–5.7)
RBC # BLD AUTO: 4.67 M/UL (ref 4.1–5.7)
RBC MORPH BLD: ABNORMAL
RSV RNA SPEC QL NAA+PROBE: NOT DETECTED
RV+EV RNA SPEC QL NAA+PROBE: NOT DETECTED
SARS-COV-2 PCR, COVPCR: DETECTED
SERVICE CMNT-IMP: ABNORMAL
SERVICE CMNT-IMP: NORMAL
SODIUM SERPL-SCNC: 130 MMOL/L (ref 136–145)
SODIUM SERPL-SCNC: 134 MMOL/L (ref 136–145)
SODIUM SERPL-SCNC: 135 MMOL/L (ref 136–145)
SODIUM SERPL-SCNC: 135 MMOL/L (ref 136–145)
SODIUM SERPL-SCNC: 136 MMOL/L (ref 136–145)
SODIUM SERPL-SCNC: 137 MMOL/L (ref 136–145)
SODIUM SERPL-SCNC: 137 MMOL/L (ref 136–145)
SODIUM SERPL-SCNC: 142 MMOL/L (ref 136–145)
SOURCE, COVRS: ABNORMAL
TRIGL SERPL-MCNC: 106 MG/DL (ref ?–150)
TROPONIN-HIGH SENSITIVITY: 8 NG/L (ref 0–76)
VENTRICULAR RATE, ECG03: 110 BPM
VENTRICULAR RATE, ECG03: 86 BPM
VLDLC SERPL CALC-MCNC: 21.2 MG/DL
WBC # BLD AUTO: 11.2 K/UL (ref 4.1–11.1)
WBC # BLD AUTO: 11.3 K/UL (ref 4.1–11.1)
WBC # BLD AUTO: 12.2 K/UL (ref 4.1–11.1)
WBC # BLD AUTO: 14.8 K/UL (ref 4.1–11.1)
WBC # BLD AUTO: 2.2 K/UL (ref 4.1–11.1)
WBC # BLD AUTO: 2.3 K/UL (ref 4.1–11.1)
WBC # BLD AUTO: 2.5 K/UL (ref 4.1–11.1)
WBC # BLD AUTO: 8 K/UL (ref 4.1–11.1)
WBC # BLD AUTO: 8.4 K/UL (ref 4.1–11.1)

## 2022-01-01 PROCEDURE — G8752 SYS BP LESS 140: HCPCS | Performed by: STUDENT IN AN ORGANIZED HEALTH CARE EDUCATION/TRAINING PROGRAM

## 2022-01-01 PROCEDURE — 70553 MRI BRAIN STEM W/O & W/DYE: CPT

## 2022-01-01 PROCEDURE — 77030003666 HC NDL SPINAL BD -A

## 2022-01-01 PROCEDURE — G8417 CALC BMI ABV UP PARAM F/U: HCPCS | Performed by: STUDENT IN AN ORGANIZED HEALTH CARE EDUCATION/TRAINING PROGRAM

## 2022-01-01 PROCEDURE — 65270000029 HC RM PRIVATE

## 2022-01-01 PROCEDURE — 36415 COLL VENOUS BLD VENIPUNCTURE: CPT

## 2022-01-01 PROCEDURE — 97116 GAIT TRAINING THERAPY: CPT

## 2022-01-01 PROCEDURE — 74011250636 HC RX REV CODE- 250/636: Performed by: RADIOLOGY

## 2022-01-01 PROCEDURE — 80053 COMPREHEN METABOLIC PANEL: CPT

## 2022-01-01 PROCEDURE — 74011000250 HC RX REV CODE- 250: Performed by: GENERAL ACUTE CARE HOSPITAL

## 2022-01-01 PROCEDURE — 99214 OFFICE O/P EST MOD 30 MIN: CPT | Performed by: INTERNAL MEDICINE

## 2022-01-01 PROCEDURE — 86140 C-REACTIVE PROTEIN: CPT

## 2022-01-01 PROCEDURE — 2709999900 HC NON-CHARGEABLE SUPPLY

## 2022-01-01 PROCEDURE — 94760 N-INVAS EAR/PLS OXIMETRY 1: CPT

## 2022-01-01 PROCEDURE — 83036 HEMOGLOBIN GLYCOSYLATED A1C: CPT

## 2022-01-01 PROCEDURE — 65270000046 HC RM TELEMETRY

## 2022-01-01 PROCEDURE — 74011000258 HC RX REV CODE- 258: Performed by: STUDENT IN AN ORGANIZED HEALTH CARE EDUCATION/TRAINING PROGRAM

## 2022-01-01 PROCEDURE — G8432 DEP SCR NOT DOC, RNG: HCPCS | Performed by: STUDENT IN AN ORGANIZED HEALTH CARE EDUCATION/TRAINING PROGRAM

## 2022-01-01 PROCEDURE — 72156 MRI NECK SPINE W/O & W/DYE: CPT

## 2022-01-01 PROCEDURE — 92610 EVALUATE SWALLOWING FUNCTION: CPT

## 2022-01-01 PROCEDURE — 74011250636 HC RX REV CODE- 250/636: Performed by: NURSE PRACTITIONER

## 2022-01-01 PROCEDURE — 77010033678 HC OXYGEN DAILY

## 2022-01-01 PROCEDURE — 96416 CHEMO PROLONG INFUSE W/PUMP: CPT

## 2022-01-01 PROCEDURE — 74011000250 HC RX REV CODE- 250: Performed by: INTERNAL MEDICINE

## 2022-01-01 PROCEDURE — 1101F PT FALLS ASSESS-DOCD LE1/YR: CPT | Performed by: SURGERY

## 2022-01-01 PROCEDURE — 70450 CT HEAD/BRAIN W/O DYE: CPT

## 2022-01-01 PROCEDURE — G8754 DIAS BP LESS 90: HCPCS | Performed by: STUDENT IN AN ORGANIZED HEALTH CARE EDUCATION/TRAINING PROGRAM

## 2022-01-01 PROCEDURE — 72149 MRI LUMBAR SPINE W/DYE: CPT

## 2022-01-01 PROCEDURE — 90670 PCV13 VACCINE IM: CPT | Performed by: INTERNAL MEDICINE

## 2022-01-01 PROCEDURE — 86705 HEP B CORE ANTIBODY IGM: CPT

## 2022-01-01 PROCEDURE — G8536 NO DOC ELDER MAL SCRN: HCPCS | Performed by: STUDENT IN AN ORGANIZED HEALTH CARE EDUCATION/TRAINING PROGRAM

## 2022-01-01 PROCEDURE — 0651 HSPC ROUTINE HOME CARE

## 2022-01-01 PROCEDURE — 83605 ASSAY OF LACTIC ACID: CPT

## 2022-01-01 PROCEDURE — 85025 COMPLETE CBC W/AUTO DIFF WBC: CPT

## 2022-01-01 PROCEDURE — 74011250636 HC RX REV CODE- 250/636: Performed by: STUDENT IN AN ORGANIZED HEALTH CARE EDUCATION/TRAINING PROGRAM

## 2022-01-01 PROCEDURE — 99285 EMERGENCY DEPT VISIT HI MDM: CPT

## 2022-01-01 PROCEDURE — 74011250636 HC RX REV CODE- 250/636: Performed by: GENERAL ACUTE CARE HOSPITAL

## 2022-01-01 PROCEDURE — 77030014115

## 2022-01-01 PROCEDURE — 74011250637 HC RX REV CODE- 250/637: Performed by: INTERNAL MEDICINE

## 2022-01-01 PROCEDURE — 72157 MRI CHEST SPINE W/O & W/DYE: CPT

## 2022-01-01 PROCEDURE — 97530 THERAPEUTIC ACTIVITIES: CPT | Performed by: OCCUPATIONAL THERAPIST

## 2022-01-01 PROCEDURE — 3336500001 HSPC ELECTION

## 2022-01-01 PROCEDURE — 85027 COMPLETE CBC AUTOMATED: CPT

## 2022-01-01 PROCEDURE — G8432 DEP SCR NOT DOC, RNG: HCPCS | Performed by: SURGERY

## 2022-01-01 PROCEDURE — 74011250636 HC RX REV CODE- 250/636: Performed by: INTERNAL MEDICINE

## 2022-01-01 PROCEDURE — 87635 SARS-COV-2 COVID-19 AMP PRB: CPT

## 2022-01-01 PROCEDURE — A9576 INJ PROHANCE MULTIPACK: HCPCS | Performed by: STUDENT IN AN ORGANIZED HEALTH CARE EDUCATION/TRAINING PROGRAM

## 2022-01-01 PROCEDURE — 84484 ASSAY OF TROPONIN QUANT: CPT

## 2022-01-01 PROCEDURE — 99233 SBSQ HOSP IP/OBS HIGH 50: CPT | Performed by: STUDENT IN AN ORGANIZED HEALTH CARE EDUCATION/TRAINING PROGRAM

## 2022-01-01 PROCEDURE — 1123F ACP DISCUSS/DSCN MKR DOCD: CPT | Performed by: STUDENT IN AN ORGANIZED HEALTH CARE EDUCATION/TRAINING PROGRAM

## 2022-01-01 PROCEDURE — 85379 FIBRIN DEGRADATION QUANT: CPT

## 2022-01-01 PROCEDURE — G8427 DOCREV CUR MEDS BY ELIG CLIN: HCPCS | Performed by: SURGERY

## 2022-01-01 PROCEDURE — G8417 CALC BMI ABV UP PARAM F/U: HCPCS | Performed by: SURGERY

## 2022-01-01 PROCEDURE — G8427 DOCREV CUR MEDS BY ELIG CLIN: HCPCS | Performed by: STUDENT IN AN ORGANIZED HEALTH CARE EDUCATION/TRAINING PROGRAM

## 2022-01-01 PROCEDURE — G0009 ADMIN PNEUMOCOCCAL VACCINE: HCPCS | Performed by: INTERNAL MEDICINE

## 2022-01-01 PROCEDURE — 88305 TISSUE EXAM BY PATHOLOGIST: CPT

## 2022-01-01 PROCEDURE — 97116 GAIT TRAINING THERAPY: CPT | Performed by: PHYSICAL THERAPIST

## 2022-01-01 PROCEDURE — 96411 CHEMO IV PUSH ADDL DRUG: CPT

## 2022-01-01 PROCEDURE — 96417 CHEMO IV INFUS EACH ADDL SEQ: CPT

## 2022-01-01 PROCEDURE — 92526 ORAL FUNCTION THERAPY: CPT

## 2022-01-01 PROCEDURE — 99215 OFFICE O/P EST HI 40 MIN: CPT | Performed by: STUDENT IN AN ORGANIZED HEALTH CARE EDUCATION/TRAINING PROGRAM

## 2022-01-01 PROCEDURE — 96415 CHEMO IV INFUSION ADDL HR: CPT

## 2022-01-01 PROCEDURE — 96413 CHEMO IV INFUSION 1 HR: CPT

## 2022-01-01 PROCEDURE — 80061 LIPID PANEL: CPT

## 2022-01-01 PROCEDURE — G8536 NO DOC ELDER MAL SCRN: HCPCS | Performed by: SURGERY

## 2022-01-01 PROCEDURE — HOSPICE MEDICATION HC HH HOSPICE MEDICATION

## 2022-01-01 PROCEDURE — 94761 N-INVAS EAR/PLS OXIMETRY MLT: CPT

## 2022-01-01 PROCEDURE — 93306 TTE W/DOPPLER COMPLETE: CPT | Performed by: INTERNAL MEDICINE

## 2022-01-01 PROCEDURE — G0299 HHS/HOSPICE OF RN EA 15 MIN: HCPCS

## 2022-01-01 PROCEDURE — 80076 HEPATIC FUNCTION PANEL: CPT

## 2022-01-01 PROCEDURE — 74011000258 HC RX REV CODE- 258: Performed by: INTERNAL MEDICINE

## 2022-01-01 PROCEDURE — 72125 CT NECK SPINE W/O DYE: CPT

## 2022-01-01 PROCEDURE — A9576 INJ PROHANCE MULTIPACK: HCPCS | Performed by: GENERAL ACUTE CARE HOSPITAL

## 2022-01-01 PROCEDURE — A9577 INJ MULTIHANCE: HCPCS | Performed by: INTERNAL MEDICINE

## 2022-01-01 PROCEDURE — 88342 IMHCHEM/IMCYTCHM 1ST ANTB: CPT

## 2022-01-01 PROCEDURE — 74011250637 HC RX REV CODE- 250/637: Performed by: NURSE PRACTITIONER

## 2022-01-01 PROCEDURE — 96523 IRRIG DRUG DELIVERY DEVICE: CPT

## 2022-01-01 PROCEDURE — G0463 HOSPITAL OUTPT CLINIC VISIT: HCPCS | Performed by: STUDENT IN AN ORGANIZED HEALTH CARE EDUCATION/TRAINING PROGRAM

## 2022-01-01 PROCEDURE — 93306 TTE W/DOPPLER COMPLETE: CPT

## 2022-01-01 PROCEDURE — 3331090004 HSPC SERVICE INTENSITY ADD-ON

## 2022-01-01 PROCEDURE — 74183 MRI ABD W/O CNTR FLWD CNTR: CPT

## 2022-01-01 PROCEDURE — 99223 1ST HOSP IP/OBS HIGH 75: CPT | Performed by: PSYCHIATRY & NEUROLOGY

## 2022-01-01 PROCEDURE — 77030012965 HC NDL HUBR BBMI -A

## 2022-01-01 PROCEDURE — 99205 OFFICE O/P NEW HI 60 MIN: CPT | Performed by: STUDENT IN AN ORGANIZED HEALTH CARE EDUCATION/TRAINING PROGRAM

## 2022-01-01 PROCEDURE — 94640 AIRWAY INHALATION TREATMENT: CPT

## 2022-01-01 PROCEDURE — 88341 IMHCHEM/IMCYTCHM EA ADD ANTB: CPT

## 2022-01-01 PROCEDURE — 97162 PT EVAL MOD COMPLEX 30 MIN: CPT | Performed by: PHYSICAL THERAPIST

## 2022-01-01 PROCEDURE — G0156 HHCP-SVS OF AIDE,EA 15 MIN: HCPCS

## 2022-01-01 PROCEDURE — XW0DXM6 INTRODUCTION OF BARICITINIB INTO MOUTH AND PHARYNX, EXTERNAL APPROACH, NEW TECHNOLOGY GROUP 6: ICD-10-PCS | Performed by: INTERNAL MEDICINE

## 2022-01-01 PROCEDURE — A9552 F18 FDG: HCPCS

## 2022-01-01 PROCEDURE — 76604 US EXAM CHEST: CPT

## 2022-01-01 PROCEDURE — 74011000250 HC RX REV CODE- 250: Performed by: STUDENT IN AN ORGANIZED HEALTH CARE EDUCATION/TRAINING PROGRAM

## 2022-01-01 PROCEDURE — 88333 PATH CONSLTJ SURG CYTO XM 1: CPT

## 2022-01-01 PROCEDURE — 99204 OFFICE O/P NEW MOD 45 MIN: CPT | Performed by: SURGERY

## 2022-01-01 PROCEDURE — 49180 BIOPSY ABDOMINAL MASS: CPT

## 2022-01-01 PROCEDURE — 0202U NFCT DS 22 TRGT SARS-COV-2: CPT

## 2022-01-01 PROCEDURE — 82565 ASSAY OF CREATININE: CPT

## 2022-01-01 PROCEDURE — 74011000250 HC RX REV CODE- 250: Performed by: RADIOLOGY

## 2022-01-01 PROCEDURE — 80048 BASIC METABOLIC PNL TOTAL CA: CPT

## 2022-01-01 PROCEDURE — 77030011893 HC TY CUT DN TRIS -B

## 2022-01-01 PROCEDURE — 83615 LACTATE (LD) (LDH) ENZYME: CPT

## 2022-01-01 PROCEDURE — 96374 THER/PROPH/DIAG INJ IV PUSH: CPT

## 2022-01-01 PROCEDURE — 97535 SELF CARE MNGMENT TRAINING: CPT | Performed by: OCCUPATIONAL THERAPIST

## 2022-01-01 PROCEDURE — 99213 OFFICE O/P EST LOW 20 MIN: CPT | Performed by: INTERNAL MEDICINE

## 2022-01-01 PROCEDURE — C1788 PORT, INDWELLING, IMP: HCPCS

## 2022-01-01 PROCEDURE — G0439 PPPS, SUBSEQ VISIT: HCPCS | Performed by: INTERNAL MEDICINE

## 2022-01-01 PROCEDURE — 87040 BLOOD CULTURE FOR BACTERIA: CPT

## 2022-01-01 PROCEDURE — 97165 OT EVAL LOW COMPLEX 30 MIN: CPT

## 2022-01-01 PROCEDURE — 1101F PT FALLS ASSESS-DOCD LE1/YR: CPT | Performed by: STUDENT IN AN ORGANIZED HEALTH CARE EDUCATION/TRAINING PROGRAM

## 2022-01-01 PROCEDURE — 86706 HEP B SURFACE ANTIBODY: CPT

## 2022-01-01 PROCEDURE — 96375 TX/PRO/DX INJ NEW DRUG ADDON: CPT

## 2022-01-01 PROCEDURE — 71045 X-RAY EXAM CHEST 1 VIEW: CPT

## 2022-01-01 PROCEDURE — 77030010507 HC ADH SKN DERMBND J&J -B

## 2022-01-01 PROCEDURE — C1894 INTRO/SHEATH, NON-LASER: HCPCS

## 2022-01-01 PROCEDURE — 76942 ECHO GUIDE FOR BIOPSY: CPT

## 2022-01-01 PROCEDURE — 83880 ASSAY OF NATRIURETIC PEPTIDE: CPT

## 2022-01-01 PROCEDURE — 93005 ELECTROCARDIOGRAM TRACING: CPT

## 2022-01-01 PROCEDURE — 77030014006 HC SPNG HEMSTAT J&J -A

## 2022-01-01 PROCEDURE — 74011000258 HC RX REV CODE- 258: Performed by: NURSE PRACTITIONER

## 2022-01-01 PROCEDURE — 74174 CTA ABD&PLVS W/CONTRAST: CPT

## 2022-01-01 PROCEDURE — 97530 THERAPEUTIC ACTIVITIES: CPT

## 2022-01-01 PROCEDURE — 74011000636 HC RX REV CODE- 636: Performed by: INTERNAL MEDICINE

## 2022-01-01 PROCEDURE — 74011000636 HC RX REV CODE- 636: Performed by: EMERGENCY MEDICINE

## 2022-01-01 PROCEDURE — 99222 1ST HOSP IP/OBS MODERATE 55: CPT | Performed by: NURSE PRACTITIONER

## 2022-01-01 PROCEDURE — 77030031139 HC SUT VCRL2 J&J -A

## 2022-01-01 PROCEDURE — 82784 ASSAY IGA/IGD/IGG/IGM EACH: CPT

## 2022-01-01 PROCEDURE — 77030040395 HC NDL BIOP COAX INTRO MRTM -B

## 2022-01-01 PROCEDURE — 77030018781

## 2022-01-01 PROCEDURE — 97110 THERAPEUTIC EXERCISES: CPT

## 2022-01-01 PROCEDURE — 84145 PROCALCITONIN (PCT): CPT

## 2022-01-01 PROCEDURE — 71275 CT ANGIOGRAPHY CHEST: CPT

## 2022-01-01 RX ORDER — DEXTROSE MONOHYDRATE 50 MG/ML
5-250 INJECTION, SOLUTION INTRAVENOUS AS NEEDED
Status: DISPENSED | OUTPATIENT
Start: 2022-01-01 | End: 2022-01-01

## 2022-01-01 RX ORDER — BUMETANIDE 0.5 MG/1
1 TABLET ORAL DAILY
Qty: 30 TABLET | Refills: 0 | Status: SHIPPED
Start: 2022-01-01

## 2022-01-01 RX ORDER — ONDANSETRON 4 MG/1
4 TABLET, ORALLY DISINTEGRATING ORAL
Qty: 40 TABLET | Refills: 1 | Status: SHIPPED | OUTPATIENT
Start: 2022-01-01

## 2022-01-01 RX ORDER — VERAPAMIL HYDROCHLORIDE 240 MG/1
240 TABLET, FILM COATED, EXTENDED RELEASE ORAL DAILY
Status: DISCONTINUED | OUTPATIENT
Start: 2022-01-01 | End: 2022-01-01 | Stop reason: HOSPADM

## 2022-01-01 RX ORDER — ALBUTEROL SULFATE 0.83 MG/ML
2.5 SOLUTION RESPIRATORY (INHALATION) AS NEEDED
Status: CANCELLED
Start: 2022-01-01

## 2022-01-01 RX ORDER — SODIUM CHLORIDE 9 MG/ML
5-40 INJECTION INTRAMUSCULAR; INTRAVENOUS; SUBCUTANEOUS AS NEEDED
Status: CANCELLED | OUTPATIENT
Start: 2022-01-01

## 2022-01-01 RX ORDER — ONDANSETRON 4 MG/1
4 TABLET, ORALLY DISINTEGRATING ORAL
Status: DISCONTINUED | OUTPATIENT
Start: 2022-01-01 | End: 2022-01-01 | Stop reason: HOSPADM

## 2022-01-01 RX ORDER — ACETAMINOPHEN 325 MG/1
650 TABLET ORAL
Status: DISCONTINUED | OUTPATIENT
Start: 2022-01-01 | End: 2022-01-01

## 2022-01-01 RX ORDER — LISINOPRIL 20 MG/1
20 TABLET ORAL DAILY
Status: DISCONTINUED | OUTPATIENT
Start: 2022-01-01 | End: 2022-01-01 | Stop reason: HOSPADM

## 2022-01-01 RX ORDER — ACETAMINOPHEN 325 MG/1
650 TABLET ORAL
Status: DISCONTINUED | OUTPATIENT
Start: 2022-01-01 | End: 2022-01-01 | Stop reason: HOSPADM

## 2022-01-01 RX ORDER — GUAIFENESIN/DEXTROMETHORPHAN 100-10MG/5
5 SYRUP ORAL
Status: DISCONTINUED | OUTPATIENT
Start: 2022-01-01 | End: 2022-01-01 | Stop reason: HOSPADM

## 2022-01-01 RX ORDER — SODIUM CHLORIDE 0.9 % (FLUSH) 0.9 %
5-40 SYRINGE (ML) INJECTION AS NEEDED
Status: CANCELLED | OUTPATIENT
Start: 2022-01-01 | End: 2022-01-01

## 2022-01-01 RX ORDER — LIDOCAINE HYDROCHLORIDE 20 MG/ML
10 INJECTION, SOLUTION INFILTRATION; PERINEURAL ONCE
Status: COMPLETED | OUTPATIENT
Start: 2022-01-01 | End: 2022-01-01

## 2022-01-01 RX ORDER — DIPHENHYDRAMINE HYDROCHLORIDE 50 MG/ML
25 INJECTION, SOLUTION INTRAMUSCULAR; INTRAVENOUS AS NEEDED
Status: CANCELLED
Start: 2022-01-01

## 2022-01-01 RX ORDER — DEXTROSE MONOHYDRATE 50 MG/ML
5-250 INJECTION, SOLUTION INTRAVENOUS AS NEEDED
Status: CANCELLED | OUTPATIENT
Start: 2022-01-01 | End: 2022-01-01

## 2022-01-01 RX ORDER — BUMETANIDE 0.5 MG/1
TABLET ORAL
Qty: 90 TABLET | Refills: 0 | Status: ON HOLD | OUTPATIENT
Start: 2022-01-01 | End: 2022-01-01 | Stop reason: SDUPTHER

## 2022-01-01 RX ORDER — ONDANSETRON 2 MG/ML
8 INJECTION INTRAMUSCULAR; INTRAVENOUS AS NEEDED
Status: CANCELLED | OUTPATIENT
Start: 2022-01-01

## 2022-01-01 RX ORDER — OXYCODONE HYDROCHLORIDE 5 MG/1
5 TABLET ORAL
Qty: 30 TABLET | Refills: 0 | Status: SHIPPED | OUTPATIENT
Start: 2022-01-01 | End: 2022-01-01

## 2022-01-01 RX ORDER — ASPIRIN 81 MG/1
81 TABLET ORAL
Status: DISCONTINUED | OUTPATIENT
Start: 2022-01-01 | End: 2022-01-01 | Stop reason: HOSPADM

## 2022-01-01 RX ORDER — ACETAMINOPHEN 325 MG/1
650 TABLET ORAL AS NEEDED
Status: CANCELLED
Start: 2022-01-01

## 2022-01-01 RX ORDER — OXYCODONE HYDROCHLORIDE 5 MG/1
5 TABLET ORAL
Qty: 84 TABLET | Refills: 0 | Status: ON HOLD | OUTPATIENT
Start: 2022-01-01 | End: 2022-01-01

## 2022-01-01 RX ORDER — OXYCODONE HYDROCHLORIDE 5 MG/1
10 TABLET ORAL
Status: DISCONTINUED | OUTPATIENT
Start: 2022-01-01 | End: 2022-01-01 | Stop reason: HOSPADM

## 2022-01-01 RX ORDER — HEPARIN 100 UNIT/ML
300 SYRINGE INTRAVENOUS
Status: COMPLETED | OUTPATIENT
Start: 2022-01-01 | End: 2022-01-01

## 2022-01-01 RX ORDER — ONDANSETRON 2 MG/ML
4 INJECTION INTRAMUSCULAR; INTRAVENOUS
Status: DISCONTINUED | OUTPATIENT
Start: 2022-01-01 | End: 2022-01-01 | Stop reason: HOSPADM

## 2022-01-01 RX ORDER — SODIUM CHLORIDE 0.9 % (FLUSH) 0.9 %
5-40 SYRINGE (ML) INJECTION AS NEEDED
Status: DISCONTINUED | OUTPATIENT
Start: 2022-01-01 | End: 2022-01-01 | Stop reason: HOSPADM

## 2022-01-01 RX ORDER — POLYETHYLENE GLYCOL 3350 17 G/17G
17 POWDER, FOR SOLUTION ORAL DAILY
Status: DISCONTINUED | OUTPATIENT
Start: 2022-01-01 | End: 2022-01-01 | Stop reason: HOSPADM

## 2022-01-01 RX ORDER — ALBUTEROL SULFATE 90 UG/1
2 AEROSOL, METERED RESPIRATORY (INHALATION)
Status: DISCONTINUED | OUTPATIENT
Start: 2022-01-01 | End: 2022-01-01 | Stop reason: HOSPADM

## 2022-01-01 RX ORDER — IPRATROPIUM BROMIDE AND ALBUTEROL SULFATE 2.5; .5 MG/3ML; MG/3ML
3 SOLUTION RESPIRATORY (INHALATION)
Status: DISCONTINUED | OUTPATIENT
Start: 2022-01-01 | End: 2022-01-01 | Stop reason: HOSPADM

## 2022-01-01 RX ORDER — HEPARIN 100 UNIT/ML
500 SYRINGE INTRAVENOUS AS NEEDED
Status: CANCELLED
Start: 2022-01-01

## 2022-01-01 RX ORDER — HYDROCORTISONE SODIUM SUCCINATE 100 MG/2ML
100 INJECTION, POWDER, FOR SOLUTION INTRAMUSCULAR; INTRAVENOUS AS NEEDED
Status: CANCELLED | OUTPATIENT
Start: 2022-01-01

## 2022-01-01 RX ORDER — SODIUM CHLORIDE 9 MG/ML
5-250 INJECTION, SOLUTION INTRAVENOUS AS NEEDED
Status: CANCELLED | OUTPATIENT
Start: 2022-01-01 | End: 2022-01-01

## 2022-01-01 RX ORDER — SODIUM CHLORIDE 0.9 % (FLUSH) 0.9 %
5-40 SYRINGE (ML) INJECTION EVERY 8 HOURS
Status: DISCONTINUED | OUTPATIENT
Start: 2022-01-01 | End: 2022-01-01 | Stop reason: HOSPADM

## 2022-01-01 RX ORDER — PALONOSETRON 0.05 MG/ML
0.25 INJECTION, SOLUTION INTRAVENOUS ONCE
Status: CANCELLED | OUTPATIENT
Start: 2022-01-01 | End: 2022-01-01

## 2022-01-01 RX ORDER — DIPHENHYDRAMINE HYDROCHLORIDE 50 MG/ML
50 INJECTION, SOLUTION INTRAMUSCULAR; INTRAVENOUS AS NEEDED
Status: CANCELLED
Start: 2022-01-01

## 2022-01-01 RX ORDER — METRONIDAZOLE 7.5 MG/G
CREAM TOPICAL 2 TIMES DAILY
COMMUNITY

## 2022-01-01 RX ORDER — BUMETANIDE 0.25 MG/ML
1 INJECTION INTRAMUSCULAR; INTRAVENOUS
Status: COMPLETED | OUTPATIENT
Start: 2022-01-01 | End: 2022-01-01

## 2022-01-01 RX ORDER — HEPARIN 100 UNIT/ML
500 SYRINGE INTRAVENOUS AS NEEDED
Status: DISPENSED | OUTPATIENT
Start: 2022-01-01 | End: 2022-01-01

## 2022-01-01 RX ORDER — EPINEPHRINE 1 MG/ML
0.3 INJECTION, SOLUTION, CONCENTRATE INTRAVENOUS AS NEEDED
Status: CANCELLED | OUTPATIENT
Start: 2022-01-01

## 2022-01-01 RX ORDER — HEPARIN 100 UNIT/ML
500 SYRINGE INTRAVENOUS AS NEEDED
Status: DISCONTINUED | OUTPATIENT
Start: 2022-01-01 | End: 2022-01-01 | Stop reason: HOSPADM

## 2022-01-01 RX ORDER — LANOLIN ALCOHOL/MO/W.PET/CERES
3 CREAM (GRAM) TOPICAL
Status: DISCONTINUED | OUTPATIENT
Start: 2022-01-01 | End: 2022-01-01 | Stop reason: HOSPADM

## 2022-01-01 RX ORDER — LIDOCAINE HYDROCHLORIDE AND EPINEPHRINE 10; 10 MG/ML; UG/ML
20 INJECTION, SOLUTION INFILTRATION; PERINEURAL ONCE
Status: COMPLETED | OUTPATIENT
Start: 2022-01-01 | End: 2022-01-01

## 2022-01-01 RX ORDER — PROCHLORPERAZINE MALEATE 10 MG
5 TABLET ORAL
Qty: 60 TABLET | Refills: 3 | Status: SHIPPED | OUTPATIENT
Start: 2022-01-01

## 2022-01-01 RX ORDER — MIDAZOLAM HYDROCHLORIDE 1 MG/ML
0-10 INJECTION, SOLUTION INTRAMUSCULAR; INTRAVENOUS
Status: DISCONTINUED | OUTPATIENT
Start: 2022-01-01 | End: 2022-01-01 | Stop reason: HOSPADM

## 2022-01-01 RX ORDER — SODIUM CHLORIDE 9 MG/ML
5-250 INJECTION, SOLUTION INTRAVENOUS AS NEEDED
Status: CANCELLED | OUTPATIENT
Start: 2022-01-01

## 2022-01-01 RX ORDER — MIDAZOLAM HYDROCHLORIDE 1 MG/ML
5 INJECTION, SOLUTION INTRAMUSCULAR; INTRAVENOUS
Status: DISCONTINUED | OUTPATIENT
Start: 2022-01-01 | End: 2022-01-01 | Stop reason: HOSPADM

## 2022-01-01 RX ORDER — MELATONIN
2000 DAILY
Status: DISCONTINUED | OUTPATIENT
Start: 2022-01-01 | End: 2022-01-01 | Stop reason: HOSPADM

## 2022-01-01 RX ORDER — DEXAMETHASONE SODIUM PHOSPHATE 10 MG/ML
6 INJECTION INTRAMUSCULAR; INTRAVENOUS EVERY 24 HOURS
Status: DISCONTINUED | OUTPATIENT
Start: 2022-01-01 | End: 2022-01-01

## 2022-01-01 RX ORDER — POLYETHYLENE GLYCOL 3350 17 G/17G
17 POWDER, FOR SOLUTION ORAL DAILY PRN
Status: DISCONTINUED | OUTPATIENT
Start: 2022-01-01 | End: 2022-01-01 | Stop reason: HOSPADM

## 2022-01-01 RX ORDER — DEXAMETHASONE SODIUM PHOSPHATE 4 MG/ML
6 INJECTION, SOLUTION INTRA-ARTICULAR; INTRALESIONAL; INTRAMUSCULAR; INTRAVENOUS; SOFT TISSUE EVERY 24 HOURS
Status: DISCONTINUED | OUTPATIENT
Start: 2022-01-01 | End: 2022-01-01 | Stop reason: HOSPADM

## 2022-01-01 RX ORDER — OXYCODONE HYDROCHLORIDE 5 MG/1
5 TABLET ORAL
Status: DISCONTINUED | OUTPATIENT
Start: 2022-01-01 | End: 2022-01-01 | Stop reason: HOSPADM

## 2022-01-01 RX ORDER — BENZONATATE 100 MG/1
100 CAPSULE ORAL
Status: DISCONTINUED | OUTPATIENT
Start: 2022-01-01 | End: 2022-01-01 | Stop reason: HOSPADM

## 2022-01-01 RX ORDER — DEXAMETHASONE SODIUM PHOSPHATE 4 MG/ML
6 INJECTION, SOLUTION INTRA-ARTICULAR; INTRALESIONAL; INTRAMUSCULAR; INTRAVENOUS; SOFT TISSUE
Status: COMPLETED | OUTPATIENT
Start: 2022-01-01 | End: 2022-01-01

## 2022-01-01 RX ORDER — LIDOCAINE AND PRILOCAINE 25; 25 MG/G; MG/G
CREAM TOPICAL AS NEEDED
Qty: 30 G | Refills: 1 | Status: SHIPPED | OUTPATIENT
Start: 2022-01-01

## 2022-01-01 RX ORDER — VANCOMYCIN 2 GRAM/500 ML IN 0.9 % SODIUM CHLORIDE INTRAVENOUS
2000 ONCE
Status: COMPLETED | OUTPATIENT
Start: 2022-01-01 | End: 2022-01-01

## 2022-01-01 RX ORDER — SODIUM CHLORIDE 0.9 % (FLUSH) 0.9 %
5-40 SYRINGE (ML) INJECTION AS NEEDED
Status: CANCELLED | OUTPATIENT
Start: 2022-01-01

## 2022-01-01 RX ORDER — FLUDEOXYGLUCOSE F-18 200 MCI/ML
10.58 INJECTION INTRAVENOUS ONCE
Status: COMPLETED | OUTPATIENT
Start: 2022-01-01 | End: 2022-01-01

## 2022-01-01 RX ORDER — FENOFIBRATE 160 MG/1
TABLET ORAL
Qty: 90 TABLET | Refills: 0 | Status: SHIPPED | OUTPATIENT
Start: 2022-01-01

## 2022-01-01 RX ORDER — ASPIRIN 81 MG/1
81 TABLET ORAL DAILY
Status: DISCONTINUED | OUTPATIENT
Start: 2022-01-01 | End: 2022-01-01 | Stop reason: HOSPADM

## 2022-01-01 RX ORDER — FLUOROURACIL 50 MG/ML
400 INJECTION, SOLUTION INTRAVENOUS ONCE
Status: CANCELLED
Start: 2022-01-01 | End: 2022-01-01

## 2022-01-01 RX ORDER — LISINOPRIL 20 MG/1
TABLET ORAL
Qty: 90 TABLET | Refills: 0 | Status: SHIPPED | OUTPATIENT
Start: 2022-01-01

## 2022-01-01 RX ORDER — BUMETANIDE 0.25 MG/ML
1 INJECTION INTRAMUSCULAR; INTRAVENOUS DAILY
Status: DISCONTINUED | OUTPATIENT
Start: 2022-01-01 | End: 2022-01-01 | Stop reason: HOSPADM

## 2022-01-01 RX ORDER — LANOLIN ALCOHOL/MO/W.PET/CERES
400 CREAM (GRAM) TOPICAL DAILY
Status: DISCONTINUED | OUTPATIENT
Start: 2022-01-01 | End: 2022-01-01 | Stop reason: HOSPADM

## 2022-01-01 RX ORDER — FENOFIBRATE 145 MG/1
145 TABLET, COATED ORAL DAILY
Status: DISCONTINUED | OUTPATIENT
Start: 2022-01-01 | End: 2022-01-01 | Stop reason: HOSPADM

## 2022-01-01 RX ORDER — ALPRAZOLAM 0.25 MG/1
0.25 TABLET ORAL
Status: DISCONTINUED | OUTPATIENT
Start: 2022-01-01 | End: 2022-01-01 | Stop reason: HOSPADM

## 2022-01-01 RX ORDER — ACETAMINOPHEN 650 MG/1
650 SUPPOSITORY RECTAL
Status: DISCONTINUED | OUTPATIENT
Start: 2022-01-01 | End: 2022-01-01 | Stop reason: HOSPADM

## 2022-01-01 RX ORDER — HEPARIN SODIUM 5000 [USP'U]/ML
5000 INJECTION, SOLUTION INTRAVENOUS; SUBCUTANEOUS EVERY 8 HOURS
Status: DISCONTINUED | OUTPATIENT
Start: 2022-01-01 | End: 2022-01-01 | Stop reason: HOSPADM

## 2022-01-01 RX ORDER — FLUOROURACIL 50 MG/ML
400 INJECTION, SOLUTION INTRAVENOUS ONCE
Status: COMPLETED | OUTPATIENT
Start: 2022-01-01 | End: 2022-01-01

## 2022-01-01 RX ORDER — SODIUM CHLORIDE 0.9 % (FLUSH) 0.9 %
5-40 SYRINGE (ML) INJECTION AS NEEDED
Status: DISPENSED | OUTPATIENT
Start: 2022-01-01 | End: 2022-01-01

## 2022-01-01 RX ORDER — FENTANYL CITRATE 50 UG/ML
25-100 INJECTION, SOLUTION INTRAMUSCULAR; INTRAVENOUS
Status: DISCONTINUED | OUTPATIENT
Start: 2022-01-01 | End: 2022-01-01 | Stop reason: HOSPADM

## 2022-01-01 RX ORDER — ACETAMINOPHEN 650 MG/1
650 SUPPOSITORY RECTAL
Status: DISCONTINUED | OUTPATIENT
Start: 2022-01-01 | End: 2022-01-01

## 2022-01-01 RX ORDER — SODIUM CHLORIDE 9 MG/ML
25 INJECTION, SOLUTION INTRAVENOUS CONTINUOUS
Status: DISCONTINUED | OUTPATIENT
Start: 2022-01-01 | End: 2022-01-01 | Stop reason: HOSPADM

## 2022-01-01 RX ORDER — POLYETHYLENE GLYCOL 3350 17 G/17G
17 POWDER, FOR SOLUTION ORAL DAILY PRN
Status: DISCONTINUED | OUTPATIENT
Start: 2022-01-01 | End: 2022-01-01

## 2022-01-01 RX ORDER — SODIUM BICARBONATE 42 MG/ML
5 INJECTION, SOLUTION INTRAVENOUS
Status: DISCONTINUED | OUTPATIENT
Start: 2022-01-01 | End: 2022-01-01 | Stop reason: HOSPADM

## 2022-01-01 RX ORDER — ENOXAPARIN SODIUM 100 MG/ML
40 INJECTION SUBCUTANEOUS DAILY
Status: DISCONTINUED | OUTPATIENT
Start: 2022-01-01 | End: 2022-01-01 | Stop reason: HOSPADM

## 2022-01-01 RX ORDER — HEPARIN 100 UNIT/ML
300 SYRINGE INTRAVENOUS AS NEEDED
Status: DISCONTINUED | OUTPATIENT
Start: 2022-01-01 | End: 2022-01-01 | Stop reason: HOSPADM

## 2022-01-01 RX ORDER — ENOXAPARIN SODIUM 100 MG/ML
40 INJECTION SUBCUTANEOUS DAILY
Status: DISCONTINUED | OUTPATIENT
Start: 2022-01-01 | End: 2022-01-01

## 2022-01-01 RX ORDER — FAMOTIDINE 20 MG/1
20 TABLET, FILM COATED ORAL DAILY
Status: DISCONTINUED | OUTPATIENT
Start: 2022-01-01 | End: 2022-01-01 | Stop reason: HOSPADM

## 2022-01-01 RX ORDER — MORPHINE SULFATE 2 MG/ML
2 INJECTION, SOLUTION INTRAMUSCULAR; INTRAVENOUS
Status: DISCONTINUED | OUTPATIENT
Start: 2022-01-01 | End: 2022-01-01 | Stop reason: HOSPADM

## 2022-01-01 RX ORDER — VERAPAMIL HYDROCHLORIDE 240 MG/1
TABLET, FILM COATED, EXTENDED RELEASE ORAL
Qty: 90 TABLET | Refills: 0 | Status: SHIPPED | OUTPATIENT
Start: 2022-01-01

## 2022-01-01 RX ORDER — PALONOSETRON 0.05 MG/ML
0.25 INJECTION, SOLUTION INTRAVENOUS ONCE
Status: COMPLETED | OUTPATIENT
Start: 2022-01-01 | End: 2022-01-01

## 2022-01-01 RX ORDER — FENTANYL CITRATE 50 UG/ML
200 INJECTION, SOLUTION INTRAMUSCULAR; INTRAVENOUS
Status: DISCONTINUED | OUTPATIENT
Start: 2022-01-01 | End: 2022-01-01 | Stop reason: HOSPADM

## 2022-01-01 RX ORDER — BUMETANIDE 0.25 MG/ML
1 INJECTION INTRAMUSCULAR; INTRAVENOUS 2 TIMES DAILY
Status: DISCONTINUED | OUTPATIENT
Start: 2022-01-01 | End: 2022-01-01 | Stop reason: HOSPADM

## 2022-01-01 RX ORDER — ASCORBIC ACID 500 MG
500 TABLET ORAL DAILY
Status: DISCONTINUED | OUTPATIENT
Start: 2022-01-01 | End: 2022-01-01 | Stop reason: HOSPADM

## 2022-01-01 RX ORDER — LEVOFLOXACIN 5 MG/ML
750 INJECTION, SOLUTION INTRAVENOUS
Status: DISCONTINUED | OUTPATIENT
Start: 2022-01-01 | End: 2022-01-01

## 2022-01-01 RX ORDER — BUMETANIDE 1 MG/1
0.5 TABLET ORAL DAILY
Status: DISCONTINUED | OUTPATIENT
Start: 2022-01-01 | End: 2022-01-01

## 2022-01-01 RX ADMIN — SODIUM CHLORIDE, PRESERVATIVE FREE 10 ML: 5 INJECTION INTRAVENOUS at 06:32

## 2022-01-01 RX ADMIN — ASPIRIN 81 MG: 81 TABLET, COATED ORAL at 09:06

## 2022-01-01 RX ADMIN — OXALIPLATIN 187 MG: 5 INJECTION, SOLUTION INTRAVENOUS at 10:51

## 2022-01-01 RX ADMIN — SODIUM CHLORIDE, PRESERVATIVE FREE 10 ML: 5 INJECTION INTRAVENOUS at 05:45

## 2022-01-01 RX ADMIN — HEPARIN SODIUM 5000 UNITS: 5000 INJECTION INTRAVENOUS; SUBCUTANEOUS at 16:10

## 2022-01-01 RX ADMIN — IOPAMIDOL 100 ML: 755 INJECTION, SOLUTION INTRAVENOUS at 09:04

## 2022-01-01 RX ADMIN — BUMETANIDE 1 MG: 0.25 INJECTION, SOLUTION INTRAMUSCULAR; INTRAVENOUS at 09:06

## 2022-01-01 RX ADMIN — BUMETANIDE 1 MG: 0.25 INJECTION, SOLUTION INTRAMUSCULAR; INTRAVENOUS at 10:28

## 2022-01-01 RX ADMIN — MIDAZOLAM HYDROCHLORIDE 1 MG: 1 INJECTION, SOLUTION INTRAMUSCULAR; INTRAVENOUS at 11:10

## 2022-01-01 RX ADMIN — HEPARIN 500 UNITS: 100 SYRINGE at 15:15

## 2022-01-01 RX ADMIN — BUMETANIDE 1 MG: 0.25 INJECTION, SOLUTION INTRAMUSCULAR; INTRAVENOUS at 20:48

## 2022-01-01 RX ADMIN — PIPERACILLIN AND TAZOBACTAM 3.38 G: 3; .375 INJECTION, POWDER, LYOPHILIZED, FOR SOLUTION INTRAVENOUS at 12:14

## 2022-01-01 RX ADMIN — SODIUM CHLORIDE, PRESERVATIVE FREE 10 ML: 5 INJECTION INTRAVENOUS at 14:00

## 2022-01-01 RX ADMIN — FLUDEOXYGLUCOSE F-18 10.58 MILLICURIE: 200 INJECTION INTRAVENOUS at 13:50

## 2022-01-01 RX ADMIN — FENTANYL CITRATE 50 MCG: 50 INJECTION, SOLUTION INTRAMUSCULAR; INTRAVENOUS at 11:10

## 2022-01-01 RX ADMIN — VANCOMYCIN HYDROCHLORIDE 1000 MG: 1 INJECTION, POWDER, LYOPHILIZED, FOR SOLUTION INTRAVENOUS at 03:00

## 2022-01-01 RX ADMIN — SODIUM CHLORIDE, PRESERVATIVE FREE 10 ML: 5 INJECTION INTRAVENOUS at 22:00

## 2022-01-01 RX ADMIN — MIDAZOLAM HYDROCHLORIDE 1 MG: 1 INJECTION, SOLUTION INTRAMUSCULAR; INTRAVENOUS at 11:20

## 2022-01-01 RX ADMIN — HEPARIN SODIUM 5000 UNITS: 5000 INJECTION INTRAVENOUS; SUBCUTANEOUS at 20:24

## 2022-01-01 RX ADMIN — LEVOFLOXACIN 750 MG: 5 INJECTION, SOLUTION INTRAVENOUS at 11:55

## 2022-01-01 RX ADMIN — HYOSCYAMINE SULFATE 0.12 MG: 0.12 TABLET SUBLINGUAL at 12:15

## 2022-01-01 RX ADMIN — ENOXAPARIN SODIUM 40 MG: 100 INJECTION SUBCUTANEOUS at 10:01

## 2022-01-01 RX ADMIN — CEFEPIME HYDROCHLORIDE 2 G: 2 INJECTION, POWDER, FOR SOLUTION INTRAVENOUS at 13:16

## 2022-01-01 RX ADMIN — SODIUM CHLORIDE, PRESERVATIVE FREE 10 ML: 5 INJECTION INTRAVENOUS at 05:11

## 2022-01-01 RX ADMIN — PIPERACILLIN AND TAZOBACTAM 3.38 G: 3; .375 INJECTION, POWDER, LYOPHILIZED, FOR SOLUTION INTRAVENOUS at 20:45

## 2022-01-01 RX ADMIN — HEPARIN SODIUM 5000 UNITS: 5000 INJECTION INTRAVENOUS; SUBCUTANEOUS at 14:52

## 2022-01-01 RX ADMIN — POLYETHYLENE GLYCOL 3350 17 G: 17 POWDER, FOR SOLUTION ORAL at 11:31

## 2022-01-01 RX ADMIN — LIDOCAINE HYDROCHLORIDE 10 ML: 20 INJECTION, SOLUTION INFILTRATION; PERINEURAL at 15:00

## 2022-01-01 RX ADMIN — DEXAMETHASONE SODIUM PHOSPHATE 6 MG: 4 INJECTION, SOLUTION INTRAMUSCULAR; INTRAVENOUS at 12:11

## 2022-01-01 RX ADMIN — Medication 400 MG: at 10:28

## 2022-01-01 RX ADMIN — SODIUM CHLORIDE 25 ML/HR: 0.9 INJECTION, SOLUTION INTRAVENOUS at 23:30

## 2022-01-01 RX ADMIN — LIDOCAINE HYDROCHLORIDE,EPINEPHRINE BITARTRATE 3 ML: 10; .01 INJECTION, SOLUTION INFILTRATION; PERINEURAL at 14:59

## 2022-01-01 RX ADMIN — VANCOMYCIN HYDROCHLORIDE 1000 MG: 1 INJECTION, POWDER, LYOPHILIZED, FOR SOLUTION INTRAVENOUS at 16:00

## 2022-01-01 RX ADMIN — PALONOSETRON HYDROCHLORIDE 0.25 MG: 0.25 INJECTION INTRAVENOUS at 09:30

## 2022-01-01 RX ADMIN — SODIUM CHLORIDE, PRESERVATIVE FREE 10 ML: 5 INJECTION INTRAVENOUS at 21:41

## 2022-01-01 RX ADMIN — HEPARIN SODIUM 5000 UNITS: 5000 INJECTION INTRAVENOUS; SUBCUTANEOUS at 05:45

## 2022-01-01 RX ADMIN — GADOBENATE DIMEGLUMINE 20 ML: 529 INJECTION, SOLUTION INTRAVENOUS at 16:02

## 2022-01-01 RX ADMIN — DEXAMETHASONE SODIUM PHOSPHATE 6 MG: 4 INJECTION, SOLUTION INTRAMUSCULAR; INTRAVENOUS at 08:54

## 2022-01-01 RX ADMIN — LISINOPRIL 20 MG: 20 TABLET ORAL at 09:06

## 2022-01-01 RX ADMIN — SODIUM CHLORIDE, PRESERVATIVE FREE 10 ML: 5 INJECTION INTRAVENOUS at 13:43

## 2022-01-01 RX ADMIN — SODIUM CHLORIDE, PRESERVATIVE FREE 10 ML: 5 INJECTION INTRAVENOUS at 05:12

## 2022-01-01 RX ADMIN — SODIUM CHLORIDE, PRESERVATIVE FREE 5 ML: 5 INJECTION INTRAVENOUS at 05:40

## 2022-01-01 RX ADMIN — GADOTERIDOL 20 ML: 279.3 INJECTION, SOLUTION INTRAVENOUS at 12:00

## 2022-01-01 RX ADMIN — BARICITINIB 4 MG: 2 TABLET, FILM COATED ORAL at 12:25

## 2022-01-01 RX ADMIN — SODIUM CHLORIDE, PRESERVATIVE FREE 10 ML: 5 INJECTION INTRAVENOUS at 16:10

## 2022-01-01 RX ADMIN — HEPARIN SODIUM 5000 UNITS: 5000 INJECTION INTRAVENOUS; SUBCUTANEOUS at 21:45

## 2022-01-01 RX ADMIN — Medication 10 ML: at 08:30

## 2022-01-01 RX ADMIN — FAMOTIDINE 20 MG: 20 TABLET ORAL at 17:53

## 2022-01-01 RX ADMIN — SODIUM CHLORIDE, PRESERVATIVE FREE 5 ML: 5 INJECTION INTRAVENOUS at 22:25

## 2022-01-01 RX ADMIN — BUMETANIDE 1 MG: 0.25 INJECTION, SOLUTION INTRAMUSCULAR; INTRAVENOUS at 10:07

## 2022-01-01 RX ADMIN — BUMETANIDE 1 MG: 0.25 INJECTION, SOLUTION INTRAMUSCULAR; INTRAVENOUS at 23:10

## 2022-01-01 RX ADMIN — HEPARIN SODIUM 5000 UNITS: 5000 INJECTION INTRAVENOUS; SUBCUTANEOUS at 05:10

## 2022-01-01 RX ADMIN — LISINOPRIL 20 MG: 20 TABLET ORAL at 10:00

## 2022-01-01 RX ADMIN — HEPARIN SODIUM 5000 UNITS: 5000 INJECTION INTRAVENOUS; SUBCUTANEOUS at 15:37

## 2022-01-01 RX ADMIN — PIPERACILLIN AND TAZOBACTAM 3.38 G: 3; .375 INJECTION, POWDER, LYOPHILIZED, FOR SOLUTION INTRAVENOUS at 18:11

## 2022-01-01 RX ADMIN — SODIUM CHLORIDE, PRESERVATIVE FREE 10 ML: 5 INJECTION INTRAVENOUS at 15:15

## 2022-01-01 RX ADMIN — Medication 400 MG: at 10:00

## 2022-01-01 RX ADMIN — SODIUM CHLORIDE, PRESERVATIVE FREE 10 ML: 5 INJECTION INTRAVENOUS at 21:56

## 2022-01-01 RX ADMIN — BENZONATATE 100 MG: 100 CAPSULE ORAL at 10:00

## 2022-01-01 RX ADMIN — FLUOROURACIL 5280 MG: 50 INJECTION, SOLUTION INTRAVENOUS at 13:15

## 2022-01-01 RX ADMIN — SODIUM CHLORIDE, PRESERVATIVE FREE 10 ML: 5 INJECTION INTRAVENOUS at 15:40

## 2022-01-01 RX ADMIN — PIPERACILLIN AND TAZOBACTAM 3.38 G: 3; .375 INJECTION, POWDER, LYOPHILIZED, FOR SOLUTION INTRAVENOUS at 02:56

## 2022-01-01 RX ADMIN — SODIUM CHLORIDE, PRESERVATIVE FREE 10 ML: 5 INJECTION INTRAVENOUS at 13:16

## 2022-01-01 RX ADMIN — VERAPAMIL HYDROCHLORIDE 240 MG: 240 TABLET, FILM COATED, EXTENDED RELEASE ORAL at 10:43

## 2022-01-01 RX ADMIN — GADOTERIDOL 20 ML: 279.3 INJECTION, SOLUTION INTRAVENOUS at 12:48

## 2022-01-01 RX ADMIN — ONDANSETRON 4 MG: 4 TABLET, ORALLY DISINTEGRATING ORAL at 08:53

## 2022-01-01 RX ADMIN — VERAPAMIL HYDROCHLORIDE 240 MG: 240 TABLET, FILM COATED, EXTENDED RELEASE ORAL at 08:59

## 2022-01-01 RX ADMIN — VERAPAMIL HYDROCHLORIDE 240 MG: 240 TABLET, FILM COATED, EXTENDED RELEASE ORAL at 12:35

## 2022-01-01 RX ADMIN — SODIUM CHLORIDE, PRESERVATIVE FREE 10 ML: 5 INJECTION INTRAVENOUS at 21:45

## 2022-01-01 RX ADMIN — VERAPAMIL HYDROCHLORIDE 240 MG: 240 TABLET, FILM COATED, EXTENDED RELEASE ORAL at 11:32

## 2022-01-01 RX ADMIN — CEFEPIME HYDROCHLORIDE 2 G: 2 INJECTION, POWDER, FOR SOLUTION INTRAVENOUS at 22:58

## 2022-01-01 RX ADMIN — SODIUM CHLORIDE, PRESERVATIVE FREE 10 ML: 5 INJECTION INTRAVENOUS at 23:14

## 2022-01-01 RX ADMIN — BUMETANIDE 1 MG: 0.25 INJECTION, SOLUTION INTRAMUSCULAR; INTRAVENOUS at 17:54

## 2022-01-01 RX ADMIN — VERAPAMIL HYDROCHLORIDE 240 MG: 240 TABLET, FILM COATED, EXTENDED RELEASE ORAL at 09:10

## 2022-01-01 RX ADMIN — SODIUM CHLORIDE, PRESERVATIVE FREE 10 ML: 5 INJECTION INTRAVENOUS at 14:53

## 2022-01-01 RX ADMIN — HEPARIN SODIUM 5000 UNITS: 5000 INJECTION INTRAVENOUS; SUBCUTANEOUS at 14:22

## 2022-01-01 RX ADMIN — ONDANSETRON 4 MG: 4 TABLET, ORALLY DISINTEGRATING ORAL at 12:15

## 2022-01-01 RX ADMIN — WATER 2 G: 1 INJECTION INTRAMUSCULAR; INTRAVENOUS; SUBCUTANEOUS at 14:15

## 2022-01-01 RX ADMIN — VANCOMYCIN HYDROCHLORIDE 2000 MG: 10 INJECTION, POWDER, LYOPHILIZED, FOR SOLUTION INTRAVENOUS at 13:35

## 2022-01-01 RX ADMIN — LEUCOVORIN CALCIUM 880 MG: 200 INJECTION, POWDER, LYOPHILIZED, FOR SOLUTION INTRAMUSCULAR; INTRAVENOUS at 10:51

## 2022-01-01 RX ADMIN — HEPARIN SODIUM 5000 UNITS: 5000 INJECTION INTRAVENOUS; SUBCUTANEOUS at 22:25

## 2022-01-01 RX ADMIN — VERAPAMIL HYDROCHLORIDE 240 MG: 240 TABLET, FILM COATED, EXTENDED RELEASE ORAL at 10:00

## 2022-01-01 RX ADMIN — DEXTROSE MONOHYDRATE 25 ML/HR: 5 INJECTION, SOLUTION INTRAVENOUS at 09:29

## 2022-01-01 RX ADMIN — SODIUM CHLORIDE, PRESERVATIVE FREE 10 ML: 5 INJECTION INTRAVENOUS at 21:24

## 2022-01-01 RX ADMIN — VERAPAMIL HYDROCHLORIDE 240 MG: 240 TABLET, FILM COATED, EXTENDED RELEASE ORAL at 09:38

## 2022-01-01 RX ADMIN — Medication 500 UNITS: at 15:00

## 2022-01-01 RX ADMIN — CEFEPIME HYDROCHLORIDE 2 G: 2 INJECTION, POWDER, FOR SOLUTION INTRAVENOUS at 06:32

## 2022-01-01 RX ADMIN — SODIUM CHLORIDE, PRESERVATIVE FREE 10 ML: 5 INJECTION INTRAVENOUS at 05:21

## 2022-01-01 RX ADMIN — CEFEPIME 2 G: 2 INJECTION, POWDER, FOR SOLUTION INTRAVENOUS at 11:55

## 2022-01-01 RX ADMIN — Medication 400 MG: at 09:07

## 2022-01-01 RX ADMIN — DEXAMETHASONE SODIUM PHOSPHATE 6 MG: 4 INJECTION, SOLUTION INTRAMUSCULAR; INTRAVENOUS at 10:00

## 2022-01-01 RX ADMIN — VERAPAMIL HYDROCHLORIDE 240 MG: 240 TABLET, FILM COATED, EXTENDED RELEASE ORAL at 08:53

## 2022-01-01 RX ADMIN — ASPIRIN 81 MG: 81 TABLET, COATED ORAL at 17:53

## 2022-01-01 RX ADMIN — SODIUM CHLORIDE, PRESERVATIVE FREE 10 ML: 5 INJECTION INTRAVENOUS at 05:08

## 2022-01-01 RX ADMIN — ASPIRIN 81 MG: 81 TABLET, COATED ORAL at 10:28

## 2022-01-01 RX ADMIN — ONDANSETRON 4 MG: 4 TABLET, ORALLY DISINTEGRATING ORAL at 12:19

## 2022-01-01 RX ADMIN — SODIUM CHLORIDE, PRESERVATIVE FREE 10 ML: 5 INJECTION INTRAVENOUS at 20:25

## 2022-01-01 RX ADMIN — HEPARIN SODIUM 5000 UNITS: 5000 INJECTION INTRAVENOUS; SUBCUTANEOUS at 05:39

## 2022-01-01 RX ADMIN — HEPARIN SODIUM 5000 UNITS: 5000 INJECTION INTRAVENOUS; SUBCUTANEOUS at 21:23

## 2022-01-01 RX ADMIN — DEXAMETHASONE SODIUM PHOSPHATE 12 MG: 4 INJECTION, SOLUTION INTRA-ARTICULAR; INTRALESIONAL; INTRAMUSCULAR; INTRAVENOUS; SOFT TISSUE at 09:36

## 2022-01-01 RX ADMIN — LISINOPRIL 20 MG: 20 TABLET ORAL at 10:28

## 2022-01-01 RX ADMIN — SODIUM CHLORIDE, PRESERVATIVE FREE 10 ML: 5 INJECTION INTRAVENOUS at 05:41

## 2022-01-01 RX ADMIN — IOPAMIDOL 100 ML: 755 INJECTION, SOLUTION INTRAVENOUS at 12:42

## 2022-01-01 RX ADMIN — FLUOROURACIL 880 MG: 50 INJECTION, SOLUTION INTRAVENOUS at 13:06

## 2022-01-01 RX ADMIN — HEPARIN SODIUM 5000 UNITS: 5000 INJECTION INTRAVENOUS; SUBCUTANEOUS at 05:08

## 2022-01-01 RX ADMIN — DEXAMETHASONE SODIUM PHOSPHATE 6 MG: 4 INJECTION, SOLUTION INTRAMUSCULAR; INTRAVENOUS at 11:27

## 2022-01-01 RX ADMIN — PIPERACILLIN AND TAZOBACTAM 4.5 G: 4; .5 INJECTION, POWDER, LYOPHILIZED, FOR SOLUTION INTRAVENOUS at 15:57

## 2022-01-01 RX ADMIN — ACETAMINOPHEN 650 MG: 650 SUPPOSITORY RECTAL at 12:20

## 2022-01-01 RX ADMIN — OXYCODONE 10 MG: 5 TABLET ORAL at 12:13

## 2022-01-01 RX ADMIN — BUMETANIDE 1 MG: 0.25 INJECTION, SOLUTION INTRAMUSCULAR; INTRAVENOUS at 11:29

## 2022-01-01 RX ADMIN — FENOFIBRATE 145 MG: 145 TABLET ORAL at 10:00

## 2022-02-08 NOTE — TELEPHONE ENCOUNTER
Patient came to the office and asked for a MRI of his pancreas because her was dx with pancreatitis. He does not want to have it done until early April after he comes back from his trip.       [phone]

## 2022-02-10 NOTE — TELEPHONE ENCOUNTER
[facility] scheduling states the CT order needs to be CTA abdomen and CTA pelvis.

## 2022-03-18 PROBLEM — K85.90 ACUTE PANCREATITIS: Status: ACTIVE | Noted: 2021-02-11

## 2022-03-18 PROBLEM — R74.8 ELEVATED CPK: Status: ACTIVE | Noted: 2018-01-03

## 2022-03-18 PROBLEM — L40.9 PSORIASIS: Status: ACTIVE | Noted: 2018-01-03

## 2022-03-18 PROBLEM — K63.5 COLON POLYP: Status: ACTIVE | Noted: 2018-01-03

## 2022-03-19 PROBLEM — I10 HYPERTENSION: Status: ACTIVE | Noted: 2018-01-03

## 2022-03-19 PROBLEM — I71.40 ABDOMINAL AORTIC ANEURYSM (AAA) WITHOUT RUPTURE (HCC): Status: ACTIVE | Noted: 2021-02-12

## 2022-03-20 PROBLEM — K85.90 PANCREATITIS: Status: ACTIVE | Noted: 2021-02-11

## 2022-04-04 NOTE — PROGRESS NOTES
CT scan shows stable abdominal aneurysm. Also shows some lymph node enlargement that is suspicious.   Would like him to be seen by Dr. Vijay Ramirez in the next 2 weeks to have this evaluated

## 2022-04-19 PROBLEM — Z91.89 AT RISK FOR SLEEP APNEA: Status: ACTIVE | Noted: 2019-05-22

## 2022-04-19 NOTE — PROGRESS NOTES
Subjective:     Chief Complaint   Patient presents with    Establish Care     2 week follow up       Delmy Haynes is a 68 y.o. M. His previous primary care provider was Dr. Leonardo Watt. He was last seen in December 2021 for routine follow-up of multiple medical problems. His blood pressure is being managed with Bumex, lisinopril, verapamil, and supplemented with potassium and magnesium. Raenette Claudia was being used to control his hyperlipidemia along with fish oil. 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 His physical examination of the time was notable for obesity. He is referred for routine laboratory studies. Weight loss was strongly recommended. Follow-up in 6 months has been advised. No other changes were made to his medical regimen. Earlier this month, the patient had undergone a routine CT scan for surveillance of his known abdominal aortic aneurysm. This had revealed numerous retroperitoneal lymph nodes with concentration around the celiac axis. Lymphoma was highly suspected and lymph node sampling had been advised as a consideration. In addition, there was peripancreatic inflammation, and correlation with lipase levels had been advised. Pancreatic malignancy had also been considered as potential source of this finding. Today, the patient comes in for follow-up as instructed after his recent CT findings. He reports feeling generally well overall today, saying that he is getting over a case of bronchitis, and that this is largely resolved. He denies chest pain, shortness of breath, or any further cardiopulmonary symptoms. He believes that his blood pressure is typically better controlled at home, usually in the 120s over 80s, and there have been no interval changes to his medication regimen.   He denies any recent changes to his exercise tolerance, denies any orthopnea, or paroxysmal nocturnal dyspnea, and denies any lower extremity edema.  No abdominal pain. He continues on fenofibrate for control of his cholesterol as he had been intolerant to statin medications in the past secondary to elevated CK levels. He denies any problems with the [place] at this time. Upon further questioning, the patient says that he has lost 10 pounds over the past couple months or so, but otherwise denies any night sweats, easy bruising, or other B symptoms. He notes that his mother passed away at the age of 61 from pancreatic cancer. He has a history of alcohol abuse usually drinking about 20 drinks a week. He denies any change in his stool color, or any postprandial abdominal pain. The patient otherwise remains completely independent in the home, completing all activities of daily living and IADLs without limitation. He is fully ambulatory, having recently moved furniture around in his house for a home project, and denies any problems with memory, vision, or hearing. High levels of alcohol use as noted above. He is due for his second pneumonia vaccine today. His review of systems is otherwise negative.     Past Medical History:  Past Medical History:   Diagnosis Date    AAA (abdominal aortic aneurysm) ([facility] [address]     4/4/22 CT: 3.7 cm; stable    Alcohol abuse     20 drinks/week    At risk for sleep apnea 05/22/2019    during phone assessment for PAT, LEVI 6    Colon polyp     Elevated CPK     Former smoker, stopped smoking in distant past     Hyperlipidemia     Hypertension     Obesity     Psoriasis     Recurrent pancreatitis     2/2 ETOH use    Retroperitoneal lymphadenopathy     CT 4/4/22 - most prominent around celiac axis; concern for lymphoma    Rosacea        Past Surgical Histor:  Past Surgical History:   Procedure Laterality Date    COLONOSCOPY N/A 5/29/2019    COLONOSCOPY performed by Sherry Mccarty MD at [place] [address] HX COLONOSCOPY  02/20/2014    HX HERNIA REPAIR      umbilical       Allergies:  No Known Allergies    Medications:  Current Outpatient Medications   Medication Sig Dispense Refill    verapamil ER (CALAN-SR) 240 mg CR tablet TAKE ONE TABLET BY MOUTH EVERY DAY 90 Tablet 0    bumetanide (BUMEX) 0.5 mg tablet TAKE ONE TABLET BY MOUTH EVERY DAY 90 Tablet 0    lisinopriL (PRINIVIL, ZESTRIL) 20 mg tablet TAKE ONE TABLET BY MOUTH EVERY DAY 90 Tablet 0    fenofibrate (LOFIBRA) 160 mg tablet TAKE ONE TABLET BY MOUTH EVERY DAY 90 Tablet 0    potassium (POTASSIMIN PO) Take  by mouth daily.  magnesium oxide (MAG-OX) 400 mg tablet Take 400 mg by mouth daily.  omega-3 fatty acids Cap Take 500 mg by mouth daily.  aspirin 81 mg tablet Take 81 mg by mouth four (4) days a week. Social History:  Social History     Socioeconomic History    Marital status:    Tobacco Use    Smoking status: Former Smoker     Packs/day: 1.00     Years: 20.00     Pack years: 20.00     Quit date:      Years since quittin.3    Smokeless tobacco: Never Used   Vaping Use    Vaping Use: Never used   Substance and Sexual Activity    Alcohol use:  Yes     Alcohol/week: 20.0 standard drinks     Types: 20 Cans of beer per week    Drug use: Never       Family History:  Family History   Problem Relation Age of Onset    Cancer Mother         colon       Immunizations:  Immunization History   Administered Date(s) Administered    COVID-19, Pfizer Purple top, DILUTE for use, 12+ yrs, 30mcg/0.3mL dose 2021, 2021, 10/07/2021    Influenza High Dose Vaccine PF 2018, 2020    Influenza Vaccine 2015, 2021    Influenza, Quadrivalent, Adjuvanted (>65 Yrs FLUAD QUAD [id number]) 2020    Pneumococcal Polysaccharide (PPSV-23) 2020    Tdap 2019    Zoster Recombinant 2019, 2019        Healthcare Maintenance:  Health Maintenance   Topic Date Due    Pneumococcal 65+ years (2 - PCV) 2021    Depression Screen  2022    Medicare Yearly Exam 04/21/2023    DTaP/Tdap/Td series (2 - Td or Tdap) 01/24/2029    Hepatitis C Screening  Completed    Shingrix Vaccine Age 50>  Completed    Flu Vaccine  Completed    COVID-19 Vaccine  Completed        Review of Systems:  ROS:  Review of Systems   Constitutional: Positive for weight loss. Negative for chills, fever and malaise/fatigue. HENT: Negative. Eyes: Negative. Respiratory: Negative. Negative for cough, hemoptysis, sputum production, shortness of breath and wheezing. Cardiovascular: Negative. Negative for chest pain, palpitations, orthopnea, claudication and leg swelling. Gastrointestinal: Negative. Negative for abdominal pain, blood in stool, constipation, diarrhea, heartburn, melena, nausea and vomiting. Genitourinary: Negative. Musculoskeletal: Negative. Negative for myalgias. Skin: Negative. Neurological: Negative. Endo/Heme/Allergies: Negative. Does not bruise/bleed easily. Psychiatric/Behavioral: Negative. Negative for depression. The patient is not nervous/anxious. ROS otherwise negative      Objective:     Vital Signs:  Visit Vitals  /82   Pulse 83   Temp 98.6 °F (37 °C) (Oral)   Ht 5' 9\" (1.753 m)   Wt 242 lb 11.2 oz (110.1 kg)   SpO2 92%   BMI 35.84 kg/m²       BMI:  Body mass index is 35.84 kg/m². Physical Examination:  Physical Exam  Vitals reviewed. Constitutional:       Appearance: Normal appearance. He is obese. HENT:      Head: Normocephalic and atraumatic. Nose: Nose normal.      Mouth/Throat:      Mouth: Mucous membranes are moist.   Eyes:      Extraocular Movements: Extraocular movements intact. Conjunctiva/sclera: Conjunctivae normal.      Pupils: Pupils are equal, round, and reactive to light. Cardiovascular:      Rate and Rhythm: Normal rate and regular rhythm. Pulses: Normal pulses. Heart sounds: Normal heart sounds. No murmur heard. No friction rub. No gallop.     Pulmonary:      Effort: Pulmonary effort is normal. No respiratory distress. Breath sounds: Normal breath sounds. No wheezing, rhonchi or rales. Abdominal:      General: Bowel sounds are normal. There is no distension. Palpations: Abdomen is soft. There is no mass. Tenderness: There is no abdominal tenderness. There is no guarding or rebound. Musculoskeletal:         General: No tenderness, deformity or signs of injury. Normal range of motion. Cervical back: Normal range of motion and neck supple. Right lower leg: No edema. Left lower leg: No edema. Skin:     General: Skin is warm and dry. Findings: No bruising, lesion or rash. Neurological:      General: No focal deficit present. Mental Status: He is alert and oriented to person, place, and time. Mental status is at baseline. Cranial Nerves: No cranial nerve deficit. Sensory: No sensory deficit. Motor: No weakness. Coordination: Coordination normal.      Gait: Gait normal.      Deep Tendon Reflexes: Reflexes normal.   Psychiatric:         Mood and Affect: Mood normal.         Behavior: Behavior normal.          Physical exam otherwise negative    Diagnostic Testing:    Laboratory Studies:  Hospital Outpatient Visit on 04/04/2022   Component Date Value Ref Range Status    Creatinine (POC) 04/04/2022 0.70  0.6 - 1.3 mg/dL Final    GFRAA, POC 04/04/2022 >60  >60 ml/min/1.73m2 Final    GFRNA, POC 04/04/2022 >60  >60 ml/min/1.73m2 Final    Estimated GFR is calculated using the IDMS-traceable Modification of Diet in Renal Disease (MDRD) Study equation, reported for both  Americans (GFRAA) and non- Americans (GFRNA), and normalized to 1.73m2 body surface area. The physician must decide which value applies to the patient.    Office Visit on 12/16/2021   Component Date Value Ref Range Status    CK 12/16/2021 337* 39 - 308 U/L Final    TSH 12/16/2021 2.45  0.36 - 3.74 uIU/mL Final    Comment:   Due to TSH heterogeneity, both structurally and degree of glycosylation,  monoclonal antibodies used in the TSH assay may not accurately quantitate TSH. Therefore, this result should be correlated with clinical findings as well as  with other assessments of thyroid function, e.g., free T4, free T3.  Sodium 12/16/2021 140  136 - 145 mmol/L Final    Potassium 12/16/2021 4.0  3.5 - 5.1 mmol/L Final    Chloride 12/16/2021 105  97 - 108 mmol/L Final    CO2 12/16/2021 30  21 - 32 mmol/L Final    Anion gap 12/16/2021 5  5 - 15 mmol/L Final    Glucose 12/16/2021 106* 65 - 100 mg/dL Final    BUN 12/16/2021 16  6 - 20 MG/DL Final    Creatinine 12/16/2021 0.72  0.70 - 1.30 MG/DL Final    BUN/Creatinine ratio 12/16/2021 22* 12 - 20   Final    GFR est AA 12/16/2021 >60  >60 ml/min/1.73m2 Final    GFR est non-AA 12/16/2021 >60  >60 ml/min/1.73m2 Final    Comment: Estimated GFR is calculated using the IDMS-traceable Modification of Diet in  Renal Disease (MDRD) Study equation, reported for both  Americans  (GFRAA) and non- Americans (GFRNA), and normalized to 1.73m2 body  surface area. The physician must decide which value applies to the patient.  Calcium 12/16/2021 9.1  8.5 - 10.1 MG/DL Final    Bilirubin, total 12/16/2021 0.5  0.2 - 1.0 MG/DL Final    ALT (SGPT) 12/16/2021 38  12 - 78 U/L Final    AST (SGOT) 12/16/2021 29  15 - 37 U/L Final    Alk. phosphatase 12/16/2021 58  45 - 117 U/L Final    Protein, total 12/16/2021 7.0  6.4 - 8.2 g/dL Final    Albumin 12/16/2021 4.0  3.5 - 5.0 g/dL Final    Globulin 12/16/2021 3.0  2.0 - 4.0 g/dL Final    A-G Ratio 12/16/2021 1.3  1.1 - 2.2   Final    Cholesterol, total 12/16/2021 226* <200 MG/DL Final    Triglyceride 12/16/2021 128  <150 MG/DL Final    Comment: Based on NCEP-ATP III:  Triglycerides <150 mg/dL  is considered normal, 150-199  mg/dL  borderline high,  200-499 mg/dL high and  greater than or equal to 500  mg/dL very high.       HDL Cholesterol 12/16/2021 51 MG/DL Final    Comment: Based on NCEP ATP III, HDL Cholesterol <40 mg/dL is considered low and >60  mg/dL is elevated.  LDL, calculated 12/16/2021 149.4* 0 - 100 MG/DL Final    Comment: Based on the NCEP-ATP: LDL-C concentrations are considered  optimal <100 mg/dL,  near optimal/above Normal 100-129 mg/dL Borderline High: 130-159, High: 160-189  mg/dL Very High: Greater than or equal to 190 mg/dL      VLDL, calculated 12/16/2021 25.6  MG/DL Final    CHOL/HDL Ratio 12/16/2021 4.4  0.0 - 5.0   Final    WBC 12/16/2021 7.2  4.1 - 11.1 K/uL Final    RBC 12/16/2021 4.61  4.10 - 5.70 M/uL Final    HGB 12/16/2021 14.2  12.1 - 17.0 g/dL Final    HCT 12/16/2021 42.4  36.6 - 50.3 % Final    MCV 12/16/2021 92.0  80.0 - 99.0 FL Final    MCH 12/16/2021 30.8  26.0 - 34.0 PG Final    MCHC 12/16/2021 33.5  30.0 - 36.5 g/dL Final    RDW 12/16/2021 12.4  11.5 - 14.5 % Final    PLATELET 33/33/8836 098  150 - 400 K/uL Final    MPV 12/16/2021 9.4  8.9 - 12.9 FL Final    NRBC 12/16/2021 0.0  0  WBC Final    ABSOLUTE NRBC 12/16/2021 0.00  0.00 - 0.01 K/uL Final    NEUTROPHILS 12/16/2021 61  32 - 75 % Final    LYMPHOCYTES 12/16/2021 25  12 - 49 % Final    MONOCYTES 12/16/2021 10  5 - 13 % Final    EOSINOPHILS 12/16/2021 3  0 - 7 % Final    BASOPHILS 12/16/2021 1  0 - 1 % Final    IMMATURE GRANULOCYTES 12/16/2021 0  0.0 - 0.5 % Final    ABS. NEUTROPHILS 12/16/2021 4.4  1.8 - 8.0 K/UL Final    ABS. LYMPHOCYTES 12/16/2021 1.8  0.8 - 3.5 K/UL Final    ABS. MONOCYTES 12/16/2021 0.7  0.0 - 1.0 K/UL Final    ABS. EOSINOPHILS 12/16/2021 0.2  0.0 - 0.4 K/UL Final    ABS. BASOPHILS 12/16/2021 0.1  0.0 - 0.1 K/UL Final    ABS. IMM. GRANS.  12/16/2021 0.0  0.00 - 0.04 K/UL Final    DF 12/16/2021 AUTOMATED    Final    Color 12/16/2021 DARK YELLOW    Final    Color Reference Range: Straw, Yellow or Dark Yellow    Appearance 12/16/2021 CLOUDY* CLEAR   Final    Specific gravity 12/16/2021 1.027  1.003 - 1.030 Final    pH (UA) 12/16/2021 5.0  5.0 - 8.0   Final    Protein 12/16/2021 Negative  Negative mg/dL Final    Glucose 12/16/2021 Negative  Negative mg/dL Final    Ketone 12/16/2021 TRACE* Negative mg/dL Final    Bilirubin 12/16/2021 Negative  Negative   Final    Blood 12/16/2021 Negative  Negative   Final    Urobilinogen 12/16/2021 0.2  0.2 - 1.0 EU/dL Final    Nitrites 12/16/2021 Negative  Negative   Final    Leukocyte Esterase 12/16/2021 Negative  Negative   Final    WBC 12/16/2021 0-4  0 - 4 /hpf Final    RBC 12/16/2021 0-5  0 - 5 /hpf Final    Epithelial cells 12/16/2021 FEW  FEW /lpf Final    Comment: Epithelial cell category consists of squamous cells and /or transitional  urothelial cells. Renal tubular cells, if present, are separately identified as  such.  Bacteria 12/16/2021 Negative  Negative /hpf Final    UA:UC IF INDICATED 12/16/2021 CULTURE NOT INDICATED BY UA RESULT  CULTURE NOT INDICATED BY UA RESULT   Final    Mucus 12/16/2021 2+* Negative /lpf Final         Radiographic Studies:  XR Results (most recent):  Results from [place] encounter on 05/31/12    XR ABD ACUTE W 1 V CHEST    Narrative  **Final Report**      ICD Codes / Adm. Diagnosis: 70  555926 / Nausea  Flank Pain  Examination:  CR ABDOMEN ACUTE W PA CHEST  - [id number] - May 31 2012  4:54PM  Accession No:  [id number]  Reason:  Abdominal Pain      REPORT:  INDICATION:   Abdominal Pain    COMPARISON: 08/21/2008. FINDINGS: The upright chest radiograph demonstrates shallow inspiration with  bibasilar atelectasis. Heart and mediastinal shadows are stable. .. There is  no pleural effusion or free air under the diaphragm. Supine and upright  views of the abdomen demonstrate a nonobstructive bowel  gas pattern. There is no free intraperitoneal air. No soft tissue masses or  pathologic calcifications are identified. The bones are within normal  limits. Surgical anchors overlie the lower abdomen. IMPRESSION:  1. bibasilar atelectasis. 2. No obstruction or free air          Signing/Reading Doctor: Varun Lyon ([id number])  Approved: Varun Lyon ([id number])  05/31/2012     CESARIO Results (most recent):  No results found for this or any previous visit. CT Results (most recent):  Results from Hospital Encounter encounter on 04/04/22    CTA ABDOMEN PELV W CONT    Narrative  CLINICAL HISTORY:  Abdominal aortic aneurysm    COMPARISON: CT abdomen pelvis of 4/10/2020    TECHNIQUE: CT of the abdomen and pelvis with  IV contrast ,100 cc Isovue-370 is  performed. Axial images from the abdomen to the level of the upper thighs is  obtained. Manual post-processing of the images and coronal reformatting is also  performed. Standard dose modulation was utilized to reduce the overall radiation  dose administered to the patient. Multiplanar reformatted imaging was  performed. Sagittal and coronal reformatting. CT dose reduction was achieved  through use of a standardized protocol tailored for this examination and  automatic exposure control for dose modulation. 3-D MIP reconstructed images were obtained in the coronal plane. FINDINGS:  Vasculature: Aorta: Fusiform abdominal aortic aneurysm, infrarenal. Sac measures 3.7 cm in  diameter, unchanged compared to February 10, 2021. Visceral arteries: Celiac, superior mesenteric, and inferior mesenteric arteries  are widely patent. Renal arteries: Widely patent bilaterally. Right iliac arteries: Widely patent. Left iliac arteries: Widely patent. Lower chest:  Visualized part of the lungs are clear. No nodules or masses. No pericardial  effusion or pneumothorax. The distal esophagus is unremarkable. There are artery  calcifications. Abdomen/Pelvis:  Normal liver morphology. Normal gallbladder. Bilateral renal cysts are unchanged  compared to prior. Stomach, duodenum, and small bowel are unremarkable. Sigmoid diverticulosis.  The  colon is otherwise unremarkable. Retroperitoneal lymphadenopathy, involving the lymph nodes immediately above the  celiac artery, and periaortic nodes. The celiac conglomerate measures 5.7 x 4.2  cm (2-64), and encases the left gastric artery. Mesenteric fat stranding and prominent although not pathologically enlarged  lymph nodes, compatible with noemí mesentery sign. This fat stranding extends to  the peripancreatic space, although appears to originate from the mesentery, this  is reversed compared to prior examination, previously appeared that the  peripancreatic fat stranding was a primary process and mesenteric edema was  secondary. Prior ventral hernia repair. Impression  1. Stable fusiform infrarenal abdominal aortic aneurysm, 3.7 cm.  2. New retroperitoneal lymphadenopathy, most prominent around the celiac axis,  with mesenteric fat stranding which appears to extend toward the peripancreatic  space. These findings are highly suspicious for lymphoma. Peripancreatic  inflammation appears secondary to neoplastic process. Recommend correlation with  lipase levels. Consider tissue sampling of the lymph nodes. DEXA Results (most recent):  No results found for this or any previous visit. MRI Results (most recent):  No results found for this or any previous visit. Assessment/Plan:       ICD-10-CM ICD-9-CM    1. Routine adult health maintenance  Z00.00 V70.0    2. Retroperitoneal lymphadenopathy  R59.0 785.6 LIPASE      METABOLIC PANEL, COMPREHENSIVE      CBC WITH AUTOMATED DIFF      SED RATE (ESR)      REFERRAL TO GENERAL SURGERY      MRI ABD W MRCP W CONT   3. Severe obesity (BMI 35.0-39. 9) with comorbidity ([facility] [address]  E66.01 278.01    4. Abdominal aortic aneurysm (AAA) without rupture (HCC)  I71.4 441.4    5. Primary hypertension  I10 401.9    6. Mixed hyperlipidemia  E78.2 272.2    7. Alcohol abuse  F10.10 305.00    8.  Encounter for immunization  Z23 V03.89 ADMIN PNEUMOCOCCAL VACCINE      PNEUMOCOCCAL CONJ VACCINE 13 VALENT IM      CANCELED: PNEUMOCOCCAL CONJUGATE PCV20, PF (PREVNAR 20)   9. Medicare annual wellness visit, subsequent  Z00.00 V70.0           This 59-year-old white male with a past medical history of abdominal aortic aneurysm, hypertension, hyperlipidemia, and alcohol abuse, was recently found to have retroperitoneal lymphadenopathy on a surveillance CT scan. The etiology of this is unclear but includes numerous possibilities, including non-Hodgkin's lymphoma, pancreatic cancer, and other malignancies. Retroperitoneal lymphadenopathy:   Etiology as per above unclear. I am doubtful of the possibility of lymphoma given the lack of other B symptoms that this should be considered. Tissue diagnosis obviously of paramount importance.  Referral to general surgery is provided. In addition, checking MRCP to further evaluate the pancreas to determine if there is an underlying mass. Pancreatic cancer would be a possibility in light of the patient's history of alcohol abuse and repeated episodes of pancreatitis in the past.   Checking additional laboratory studies as per above.  Additional referrals are likely depending on the diagnostic evaluation. Obesity:   Contributory to his hypertension, hyperlipidemia and metabolic syndrome.  Advised continued efforts at weight loss. AAA:   Repeat blood pressure better, target blood pressure less than 130/80 mmHg   Continuing with fenofibrate, current blood pressure management, current aspirin    Hypertension:   Target blood pressure less then 130/80 mmHg   Continuing with current care    Hyperlipidemia:   Tolerating fenofibrate, previously intolerant of statins   Continuing with current care    Follow-up with me in 1 month      Follow-up and Dispositions    · Return in about 4 weeks (around 5/18/2022), or if symptoms worsen or fail to improve.          Willow Snellen, MD    Please note that this dictation was completed with Evin, the [employer] voice recognition software. Quite often unanticipated grammatical, syntax, homophones, and other interpretive errors are inadvertently transcribed by the computer software. Please disregard these errors. Please excuse any errors that have escaped final proofreading. This is the Subsequent Medicare Annual Wellness Exam, performed 12 months or more after the Initial AWV or the last Subsequent AWV    I have reviewed the patient's medical history in detail and updated the computerized patient record. Assessment/Plan   Education and counseling provided:  Are appropriate based on today's review and evaluation  Pneumococcal Vaccine  Cardiovascular screening blood test    1. Routine adult health maintenance  2. Retroperitoneal lymphadenopathy  -     LIPASE; Future  -     METABOLIC PANEL, COMPREHENSIVE; Future  -     CBC WITH AUTOMATED DIFF; Future  -     SED RATE (ESR); Future  -     REFERRAL TO GENERAL SURGERY  -     MRI ABD W MRCP W CONT; Future  3. Severe obesity (BMI 35.0-39. 9) with comorbidity ([facility] [address]  4. Abdominal aortic aneurysm (AAA) without rupture ([facility] [address]  5. Primary hypertension  6. Mixed hyperlipidemia  7. Alcohol abuse  8. Encounter for immunization  -     ADMIN PNEUMOCOCCAL VACCINE  -     PNEUMOCOCCAL CONJ VACCINE 13 VALENT IM  9. Medicare annual wellness visit, subsequent       Depression Risk Factor Screening     3 most recent PHQ Screens 4/20/2022   Little interest or pleasure in doing things Not at all   Feeling down, depressed, irritable, or hopeless Not at all   Total Score PHQ 2 0       Alcohol & Drug Abuse Risk Screen    Do you average more than 1 drink per night or more than 7 drinks a week: Yes    In the past three months have you have had more than 4 drinks containing alcohol on one occasion: Yes          Functional Ability and Level of Safety    Hearing: Hearing is good. Activities of Daily Living: The home contains: no safety equipment.   Patient does total self care      Ambulation: with no difficulty     Fall Risk:  Fall Risk Assessment, last 12 mths 2/15/2021   Able to walk? Yes   Fall in past 12 months? 0   Do you feel unsteady? 0   Are you worried about falling 0      Abuse Screen:  Patient is not abused       Cognitive Screening    Has your family/caregiver stated any concerns about your memory: no     Cognitive Screening: Normal - Verbal Fluency Test    Health Maintenance Due     Health Maintenance Due   Topic Date Due    Pneumococcal 65+ years (2 - PCV) 09/01/2021       Patient Care Team   Patient Care Team:  Alfredo Rider MD as PCP - General (Internal Medicine)  Kody Calderon MD as PCP - [facility]  Angeles Payton MD (Dermatology)    History     Patient Active Problem List   Diagnosis Code    History of pancreatitis Z87.19    Sepsis(995.91) A41.9    UTI (urinary tract infection) N39.0    Obesity (BMI 30-39. 9) E66.9    Hyperlipidemia E78.5    Alcohol abuse F10.10    Colon polyp K63.5    Hypertension I10    Elevated CPK R74.8    Psoriasis L40.9    Acute pancreatitis K85.90    Pancreatitis K85.90    Abdominal aortic aneurysm (AAA) without rupture (HCC) I71.4    AAA (abdominal aortic aneurysm) (HCC) I71.4    Recurrent pancreatitis K85.90    At risk for sleep apnea Z91.89    Obesity E66.9    Former smoker, stopped smoking in distant past Z87.891    Rosacea L71.9    Retroperitoneal lymphadenopathy R59.0     Past Medical History:   Diagnosis Date    AAA (abdominal aortic aneurysm) (HCC)     4/4/22 CT: 3.7 cm; stable    Alcohol abuse     20 drinks/week    At risk for sleep apnea 05/22/2019    during phone assessment for PAT, LEVI 6    Colon polyp     Elevated CPK     Former smoker, stopped smoking in distant past     Hyperlipidemia     Hypertension     Obesity     Psoriasis     Recurrent pancreatitis     2/2 ETOH use    Retroperitoneal lymphadenopathy     CT 4/4/22 - most prominent around celiac axis; concern for lymphoma    Rosacea       Past Surgical History:   Procedure Laterality Date    COLONOSCOPY N/A 2019    COL[facility] performed [facility]    HX COLONOSCOPY  2014    HX HERNIA REPAIR      umbilical     Current Outpatient Medications   Medication Sig Dispense Refill    verapamil ER (CALAN-SR) 240 mg CR tablet TAKE ONE TABLET BY MOUTH EVERY DAY 90 Tablet 0    bumetanide (BUMEX) 0.5 mg tablet TAKE ONE TABLET BY MOUTH EVERY DAY 90 Tablet 0    lisinopriL (PRINIVIL, ZESTRIL) 20 mg tablet TAKE ONE TABLET BY MOUTH EVERY DAY 90 Tablet 0    fenofibrate (LOFIBRA) 160 mg tablet TAKE ONE TABLET BY MOUTH EVERY DAY 90 Tablet 0    potassium (POTASSIMIN PO) Take  by mouth daily.  magnesium oxide (MAG-OX) 400 mg tablet Take 400 mg by mouth daily.  omega-3 fatty acids Cap Take 500 mg by mouth daily.  aspirin 81 mg tablet Take 81 mg by mouth four (4) days a week. No Known Allergies    Family History   Problem Relation Age of Onset    Cancer Mother         colon     Social History     Tobacco Use    Smoking status: Former Smoker     Packs/day: 1.00     Years: 20.00     Pack years: 20.00     Quit date: 26     Years since quittin.3    Smokeless tobacco: Never Used   Substance Use Topics    Alcohol use:  Yes     Alcohol/week: 20.0 standard drinks     Types: 20 Cans of beer per week         Delores King MD

## 2022-04-20 NOTE — PROGRESS NOTES
Chief Complaint   Patient presents with   [facility] Establish Care     2 week follow up     Visit Vitals  /82   Pulse 83   Temp 98.6 °F (37 °C) (Oral)   Ht 5' 9\" (1.753 m)   Wt 242 lb 11.2 oz (110.1 kg)   SpO2 92%   BMI 35.84 kg/m²           1. \"Have you been to the ER, urgent care clinic since your last visit? Hospitalized since your last visit? \" No    2. \"Have you seen or consulted any other health care providers outside of the [address] [place] [address] since your last visit? \" No     3. For patients aged 39-70: Has the patient had a colonoscopy / FIT/ Cologuard? No      If the patient is female:    4. For patients aged 41-77: Has the patient had a mammogram within the past 2 years? No      5. For patients aged 21-65: Has the patient had a pap smear?  No

## 2022-04-20 NOTE — PATIENT INSTRUCTIONS
Medicare Wellness Visit, Male    The best way to live healthy is to have a lifestyle where you eat a well-balanced diet, exercise regularly, limit alcohol use, and quit all forms of tobacco/nicotine, if applicable. Regular preventive services are another way to keep healthy. Preventive services (vaccines, screening tests, monitoring & exams) can help personalize your care plan, which helps you manage your own care. Screening tests can find health problems at the earliest stages, when they are easiest to treat. [facility] follows the current, evidence-based guidelines published by the [facility] ([facility]) when recommending preventive services for our patients. Because we follow these guidelines, sometimes recommendations change over time as research supports it. (For example, a prostate screening blood test is no longer routinely recommended for men with no symptoms). Of course, you and your doctor may decide to screen more often for some diseases, based on your risk and co-morbidities (chronic disease you are already diagnosed with). Preventive services for you include:  - Medicare offers their members a free annual wellness visit, which is time for you and your primary care provider to discuss and plan for your preventive service needs. Take advantage of this benefit every year!  -All adults over age 72 should receive the recommended pneumonia vaccines. Current USPSTF guidelines recommend a series of two vaccines for the best pneumonia protection.   -All adults should have a flu vaccine yearly and tetanus vaccine every 10 years.  -All adults age 48 and older should receive the shingles vaccines (series of two vaccines).        -All adults age 38-68 who are overweight should have a diabetes screening test once every three years.   -Other screening tests & preventive services for persons with diabetes include: an eye exam to screen for diabetic retinopathy, a kidney function test, a foot exam, and stricter control over your cholesterol.   -Cardiovascular screening for adults with routine risk involves an electrocardiogram (ECG) at intervals determined by the provider.   -Colorectal cancer screening should be done for adults age 54-65 with no increased risk factors for colorectal cancer. There are a number of acceptable methods of screening for this type of cancer. Each test has its own benefits and drawbacks. Discuss with your provider what is most appropriate for you during your annual wellness visit. The different tests include: colonoscopy (considered the best screening method), a fecal occult blood test, a fecal DNA test, and sigmoidoscopy.  -All adults born between [facility] should be screened once for Hepatitis C.  -An Abdominal Aortic Aneurysm (AAA) Screening is recommended for men age 73-68 who has ever smoked in their lifetime.      Here is a list of your current Health Maintenance items (your personalized list of preventive services) with a due date:  Health Maintenance Due   Topic Date Due    Pneumococcal Vaccine (2 - PCV) 09/01/2021

## 2022-04-20 NOTE — PROGRESS NOTES
After obtaining written consent and per orders of Dr. Marylu Gilbert, injection of [id number] given by Lorena Barahona, [address] [place], [place] [address]farshad. Order and injection/medication verified by Dena Severino. Patient tolerated procedure well. VIS was given to them. No reactions noted.

## 2022-05-09 NOTE — PROGRESS NOTES
Identified pt with two pt identifiers(name and ). Reviewed record in preparation for visit and have obtained necessary documentation. All patient medications has been reviewed. Chief Complaint   Patient presents with    Mass     Seen at the request of Dr Elgin Steel for eval of retroperitoneal lymphadenopathy        There are no preventive care reminders to display for this patient. Vitals:    22 1401 22 1403   BP: (!) 142/82 138/80   Pulse: 65    Resp: 16    Temp: 97.3 °F (36.3 °C)    TempSrc: Temporal    SpO2: 96%    Weight: 109.8 kg (242 lb)    Height: 5' 9\" (1.753 m)    PainSc:   0 - No pain        4. Have you been to the ER, urgent care clinic since your last visit? Hospitalized since your last visit? No    5. Have you seen or consulted any other health care providers outside of the [address] [place] [address] since your last visit? Include any pap smears or colon screening. No      Patient is accompanied by self I have received verbal consent from Ana María Aparicio to discuss any/all medical information while they are present in the room.

## 2022-05-09 NOTE — PROGRESS NOTES
HISTORY OF PRESENT ILLNESS  Anibal Graff is a 68 y.o. male. History of pancreatitis  AAA    Most recent underwent a CT scan with mostly resolution of the pancreatitis  AAA unchanged  Now with evidence of retroperitoneal lymphadenopathy    MRI is pending    Appetite ok  Wt down 10# last month  BMs ok      ____________________________________________________________________________  Patient presents with:  Mass: Seen at the request of Dr Lucius Mcduffie for eval of retroperitoneal lymphadenopathy     /80 (BP 1 Location: Left upper arm, BP Patient Position: Sitting, BP Cuff Size: Adult)   Pulse 65   Temp 97.3 °F (36.3 °C) (Temporal)   Resp 16   Ht 5' 9\" (1.753 m)   Wt 109.8 kg (242 lb)   SpO2 96%   BMI 35.74 kg/m²   Past Medical History:  No date: AAA (abdominal aortic aneurysm) ([facility] [address]      Comment:  22 CT: 3.7 cm; stable  No date: Alcohol abuse      Comment:  20 drinks/week  2019: At risk for sleep apnea      Comment:  during phone assessment for PAT, LEVI 6  No date: Colon polyp  No date: Elevated CPK  No date:  Former smoker, stopped smoking in distant past  No date: Hyperlipidemia  No date: Hypertension  No date: Obesity  No date: Psoriasis  No date: Recurrent pancreatitis      Comment:  2/ ETOH use  No date: Retroperitoneal lymphadenopathy      Comment:  CT 22 - most prominent around celiac axis; concern                for lymphoma  No date: Rosacea  Past Surgical History:  2019: COLONOSCOPY; N/A      Comment:  COLONOSCOPY performed by Mitul Ji MD at [facility]               AMBULATORY OR  2014: HX COLONOSCOPY  No date: HX HERNIA REPAIR      Comment:  umbilical  Social History    Socioeconomic History      Marital status:     Tobacco Use      Smoking status: Former Smoker        Packs/day: 1.00        Years: 20.00        Pack years: 21        Quit date:         Years since quittin.3      Smokeless tobacco: Never Used    Vaping Use      Vaping Use: Never used    Substance and Sexual Activity      Alcohol use: Yes        Alcohol/week: 20.0 standard drinks        Types: 20 Cans of beer per week      Drug use: Never    Review of patient's family history indicates:  Problem: Cancer      Relation: Mother          Age of Onset: (Not Specified)          Comment: colon    Current Outpatient Medications:  metroNIDAZOLE (METROCREAM) 0.75 % topical cream, Apply  to affected area two (2) times a day. Use a thin layer to affected areas after washing  verapamil ER (CALAN-SR) 240 mg CR tablet, TAKE ONE TABLET BY MOUTH EVERY DAY   bumetanide (BUMEX) 0.5 mg tablet, TAKE ONE TABLET BY MOUTH EVERY DAY  lisinopriL (PRINIVIL, ZESTRIL) 20 mg tablet, TAKE ONE TABLET BY MOUTH EVERY DAY  fenofibrate (LOFIBRA) 160 mg tablet, TAKE ONE TABLET BY MOUTH EVERY DAY  potassium (POTASSIMIN PO), Take  by mouth daily. magnesium oxide (MAG-OX) 400 mg tablet, Take 400 mg by mouth daily. omega-3 fatty acids Cap, Take 500 mg by mouth daily. aspirin 81 mg tablet, Take 81 mg by mouth four (4) days a week. No current facility-administered medications for this visit. Allergies: No Known Allergies  _____________________________________________________________________________      Mass  The history is provided by the patient. This is a new problem. The current episode started more than 1 week ago. The problem occurs constantly. The problem has not changed since onset. Pertinent negatives include no chest pain, no abdominal pain, no headaches and no shortness of breath. The treatment provided no relief. Review of Systems   Constitutional: Negative for chills, fever and weight loss. HENT: Negative for ear pain. Eyes: Negative for pain. Respiratory: Negative for shortness of breath. Cardiovascular: Negative for chest pain. Gastrointestinal: Negative for abdominal pain and blood in stool. Genitourinary: Negative for hematuria. Musculoskeletal: Negative for joint pain.    Skin: Negative for rash. Neurological: Negative for dizziness, focal weakness, seizures and headaches. Endo/Heme/Allergies: Does not bruise/bleed easily. Psychiatric/Behavioral: The patient does not have insomnia. Physical Exam  Constitutional:       General: He is not in acute distress. Appearance: He is well-developed. HENT:      Head: Normocephalic. Mouth/Throat:      Pharynx: No oropharyngeal exudate. Eyes:      General: No scleral icterus. Pupils: Pupils are equal, round, and reactive to light. Neck:      Trachea: No tracheal deviation. Cardiovascular:      Rate and Rhythm: Normal rate and regular rhythm. Heart sounds: Normal heart sounds. Pulmonary:      Effort: Pulmonary effort is normal.      Breath sounds: Normal breath sounds. No wheezing. Abdominal:      General: There is no distension. Palpations: Abdomen is soft. There is no mass. Tenderness: There is no abdominal tenderness. Hernia: No hernia is present. Musculoskeletal:         General: Normal range of motion. Cervical back: Neck supple. Lymphadenopathy:      Cervical: No cervical adenopathy. Skin:     General: Skin is warm. Findings: No erythema or rash. Neurological:      Mental Status: He is alert and oriented to person, place, and time. Psychiatric:         Behavior: Behavior normal.         ASSESSMENT and PLAN    ICD-10-CM ICD-9-CM    1. Lymphadenopathy  R59.1 785. 6       Discussed findings with Michael Muller and his wife. Will await the MRCP  If there is no pancreatic mass, I agree with proceeding with a lymph node biopsy. Exam is difficult due to his body habitus. I think I do feel some lymphadenopathy in his left groin. I think this would be the least invasive way to get a lymph node. Further management after the MRCP. Expressed understanding of our discussion and agreeable with the plan. Thank you for this consult.

## 2022-05-23 NOTE — PROGRESS NOTES
Please contact this patient to have him walk in to discuss his recent MRI findings in person. This can be done at any point this week.

## 2022-05-24 NOTE — PROGRESS NOTES
Subjective:     No chief complaint on file. Rocco Dejesus is a 68 y.o. M. This patient comes in at my request to discuss his recent MRCP findings. I had recently seen this patient for establishment, and he was noted to have had previous CT scan with finding of retroperitoneal lymphadenopathy. Based upon this, I had ordered an MRCP to evaluate him further. This demonstrated results as noted below concerning for esophageal cancer with liver, bone, and lymph node metastases. The patient says that he has a lymph node biopsy pending with general surgery. The entirety of today's appointment is spent discussing the MRCP findings, and next steps in the plan of care. He currently feels generally okay. He has had worsening dysphagia, and has had some early satiety with loss of appetite. Over the past few months, he has lost about 10 pounds or so. This is unintentional.  He has had no fevers or chills, melena, or hematochezia. He does relate to some lower back pain, in the upper lumbar area. His review of systems is otherwise negative. Routine Healthcare Maintenance issues are reviewed and discussed with the patient as noted below. Orders to update gaps in healthcare maintenance were placed as noted below in the Assessment and Plan, where applicable. The patient's physical examination is notable for obesity with a detailed physical examination deferred for today. Past Medical History:  Past Medical History:   Diagnosis Date    AAA (abdominal aortic aneurysm) ([facility] [address]     4/4/22 CT: 3.7 cm; stable    Alcohol abuse     20 drinks/week    At risk for sleep apnea 05/22/2019    during phone assessment for PAT, LEVI 6    Colon polyp     Elevated CPK     Esophageal cancer (HCC)     *PROVISIONAL DIAGNOSIS* - MRCP finding with esophageal mass, hepatic, bone, LN mets. LN bx pending.  H/O Referral placed 5/24/22    Former smoker, stopped smoking in distant past     Hyperlipidemia     Hypertension     Obesity     Psoriasis     Recurrent pancreatitis     2/2 ETOH use    Retroperitoneal lymphadenopathy     CT 22 - most prominent around celiac axis; concern for lymphoma    Rosacea        Past Surgical Histor:  Past Surgical History:   Procedure Laterality Date    COLONOSCOPY N/A 2019    COLONOSCOPY performed by Manuel Nunes MD at [facility] AMBULAT[facility] OR    HX COLONOSCOPY  2014    HX HERNIA REPAIR      umbilical       Allergies:  No Known Allergies    Medications:  Current Outpatient Medications   Medication Sig Dispense Refill    metroNIDAZOLE (METROCREAM) 0.75 % topical cream Apply  to affected area two (2) times a day. Use a thin layer to affected areas after washing      verapamil ER (CALAN-SR) 240 mg CR tablet TAKE ONE TABLET BY MOUTH EVERY DAY 90 Tablet 0    bumetanide (BUMEX) 0.5 mg tablet TAKE ONE TABLET BY MOUTH EVERY DAY 90 Tablet 0    lisinopriL (PRINIVIL, ZESTRIL) 20 mg tablet TAKE ONE TABLET BY MOUTH EVERY DAY 90 Tablet 0    fenofibrate (LOFIBRA) 160 mg tablet TAKE ONE TABLET BY MOUTH EVERY DAY 90 Tablet 0    potassium (POTASSIMIN PO) Take  by mouth daily.  magnesium oxide (MAG-OX) 400 mg tablet Take 400 mg by mouth daily.  omega-3 fatty acids Cap Take 500 mg by mouth daily.  aspirin 81 mg tablet Take 81 mg by mouth four (4) days a week. Social History:  Social History     Socioeconomic History    Marital status:    Tobacco Use    Smoking status: Former Smoker     Packs/day: 1.00     Years: 20.00     Pack years: 20.00     Quit date:      Years since quittin.4    Smokeless tobacco: Never Used   Vaping Use    Vaping Use: Never used   Substance and Sexual Activity    Alcohol use:  Yes     Alcohol/week: 20.0 standard drinks     Types: 20 Cans of beer per week    Drug use: Never       Family History:  Family History   Problem Relation Age of Onset    Cancer Mother         colon       Immunizations:  Immunization History   Administered Date(s) Administered    COVID-19, Pfizer Purple top, DILUTE for use, 12+ yrs, 30mcg/0.3mL dose 01/30/2021, 02/27/2021, 10/07/2021    Influenza High Dose Vaccine PF 11/05/2018, 09/01/2020    Influenza Vaccine 11/01/2015, 11/01/2021    Influenza, Quadrivalent, Adjuvanted (>65 Yrs FLUAD QUAD [id number]) 09/01/2020    Pneumococcal Conjugate (PCV-13) 04/20/2022    Pneumococcal Polysaccharide (PPSV-23) 09/01/2020    Tdap 01/24/2019    Zoster Recombinant 04/25/2019, 09/05/2019        Healthcare Maintenance:  Health Maintenance   Topic Date Due    Depression Screen  04/20/2023    Medicare Yearly Exam  04/21/2023    DTaP/Tdap/Td series (2 - Td or Tdap) 01/24/2029    Hepatitis C Screening  Completed    Shingrix Vaccine Age 50>  Completed    Flu Vaccine  Completed    COVID-19 Vaccine  Completed    Pneumococcal 65+ years  Completed        Review of Systems:  ROS:  Review of Systems   Constitutional: Positive for malaise/fatigue and weight loss. Negative for chills, diaphoresis and fever. Cardiovascular: Negative for chest pain, palpitations, orthopnea, claudication and leg swelling. Gastrointestinal: Negative for blood in stool, constipation and melena. ROS otherwise negative      Objective:     Vital Signs: There were no vitals taken for this visit. BMI:  There is no height or weight on file to calculate BMI. Physical Examination:  Physical Exam  Constitutional:       Appearance: Normal appearance. He is obese. HENT:      Head: Normocephalic and atraumatic. Pulmonary:      Effort: Pulmonary effort is normal.   Skin:     General: Skin is warm and dry. Neurological:      General: No focal deficit present. Mental Status: He is alert and oriented to person, place, and time. Mental status is at baseline.           Physical exam otherwise negative    Diagnostic Testing:    Laboratory Studies:  Appointment on 04/21/2022   Component Date Value Ref Range Status    Sed rate, automated 04/21/2022 28* 0 - 20 mm/hr Final    WBC 04/21/2022 8.0  4.1 - 11.1 K/uL Final    RBC 04/21/2022 4.67  4.10 - 5.70 M/uL Final    HGB 04/21/2022 14.2  12.1 - 17.0 g/dL Final    HCT 04/21/2022 44.3  36.6 - 50.3 % Final    MCV 04/21/2022 94.9  80.0 - 99.0 FL Final    MCH 04/21/2022 30.4  26.0 - 34.0 PG Final    MCHC 04/21/2022 32.1  30.0 - 36.5 g/dL Final    RDW 04/21/2022 12.5  11.5 - 14.5 % Final    PLATELET 97/17/6137 176  150 - 400 K/uL Final    MPV 04/21/2022 8.9  8.9 - 12.9 FL Final    NRBC 04/21/2022 0.0  0  WBC Final    ABSOLUTE NRBC 04/21/2022 0.00  0.00 - 0.01 K/uL Final    NEUTROPHILS 04/21/2022 66  32 - 75 % Final    LYMPHOCYTES 04/21/2022 22  12 - 49 % Final    MONOCYTES 04/21/2022 9  5 - 13 % Final    EOSINOPHILS 04/21/2022 2  0 - 7 % Final    BASOPHILS 04/21/2022 1  0 - 1 % Final    IMMATURE GRANULOCYTES 04/21/2022 0  0.0 - 0.5 % Final    ABS. NEUTROPHILS 04/21/2022 5.3  1.8 - 8.0 K/UL Final    ABS. LYMPHOCYTES 04/21/2022 1.7  0.8 - 3.5 K/UL Final    ABS. MONOCYTES 04/21/2022 0.7  0.0 - 1.0 K/UL Final    ABS. EOSINOPHILS 04/21/2022 0.2  0.0 - 0.4 K/UL Final    ABS. BASOPHILS 04/21/2022 0.1  0.0 - 0.1 K/UL Final    ABS. IMM.  GRANS. 04/21/2022 0.0  0.00 - 0.04 K/UL Final    DF 04/21/2022 AUTOMATED    Final    Sodium 04/21/2022 142  136 - 145 mmol/L Final    Potassium 04/21/2022 4.4  3.5 - 5.1 mmol/L Final    Chloride 04/21/2022 108  97 - 108 mmol/L Final    CO2 04/21/2022 27  21 - 32 mmol/L Final    Anion gap 04/21/2022 7  5 - 15 mmol/L Final    Glucose 04/21/2022 124* 65 - 100 mg/dL Final    BUN 04/21/2022 11  6 - 20 MG/DL Final    Creatinine 04/21/2022 0.85  0.70 - 1.30 MG/DL Final    BUN/Creatinine ratio 04/21/2022 13  12 - 20   Final    GFR est AA 04/21/2022 >60  >60 ml/min/1.73m2 Final    GFR est non-AA 04/21/2022 >60  >60 ml/min/1.73m2 Final    Comment: Estimated GFR is calculated using the IDMS-traceable Modification of Diet in  Renal Disease (MDRD) Study equation, reported for both  Americans  (GFRAA) and non- Americans (GFRNA), and normalized to 1.73m2 body  surface area. The physician must decide which value applies to the patient.  Calcium 04/21/2022 8.8  8.5 - 10.1 MG/DL Final    Bilirubin, total 04/21/2022 0.7  0.2 - 1.0 MG/DL Final    ALT (SGPT) 04/21/2022 47  12 - 78 U/L Final    AST (SGOT) 04/21/2022 40* 15 - 37 U/L Final    Alk. phosphatase 04/21/2022 78  45 - 117 U/L Final    Protein, total 04/21/2022 7.4  6.4 - 8.2 g/dL Final    Albumin 04/21/2022 3.6  3.5 - 5.0 g/dL Final    Globulin 04/21/2022 3.8  2.0 - 4.0 g/dL Final    A-G Ratio 04/21/2022 0.9* 1.1 - 2.2   Final    Lipase 04/21/2022 123  73 - 393 U/L Final   Hospital Outpatient Visit on 04/04/2022   Component Date Value Ref Range Status    Creatinine (POC) 04/04/2022 0.70  0.6 - 1.3 mg/dL Final    GFRAA, POC 04/04/2022 >60  >60 ml/min/1.73m2 Final    GFRNA, POC 04/04/2022 >60  >60 ml/min/1.73m2 Final    Estimated GFR is calculated using the IDMS-traceable Modification of Diet in Renal Disease (MDRD) Study equation, reported for both  Americans (GFRAA) and non- Americans (GFRNA), and normalized to 1.73m2 body surface area. The physician must decide which value applies to the patient. Office Visit on 12/16/2021   Component Date Value Ref Range Status    CK 12/16/2021 337* 39 - 308 U/L Final    TSH 12/16/2021 2.45  0.36 - 3.74 uIU/mL Final    Comment:   Due to TSH heterogeneity, both structurally and degree of glycosylation,  monoclonal antibodies used in the TSH assay may not accurately quantitate TSH. Therefore, this result should be correlated with clinical findings as well as  with other assessments of thyroid function, e.g., free T4, free T3.       Sodium 12/16/2021 140  136 - 145 mmol/L Final    Potassium 12/16/2021 4.0  3.5 - 5.1 mmol/L Final    Chloride 12/16/2021 105  97 - 108 mmol/L Final    CO2 12/16/2021 30  21 - 32 mmol/L Final    Anion gap 12/16/2021 5  5 - 15 mmol/L Final    Glucose 12/16/2021 106* 65 - 100 mg/dL Final    BUN 12/16/2021 16  6 - 20 MG/DL Final    Creatinine 12/16/2021 0.72  0.70 - 1.30 MG/DL Final    BUN/Creatinine ratio 12/16/2021 22* 12 - 20   Final    GFR est AA 12/16/2021 >60  >60 ml/min/1.73m2 Final    GFR est non-AA 12/16/2021 >60  >60 ml/min/1.73m2 Final    Comment: Estimated GFR is calculated using the IDMS-traceable Modification of Diet in  Renal Disease (MDRD) Study equation, reported for both  Americans  (GFRAA) and non- Americans (GFRNA), and normalized to 1.73m2 body  surface area. The physician must decide which value applies to the patient.  Calcium 12/16/2021 9.1  8.5 - 10.1 MG/DL Final    Bilirubin, total 12/16/2021 0.5  0.2 - 1.0 MG/DL Final    ALT (SGPT) 12/16/2021 38  12 - 78 U/L Final    AST (SGOT) 12/16/2021 29  15 - 37 U/L Final    Alk. phosphatase 12/16/2021 58  45 - 117 U/L Final    Protein, total 12/16/2021 7.0  6.4 - 8.2 g/dL Final    Albumin 12/16/2021 4.0  3.5 - 5.0 g/dL Final    Globulin 12/16/2021 3.0  2.0 - 4.0 g/dL Final    A-G Ratio 12/16/2021 1.3  1.1 - 2.2   Final    Cholesterol, total 12/16/2021 226* <200 MG/DL Final    Triglyceride 12/16/2021 128  <150 MG/DL Final    Comment: Based on NCEP-ATP III:  Triglycerides <150 mg/dL  is considered normal, 150-199  mg/dL  borderline high,  200-499 mg/dL high and  greater than or equal to 500  mg/dL very high.  HDL Cholesterol 12/16/2021 51  MG/DL Final    Comment: Based on NCEP ATP III, HDL Cholesterol <40 mg/dL is considered low and >60  mg/dL is elevated.       LDL, calculated 12/16/2021 149.4* 0 - 100 MG/DL Final    Comment: Based on the NCEP-ATP: LDL-C concentrations are considered  optimal <100 mg/dL,  near optimal/above Normal 100-129 mg/dL Borderline High: 130-159, High: 160-189  mg/dL Very High: Greater than or equal to 190 mg/dL      VLDL, calculated 12/16/2021 25.6  MG/DL Final    CHOL/HDL Ratio 12/16/2021 4.4  0.0 - 5.0   Final    WBC 12/16/2021 7.2  4.1 - 11.1 K/uL Final    RBC 12/16/2021 4.61  4.10 - 5.70 M/uL Final    HGB 12/16/2021 14.2  12.1 - 17.0 g/dL Final    HCT 12/16/2021 42.4  36.6 - 50.3 % Final    MCV 12/16/2021 92.0  80.0 - 99.0 FL Final    MCH 12/16/2021 30.8  26.0 - 34.0 PG Final    MCHC 12/16/2021 33.5  30.0 - 36.5 g/dL Final    RDW 12/16/2021 12.4  11.5 - 14.5 % Final    PLATELET 61/26/1568 583  150 - 400 K/uL Final    MPV 12/16/2021 9.4  8.9 - 12.9 FL Final    NRBC 12/16/2021 0.0  0  WBC Final    ABSOLUTE NRBC 12/16/2021 0.00  0.00 - 0.01 K/uL Final    NEUTROPHILS 12/16/2021 61  32 - 75 % Final    LYMPHOCYTES 12/16/2021 25  12 - 49 % Final    MONOCYTES 12/16/2021 10  5 - 13 % Final    EOSINOPHILS 12/16/2021 3  0 - 7 % Final    BASOPHILS 12/16/2021 1  0 - 1 % Final    IMMATURE GRANULOCYTES 12/16/2021 0  0.0 - 0.5 % Final    ABS. NEUTROPHILS 12/16/2021 4.4  1.8 - 8.0 K/UL Final    ABS. LYMPHOCYTES 12/16/2021 1.8  0.8 - 3.5 K/UL Final    ABS. MONOCYTES 12/16/2021 0.7  0.0 - 1.0 K/UL Final    ABS. EOSINOPHILS 12/16/2021 0.2  0.0 - 0.4 K/UL Final    ABS. BASOPHILS 12/16/2021 0.1  0.0 - 0.1 K/UL Final    ABS. IMM. GRANS.  12/16/2021 0.0  0.00 - 0.04 K/UL Final    DF 12/16/2021 AUTOMATED    Final    Color 12/16/2021 DARK YELLOW    Final    Color Reference Range: Straw, Yellow or Dark Yellow    Appearance 12/16/2021 CLOUDY* CLEAR   Final    Specific gravity 12/16/2021 1.027  1.003 - 1.030   Final    pH (UA) 12/16/2021 5.0  5.0 - 8.0   Final    Protein 12/16/2021 Negative  Negative mg/dL Final    Glucose 12/16/2021 Negative  Negative mg/dL Final    Ketone 12/16/2021 TRACE* Negative mg/dL Final    Bilirubin 12/16/2021 Negative  Negative   Final    Blood 12/16/2021 Negative  Negative   Final    Urobilinogen 12/16/2021 0.2  0.2 - 1.0 EU/dL Final    Nitrites 12/16/2021 Negative  Negative   Final    Leukocyte Esterase 12/16/2021 Negative  Negative   Final    WBC 12/16/2021 0-4  0 - 4 /hpf Final    RBC 12/16/2021 0-5  0 - 5 /hpf Final    Epithelial cells 12/16/2021 FEW  FEW /lpf Final    Comment: Epithelial cell category consists of squamous cells and /or transitional  urothelial cells. Renal tubular cells, if present, are separately identified as  such.  Bacteria 12/16/2021 Negative  Negative /hpf Final    UA:UC IF INDICATED 12/16/2021 CULTURE NOT INDICATED BY UA RESULT  CULTURE NOT INDICATED BY UA RESULT   Final    Mucus 12/16/2021 2+* Negative /lpf Final         Radiographic Studies:  XR Results (most recent):  Results from [place] encounter on 05/31/12    XR ABD ACUTE W 1 V CHEST    Narrative  **Final Report**      ICD Codes / Adm. Diagnosis: 70  569394 / Nausea  Flank Pain  Examination:  CR ABDOMEN ACUTE W PA CHEST  - [id number] - May 31 2012  4:54PM  Accession No:  [id number]  Reason:  Abdominal Pain      REPORT:  INDICATION:   Abdominal Pain    COMPARISON: 08/21/2008. FINDINGS: The upright chest radiograph demonstrates shallow inspiration with  bibasilar atelectasis. Heart and mediastinal shadows are stable. .. There is  no pleural effusion or free air under the diaphragm. Supine and upright  views of the abdomen demonstrate a nonobstructive bowel  gas pattern. There is no free intraperitoneal air. No soft tissue masses or  pathologic calcifications are identified. The bones are within normal  limits. Surgical anchors overlie the lower abdomen. IMPRESSION:  1. bibasilar atelectasis. 2. No obstruction or free air          Signing/Reading Doctor: Pérez Lynn ([id number])  Approved: Pérez Lynn ([id number])  05/31/2012     CESARIO Results (most recent):  No results found for this or any previous visit.      CT Results (most recent):  Results from Hospital Encounter encounter on 04/04/22    CTA ABDOMEN PELV W CONT    Narrative  CLINICAL HISTORY:  Abdominal aortic aneurysm    COMPARISON: CT abdomen pelvis of 4/10/2020    TECHNIQUE: CT of the abdomen and pelvis with  IV contrast ,100 cc Isovue-370 is  performed. Axial images from the abdomen to the level of the upper thighs is  obtained. Manual post-processing of the images and coronal reformatting is also  performed. Standard dose modulation was utilized to reduce the overall radiation  dose administered to the patient. Multiplanar reformatted imaging was  performed. Sagittal and coronal reformatting. CT dose reduction was achieved  through use of a standardized protocol tailored for this examination and  automatic exposure control for dose modulation. 3-D MIP reconstructed images were obtained in the coronal plane. FINDINGS:  Vasculature: Aorta: Fusiform abdominal aortic aneurysm, infrarenal. Sac measures 3.7 cm in  diameter, unchanged compared to February 10, 2021. Visceral arteries: Celiac, superior mesenteric, and inferior mesenteric arteries  are widely patent. Renal arteries: Widely patent bilaterally. Right iliac arteries: Widely patent. Left iliac arteries: Widely patent. Lower chest:  Visualized part of the lungs are clear. No nodules or masses. No pericardial  effusion or pneumothorax. The distal esophagus is unremarkable. There are artery  calcifications. Abdomen/Pelvis:  Normal liver morphology. Normal gallbladder. Bilateral renal cysts are unchanged  compared to prior. Stomach, duodenum, and small bowel are unremarkable. Sigmoid diverticulosis. The  colon is otherwise unremarkable. Retroperitoneal lymphadenopathy, involving the lymph nodes immediately above the  celiac artery, and periaortic nodes. The celiac conglomerate measures 5.7 x 4.2  cm (2-64), and encases the left gastric artery. Mesenteric fat stranding and prominent although not pathologically enlarged  lymph nodes, compatible with noemí mesentery sign.  This fat stranding extends to  the peripancreatic space, although appears to originate from the mesentery, this  is reversed compared to prior examination, previously appeared that the  peripancreatic fat stranding was a primary process and mesenteric edema was  secondary. Prior ventral hernia repair. Impression  1. Stable fusiform infrarenal abdominal aortic aneurysm, 3.7 cm.  2. New retroperitoneal lymphadenopathy, most prominent around the celiac axis,  with mesenteric fat stranding which appears to extend toward the peripancreatic  space. These findings are highly suspicious for lymphoma. Peripancreatic  inflammation appears secondary to neoplastic process. Recommend correlation with  lipase levels. Consider tissue sampling of the lymph nodes. DEXA Results (most recent):  No results found for this or any previous visit. MRI Results (most recent):  Results from [place] encounter on 05/20/22    MRI ABD W MRCP W WO CONT    Narrative  EXAM: MRI ABD W MRCP W WO CONT    INDICATION: Retroperitoneal lymphadenopathy. COMPARISON: CTA 4/4/2022, Ultrasound 2/11/2021 and CT 2/10/2021. CONTRAST: 20 mL MultiHance IV. TECHNIQUE: Multisequence and multiplanar MRI of the abdomen was performed prior  to and after the administration of IV contrast. Multiphase postcontrast imaging  obtained. Heavily T2-weighted thick slab and thin slice images were obtained in  the oblique coronal plane through the biliary tree (MRCP). FINDINGS:    VISUALIZED LOWER CHEST: There is a enhancing mass measuring 2.8 x 2.7 cm  inseparable from the distal esophagus. Right precardial lymph node measuring 2.3  x 2.4 cm. Trace right pericardial effusion. LIVER: There are multiple enhancing masses, the largest which is in the  subcapsular segment 7 posterior laterally measuring 2.8 x 3.5 cm in segment 8  measuring 1.6 x 1.9 cm. GALLBLADDER: Intraluminal gallstones with otherwise unremarkable appearance. BILIARY DUCTS: The bile ducts are nondilated with no evident filling defect,  stricture, or mucosal abnormality. SPLEEN: Unremarkable.   PANCREAS: At the terminus of the pancreatic duct in the tail there is a 1.0 x  1.6 cm nonenhancing fluid signal intensity structure with no other mass and no  duct dilation. There is reduction in previously seen peripancreatic stranding  without peripancreatic fluid collection, vascular abnormality, or other  abnormality. No evidence of pancreas divisum. ADRENALS: Unremarkable. KIDNEYS: Multiple simple fluid signal intensity cysts with a few cysts showing  intrinsic T1 signal intensity but no suspicious enhancing nodular tissue and for  which no follow-up is required. No enhancing mass, hydronephrosis, or other  normality. STOMACH: Unremarkable. VISUALIZED BOWEL: No dilatation or wall thickening. Noninflamed appearing left  and sigmoid colon diverticula. PERITONEUM: No ascites or pneumoperitoneum. RETROPERITONEUM: Extensive lymphadenopathy is redemonstrated with periaortic  nodes measuring up to 3.4 x 4.8 cm and 2.5 x 3.6 cm. VISUALIZED PELVIS: Unremarkable. BONES AND SOFT TISSUES: Enhancing lesions in the L3 and L4 vertebral bodies and  left ilium. Degenerative spine change. No acute fracture. ADDITIONAL COMMENTS: N/A    Impression  1. Findings most suspicious for primary distal esophageal neoplasm with hepatic  metastases,  precardial and retroperitoneal lymph adela metastases, and osseous  metastases with other neoplastic processes with metastatic periesophageal node  also in the differential.    2. There is decreased peripancreatic stranding compatible with interval  improvement in pancreatitis with no complication identified otherwise. 3. 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 23X       Assessment/Plan:       ICD-10-CM ICD-9-CM    1. Malignant neoplasm of lower third of esophagus (HCC)  C15.5 150.5 REFERRAL TO HEMATOLOGY ONCOLOGY          This patient likely has esophageal cancer, which is probably metastatic.   The enhancing lesions observed on the MRCP in the lumbar spine probably account for his lumbar back pain. I expressed the patient my concern for metastatic esophageal cancer, noting to him that if confirmed, that this would be a fairly advanced malignancy. I strongly urged him to maintain the appointment for his lymph node biopsy with General Surgery. I also further advised him that I would place a referral to hematology/oncology, and to maintain this appointment once established. He endorses agreement and understanding. I advised the patient to follow-up with me in no more than 3 months to ensure that he was getting all required care for this issue. Return precautions discussed. He endorses agreement and understanding. Fanta Carcamo MD    Please note that this dictation was completed with [employer], the computer voice recognition software. Quite often unanticipated grammatical, syntax, homophones, and other interpretive errors are inadvertently transcribed by the computer software. Please disregard these errors. Please excuse any errors that have escaped final proofreading.

## 2022-06-01 NOTE — PROGRESS NOTES
[facility] at [address]  W: [phone] F: [phone]      Reason for Visit:   Yuri Garcia is a 68 y.o. male who is seen in consultation at the request of Dr. Alexa Rogers for evaluation of widespread lymphadenopathy, malignancy. Hematology / Oncology Treatment History:     Hematological/Oncological Diagnosis: Suspected widely metastatic esophageal malignancy    Date of Diagnosis: Pending    Treatment course: Pending      History of Present Illness:     51-year-old male with a relevant medical history significant for abdominal aortic aneurysm, alcohol abuse and dependence, hypertension, dyslipidemia, psoriasis, presents for evaluation and treatment of suspected metastatic esophageal malignancy. MRI of the patient's abdomen shows the following  FINDINGS:     VISUALIZED LOWER CHEST: There is a enhancing mass measuring 2.8 x 2.7 cm  inseparable from the distal esophagus. Right precardial lymph node measuring 2.3  x 2.4 cm. Trace right pericardial effusion. LIVER: There are multiple enhancing masses, the largest which is in the  subcapsular segment 7 posterior laterally measuring 2.8 x 3.5 cm in segment 8  measuring 1.6 x 1.9 cm. GALLBLADDER: Intraluminal gallstones with otherwise unremarkable appearance. BILIARY DUCTS: The bile ducts are nondilated with no evident filling defect,  stricture, or mucosal abnormality. SPLEEN: Unremarkable. PANCREAS: At the terminus of the pancreatic duct in the tail there is a 1.0 x  1.6 cm nonenhancing fluid signal intensity structure with no other mass and no  duct dilation. There is reduction in previously seen peripancreatic stranding  without peripancreatic fluid collection, vascular abnormality, or other  abnormality. No evidence of pancreas divisum. ADRENALS: Unremarkable.   KIDNEYS: Multiple simple fluid signal intensity cysts with a few cysts showing  intrinsic T1 signal intensity but no suspicious enhancing nodular tissue and for  which no follow-up is required. No enhancing mass, hydronephrosis, or other  normality. STOMACH: Unremarkable. VISUALIZED BOWEL: No dilatation or wall thickening. Noninflamed appearing left  and sigmoid colon diverticula. PERITONEUM: No ascites or pneumoperitoneum. RETROPERITONEUM: Extensive lymphadenopathy is redemonstrated with periaortic  nodes measuring up to 3.4 x 4.8 cm and 2.5 x 3.6 cm. VISUALIZED PELVIS: Unremarkable. BONES AND SOFT TISSUES: Enhancing lesions in the L3 and L4 vertebral bodies and  left ilium. Degenerative spine change. No acute fracture. ADDITIONAL COMMENTS: N/A     IMPRESSION     1. Findings most suspicious for primary distal esophageal neoplasm with hepatic  metastases,  precardial and retroperitoneal lymph adela metastases, and osseous  metastases with other neoplastic processes with metastatic periesophageal node  also in the differential.      2. There is decreased peripancreatic stranding compatible with interval  improvement in pancreatitis with no complication identified otherwise.     3. 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 No no thoracic or bone imaging is available for review. Most recent labs available from April 21 show no significant cytopenias or significant LFT abnormalities. On interview, the patient complains of back pain, fatigue. He also has complaints of difficulty with swallowing particularly with food slowly moving into his stomach. No reported choking or regurgitation of food reported. He denies any blood in his stool and denies any tarry stools. No reported hematemesis. Social history reviewed, notable for 20-pack-year smoking history, now quit. As well as current active alcohol use of about 3 beers per day. Family history reviewed, notable for colon cancer in his mother only.     Review of Systems: A complete review of systems was obtained, negative except as described above. Past Medical History:   Diagnosis Date    AAA (abdominal aortic aneurysm) ([facility] [address]     22 CT: 3.7 cm; stable    Alcohol abuse     20 drinks/week    At risk for sleep apnea 2019    during phone assessment for PAT, LEVI 6    Colon polyp     Elevated CPK     Esophageal cancer (HCC)     *PROVISIONAL DIAGNOSIS* - MRCP finding with esophageal mass, hepatic, bone, LN mets. LN bx pending. H/O Referral placed 22    Former smoker, stopped smoking in distant past     Hyperlipidemia     Hypertension     Obesity     Psoriasis     Recurrent pancreatitis     2/2 ETOH use    Retroperitoneal lymphadenopathy     CT 22 - most prominent around celiac axis; concern for lymphoma    Rosacea       Past Surgical History:   Procedure Laterality Date    COLONOSCOPY N/A 2019    COLONOSCOPY performed by Courtney Gomez MD at [place] [address] HX COLONOSCOPY  2014    HX HERNIA REPAIR      umbilical      Social History     Tobacco Use    Smoking status: Former Smoker     Packs/day: 1.00     Years: 20.00     Pack years: 20.00     Quit date: 26     Years since quittin.4    Smokeless tobacco: Never Used   Substance Use Topics    Alcohol use: Yes     Alcohol/week: 20.0 standard drinks     Types: 20 Cans of beer per week      Family History   Problem Relation Age of Onset    Cancer Mother         colon     Current Outpatient Medications   Medication Sig    metroNIDAZOLE (METROCREAM) 0.75 % topical cream Apply  to affected area two (2) times a day.  Use a thin layer to affected areas after washing    verapamil ER (CALAN-SR) 240 mg CR tablet TAKE ONE TABLET BY MOUTH EVERY DAY    bumetanide (BUMEX) 0.5 mg tablet TAKE ONE TABLET BY MOUTH EVERY DAY    lisinopriL (PRINIVIL, ZESTRIL) 20 mg tablet TAKE ONE TABLET BY MOUTH EVERY DAY    fenofibrate (LOFIBRA) 160 mg tablet TAKE ONE TABLET BY MOUTH EVERY DAY    potassium (POTASSIMIN PO) Take  by mouth daily.  magnesium oxide (MAG-OX) 400 mg tablet Take 400 mg by mouth daily.  omega-3 fatty acids Cap Take 500 mg by mouth daily.  aspirin 81 mg tablet Take 81 mg by mouth four (4) days a week. No current facility-administered medications for this visit. No Known Allergies         Physical Exam:     Visit Vitals  /74 (BP 1 Location: Left upper arm, BP Patient Position: Sitting)   Pulse 81   Temp 97.7 °F (36.5 °C) (Temporal)   Ht 5' 9\" (1.753 m)   Wt 230 lb 6.4 oz (104.5 kg)   SpO2 92%   BMI 34.02 kg/m²     ECOG PS: 2  General: alert, cooperative, no distress   Mental  status: normal mood, behavior, speech, dress, motor activity, and thought processes, able to follow commands   HENT: NCAT   Neck: no visualized mass   Resp: no respiratory distress   Neuro: no gross deficits   Skin: no discoloration or lesions of concern on visible areas   Psychiatric: normal affect, consistent with stated mood, no evidence of hallucinations           Results:     Lab Results   Component Value Date/Time    WBC 8.0 04/21/2022 10:45 AM    HGB 14.2 04/21/2022 10:45 AM    HCT 44.3 04/21/2022 10:45 AM    PLATELET 810 36/43/6444 10:45 AM    MCV 94.9 04/21/2022 10:45 AM    ABS.  NEUTROPHILS 5.3 04/21/2022 10:45 AM    HGB (POC) 15.1 01/24/2019 01:24 PM    HCT (POC) 43.8 01/24/2019 01:24 PM     Lab Results   Component Value Date/Time    Sodium 142 04/21/2022 10:45 AM    Potassium 4.4 04/21/2022 10:45 AM    Chloride 108 04/21/2022 10:45 AM    CHLORIDE 103.0 01/10/2018 01:05 PM    CO2 27 04/21/2022 10:45 AM    Glucose 124 (H) 04/21/2022 10:45 AM    BUN 11 04/21/2022 10:45 AM    Creatinine 0.85 04/21/2022 10:45 AM    GFR est AA >60 04/21/2022 10:45 AM    GFR est non-AA >60 04/21/2022 10:45 AM    Calcium 8.8 04/21/2022 10:45 AM    Sodium (POC) 144.0 01/10/2018 01:05 PM    Potassium (POC) 4.4 01/10/2018 01:05 PM    Creatinine (POC) 0.70 04/04/2022 08:58 AM     Lab Results   Component Value Date/Time    Bilirubin, total 0.7 04/21/2022 10:45 AM    ALT (SGPT) 47 04/21/2022 10:45 AM    Alk. phosphatase 78 04/21/2022 10:45 AM    Protein, total 7.4 04/21/2022 10:45 AM    Albumin 3.6 04/21/2022 10:45 AM    Globulin 3.8 04/21/2022 10:45 AM       CT Results (most recent):  Results from Hospital Encounter encounter on 04/04/22    CTA ABDOMEN PELV W CONT    Narrative  CLINICAL HISTORY:  Abdominal aortic aneurysm    COMPARISON: CT abdomen pelvis of 4/10/2020    TECHNIQUE: CT of the abdomen and pelvis with  IV contrast ,100 cc Isovue-370 is  performed. Axial images from the abdomen to the level of the upper thighs is  obtained. Manual post-processing of the images and coronal reformatting is also  performed. Standard dose modulation was utilized to reduce the overall radiation  dose administered to the patient. Multiplanar reformatted imaging was  performed. Sagittal and coronal reformatting. CT dose reduction was achieved  through use of a standardized protocol tailored for this examination and  automatic exposure control for dose modulation. 3-D MIP reconstructed images were obtained in the coronal plane. FINDINGS:  Vasculature: Aorta: Fusiform abdominal aortic aneurysm, infrarenal. Sac measures 3.7 cm in  diameter, unchanged compared to February 10, 2021. Visceral arteries: Celiac, superior mesenteric, and inferior mesenteric arteries  are widely patent. Renal arteries: Widely patent bilaterally. Right iliac arteries: Widely patent. Left iliac arteries: Widely patent. Lower chest:  Visualized part of the lungs are clear. No nodules or masses. No pericardial  effusion or pneumothorax. The distal esophagus is unremarkable. There are artery  calcifications. Abdomen/Pelvis:  Normal liver morphology. Normal gallbladder. Bilateral renal cysts are unchanged  compared to prior. Stomach, duodenum, and small bowel are unremarkable. Sigmoid diverticulosis. The  colon is otherwise unremarkable.     Retroperitoneal lymphadenopathy, involving the lymph nodes immediately above the  celiac artery, and periaortic nodes. The celiac conglomerate measures 5.7 x 4.2  cm (2-64), and encases the left gastric artery. Mesenteric fat stranding and prominent although not pathologically enlarged  lymph nodes, compatible with noemí mesentery sign. This fat stranding extends to  the peripancreatic space, although appears to originate from the mesentery, this  is reversed compared to prior examination, previously appeared that the  peripancreatic fat stranding was a primary process and mesenteric edema was  secondary. Prior ventral hernia repair. Impression  1. Stable fusiform infrarenal abdominal aortic aneurysm, 3.7 cm.  2. New retroperitoneal lymphadenopathy, most prominent around the celiac axis,  with mesenteric fat stranding which appears to extend toward the peripancreatic  space. These findings are highly suspicious for lymphoma. Peripancreatic  inflammation appears secondary to neoplastic process. Recommend correlation with  lipase levels. Consider tissue sampling of the lymph nodes. Assessment and Recommendations:     # Suspected widely metastatic esophageal malignancy     -Available imaging shows retroperitoneal lymphadenopathy, distal esophageal mass measuring 2.8 x 2.7 cm with associated pericardial lymphadenopathy    -No dedicated thoracic imaging available for review. - Overall radiographic findings are highly concerning for metastatic esophageal carcinoma    -Today I had a long discussion about treatment options available for the treatment of esophageal cancers. I discussed that in the case of stage IV malignancies, these cancers are often not curable but can be controlled for some time. I discussed that treatment options will depend on molecular testing and the results of targetable molecular marker findings.   I discussed with the patient that treatment would likely include infusional chemotherapy, possibly with the addition of targeted treatment options. To that end, the patient is agreeable to proceeding with port placement as well as liver biopsy.    -Plan to complete staging with PET scan. # Alcohol dependence  -I counseled the patient on decreasing his alcohol intake. He reports that he drinks socially with his friends and will try to decrease his alcohol intake.     # Dysphagia  -Likely secondary to some amount of distal esophageal obstruction  -Given radiographic evidence of widespread metastases and extension of malignancy through the esophageal wall, I suspect that attempts at dilation would likely be very risky for perforation.  -The patient is able to eat most foods though he reports dry foods like bread are more difficult to tolerate.  -We will consult registered dietitian for assistance with dietary planning  -The patient has had some weight loss over the last month    Signed By: Adriana Mccarty MD      Attending Medical Oncologist   [facility]

## 2022-06-01 NOTE — PROGRESS NOTES
Lakesha Alfred is a 68 y.o. male here for new patient appt for met esophageal carcinoma. Referred by Dr Hussein Parekh. Pt has lost about 23 lbs recently and does have issues eating. States his food has a hard time going down. Lower back pain started about 3 weeks ago. Pt states he can tolerate a lot of pain and does not usually take any pain medications. 1. Have you been to the ER, urgent care clinic since your last visit? Hospitalized since your last visit? New Pt    2. Have you seen or consulted any other health care providers outside of the [address] [place] [address] since your last visit? Include any pap smears or colon screening.  New Pt

## 2022-06-01 NOTE — PROGRESS NOTES
Oncology Social Work  Psychosocial Assessment    Reason for Assessment:      [] Social Work Referral [x] Initial Assessment [x] New Diagnosis [] Other    Advance Care Planning:  Advance Care Planning 2/11/2021   Confirm Advance Directive None   Patient Would Like to Complete Advance Directive No       Sources of Information:    [x]Patient  []Family  [x]Staff  [x]Medical Record    Mental Status:    [x]Alert  []Lethargic  []Unresponsive   [] Unable to assess   Oriented to:  [x]Person  [x]Place  [x]Time  [x]Situation      Relationship Status:  []Single  []  []Significant Other/Life Partner  []  []  []    Living Circumstances:  []Lives Alone  [x]Family/Significant Other in Household  []Roommates  []Children in the Home  []Paid Caregivers  []Assisted Living Facility/Group Home  []Skilled [address]  []Homeless  []Incarcerated  []Environmental/Care Concerns  []Other:    Employment Status:  []Employed Full-time []Employed Part-time []Homemaker  [x] Retired [] Short-Term Disability [] Long-Term Disability  [] Unemployed   [] [place]   [] SSDI  [] Self-Employment    Barriers to Learning:    []Language  []Developmental  []Cognitive  []Altered Mental Status  []Visual/Hearing Impairment  []Unable to Read/Write  []Motivational  [] Challenges Understanding Medical Jargon [x]No Barriers Identified      Financial/Legal Concerns:    []Uninsured  [x]Limited Income/Resources  []Non-Citizen  []Food Insecurity []No Concerns Identified   []Other:    Yazdanism/Spiritual/Existential:  Does patient have any spiritual or Islam beliefs? [x] Yes [] No  Is the patient involved in a spiritual, lianet or Islam community?  [] Yes [] No  Patient expressing spiritual/existential angst? [] Yes [x] No  Notes:    Support System:    Identified Support Person/Group:  []Strong  [x]Fair  []Limited    Coping with Illness:   [x]  Coping Well  [] Challenges Coping with Serious Illness [] Situational Depression [] Situational Anxiety [] Anticipatory Grief  [] Recent Loss [] Caregiver [place]            Narrative:    Met with patient and his   to introduce social work navigator role and supports. Pt  for 57.5 years and has 1 dtr and 1 son in [place]. Pt retired at [age] from [employer]. Per pt, spouse is praying for him. PT to have PET scan, biopsy, and a port. Plan:   1. Introduce self and role of the  in the [address] [place] [address] Dr. 2. Informed the patient of the [facility] and available resources there. 3. Continue to meet with the patient when he returns to the clinic for ongoing assessment of the patient's adjustment to his diagnosis and treatment. 4. Ongoing psychosocial support as desired by patient. Referral/Handouts:     Complementary therapies referral  Insurance/Entitlements referral  Financial/Medication assistance referral     Rebecca Brooks.  SMILEY Jaeger/BETTY  Supervisee in Social Work

## 2022-06-06 NOTE — ROUTINE PROCESS
Pt arrives ambulatory to angio department accompanied by wife Julianne Apley for mediport insertion procedure. All assessments completed and consent was reviewed. Education given was regarding procedure, conscious sedation, post-procedure care and  management/follow-up. Opportunity for questions was provided and all questions and concerns were addressed.

## 2022-06-06 NOTE — DISCHARGE INSTRUCTIONS
Patient Education        Implanted [facility] for Chemotherapy: Care Instructions  Overview  An implanted port is a device that's placed, in most cases, under the skin of your chest below your collarbone. It's made of plastic, stainless steel, or titanium. The port is about the size of a quarter, but thicker. A thin, flexible tube called a catheter runs from the port under your skin into a large vein. A membrane (septum) similar to a pencil eraser is in the center of the port. A nurse uses a needle to put chemotherapy or other medicine and fluids through the septum into a blood vessel. The port may be used to draw blood for tests only if another vein, such as in the hand or arm, can't be used. An implanted port can be used for months. A special needle (called a Nobles needle) may stay in the port for a short time. The port needs regular care to make sure that it doesn't get blocked. Tell your doctor if you take aspirin or some other blood thinner. These medicines can increase the chance of bleeding inside your body. Follow-up care is a key part of your treatment and safety. Be sure to make and go to all appointments, and call your doctor if you are having problems. It's also a good idea to know your test results and keep a list of the medicines you take. How can you care for yourself at home? · You will probably need to take 1 day off from work and will be able return to normal activities shortly after. This depends on the type of work you do, why you have the port, and how you feel. · You probably will be able to bathe and swim. But you may need to avoid some activities if a Nobles needle is left in the port. Talk to your doctor about any limits on your activity. · Some clothes may rub the skin over the port. Do not wear a bra or suspenders that irritate your skin near the port. · You will get a medical alert card with information about your port. Carry this with you.  It will tell health care workers you have a port in case you need emergency care. · Your port will need regular flushing to keep it open. A nurse or other health professional will do this for you. When should you call for help? Call your doctor now or seek immediate medical care if:    · You have signs of infection, such as:  ? Increased pain, swelling, warmth, or redness near the port. ? Red streaks leading from the port. ? Pus draining from the port. ? A fever.     · You have pain or swelling in your neck or arm.     · You have trouble breathing. Watch closely for changes in your health, and be sure to contact your doctor if:    · You have any problems with your port. Where can you learn more? Go to http://www.gray.com/  Enter P577 in the search box to learn more about \"Implanted [facility] [employer] [facility] [place] for Chemotherapy: Care Instructions. \"  Current as of: July 1, 2021               Content Version: 13.2  © 2006-2022 [employer]. Care instructions adapted under license by [employer] (which disclaims liability or warranty for this information). If you have questions about a medical condition or this instruction, always ask your healthcare professional. [place] [address] any warranty or liability for your use of this information. You have received sedation medications today. YOU SHOULD NOT DRIVE FOR 24 HOURS, DO NOT OPERATE HEAVY MACHINERY, DO NOT MAKE ANY LEGAL DECISIONS OR SIGN LEGAL DOCUMENTS FOR 24 HOURS. DO NOT DRINK ALCOHOL, TAKE ANY MEDICATIONS UNLESS PRESCRIBED BY YOUR DOCTOR. IF YOU ARE A CAREGIVER, SOMEONE SHOULD TAKE THAT ROLE FOR 24 HOURS. Side effects of sedation medications and other medications used today have been reviewed  Those side effects can include but are not limited to: dizziness, drowsiness, poor balance, fatigue, sleepiness.  Take precautions at home to prevent falls, such as assistance with walking or stairs if allowed and /or when needed or position changes. Allergic or adverse reactions could include nausea, itching, hives, dizziness, or anything else out of the ordinary. Should you experience any of these significant changes, please call [phone] between the hours of 7:30 am and 3:30 pm or [phone] after hours. After hours, ask the  to page the X-ray Technologist, and describe the problem to the technologist. If you are experiencing chest pain, shortness of breath, altered mental state, unusual bleeding or any other emergent symptom you should call 911 immediately.

## 2022-06-06 NOTE — ROUTINE PROCESS
Pt with uneventful port insertion. Refused sedation so none given. Pt verbalizes understanding of all discharge instructions. No signs of bleeding or issues with port site. IV removed with hemostasis. Ready for discharge.

## 2022-06-07 NOTE — TELEPHONE ENCOUNTER
[address] [facility], [address] [facility]   W: [phone]  F: [phone]    Medical Nutrition Therapy  Nutrition Referral:    Referral received from Dr. Kassandra Pickard regarding weight loss and dysphagia. Patient with met esophageal cancer. Called patient, spoke with him. Explained that RD is available to address nutrition throughout the spectrum of care. He reports he has changed some of things he has been eating to make it easier to swallow. He reports sometimes things will get hung up and he cannot eat anymore. He now avoids bread as it is too difficult to swallow. He is doing more soups and soft foods. Provided him with additional suggestions and encouraged her to reach out for any additional questions or concerns. Contact information provided.      Wt Readings from Last 5 Encounters:   06/06/22 225 lb (102.1 kg)   06/01/22 230 lb 6.4 oz (104.5 kg)   05/09/22 242 lb (109.8 kg)   04/20/22 242 lb 11.2 oz (110.1 kg)   12/16/21 251 lb (113.9 kg)       Signed By: Fabien Jasmine, [address]

## 2022-06-09 NOTE — ROUTINE PROCESS
Pt arrives to dept in wheelchair, reports wife went home will come back to pick him up. Consent reviewed and signed.

## 2022-06-09 NOTE — PROGRESS NOTES
Pt tolerated biopsy well  Fentanyl 50 mcg  Versed 2 mg  Start time 1110  End time 1133    Pt tolerated procedure well, Pt verbalized understanding of all discharge info, copies received. All questions answered. Pt discharge out of building via wheelchair to family in private vehicle with all personal belongings  Pt tolerated PO fluids and snacks.

## 2022-06-09 NOTE — H&P
Interventional and Vascular Radiology History and Physical    Patient: Duran Alcantara 68 y.o. male       Chief Complaint: Esophageal cancer with probable metastatic disease     History of Present Illness: CT guided biopsy of retroperitoneal node. History:    Past Medical History:   Diagnosis Date    AAA (abdominal aortic aneurysm) ([facility] [address]     22 CT: 3.7 cm; stable    Alcohol abuse     20 drinks/week    At risk for sleep apnea 2019    during phone assessment for PAT, LEVI 6    Colon polyp     Elevated CPK     Esophageal cancer (HCC)     *PROVISIONAL DIAGNOSIS* - MRCP finding with esophageal mass, hepatic, bone, LN mets. LN bx pending. H/O Referral placed 22    Former smoker, stopped smoking in distant past     Hyperlipidemia     Hypertension     Obesity     Psoriasis     Recurrent pancreatitis     2/2 ETOH use    Retroperitoneal lymphadenopathy     CT 22 - most prominent around celiac axis; concern for lymphoma    Rosacea      Family History   Problem Relation Age of Onset    Cancer Mother         colon     Social History     Socioeconomic History    Marital status:      Spouse name: Not on file    Number of children: Not on file    Years of education: Not on file    Highest education level: Not on file   Occupational History    Not on file   Tobacco Use    Smoking status: Former Smoker     Packs/day: 1.00     Years: 20.00     Pack years: 20.00     Quit date:      Years since quittin.4    Smokeless tobacco: Never Used   Vaping Use    Vaping Use: Never used   Substance and Sexual Activity    Alcohol use:  Yes     Alcohol/week: 20.0 standard drinks     Types: 20 Cans of beer per week     Comment: not much since throat issues    Drug use: Never    Sexual activity: Not on file   Other Topics Concern    Not on file   Social History Narrative    Not on file     Social Determinants of Health     Financial Resource Strain:     Difficulty of Paying Living Expenses: Not on file   Food Insecurity:     Worried About Running Out of Food in the Last Year: Not on file    Анна of Food in the Last Year: Not on file   Transportation Needs:     Lack of Transportation (Medical): Not on file    Lack of Transportation (Non-Medical): Not on file   Physical Activity:     Days of Exercise per Week: Not on file    Minutes of Exercise per Session: Not on file   Stress:     Feeling of Stress : Not on file   Social Connections:     Frequency of Communication with Friends and Family: Not on file    Frequency of Social Gatherings with Friends and Family: Not on file    Attends Jain Services: Not on file    Active Member of [address] [place] [address] [employer] or Organizations: Not on file    Attends Club or Organization Meetings: Not on file    Marital Status: Not on file   Intimate Partner Violence:     Fear of Current or Ex-Partner: Not on file    Emotionally Abused: Not on file    Physically Abused: Not on file    Sexually Abused: Not on file   Housing Stability:     Unable to Pay for Housing in the Last Year: Not on file    Number of [place] in the Last Year: Not on file    Unstable Housing in the Last Year: Not on file       Allergies: No Known Allergies    Current Medications:  Current Outpatient Medications   Medication Sig    metroNIDAZOLE (METROCREAM) 0.75 % topical cream Apply  to affected area two (2) times a day. Use a thin layer to affected areas after washing    verapamil ER (CALAN-SR) 240 mg CR tablet TAKE ONE TABLET BY MOUTH EVERY DAY    bumetanide (BUMEX) 0.5 mg tablet TAKE ONE TABLET BY MOUTH EVERY DAY    lisinopriL (PRINIVIL, ZESTRIL) 20 mg tablet TAKE ONE TABLET BY MOUTH EVERY DAY    fenofibrate (LOFIBRA) 160 mg tablet TAKE ONE TABLET BY MOUTH EVERY DAY    potassium (POTASSIMIN PO) Take  by mouth daily.  magnesium oxide (MAG-OX) 400 mg tablet Take 400 mg by mouth daily.  omega-3 fatty acids Cap Take 500 mg by mouth daily.     aspirin 81 mg tablet Take 81 mg by mouth four (4) days a week. No current facility-administered medications for this encounter. Facility-Administered Medications Ordered in Other Encounters   Medication Dose Route Frequency    fentaNYL citrate (PF) injection  mcg   mcg IntraVENous Rad Multiple    midazolam (VERSED) injection 0-10 mg  0-10 mg IntraVENous Rad Multiple        Physical Exam:  There were no vitals taken for this visit. No acute distress  Good peripheral perfusion  Nonlabored respirations  Abdomen nondistended    Alerts:    Hospital Problems  Date Reviewed: 5/24/2022    None          Laboratory:    No results for input(s): HGB, HCT, WBC, PLT, INR, BUN, CREA, K, CRCLT, HGBEXT, HCTEXT, PLTEXT, INREXT in the last 72 hours. No lab exists for component: PTT, PT      Plan of Care/Planned Procedure:  Risks, benefits, and alternatives reviewed with patient and he agrees to proceed with the procedure. Conscious sedation will be performed with IV fentanyl and versed.        Chapito Serna MD

## 2022-06-09 NOTE — DISCHARGE INSTRUCTIONS
[facility] Procedures/Radiology Department    Radiologist:   Dr Luis Garcia     Date:    6/9/22    Biopsy Discharge Instructions    You may have an aching pain in the biopsy site tonight. You may Tylenol, as directed on the label, for pain or discomfort. Avoid ibuprofen (Advil, Motrin) and aspirin for the next 48 hours as these drugs may cause you to bleed. Watch for bleeding from the biopsy site. Hold pressure to the area for at least 15 minutes if bleeding does occur. If you have severe pain or swelling at the site, go directly to the nearest Emergency Room. Watch for signs of infection:  redness, pain, pus, drainage, fever or chills. If this occurs, call your doctor. Resume your previous diet and follow the medication reconciliation form. Rest the remainder of today. Do not lift anything heavier than a small grocery bag (10 pounds) for the next 5 days. It may take up to 3 days for your test results to become available to your physician. Call your physician if you have not heard anything after 3 business day. If you have any questions or concerns, please call [phone] and ask to speak to the nurse on-call.

## 2022-06-15 NOTE — PROGRESS NOTES
Nataliia Boyle is a 68 y.o. male here for follow up for met esophageal carcinoma. Here to discuss recent biopsy. No concerns brought up. Pt has lost 10 lbs since last visit. Pt states last time he walked in but now needs Rolator. 1. Have you been to the ER, urgent care clinic since your last visit? Hospitalized since your last visit? no  2. Have you seen or consulted any other health care providers outside of the [address] [place] [address] since your last visit? Include any pap smears or colon screening.   no

## 2022-06-15 NOTE — PROGRESS NOTES
[facility] [address]  W: [phone] F: [phone]      Reason for Visit:   Barber Bullard is a 68 y.o. male who is seen in consultation at the request of Dr. Katheryn Eason for evaluation of widespread lymphadenopathy, malignancy. 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 Tissue consistent with metastatic gastroesophageal adenocarcinoma. Date of Diagnosis: June 9, 2022    Treatment course: Plan for FOLFOX with molecular markers pending      History of Present Illness:     58-year-old male with a relevant medical history significant for abdominal aortic aneurysm, alcohol abuse and dependence, hypertension, dyslipidemia, psoriasis, presents for evaluation and treatment of suspected metastatic esophageal malignancy. MRI of the patient's abdomen shows the following    FINDINGS:     VISUALIZED LOWER CHEST: There is a enhancing mass measuring 2.8 x 2.7 cm  inseparable from the distal esophagus. Right precardial lymph node measuring 2.3  x 2.4 cm. Trace right pericardial effusion. LIVER: There are multiple enhancing masses, the largest which is in the  subcapsular segment 7 posterior laterally measuring 2.8 x 3.5 cm in segment 8  measuring 1.6 x 1.9 cm. GALLBLADDER: Intraluminal gallstones with otherwise unremarkable appearance. BILIARY DUCTS: The bile ducts are nondilated with no evident filling defect,  stricture, or mucosal abnormality. SPLEEN: Unremarkable.   PANCREAS: At the terminus of the pancreatic duct in the tail there is a 1.0 x  1.6 cm nonenhancing fluid signal intensity structure with no other mass and no  duct dilation. There is reduction in previously seen peripancreatic stranding  without peripancreatic fluid collection, vascular abnormality, or other  abnormality. No evidence of pancreas divisum. ADRENALS: Unremarkable. KIDNEYS: Multiple simple fluid signal intensity cysts with a few cysts showing  intrinsic T1 signal intensity but no suspicious enhancing nodular tissue and for  which no follow-up is required. No enhancing mass, hydronephrosis, or other  normality. STOMACH: Unremarkable. VISUALIZED BOWEL: No dilatation or wall thickening. Noninflamed appearing left  and sigmoid colon diverticula. PERITONEUM: No ascites or pneumoperitoneum. RETROPERITONEUM: Extensive lymphadenopathy is redemonstrated with periaortic  nodes measuring up to 3.4 x 4.8 cm and 2.5 x 3.6 cm. VISUALIZED PELVIS: Unremarkable. BONES AND SOFT TISSUES: Enhancing lesions in the L3 and L4 vertebral bodies and  left ilium. Degenerative spine change. No acute fracture. ADDITIONAL COMMENTS: N/A     IMPRESSION     1. Findings most suspicious for primary distal esophageal neoplasm with hepatic  metastases,  precardial and retroperitoneal lymph adela metastases, and osseous  metastases with other neoplastic processes with metastatic periesophageal node  also in the differential.      2. There is decreased peripancreatic stranding compatible with interval  improvement in pancreatitis with no complication identified otherwise.     3. 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 No no thoracic or bone imaging is available for review. Most recent labs available from April 21 show no significant cytopenias or significant LFT abnormalities. On interview, the patient complains of back pain, fatigue. He also has complaints of difficulty with swallowing particularly with food slowly moving into his stomach. No reported choking or regurgitation of food reported.   He denies any blood in his stool and denies any tarry stools. No reported hematemesis. Social history reviewed, notable for 20-pack-year smoking history, now quit. As well as current active alcohol use of about 3 beers per day. Family history reviewed, notable for colon cancer in his mother only. Review of Systems: A complete review of systems was obtained, negative except as described above. Interval History:     6/15/22: Since last visit, the patient had PET scan that shows widely metastatic malignancy with disease in the chest, lungs, liver, bones. He has clinical complaints of fatigue, and new right lower extremity weakness as well as left sided facial droop. Clinical findings are highly concerning for brain metastasis. He has no headache or any other mental status changes. Tissue biopsy from retroperitoneal lymph node biopsy shows metastatic gastric adenocarcinoma with signet ring cell features. Past Medical History:   Diagnosis Date    AAA (abdominal aortic aneurysm) ([facility] [address]     4/4/22 CT: 3.7 cm; stable    Alcohol abuse     20 drinks/week    At risk for sleep apnea 05/22/2019    during phone assessment for PAT, LEVI 6    Colon polyp     Elevated CPK     Esophageal cancer (HCC)     *PROVISIONAL DIAGNOSIS* - MRCP finding with esophageal mass, hepatic, bone, LN mets. LN bx pending.  H/O Referral placed 5/24/22    Former smoker, stopped smoking in distant past     Hyperlipidemia     Hypertension     Obesity     Psoriasis     Recurrent pancreatitis     2/2 ETOH use    Retroperitoneal lymphadenopathy     CT 4/4/22 - most prominent around celiac axis; concern for lymphoma    Rosacea       Past Surgical History:   Procedure Laterality Date    COLONOSCOPY N/A 5/29/2019    COLON[facility] performed by [facility] at [facility] HX COLONOSCOPY  02/20/2014    HX HERNIA REPAIR      umbilical    IR INSERT TUNL CVC W PORT OVER 5 YEARS  6/6/2022      Social History     Tobacco Use    Smoking status: Former Smoker     Packs/day: 1.00     Years: 20.00     Pack years: 20.00     Quit date:      Years since quittin.4    Smokeless tobacco: Never Used   Substance Use Topics    Alcohol use: Yes     Alcohol/week: 20.0 standard drinks     Types: 20 Cans of beer per week     Comment: not much since throat issues      Family History   Problem Relation Age of Onset    Cancer Mother         colon     Current Outpatient Medications   Medication Sig    metroNIDAZOLE (METROCREAM) 0.75 % topical cream Apply  to affected area two (2) times a day. Use a thin layer to affected areas after washing    verapamil ER (CALAN-SR) 240 mg CR tablet TAKE ONE TABLET BY MOUTH EVERY DAY    bumetanide (BUMEX) 0.5 mg tablet TAKE ONE TABLET BY MOUTH EVERY DAY    lisinopriL (PRINIVIL, ZESTRIL) 20 mg tablet TAKE ONE TABLET BY MOUTH EVERY DAY    fenofibrate (LOFIBRA) 160 mg tablet TAKE ONE TABLET BY MOUTH EVERY DAY    potassium (POTASSIMIN PO) Take  by mouth daily.  magnesium oxide (MAG-OX) 400 mg tablet Take 400 mg by mouth daily.  omega-3 fatty acids Cap Take 500 mg by mouth daily.  aspirin 81 mg tablet Take 81 mg by mouth four (4) days a week. No current facility-administered medications for this visit.       No Known Allergies         Physical Exam:     Visit Vitals  /63 (BP 1 Location: Left upper arm, BP Patient Position: Sitting)   Pulse 91   Temp 97.9 °F (36.6 °C) (Temporal)   Ht 5' 9\" (1.753 m)   Wt 220 lb (99.8 kg)   SpO2 93%   BMI 32.49 kg/m²     ECOG PS: 2  General: alert, cooperative, no distress   Mental  status: normal mood, behavior, speech, dress, motor activity, and thought processes, able to follow commands   HENT: NCAT   Neck: no visualized mass   Resp: no respiratory distress   Neuro: no gross deficits   Skin: no discoloration or lesions of concern on visible areas   Psychiatric: normal affect, consistent with stated mood, no evidence of hallucinations           Results:     Lab Results Component Value Date/Time    WBC 9.0 06/01/2022 03:00 PM    HGB 13.6 06/01/2022 03:00 PM    HCT 41.0 06/01/2022 03:00 PM    PLATELET 550 83/78/0900 03:00 PM    MCV 93.4 06/01/2022 03:00 PM    ABS. NEUTROPHILS 6.6 06/01/2022 03:00 PM    HGB (POC) 15.1 01/24/2019 01:24 PM    HCT (POC) 43.8 01/24/2019 01:24 PM     Lab Results   Component Value Date/Time    Sodium 138 06/01/2022 03:00 PM    Potassium 3.9 06/01/2022 03:00 PM    Chloride 102 06/01/2022 03:00 PM    CHLORIDE 103.0 01/10/2018 01:05 PM    CO2 28 06/01/2022 03:00 PM    Glucose 148 (H) 06/01/2022 03:00 PM    BUN 20 06/01/2022 03:00 PM    Creatinine 0.91 06/01/2022 03:00 PM    GFR est AA >60 06/01/2022 03:00 PM    GFR est non-AA >60 06/01/2022 03:00 PM    Calcium 9.2 06/01/2022 03:00 PM    Sodium (POC) 144.0 01/10/2018 01:05 PM    Potassium (POC) 4.4 01/10/2018 01:05 PM    Glucose (POC) 119 (H) 06/07/2022 01:49 PM    Creatinine (POC) 0.70 04/04/2022 08:58 AM     Lab Results   Component Value Date/Time    Bilirubin, total 0.9 06/01/2022 03:00 PM    ALT (SGPT) 31 06/01/2022 03:00 PM    Alk. phosphatase 102 06/01/2022 03:00 PM    Protein, total 7.2 06/01/2022 03:00 PM    Albumin 3.6 06/01/2022 03:00 PM    Globulin 3.6 06/01/2022 03:00 PM       CT Results (most recent):  Results from [place] encounter on 06/09/22    CT BX ABD MASS NDL PERC    Narrative  EXAM: CT BX ABD MASS NDL PERC    DATE: 6/9/2022 11:45 AM    INDICATION: Right retroperitoneal node    CLINICAL DATA: 80-year-old male with history of probable esophageal malignancy  and metastatic disease presents for right retroperitoneal node biopsy. : Juan Antunez MD    ESTIMATED BLOOD LOSS: Minimal  COMPLICATIONS: None  SPECIMEN REMOVED: Multiple 18-gauge core needle biopsies of right  retroperitoneal node. PROCEDURE SUMMARY:  1.  CT-guided percutaneous biopsy of right retroperitoneal node. SEDATION:  Moderate intravenous conscious sedation was supervised by Dr. Marybel Ellis. The  patient was independently monitored by a registered nurse assigned to the  Department of Radiology using automated blood pressure, EKG, and pulse oximetry. The detail conscious sedation record is stored in the hospital information  system. Medication:  Versed: 2 mg  Fentanyl: 50 mcg  Procedural time: 20 minutes    PROCEDURE AND FINDINGS:    The risks and benefits of the procedure were discussed and written consent was  obtained. CT dose reduction was achieved through use of a standardized protocol tailored  for this examination and automatic exposure control for dose modulation. Preliminary CT imaging of the abdomen confirm the right retroperitoneal node  that was targeted for biopsy. The patient was placed prone and an appropriate  site for biopsy was marked on the skin. The patient was prepped and draped in  sterile fashion. 1% lidocaine was used for local anesthesia. A small dermatotomy was made. An  18-gauge coaxial introducer needle was advanced into the cardiac under CT  guidance. Multiple 18-gauge core needle biopsies were then obtained. Specimens were handed  to the cytologist, who confirmed adequate samples. Gelfoam tract embolization  was performed. The needle was then removed. Postprocedural CT demonstrated no immediate complications. The patient tolerated the procedure well. There were no immediate complications. Impression  CT-guided biopsy of right retroperitoneal node. There were no immediate  complications. Pathology results are pending. 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 Additional metastatic lesion is noted in the lungs with a lingular lesion showing increased metabolic uptake. Hypermetabolic liver lesions are also seen.    -I had a long discussion about treatment options available for the treatment of esophageal cancers. I discussed that in the case of stage IV malignancies, these cancers are often not curable but can be controlled for some time. I discussed that treatment options will depend on molecular testing and the results of targetable molecular marker findings. I discussed with the patient that treatment would likely include infusional chemotherapy, possibly with the addition of targeted treatment options. To that end, the patient is agreeable to proceeding with port placement as well as liver biopsy.    -Plan to start [id number] chemotherapy with a plan to add immunotherapy and/or anti-HER2 directed therapy in the future depending on molecular studies. We discussed the risks and benefits of FOLFOX chemotherapy, including potential side effects. These include but are not limited to fatigue, nausea vomiting, diarrhea, neuropathy, taste changes, esophageal spasm, cold intolerance, allergic reactions, alopecia, mucositis, myelosuppression, risk for infection, infertility and rarely, death. Rarely, a patient may have a condition where they do not metabolize fluorouracil appropriately (called DPD deficiency),  and they may have excessive toxicity. A Port-A-Cath will be required in order to deliver the continuous infusion. The patient has consented to beginning therapy. # Alcohol dependence  -Patient is slowly decreasing alcohol use    # Dysphagia  -Likely secondary to some amount of distal esophageal obstruction  -Persistent, dietitian consulted    # Facial droop, lower extremity weakness  -Concern for brain mets given extensive metastatic disease.   Will check stat MRI brain  -We will refer to Tania Nash if amenable to gamma knife    Plan to start chemotherapy ASAP, follow-up 2 weeks after first cycle of FOLFOX    Signed By: Sheilla Bence, MD      Attending Medical Oncologist   [facility]

## 2022-06-15 NOTE — PROGRESS NOTES
Pharmacy Note- Chemotherapy Education    Jose Rafael Rojas is a  68 y. o.male  diagnosed with esophageal cancer here today for chemotherapy counseling and consent. Mr. Nic Kimbrough is being treated with FOLFOX. Provided education on oxaliplatin, leucovorin, and fluorouracil and premedications - palonosetron and dexamethasone. Side effects of chemotherapy reviewed included s/s infection, anemia, appetite changes, thrombocytopenia, fatigue, hair loss/alopecia, bone pain, skin and nail changes, diarrhea/constipation, nerve sensitivities, and infection. Patient given ways to manage these side effects and when to contact office. [address] Handout of medications provided to patient. Mr. Nic Kimbrough verbalized understanding of the information presented and all of the patient's questions were answered. Jing Cueva, PharmD, BCPS    For Pharmacy Admin Tracking Only     CPA in place:  Yes   Recommendation Provided To: Patient/Caregiver: 1 via In person   Intervention Accepted By: Patient/Caregiver: 1   Time Spent (min): 20

## 2022-06-15 NOTE — PROGRESS NOTES
Pharmacy Note- Chemotherapy Education    Nicole Fournier is a  68 y. o.male  diagnosed with colon here today chemotherapy counseling. Mr. Jacob Mendosa is being treated with FOLFOX. Provided education on oxaliplatin, leucovorin, fluororuacil and premedications - palonosetron and dexamethasone. Side effects of chemotherapy reviewed included s/s infection, anemia, appetite changes, thrombocytopenia, fatigue, hair loss/alopecia, bone pain, skin and nail changes, diarrhea/constipation, nerve sensitivities, and risk of infection. Patient given ways to manage these side effects and when to contact office. [employer] Handout of medications provided to patient. Mr. Jacob Mendosa verbalized understanding of the information presented and all of the patient's questions were answered.     Louisa Adari, PharmD, BCPS

## 2022-06-20 NOTE — PROGRESS NOTES
OPIC Progress Note  Date: June 20, 2022      Mr. Sandy Petersen Arrived via wheelchair and in no acute distress at 0810 for C1D1 of Folfox regimen. Patient COVID Screening completed:   Do you have any symptoms of COVID-19? SOB, coughing, fever, or generally not feeling well? NO   Have you been exposed to COVID-19 recently? NO   Have you had any recent contact with family/friend that has a pending COVID test? NO    Assessment was unremarkable with the exception of 10/10 lower back pain. Patient and wife also voiced concern about patient not being able to swallow well. Patient has difficulty with some pills and food. No other complaints voiced. R chest port accessed without difficulty, positive blood return noted, labs drawn and processed. Problem: Chemotherapy Treatment  Goal: *Chemotherapy regimen followed  Outcome: Progressing Towards Goal     Problem: Infection - Risk of, Central Venous Catheter-Associated Bloodstream Infection  Goal: *Absence of infection signs and symptoms  Outcome: Progressing Towards Goal     Problem: Pain  Goal: *Control of Pain  Outcome: Progressing Towards Goal     Problem: Knowledge Deficit  Goal: *Verbalizes understanding of procedures and medications  Outcome: Progressing Towards Goal     Problem: Patient Education:  Go to Education Activity  Goal: Patient/Family Education  Outcome: Progressing Towards Goal   chemotherapy    Chemotherapy Flowsheet 6/20/2022   Cycle C1D1   Date 6/20/2022   Drug / Regimen Folfox   Pre Meds given   Notes given       Mr. Manzano's vitals were reviewed.   Patient Vitals for the past 12 hrs:   Temp Pulse Resp BP SpO2   06/20/22 0817 97.8 °F (36.6 °C) (!) 106 16 113/76 100 %       Lab results:  Recent Results (from the past 12 hour(s))   CBC WITH AUTOMATED DIFF    Collection Time: 06/20/22  8:35 AM   Result Value Ref Range    WBC 8.4 4.1 - 11.1 K/uL    RBC 4.22 4.10 - 5.70 M/uL    HGB 12.6 12.1 - 17.0 g/dL    HCT 37.5 36.6 - 50.3 %    MCV 88.9 80.0 - 99.0 FL MCH 29.9 26.0 - 34.0 PG    MCHC 33.6 30.0 - 36.5 g/dL    RDW 12.4 11.5 - 14.5 %    PLATELET 642 043 - 888 K/uL    MPV 9.0 8.9 - 12.9 FL    NRBC 0.0 0  WBC    ABSOLUTE NRBC 0.00 0.00 - 0.01 K/uL    NEUTROPHILS 73 32 - 75 %    LYMPHOCYTES 13 12 - 49 %    MONOCYTES 10 5 - 13 %    EOSINOPHILS 3 0 - 7 %    BASOPHILS 1 0 - 1 %    IMMATURE GRANULOCYTES 0 0.0 - 0.5 %    ABS. NEUTROPHILS 6.2 1.8 - 8.0 K/UL    ABS. LYMPHOCYTES 1.1 0.8 - 3.5 K/UL    ABS. MONOCYTES 0.8 0.0 - 1.0 K/UL    ABS. EOSINOPHILS 0.2 0.0 - 0.4 K/UL    ABS. BASOPHILS 0.1 0.0 - 0.1 K/UL    ABS. IMM. GRANS. 0.0 0.00 - 0.04 K/UL    DF AUTOMATED     METABOLIC PANEL, COMPREHENSIVE    Collection Time: 06/20/22  8:35 AM   Result Value Ref Range    Sodium 136 136 - 145 mmol/L    Potassium 3.9 3.5 - 5.1 mmol/L    Chloride 100 97 - 108 mmol/L    CO2 27 21 - 32 mmol/L    Anion gap 9 5 - 15 mmol/L    Glucose 136 (H) 65 - 100 mg/dL    BUN 22 (H) 6 - 20 MG/DL    Creatinine 1.02 0.70 - 1.30 MG/DL    BUN/Creatinine ratio 22 (H) 12 - 20      GFR est AA >60 >60 ml/min/1.73m2    GFR est non-AA >60 >60 ml/min/1.73m2    Calcium 8.7 8.5 - 10.1 MG/DL    Bilirubin, total 1.0 0.2 - 1.0 MG/DL    ALT (SGPT) 37 12 - 78 U/L    AST (SGOT) 38 (H) 15 - 37 U/L    Alk. phosphatase 129 (H) 45 - 117 U/L    Protein, total 7.2 6.4 - 8.2 g/dL    Albumin 3.0 (L) 3.5 - 5.0 g/dL    Globulin 4.2 (H) 2.0 - 4.0 g/dL    A-G Ratio 0.7 (L) 1.1 - 2.2     HEP B SURFACE AB    Collection Time: 06/20/22  8:35 AM   Result Value Ref Range    Hepatitis B surface Ab 5.36 mIU/mL    Hep B surface Ab Interp. NONREACTIVE NR     HEPATITIS B CORE AB, IGM    Collection Time: 06/20/22  8:35 AM   Result Value Ref Range    Hepatitis B core, IgM NONREACTIVE NR     IMMUNOGLOBULINS, G/A/M, QT.     Collection Time: 06/20/22  8:35 AM   Result Value Ref Range    Immunoglobulin G 1,080 700 - 1,600 mg/dL    Immunoglobulin A 201 70 - 400 mg/dL    Immunoglobulin M 126 40 - 230 mg/dL       Pre-medications  were administered as ordered and chemotherapy was initiated. Medications Administered     dexamethasone (DECADRON) 12 mg in 0.9% sodium chloride 50 mL IVPB     Admin Date  06/20/2022 Action  New Bag Dose  12 mg Route  IntraVENous Administered By  Sandhya Hudson RN          dextrose 5% infusion     Admin Date  06/20/2022 Action  New Bag Dose  25 mL/hr Rate  25 mL/hr Route  IntraVENous Administered By  Sandhya Hudson RN          fluorouraciL (ADRUCIL) 5,280 mg in 0.9% sodium chloride 250 mL CADD Cassette     Admin Date  06/20/2022 Action  New Bag Dose  5,280 mg Rate  5.4 mL/hr Route  IntraVENous Administered By  Sandhya Hudson RN          fluorouraciL (ADRUCIL) chemo syringe 880 mg     Admin Date  06/20/2022 Action  Given Dose  880 mg Route  IntraVENous Administered By  Sandhya Hudson RN          leucovorin (WELLCOVORIN) 880 mg in dextrose 5% 250 mL, overfill volume 25 mL IVPB     Admin Date  06/20/2022 Action  New Bag Dose  880 mg Rate  159.5 mL/hr Route  IntraVENous Administered By  Sandhya Hudson RN          oxaliplatin (ELOXATIN) 187 mg in dextrose 5% 250 mL, overfill volume 25 mL chemo infusion     Admin Date  06/20/2022 Action  New Bag Dose  187 mg Rate  156.2 mL/hr Route  IntraVENous Administered By  Sandhya Hudson RN          palonosetron HCl (ALOXI) injection 0.25 mg     Admin Date  06/20/2022 Action  Given Dose  0.25 mg Route  IntraVENous Administered By  Sandhya Hudson RN          sodium chloride (NS) flush 5-40 mL     Admin Date  06/20/2022 Action  Given Dose  10 mL Route  IntraVENous Administered By  Sandhya Hudson, AMANDEEP                Mr. Sandy Petersen tolerated treatment well, CADD infusing. Patient was discharged from [place] [address] in stable condition at 1320.      Future Appointments   Date Time Provider Ed Bonilla   6/22/2022  3:00 PM [address] MARINO   7/5/2022  8:00 AM [facility] INFUSION NURSE 1 [facility]   7/7/2022  3:00  [facility] 7/19/2022  8:30 AM [address] [place] [address] REG   7/19/2022  9:00 AM Magaly Matamoros MD ONCMR BS AMB   7/21/2022  3:00 PM [facility] [address] REG   8/2/2022  8:00  [facility] [place] [address] REG   8/4/2022  3:00 PM L[facility] FASTRACK 5 [address] REG   8/16/2022  8:00 AM [facility] [place] [address] REG   8/18/2022  3:00 PM [facility] MED5 [place] [address] REG   8/29/2022  1:00 PM Jazlyn Cottrell MD PCAM BS AMB   8/30/2022  8:00 AM [facility]1 [facility] REG   9/1/2022  3:00 PM [facility] FASTR[facility] 3 [address] REG   9/13/2022  8:00 AM [facility] [place] [address] REG   9/15/2022  3:00 PM [facility] MED7 [place] [address] REG   9/27/2022  8:00 AM [facility]2 [place] [address] REG   9/29/2022  3:00 PM [facility] FASTRACK 3 [address] REG   10/13/2022  8:30 AM [facility] MED6 1 [facility], AMANDEEP  June 20, 2022

## 2022-06-20 NOTE — DISCHARGE INSTRUCTIONS

## 2022-06-20 NOTE — PROGRESS NOTES
[facility] at [address] [facility] [address]  W: [phone] F: [phone]      Reason for Visit:   Fabian Ortiz is a 68 y.o. male who is seen in consultation at the request of Dr. Manuel London for evaluation of widespread lymphadenopathy, malignancy. 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 Tissue consistent with metastatic gastroesophageal adenocarcinoma. Date of Diagnosis: June 9, 2022    Treatment course: FOLFOX with molecular markers pending      History of Present Illness:     66-year-old male with a relevant medical history significant for abdominal aortic aneurysm, alcohol abuse and dependence, hypertension, dyslipidemia, psoriasis, presents for evaluation and treatment of suspected metastatic esophageal malignancy. MRI of the patient's abdomen shows the following    FINDINGS:     VISUALIZED LOWER CHEST: There is a enhancing mass measuring 2.8 x 2.7 cm  inseparable from the distal esophagus. Right precardial lymph node measuring 2.3  x 2.4 cm. Trace right pericardial effusion. LIVER: There are multiple enhancing masses, the largest which is in the  subcapsular segment 7 posterior laterally measuring 2.8 x 3.5 cm in segment 8  measuring 1.6 x 1.9 cm. GALLBLADDER: Intraluminal gallstones with otherwise unremarkable appearance. BILIARY DUCTS: The bile ducts are nondilated with no evident filling defect,  stricture, or mucosal abnormality. SPLEEN: Unremarkable. PANCREAS: At the terminus of the pancreatic duct in the tail there is a 1.0 x  1.6 cm nonenhancing fluid signal intensity structure with no other mass and no  duct dilation. There is reduction in previously seen peripancreatic stranding  without peripancreatic fluid collection, vascular abnormality, or other  abnormality. No evidence of pancreas divisum. ADRENALS: Unremarkable. KIDNEYS: Multiple simple fluid signal intensity cysts with a few cysts showing  intrinsic T1 signal intensity but no suspicious enhancing nodular tissue and for  which no follow-up is required. No enhancing mass, hydronephrosis, or other  normality. STOMACH: Unremarkable. VISUALIZED BOWEL: No dilatation or wall thickening. Noninflamed appearing left  and sigmoid colon diverticula. PERITONEUM: No ascites or pneumoperitoneum. RETROPERITONEUM: Extensive lymphadenopathy is redemonstrated with periaortic  nodes measuring up to 3.4 x 4.8 cm and 2.5 x 3.6 cm. VISUALIZED PELVIS: Unremarkable. BONES AND SOFT TISSUES: Enhancing lesions in the L3 and L4 vertebral bodies and  left ilium. Degenerative spine change. No acute fracture. ADDITIONAL COMMENTS: N/A     IMPRESSION     1. Findings most suspicious for primary distal esophageal neoplasm with hepatic  metastases,  precardial and retroperitoneal lymph adela metastases, and osseous  metastases with other neoplastic processes with metastatic periesophageal node  also in the differential.      2. There is decreased peripancreatic stranding compatible with interval  improvement in pancreatitis with no complication identified otherwise.     3. 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 No no thoracic or bone imaging is available for review. Most recent labs available from April 21 show no significant cytopenias or significant LFT abnormalities. On interview, the patient complains of back pain, fatigue. He also has complaints of difficulty with swallowing particularly with food slowly moving into his stomach. No reported choking or regurgitation of food reported.   He denies any blood in his stool and denies any tarry stools. No reported hematemesis. Social history reviewed, notable for 20-pack-year smoking history, now quit. As well as current active alcohol use of about 3 beers per day. Family history reviewed, notable for colon cancer in his mother only. Review of Systems: A complete review of systems was obtained, negative except as described above. 6/15/22: Since last visit, the patient had PET scan that shows widely metastatic malignancy with disease in the chest, lungs, liver, bones. He has clinical complaints of fatigue, and new right lower extremity weakness as well as left sided facial droop. Clinical findings are highly concerning for brain metastasis. He has no headache or any other mental status changes. Tissue biopsy from retroperitoneal lymph node biopsy shows metastatic gastric adenocarcinoma with signet ring cell features. Interval History:     6/20/22:  Patient has worsening back pain, concern for decreased PO intake. MRI brain reviewed, shows no mass or clear evidence of CVA. Patient presents for start of palliative chemotherapy today. Past Medical History:   Diagnosis Date    AAA (abdominal aortic aneurysm) ([facility] [address]     4/4/22 CT: 3.7 cm; stable    Alcohol abuse     20 drinks/week    At risk for sleep apnea 05/22/2019    during phone assessment for PAT, LEVI 6    Colon polyp     Elevated CPK     Esophageal cancer (HCC)     *PROVISIONAL DIAGNOSIS* - MRCP finding with esophageal mass, hepatic, bone, LN mets. LN bx pending.  H/O Referral placed 5/24/22    Former smoker, stopped smoking in distant past     Hyperlipidemia     Hypertension     Obesity     Psoriasis     Recurrent pancreatitis     2/2 ETOH use    Retroperitoneal lymphadenopathy     CT 4/4/22 - most prominent around celiac axis; concern for lymphoma    Rosacea       Past Surgical History:   Procedure Laterality Date    COLONOSCOPY N/A 5/29/2019    COLONOSCOPY performed by Sweetie Christopher MD at \[facility]\"" AMBULATORY OR    HX COLONOSCOPY  2014    HX HERNIA REPAIR      umbilical    IR INSERT TUNL CVC W PORT OVER 5 YEARS  2022      Social History     Tobacco Use    Smoking status: Former Smoker     Packs/day: 1.00     Years: 20.00     Pack years: 20.00     Quit date:      Years since quittin.4    Smokeless tobacco: Never Used   Substance Use Topics    Alcohol use: Yes     Alcohol/week: 20.0 standard drinks     Types: 20 Cans of beer per week     Comment: not much since throat issues      Family History   Problem Relation Age of Onset    Cancer Mother         colon     Current Outpatient Medications   Medication Sig    ondansetron (ZOFRAN ODT) 4 mg disintegrating tablet Take 1 Tablet by mouth every eight (8) hours as needed for Nausea or Vomiting.  prochlorperazine (COMPAZINE) 10 mg tablet Take 0.5 Tablets by mouth every six (6) hours as needed for Nausea or Vomiting.  lidocaine-prilocaine (EMLA) topical cream Apply  to affected area as needed for Pain (Apply a quarter size cream over port site 1 hour prior to chemotherapy).  metroNIDAZOLE (METROCREAM) 0.75 % topical cream Apply  to affected area two (2) times a day. Use a thin layer to affected areas after washing    verapamil ER (CALAN-SR) 240 mg CR tablet TAKE ONE TABLET BY MOUTH EVERY DAY    bumetanide (BUMEX) 0.5 mg tablet TAKE ONE TABLET BY MOUTH EVERY DAY    lisinopriL (PRINIVIL, ZESTRIL) 20 mg tablet TAKE ONE TABLET BY MOUTH EVERY DAY    fenofibrate (LOFIBRA) 160 mg tablet TAKE ONE TABLET BY MOUTH EVERY DAY    potassium (POTASSIMIN PO) Take  by mouth daily.  magnesium oxide (MAG-OX) 400 mg tablet Take 400 mg by mouth daily.  omega-3 fatty acids Cap Take 500 mg by mouth daily.  aspirin 81 mg tablet Take 81 mg by mouth four (4) days a week. No current facility-administered medications for this visit.      Facility-Administered Medications Ordered in Other Visits   Medication Dose Route Frequency    sodium chloride (NS) flush 5-40 mL  5-40 mL IntraVENous PRN    heparin (porcine) pf 500 Units  500 Units InterCATHeter PRN      No Known Allergies         Physical Exam:     Visit Vitals  /76   Pulse (!) 106   Temp 97.8 °F (36.6 °C)   Resp 18   Ht 5' 9\" (1.753 m)   Wt 215 lb (97.5 kg)   SpO2 100%   BMI 31.75 kg/m²     ECOG PS: 2  General: alert, cooperative, no distress   Mental  status: normal mood, behavior, speech, dress, motor activity, and thought processes, able to follow commands   HENT: NCAT   Neck: no visualized mass   Resp: no respiratory distress   Neuro: no gross deficits   Skin: no discoloration or lesions of concern on visible areas   Psychiatric: normal affect, consistent with stated mood, no evidence of hallucinations           Results:     Lab Results   Component Value Date/Time    WBC 8.4 06/20/2022 08:35 AM    HGB 12.6 06/20/2022 08:35 AM    HCT 37.5 06/20/2022 08:35 AM    PLATELET 677 95/98/4975 08:35 AM    MCV 88.9 06/20/2022 08:35 AM    ABS. NEUTROPHILS 6.2 06/20/2022 08:35 AM    HGB (POC) 15.1 01/24/2019 01:24 PM    HCT (POC) 43.8 01/24/2019 01:24 PM     Lab Results   Component Value Date/Time    Sodium 138 06/01/2022 03:00 PM    Potassium 3.9 06/01/2022 03:00 PM    Chloride 102 06/01/2022 03:00 PM    CHLORIDE 103.0 01/10/2018 01:05 PM    CO2 28 06/01/2022 03:00 PM    Glucose 148 (H) 06/01/2022 03:00 PM    BUN 20 06/01/2022 03:00 PM    Creatinine 0.91 06/01/2022 03:00 PM    GFR est AA >60 06/01/2022 03:00 PM    GFR est non-AA >60 06/01/2022 03:00 PM    Calcium 9.2 06/01/2022 03:00 PM    Sodium (POC) 144.0 01/10/2018 01:05 PM    Potassium (POC) 4.4 01/10/2018 01:05 PM    Glucose (POC) 119 (H) 06/07/2022 01:49 PM    Creatinine (POC) 0.70 04/04/2022 08:58 AM     Lab Results   Component Value Date/Time    Bilirubin, total 0.9 06/01/2022 03:00 PM    ALT (SGPT) 31 06/01/2022 03:00 PM    Alk.  phosphatase 102 06/01/2022 03:00 PM    Protein, total 7.2 06/01/2022 03:00 PM    Albumin 3.6 06/01/2022 03:00 PM    Globulin 3.6 06/01/2022 03:00 PM       CT Results (most recent):  Results from [place] encounter on 06/09/22    CT BX ABD MASS NDL PERC    Narrative  EXAM: CT BX ABD MASS NDL PERC    DATE: 6/9/2022 11:45 AM    INDICATION: Right retroperitoneal node    CLINICAL DATA: 80-year-old male with history of probable esophageal malignancy  and metastatic disease presents for right retroperitoneal node biopsy. : Indiana Mercedes MD    ESTIMATED BLOOD LOSS: Minimal  COMPLICATIONS: None  SPECIMEN REMOVED: Multiple 18-gauge core needle biopsies of right  retroperitoneal node. PROCEDURE SUMMARY:  1.  CT-guided percutaneous biopsy of right retroperitoneal node. SEDATION:  Moderate intravenous conscious sedation was supervised by Dr. Eneida Patel. The  patient was independently monitored by a registered nurse assigned to the  Department of Radiology using automated blood pressure, EKG, and pulse oximetry. The detail conscious sedation record is stored in the hospital information  system. Medication:  Versed: 2 mg  Fentanyl: 50 mcg  Procedural time: 20 minutes    PROCEDURE AND FINDINGS:    The risks and benefits of the procedure were discussed and written consent was  obtained. CT dose reduction was achieved through use of a standardized protocol tailored  for this examination and automatic exposure control for dose modulation. Preliminary CT imaging of the abdomen confirm the right retroperitoneal node  that was targeted for biopsy. The patient was placed prone and an appropriate  site for biopsy was marked on the skin. The patient was prepped and draped in  sterile fashion. 1% lidocaine was used for local anesthesia. A small dermatotomy was made. An  18-gauge coaxial introducer needle was advanced into the cardiac under CT  guidance. Multiple 18-gauge core needle biopsies were then obtained. Specimens were handed  to the cytologist, who confirmed adequate samples.  G[place]am tract embolization  was performed. The needle was then removed. Postprocedural CT demonstrated no immediate complications. The patient tolerated the procedure well. There were no immediate complications. Impression  CT-guided biopsy of right retroperitoneal node. There were no immediate  complications. Pathology results are pending. 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 Additional metastatic lesion is noted in the lungs with a lingular lesion showing increased metabolic uptake. Hypermetabolic liver lesions are also seen.    -I had a long discussion about treatment options available for the treatment of esophageal cancers. I discussed that in the case of stage IV malignancies, these cancers are often not curable but can be controlled for some time. I discussed that treatment options will depend on molecular testing and the results of targetable molecular marker findings. I discussed with the patient that treatment would likely include infusional chemotherapy, possibly with the addition of targeted treatment options. To that end, the patient is agreeable to proceeding with port placement as well as liver biopsy.    -Plan to start mFOLFOX6 chemotherapy with a plan to add immunotherapy and/or anti-HER2 directed therapy in the future depending on molecular studies. We discussed the risks and benefits of FOLFOX chemotherapy, including potential side effects.  These include but are not limited to fatigue, nausea vomiting, diarrhea, neuropathy, taste changes, esophageal spasm, cold intolerance, allergic reactions, alopecia, mucositis, myelosuppression, risk for infection, infertility and rarely, death. Rarely, a patient may have a condition where they do not metabolize fluorouracil appropriately (called DPD deficiency),  and they may have excessive toxicity. The patient has consented to beginning therapy. # Alcohol dependence  -Patient is slowly decreasing alcohol use    # Facial droop, lower extremity weakness  - brain MRI reviewed, shows no clear evidence of intracranial metastasis. No clear evidence of CVA on MRI brain with contrast.     # Cancer related pain  - will start oxycodone 5 mg q4 hrs prn severe pain  - will refer patient to radiation oncology for palliative RT for pain control     # Dysphagia to solids  - patient having difficulty with eating  - He is able to eat liquids and shakes/smoothies. RD consulted, pending.   - He is having difficulty with large pills. Advised to hold fish oil and fenofibrate for now, until swallowing improves.         Follow up in 2 weeks with next cycle    Signed By: Eldon Mendoza MD      Attending [address] [place] [address]

## 2022-06-20 NOTE — PROGRESS NOTES
Brooklynn Ervin is a 68 y.o. male here today for gastric cancer f/u. Pt starting chemo today; Folfox; cycle 1; day 1. Chief Complaint   Patient presents with    Follow-up     Gastric adenocarcinoma      1. Have you been to the ER, urgent care clinic since your last visit? Hospitalized since your last visit? No    2. Have you seen or consulted any other health care providers outside of the [address] [place] [address] since your last visit? Include any pap smears or colon screening.  No

## 2022-06-22 NOTE — PROGRESS NOTES
[facility] VISIT NOTE    [id number]  Pt arrived at [facility] ambulatory and in no distress for 5FU pump disconnect. Patient has 10/10 lower back pain not relieved with oxycodone 5mg every 4-12 hours. He's requesting a back brace and a handicap placard since he has limited movement d/t back pain. He's going to stop by Dr. Elif Law office to ask about his back pain and the handicap placard. Blood pressure 112/68, pulse 78, temperature 97.9 °F (36.6 °C), resp. rate 16, SpO2 94 %. Home infusion pump has completed full amount. Tolerated treatment well, no adverse reaction noted. Port de-accessed and flushed per protocol. Positive blood return noted. 1515  D/C'd from [facility] ambulatory and in no distress accompanied by his wife. Next appointment is 7/5/22 at [address] [place] [address] at [facility]. Patient denied having any symptoms of COVID-19, i.e. SOB, coughing, fever, or generally not feeling well.   Also denies having been exposed to COVID-19 recently or having had any recent contact with family/friend that has a pending COVID test.

## 2022-06-28 NOTE — TELEPHONE ENCOUNTER
[address] [facility] [address] [facility]   W: [phone]  F: [phone]    Medical Nutrition Therapy  Nutrition Encounter:    Patients wife called regarding ongoing dysphagia. He is unable to eat anything solid. Food often gets stuck in his throat. He is drinking water, water with orange juice, boost shakes. Jello and occasionally will try a grape. Suggested items like egg or egg casserole, pudding, meatloaf with gravy. He has tried mashed potatoes but it gets stuck. Discussed strategies to make items more liquid by blending, adding creams, broths, gravy. Information sent via email. Encouraged to follow up with any additional questions or concerns.       Wt Readings from Last 5 Encounters:   06/20/22 215 lb 14.4 oz (97.9 kg)   06/20/22 215 lb (97.5 kg)   06/15/22 220 lb (99.8 kg)   06/09/22 224 lb (101.6 kg)   06/07/22 224 lb (101.6 kg)       Signed By: Bianca Tony, [address] [employer]

## 2022-07-04 PROBLEM — I63.9 ACUTE CVA (CEREBROVASCULAR ACCIDENT) (HCC): Status: ACTIVE | Noted: 2022-01-01

## 2022-07-04 PROBLEM — I63.9 ACUTE CVA (CEREBROVASCULAR ACCIDENT) (HCC): Status: ACTIVE | Noted: 2022-07-04

## 2022-07-04 NOTE — Clinical Note
Status[de-identified] INPATIENT [101]   Type of Bed: Telemetry [19]   Cardiac Monitoring Required?: Yes   Inpatient Hospitalization Certified Necessary for the Following Reasons: 3.  Patient receiving treatment that can only be provided in an inpatient setting (further clarification in H&P documentation)   Admitting Diagnosis: Acute CVA (cerebrovascular accident) [facility]) [[id number]]   Admitting Physician: Kareem Lawton   Attending Physician: Herberth Murillo   Estimated Length of Stay: 3-4 Midnights   Discharge Plan[de-identified] Home with Office Follow-up

## 2022-07-04 NOTE — PROGRESS NOTES
End of Shift Note    Bedside shift change report given to Carlos JEROME (oncoming nurse) by Lucy Doss RN (offgoing nurse). Report included the following information SBAR, Kardex, Procedure Summary, MAR and Cardiac Rhythm sr    Shift worked:  days   Shift summary and any significant changes:     new admit       Concerns for physician to address:  none   Zone phone for oncoming shift:   [phone]     Patient Information  Soledad Ye  68 y.o.  7/4/2022 11:01 AM by Jose Antonio Maharaj MD. Soledad Ye was admitted from Home    Problem List  Patient Active Problem List    Diagnosis Date Noted    Colon polyp 01/03/2018    Hypertension 01/03/2018    Elevated CPK 01/03/2018    Alcohol abuse 06/01/2012    Hyperlipidemia 06/01/2012    Acute CVA (cerebrovascular accident) ([address] 07/04/2022    Esophageal cancer ([address]     AAA (abdominal aortic aneurysm) ([address]     Recurrent pancreatitis     Obesity     Former smoker, stopped smoking in distant past     Rosacea     Retroperitoneal lymphadenopathy     Abdominal aortic aneurysm (AAA) without rupture ([address] 02/12/2021    Acute pancreatitis 02/11/2021    Pancreatitis 02/11/2021    At risk for sleep apnea 05/22/2019    Psoriasis 01/03/2018    History of pancreatitis 05/31/2012    Sepsis(995.91) 05/31/2012    UTI (urinary tract infection) 05/31/2012    Obesity (BMI 30-39.9) 05/31/2012     Past Medical History:   Diagnosis Date    AAA (abdominal aortic aneurysm) ([address]     4/4/22 CT: 3.7 cm; stable    Alcohol abuse     20 drinks/week    At risk for sleep apnea 05/22/2019    during phone assessment for PAT, LEVI 6    Colon polyp     Elevated CPK     Esophageal cancer (HCC)     *PROVISIONAL DIAGNOSIS* - MRCP finding with esophageal mass, hepatic, bone, LN mets. LN bx pending.  H/O Referral placed 5/24/22    Former smoker, stopped smoking in distant past     Hyperlipidemia     Hypertension     Obesity     Psoriasis     Recurrent pancreatitis     2/2 ETOH use  Retroperitoneal lymphadenopathy     CT 4/4/22 - most prominent around celiac axis; concern for lymphoma    Rosacea        Core Measures:  CVA: Yes Yes  CHF:No No  PNA:No No    Activity:  Activity Level: Bed Rest  Number times ambulated in hallways past shift: 0  Number of times OOB to chair past shift: 0    Cardiac:   Cardiac Monitoring: Yes           Access:   Current line(s): PIV   Central Line? No   PICC LINE? No     Genitourinary:   Urinary status: voiding  Urinary Catheter? No     Respiratory:   O2 Device: Nasal cannula  Chronic home O2 use?: NO  Incentive spirometer at bedside: no       GI:     Current diet:  ADULT DIET Regular  Passing flatus: YES  Tolerating current diet: YES       Pain Management:   Patient states pain is manageable on current regimen: YES    Skin:  Shahzad Score: 16  Interventions: increase time out of bed and PT/OT consult    Patient Safety:  Fall Score:  Total Score: 3  Interventions: bed/chair alarm, gripper socks, pt to call before getting OOB and stay with me (per policy)  High Fall Risk: Yes  @Rollbelt  @dexterity to release roll belt  Yes/No ( must document dexterity  here by stating Yes or No here, otherwise this is a restraint and must follow restraint documentation policy.)    DVT prophylaxis:  DVT prophylaxis Med- Yes  DVT prophylaxis SCD or RUTH- No     Wounds: (If Applicable)  Wounds- No  Location     Active Consults:  IP CONSULT TO HOSPITALIST  IP CONSULT TO NEUROLOGY  IP CONSULT TO HEMATOLOGY    Length of Stay:  Expected LOS: - - -  Actual LOS: 0  Discharge Plan: No       Geovanna Marques RN

## 2022-07-04 NOTE — ED PROVIDER NOTES
EMERGENCY DEPARTMENT HISTORY AND PHYSICAL EXAM      Date: 7/4/2022  Patient Name: Gavin Neff  Patient Age and Sex: 68 y.o. male     History of Presenting Illness     Chief Complaint   Patient presents with    Back Pain     left sided weakness and numbness starting saturday. says cva scale -, says aortic anyrysm hx. says diffrent bp on each arms per ems. gcs 15 a&ox4. History Provided By: Patient    HPI: Gavin Neff is a 26-year-old history esophageal cancer, AAA presenting with back pain and left-sided weakness. Patient reports chronic low back pain been present for quite a while. No change in the nature of this back pain. He reports 1 to 1.5 weeks ago he developed weakness to his left leg. This past Saturday he developed left arm weakness. No headache, no vision change, no speech change, no facial asymmetry. He denies any numbness to bilateral upper or lower extremities, denies any saddle anesthesia urinary fecal incontinence. No chest pain or dyspnea. No abdominal pain or flank pain. There are no other complaints, changes, or physical findings at this time. PCP: Isela Livingston MD    No current facility-administered medications on file prior to encounter. Current Outpatient Medications on File Prior to Encounter   Medication Sig Dispense Refill    oxyCODONE IR (ROXICODONE) 5 mg immediate release tablet Take 1 Tablet by mouth every four (4) hours as needed for Pain for up to 14 days. Max Daily Amount: 30 mg. 84 Tablet 0    ondansetron (ZOFRAN ODT) 4 mg disintegrating tablet Take 1 Tablet by mouth every eight (8) hours as needed for Nausea or Vomiting. 40 Tablet 1    prochlorperazine (COMPAZINE) 10 mg tablet Take 0.5 Tablets by mouth every six (6) hours as needed for Nausea or Vomiting. 60 Tablet 3    lidocaine-prilocaine (EMLA) topical cream Apply  to affected area as needed for Pain (Apply a quarter size cream over port site 1 hour prior to chemotherapy).  30 g 1    metroNIDAZOLE (METROCREAM) 0.75 % topical cream Apply  to affected area two (2) times a day. Use a thin layer to affected areas after washing      verapamil ER (CALAN-SR) 240 mg CR tablet TAKE ONE TABLET BY MOUTH EVERY DAY 90 Tablet 0    bumetanide (BUMEX) 0.5 mg tablet TAKE ONE TABLET BY MOUTH EVERY DAY 90 Tablet 0    lisinopriL (PRINIVIL, ZESTRIL) 20 mg tablet TAKE ONE TABLET BY MOUTH EVERY DAY 90 Tablet 0    fenofibrate (LOFIBRA) 160 mg tablet TAKE ONE TABLET BY MOUTH EVERY DAY 90 Tablet 0    potassium (POTASSIMIN PO) Take  by mouth daily.  magnesium oxide (MAG-OX) 400 mg tablet Take 400 mg by mouth daily.  omega-3 fatty acids Cap Take 500 mg by mouth daily.  aspirin 81 mg tablet Take 81 mg by mouth four (4) days a week. Past History     Past Medical History:  Past Medical History:   Diagnosis Date    AAA (abdominal aortic aneurysm) ([facility] [address]     4/4/22 CT: 3.7 cm; stable    Alcohol abuse     20 drinks/week    At risk for sleep apnea 05/22/2019    during phone assessment for PAT, LEVI 6    Colon polyp     Elevated CPK     Esophageal cancer (HCC)     *PROVISIONAL DIAGNOSIS* - MRCP finding with esophageal mass, hepatic, bone, LN mets. LN bx pending.  H/O Referral placed 5/24/22    Former smoker, stopped smoking in distant past     Hyperlipidemia     Hypertension     Obesity     Psoriasis     Recurrent pancreatitis     2/2 ETOH use    Retroperitoneal lymphadenopathy     CT 4/4/22 - most prominent around celiac axis; concern for lymphoma    Rosacea        Past Surgical History:  Past Surgical History:   Procedure Laterality Date    COLONOSCOPY N/A 5/29/2019    COLONOSCOPY performed by Ravi Esqueda MD at [facility] AMBUL[facility]    HX COLONOSCOPY  02/20/2014    HX HERNIA REPAIR      umbilical    IR INSERT TUNL CVC W PORT OVER 5 YEARS  6/6/2022       Family History:  Family History   Problem Relation Age of Onset    Cancer Mother         colon       Social History:  Social History Tobacco Use    Smoking status: Former Smoker     Packs/day: 1.00     Years: 20.00     Pack years: 20.00     Quit date: 26     Years since quittin.5    Smokeless tobacco: Never Used   Vaping Use    Vaping Use: Never used   Substance Use Topics    Alcohol use: Yes     Alcohol/week: 20.0 standard drinks     Types: 20 Cans of beer per week     Comment: not much since throat issues    Drug use: Never       Allergies:  No Known Allergies      Review of Systems   Review of Systems   Constitutional: Negative for chills and fever. HENT: Negative for congestion and rhinorrhea. Respiratory: Negative for shortness of breath. Cardiovascular: Negative for chest pain. Gastrointestinal: Negative for abdominal pain, diarrhea, nausea and vomiting. Genitourinary: Negative for dysuria and frequency. Musculoskeletal: Positive for back pain. Negative for myalgias. Skin: Negative for rash. Neurological: Positive for weakness. Negative for numbness. All other systems reviewed and are negative. Physical Exam   Physical Exam  Vitals and nursing note reviewed. HENT:      Head: Normocephalic and atraumatic. Mouth/Throat:      Mouth: Mucous membranes are moist.   Eyes:      Conjunctiva/sclera: Conjunctivae normal.   Cardiovascular:      Rate and Rhythm: Normal rate and regular rhythm. Pulmonary:      Effort: Pulmonary effort is normal.   Abdominal:      General: Abdomen is flat. Musculoskeletal:         General: No deformity. Comments: No midline C, T, L-spine tenderness palpation   Skin:     General: Skin is warm and dry. Capillary Refill: Capillary refill takes less than 2 seconds. Neurological:      Mental Status: He is alert and oriented to person, place, and time. Mental status is at baseline. GCS: GCS eye subscore is 4. GCS verbal subscore is 5. GCS motor subscore is 6. Cranial Nerves: Cranial nerves are intact. Sensory: Sensation is intact.       Motor: Weakness present. Comments: 4/5 weakness left lower extremity, 4+/5 weakness in the left lower extremity. Psychiatric:         Behavior: Behavior normal.         Thought Content: Thought content normal.        Diagnostic Study Results     Labs  Recent Results (from the past 12 hour(s))   CBC WITH AUTOMATED DIFF    Collection Time: 07/04/22 12:18 PM   Result Value Ref Range    WBC 2.2 (L) 4.1 - 11.1 K/uL    RBC 4.12 4.10 - 5.70 M/uL    HGB 12.0 (L) 12.1 - 17.0 g/dL    HCT 36.4 (L) 36.6 - 50.3 %    MCV 88.3 80.0 - 99.0 FL    MCH 29.1 26.0 - 34.0 PG    MCHC 33.0 30.0 - 36.5 g/dL    RDW 13.5 11.5 - 14.5 %    PLATELET 091 394 - 694 K/uL    MPV 9.0 8.9 - 12.9 FL    NRBC 0.0 0  WBC    ABSOLUTE NRBC 0.00 0.00 - 0.01 K/uL    NEUTROPHILS 48 32 - 75 %    LYMPHOCYTES 31 12 - 49 %    MONOCYTES 17 (H) 5 - 13 %    EOSINOPHILS 2 0 - 7 %    BASOPHILS 1 0 - 1 %    IMMATURE GRANULOCYTES 1 (H) 0.0 - 0.5 %    ABS. NEUTROPHILS 1.1 (L) 1.8 - 8.0 K/UL    ABS. LYMPHOCYTES 0.7 (L) 0.8 - 3.5 K/UL    ABS. MONOCYTES 0.4 0.0 - 1.0 K/UL    ABS. EOSINOPHILS 0.0 0.0 - 0.4 K/UL    ABS. BASOPHILS 0.0 0.0 - 0.1 K/UL    ABS. IMM. GRANS. 0.0 0.00 - 0.04 K/UL    DF SMEAR SCANNED      RBC COMMENTS NORMOCYTIC, NORMOCHROMIC     METABOLIC PANEL, COMPREHENSIVE    Collection Time: 07/04/22 12:18 PM   Result Value Ref Range    Sodium 135 (L) 136 - 145 mmol/L    Potassium 3.6 3.5 - 5.1 mmol/L    Chloride 101 97 - 108 mmol/L    CO2 27 21 - 32 mmol/L    Anion gap 7 5 - 15 mmol/L    Glucose 121 (H) 65 - 100 mg/dL    BUN 14 6 - 20 MG/DL    Creatinine 0.73 0.70 - 1.30 MG/DL    BUN/Creatinine ratio 19 12 - 20      GFR est AA >60 >60 ml/min/1.73m2    GFR est non-AA >60 >60 ml/min/1.73m2    Calcium 8.3 (L) 8.5 - 10.1 MG/DL    Bilirubin, total 1.2 (H) 0.2 - 1.0 MG/DL    ALT (SGPT) 82 (H) 12 - 78 U/L    AST (SGOT) 111 (H) 15 - 37 U/L    Alk.  phosphatase 142 (H) 45 - 117 U/L    Protein, total 6.1 (L) 6.4 - 8.2 g/dL    Albumin 2.7 (L) 3.5 - 5.0 g/dL    Globulin 3.4 2.0 - 4.0 g/dL    A-G Ratio 0.8 (L) 1.1 - 2.2     EKG, 12 LEAD, INITIAL    Collection Time: 07/04/22 12:20 PM   Result Value Ref Range    Ventricular Rate 86 BPM    Atrial Rate 86 BPM    P-R Interval 150 ms    QRS Duration 94 ms    Q-T Interval 394 ms    QTC Calculation (Bezet) 471 ms    Calculated P Axis -21 degrees    Calculated R Axis -8 degrees    Calculated T Axis 87 degrees    Diagnosis       Sinus rhythm with frequent premature ventricular complexes in a pattern of   bigeminy  Inferior infarct , age undetermined  No previous ECGs available       Radiologic Studies  CT HEAD WO CONT   Final Result   Central atrophy and old left posterior parietal infarct. No acute process identified. Mild bilateral maxillary sinus disease      CTA CHEST W OR W WO CONT   Final Result   1.  3.8 x 3.7 cm infrarenal abdominal aortic aneurysm without evidence of   rupture. 2.  Extensive mesenteric, retroperitoneal, and intrathoracic lymphadenopathy in   addition to liver lesions, lingular lung nodule, and distal esophageal wall   thickening indicative of malignancy, as better evaluated on the recent PET/CT. CTA ABDOMEN PELV W CONT   Final Result   1.  3.8 x 3.7 cm infrarenal abdominal aortic aneurysm without evidence of   rupture. 2.  Extensive mesenteric, retroperitoneal, and intrathoracic lymphadenopathy in   addition to liver lesions, lingular lung nodule, and distal esophageal wall   thickening indicative of malignancy, as better evaluated on the recent PET/CT. CT SPINE CERV WO CONT   Final Result   Severe C5-6 and mild C6-7 central stenosis. Severe bilateral C5-6 neural foraminal stenosis.        Mild T2, T3, and T4 superior endplate deformities, age indeterminant but of the   osteoporotic type      Lung disease will be separately evaluated by a CTA of the chest      MRI BRAIN W WO CONT    (Results Pending)   MRI CERV SPINE W CONT    (Results Pending)   MRI LUMB SPINE W WO CONT    (Results Pending)     CT Results  (Last 48 hours)               07/04/22 1247  CT HEAD WO CONT Final result    Impression:  Central atrophy and old left posterior parietal infarct. No acute process identified. Mild bilateral maxillary sinus disease       Narrative:  EXAM: CT HEAD WO CONT       INDICATION: left arm and leg weakness. History of gastric adenocarcinoma. COMPARISON: 6/16/2022. CONTRAST: None. TECHNIQUE: Unenhanced CT of the head was performed using 5 mm images. Brain and   bone windows were generated. Coronal and sagittal reformats. CT dose reduction   was achieved through use of a standardized protocol tailored for this   examination and automatic exposure control for dose modulation. FINDINGS:   The ventricles and sulci are prominent but stable in size, shape and   configuration. . There is a remote left parietal infarct. There is no acute white   matter disease. There is no intracranial hemorrhage, extra-axial collection, or   mass effect. The basilar cisterns are open. No CT evidence of acute infarct. The bone windows demonstrate no abnormalities. The visualized portions of the   paranasal sinuses and mastoid air cells are clear with exception of mild   circumferential mucosal thickening in both maxillary sinuses. Douglas Ra 07/04/22 1247  CTA [facility] CONT Final result    Impression:  1.  3.8 x 3.7 cm infrarenal abdominal aortic aneurysm without evidence of   rupture. 2.  Extensive mesenteric, retroperitoneal, and intrathoracic lymphadenopathy in   addition to liver lesions, lingular lung nodule, and distal esophageal wall   thickening indicative of malignancy, as better evaluated on the recent PET/CT. Narrative:  INDICATION:  Evaluate aneurysm        COMPARISON:  PET/CT June 7, 2022       TECHNIQUE:   After the rapid bolus administration of 100 cc of Isovue-370, then   thin section helical images were obtained through the chest, abdomen, and   pelvis.   3D image postprocessing and maximum intensity projections were   performed. CT dose reduction was achieved through use of a standardized   protocol tailored for this examination and automatic exposure control for dose   modulation. FINDINGS:   THORAX: Moderate bilateral pleural effusions. Compressive atelectasis at the   left lung base. Mediastinal and hilar lymphadenopathy. Normal caliber main   pulmonary artery. Distal branches are patent. Thoracic aorta is normal in   caliber. 5 mm lingular pulmonary nodule (301:67). Circumferential thickening of   the distal esophagus. LIVER: Ill-defined hypodensities in the liver is not optimally characterized on   this arterial phase study. GALLBLADDER: Stones in the gallbladder, mild distention. No pericholecystic   edema. SPLEEN: Unremarkable   PANCREAS: No mass or ductal dilatation. ADRENALS: Unremarkable. KIDNEYS/URETERS: Bilateral renal cysts incompletely characterized. No   hydronephrosis. PERITONEUM: Extensive mesenteric and retroperitoneal lymphadenopathy   STOMACH: Unremarkable. SMALL BOWEL: No dilatation or wall thickening. COLON: No dilatation or wall thickening. APPENDIX: Unremarkable. PELVIS: No pelvic lymphadenopathy or free fluid. BONES: Lytic lesion in the L3 vertebral body resulting in destruction of the   posterior aspect of the vertebral body. VESSELS: Normal caliber thoracic aorta. 3.8 x 3.7 cm infrarenal abdominal aortic   aneurysm. No periaortic hematoma. Iliac arteries are normal in caliber. 07/04/22 1247  CTA ABDOMEN PELV W CONT Final result    Impression:  1.  3.8 x 3.7 cm infrarenal abdominal aortic aneurysm without evidence of   rupture. 2.  Extensive mesenteric, retroperitoneal, and intrathoracic lymphadenopathy in   addition to liver lesions, lingular lung nodule, and distal esophageal wall   thickening indicative of malignancy, as better evaluated on the recent PET/CT.        Narrative:  INDICATION:  Evaluate aneurysm        COMPARISON:  PET/CT June 7, 2022       TECHNIQUE:   After the rapid bolus administration of 100 cc of Isovue-370, then   thin section helical images were obtained through the chest, abdomen, and   pelvis. 3D image postprocessing and maximum intensity projections were   performed. CT dose reduction was achieved through use of a standardized   protocol tailored for this examination and automatic exposure control for dose   modulation. FINDINGS:   THORAX: Moderate bilateral pleural effusions. Compressive atelectasis at the   left lung base. Mediastinal and hilar lymphadenopathy. Normal caliber main   pulmonary artery. Distal branches are patent. Thoracic aorta is normal in   caliber. 5 mm lingular pulmonary nodule (301:67). Circumferential thickening of   the distal esophagus. LIVER: Ill-defined hypodensities in the liver is not optimally characterized on   this arterial phase study. GALLBLADDER: Stones in the gallbladder, mild distention. No pericholecystic   edema. SPLEEN: Unremarkable   PANCREAS: No mass or ductal dilatation. ADRENALS: Unremarkable. KIDNEYS/URETERS: Bilateral renal cysts incompletely characterized. No   hydronephrosis. PERITONEUM: Extensive mesenteric and retroperitoneal lymphadenopathy   STOMACH: Unremarkable. SMALL BOWEL: No dilatation or wall thickening. COLON: No dilatation or wall thickening. APPENDIX: Unremarkable. PELVIS: No pelvic lymphadenopathy or free fluid. BONES: Lytic lesion in the L3 vertebral body resulting in destruction of the   posterior aspect of the vertebral body. VESSELS: Normal caliber thoracic aorta. 3.8 x 3.7 cm infrarenal abdominal aortic   aneurysm. No periaortic hematoma. Iliac arteries are normal in caliber. 07/04/22 1247  CT SPINE CERV WO CONT Final result    Impression:  Severe C5-6 and mild C6-7 central stenosis. Severe bilateral C5-6 neural foraminal stenosis.         Mild T2, T3, and T4 superior endplate deformities, age indeterminant but of the   osteoporotic type       Lung disease will be separately evaluated by a CTA of the chest       Narrative:  EXAM:  CT CERVICAL SPINE WITHOUT CONTRAST       INDICATION: left arm, leg weakness. COMPARISON: None. CONTRAST:  None. TECHNIQUE: Multislice helical CT of the cervical spine was performed without   intravenous contrast administration. Sagittal and coronal reformats were   generated. CT dose reduction was achieved through use of a standardized   protocol tailored for this examination and automatic exposure control for dose   modulation. FINDINGS:       A right IJ line is in place. There is partial opacification of the left upper   lung. There is mild mucosal thickening involving both maxillary sinuses. The alignment is remarkable for straightening as well as 3 mm retrolisthesis of   C5 relative to C6. Disc space narrowing is most pronounced at C5-6. Superior   endplate deformities are seen involving T2, T3, and T4. . The odontoid process is   intact. The craniocervical junction is within normal limits. The incidentally imaged soft tissues are within normal limits. C2-C3: There is no spinal canal or left neural foraminal stenosis. Mild   right-sided neural foraminal stenosis is present secondary to uncovertebral   joint hypertrophy (axial image 28). C3-C4: Disc osteophyte complex, eccentric to the left, with mild left-sided   neural foraminal stenosis but no significant central stenosis (series 305, image   34). Fide Mckeon C4-C5: There is no spinal canal or neural foraminal stenosis. C5-C6: Disc osteophyte complex, in combination with facet arthropathy, results   in central canal diameter of 6.9 mm. There is severe bilateral neural foraminal   stenosis (axial image 47). .       C6-C7: Disc osteophyte complex. Central canal measures about 9 mm.  There is mild   bilateral neural foraminal stenosis secondary to uncovertebral joint hypertrophy   (axial image 52). .       C7-T1: There is a disc osteophyte complex. Mild to moderate right-sided neural   foraminal stenosis is noted secondary to right-sided uncovertebral joint   hypertrophy. (Axial image 59). .       There are bilateral pleural effusions, partial lung opacification, and   significant mediastinal adenopathy. CXR Results  (Last 48 hours)    None            Medical Decision Making   I am the first provider for this patient. I reviewed the vital signs, available nursing notes, past medical history, past surgical history, family history and social history. Vital Signs-Reviewed the patient's vital signs. Patient Vitals for the past 12 hrs:   Temp Pulse Resp BP SpO2   07/04/22 1444 97.8 °F (36.6 °C) 73 18 124/74 92 %   07/04/22 1405  83 19 132/89 91 %   07/04/22 1335  83 20  91 %   07/04/22 1305  81 19 130/85 93 %   07/04/22 1205  87 19 123/76 (!) 88 %   07/04/22 1109  95 18 (!) 145/66 92 %   07/04/22 1106 97.2 °F (36.2 °C)       07/04/22 1105    (!) 145/66        Records Reviewed: Nursing Notes and Old Medical Records    Provider Notes (Medical Decision Making):   DDx brain metastases, cervical spine mets, ruptured AAA causing decreased spinal cord perfusion    Given localization of symptoms to upper and lower extremity, if spinal cord pathology will be cervical in nature. Will obtain CT head, CTA chest and abdomen in addition on contrasted CT of C-spine and head. Given morbidity of pain is AAA versus stat angiography of neck and brain in setting of symptoms which has been present for 10 days, will obtain angiography of aorta before cerebral angiography at this time. ED Course:   Initial assessment performed. The patients presenting problems have been discussed, and they are in agreement with the care plan formulated and outlined with them. I have encouraged them to ask questions as they arise throughout their visit.     ED Course as of 07/04/22 1856   Mon Jul 04, 2022   1311 CTA shows 3.8 x 3.7 cm infrarenal aneurysm without evidence of rupture. Lymphadenopathy in the mesentery retroperitoneum and intrathoracic region additional liver lesions lung lesion, distal esophageal lesion. Consistent with his metastatic esophageal cancer [WB]   1311 Mildly elevated transaminase in setting of known liver lesions [WB]   1312 CT C-spine demonstrates central and foraminal stenosis, osteoporotic type superior endplate deformities of T2-T3-T4 [WB]   36 Hospitalist paged for admission [WB]      ED Course User Index  [WB] Nessa Anders MD     Disposition:  Admission Note:  Patient is being admitted to the hospital by Dr. Jane Lugo, Service: Hospitalist.  The results of their tests and reasons for their admission have been discussed with them and available family. They convey agreement and understanding for the need to be admitted and for their admission diagnosis. Diagnosis     Clinical Impression:   1. Unilateral weakness      Attestations:    Natalie Smith M.D. Please note that this dictation was completed with [employer], the computer voice recognition software. Quite often unanticipated grammatical, syntax, homophones, and other interpretive errors are inadvertently transcribed by the computer software. Please disregard these errors. Please excuse any errors that have escaped final proofreading. Thank you.

## 2022-07-04 NOTE — PROGRESS NOTES
Problem: Falls - Risk of  Goal: *Absence of Falls  Description: Document Chuy Barraza Fall Risk and appropriate interventions in the flowsheet.   Note: Fall Risk Interventions:  Mobility Interventions: Bed/chair exit alarm,Patient to call before getting OOB,PT Consult for mobility concerns         Medication Interventions: Bed/chair exit alarm,Patient to call before getting OOB    Elimination Interventions: Bed/chair exit alarm,Call light in reach,Patient to call for help with toileting needs,Toileting schedule/hourly rounds,Urinal in reach

## 2022-07-04 NOTE — ED NOTES
PT CONTINUES TO REST IN BED. PT CONTINUES TO BE WEAK IN RIGHT ARM MORE THAN OTHER. VS UPDATED ALONG WITH NIH ACCORDING TO PROTOCOL.  REPORT GIVEN TO FLOOR RN ATT

## 2022-07-04 NOTE — TELEPHONE ENCOUNTER
Patient called stating he is in significant pain in his back. This is despite taking his pain medications. He is taking Oxycodone and states that he became too sleepy when he doubled this in the past. He does not think he can make it in for this appointment tomorrow. He reports his pain is so severe he cannot walk. He is concerned about how he would get down the stairs. No n/v/d, SOB, CP. He also reports that his left side feels weak. I advised he call EMS and go to the ER.

## 2022-07-04 NOTE — H&P
Hospitalist Admission Note    NAME: Tad Valladares   :  1945   MRN:  [id number]     Date/Time:  2022 1:38 PM    Patient PCP: Aldean Fabry, MD  ______________________________________________________________________  Given the patient's current clinical presentation, I have a high level of concern for decompensation if discharged from the emergency department. Complex decision making was performed, which includes reviewing the patient's available past medical records, laboratory results, and x-ray films. My assessment of this patient's clinical condition and my plan of care is as follows. 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  -As per last heme-onc note, patient had some low extremity weakness and had MRI brain on  which was negative  -CT abdomen showed lytic lesion at L3 vertebral body    -in ED ,  CT head and CTA head and neck without LVO and no bleed.   -His presentation is likely 2/2 radiculopathy/myelopathy ,?CVA  -Get MRI lumbar spine and cervical spine. MRI brain as well  -Neuro consult  -PT/OT eval, ECHO  -Continue aspirin      gastroesophageal cancer  -Consult oncologist as a courtesy  -Last PET scan showed distal esophageal mass lesion  -Has known metastasis to liver, pericardial, bone  -Has a Chemo-Port    Elevated LFTs-likely from hepatic metastasis, recheck tomorrow      History of AAA   -Size stable around 2.7. History of hypertension-continue Bumex, lisinopril, verapamil  History of HLD        Code Status: Full code  Surrogate Decision Maker wife    DVT Prophylaxis:   GI Prophylaxis: not indicated    Baseline: Full code      Subjective:   CHIEF COMPLAINT: Back pain    HISTORY OF PRESENT ILLNESS:     Tad Valladares is a 68 y.o.    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 Who presented to ED with a chief complaint of progressive left lower extremity and left upper extremity weakness. He says he has been having lower back pain which is getting worse. He also reports that he is feeling his left leg becoming weaker for 1 week. And for past 3 days his left upper arm has been getting weaker. He say its progressive weakness. Denies any numbness or tingling. Denies any speech issue. No recent fever , chills, shortness of breath      We were asked to admit for work up and evaluation of the above problems. Past Medical History:   Diagnosis Date    AAA (abdominal aortic aneurysm) ([facility] [address]     22 CT: 3.7 cm; stable    Alcohol abuse     20 drinks/week    At risk for sleep apnea 2019    during phone assessment for PAT, LEVI 6    Colon polyp     Elevated CPK     Esophageal cancer (HCC)     *PROVISIONAL DIAGNOSIS* - MRCP finding with esophageal mass, hepatic, bone, LN mets. LN bx pending. H/O Referral placed 22    Former smoker, stopped smoking in distant past     Hyperlipidemia     Hypertension     Obesity     Psoriasis     Recurrent pancreatitis     2/2 ETOH use    Retroperitoneal lymphadenopathy     CT 22 - most prominent around celiac axis; concern for lymphoma    Rosacea         Past Surgical History:   Procedure Laterality Date    COLONOSCOPY N/A 2019    COLON[facility] performed by [facility]y [facility]entino MD at [facility] HX COLONOSCOPY  2014    HX HERNIA REPAIR      umbilical    IR INSERT TUNL CVC W PORT OVER 5 YEARS  2022       Social History     Tobacco Use    Smoking status: Former Smoker     Packs/day: 1.00     Years: 20.00     Pack years: 20.00     Quit date:      Years since quittin.5    Smokeless tobacco: Never Used   Substance Use Topics    Alcohol use:  Yes     Alcohol/week: 20.0 standard drinks     Types: 20 Cans of beer per week     Comment: not much since throat issues        Family History   Problem Relation Age of Onset    Cancer Mother         colon     No Known Allergies     Prior to Admission medications    Medication Sig Start Date End Date Taking? Authorizing Provider   oxyCODONE IR (ROXICODONE) 5 mg immediate release tablet Take 1 Tablet by mouth every four (4) hours as needed for Pain for up to 14 days. Max Daily Amount: 30 mg. 6/20/22 7/4/22  Elyse Chopra MD   ondansetron (ZOFRAN ODT) 4 mg disintegrating tablet Take 1 Tablet by mouth every eight (8) hours as needed for Nausea or Vomiting. 6/15/22   Elyse Chopra MD   prochlorperazine (COMPAZINE) 10 mg tablet Take 0.5 Tablets by mouth every six (6) hours as needed for Nausea or Vomiting. 6/15/22   Elyse Chopra MD   lidocaine-prilocaine (EMLA) topical cream Apply  to affected area as needed for Pain (Apply a quarter size cream over port site 1 hour prior to chemotherapy). 6/15/22   Elyse Chopra MD   metroNIDAZOLE (METROCREAM) 0.75 % topical cream Apply  to affected area two (2) times a day. Use a thin layer to affected areas after washing    Provider, Historical   verapamil ER (CALAN-SR) 240 mg CR tablet TAKE ONE TABLET BY MOUTH EVERY DAY 3/5/22   Lauren Dwyer MD   bumetanide (BUMEX) 0.5 mg tablet TAKE ONE TABLET BY MOUTH EVERY DAY 3/5/22   Lauren Dwyer MD   lisinopriL (PRINIVIL, ZESTRIL) 20 mg tablet TAKE ONE TABLET BY MOUTH EVERY DAY 3/5/22   Lauren Dwyer MD   fenofibrate (LOFIBRA) 160 mg tablet TAKE ONE TABLET BY MOUTH EVERY DAY 3/5/22   Lauren Dwyer MD   potassium (POTASSIMIN PO) Take  by mouth daily. Provider, Historical   magnesium oxide (MAG-OX) 400 mg tablet Take 400 mg by mouth daily. Provider, Historical   omega-3 fatty acids Cap Take 500 mg by mouth daily. Jeevan Castaneda MD   aspirin 81 mg tablet Take 81 mg by mouth four (4) days a week.     Jeevan Castaneda MD       REVIEW OF SYSTEMS:     I am not able to complete the review of systems because: The patient is intubated and sedated    The patient has altered mental status due to his acute medical problems    The patient has baseline aphasia from prior stroke(s)    The patient has baseline dementia and is not reliable historian    The patient is in acute medical distress and unable to provide information           Total of 12 systems reviewed as follows:       POSITIVE= underlined text  Negative = text not underlined  General:  fever, chills, sweats, generalized weakness, weight loss/gain,      loss of appetite   Eyes:    blurred vision, eye pain, loss of vision, double vision  ENT:    rhinorrhea, pharyngitis   Respiratory:   cough, sputum production, SOB, GUZMAN, wheezing, pleuritic pain   Cardiology:   chest pain, palpitations, orthopnea, PND, edema, syncope   Gastrointestinal:  abdominal pain , N/V, diarrhea, dysphagia, constipation, bleeding   Genitourinary:  frequency, urgency, dysuria, hematuria, incontinence   Muskuloskeletal :  arthralgia, myalgia, back pain  Hematology:  easy bruising, nose or gum bleeding, lymphadenopathy   Dermatological: rash, ulceration, pruritis, color change / jaundice  Endocrine:   hot flashes or polydipsia   Neurological:  headache, dizziness, confusion, focal weakness, paresthesia,     Speech difficulties, memory loss, gait difficulty  Psychological: Feelings of anxiety, depression, agitation    Objective:   VITALS:    Visit Vitals  BP (!) 145/66   Pulse 95   Temp 97.2 °F (36.2 °C)   Resp 18   SpO2 92%       PHYSICAL EXAM:    General:    Alert, cooperative, no distress, appears stated age. HEENT: Atraumatic, anicteric sclerae, pink conjunctivae     No oral ulcers, mucosa moist, throat clear, dentition fair  Neck:  Supple, symmetrical,  thyroid: non tender  Lungs:   Mild wheezing expiratory  Chest wall:  No tenderness  No Accessory muscle use.   Heart:   Regular  rhythm,  No  murmur   No edema  Abdomen: Soft, non-tender. Not distended. Bowel sounds normal  Extremities: No cyanosis. No clubbing,      Skin turgor normal, Capillary refill normal, Radial dial pulse 2+  Skin:     Not pale. Not Jaundiced  No rashes   Psych:  Good insight. Not depressed. Not anxious or agitated. Neurologic: EOMs intact. No facial asymmetry. 5 out of 5 strength bilateral legs, but slight decreased strength on left side. Left upper extremity 3-4/out of 5.    _______________________________________________________________________  Care Plan discussed with:    Comments   Patient x    Family      RN     Care Manager                    Consultant:      _______________________________________________________________________  Expected  Disposition:   Home with Family    HH/PT/OT/RN    SNF/LTC    [facility]NA    ________________________________________________________________________  TOTAL TIME:  28 Minutes    Critical Care Provided     Minutes non procedure based      Comments     Reviewed previous records   >50% of visit spent in counseling and coordination of care  Discussion with patient and/or family and questions answered       ________________________________________________________________________  Signed: Kelly Pitts MD    Procedures: see electronic medical records for all procedures/Xrays and details which were not copied into this note but were reviewed prior to creation of Plan.     LAB DATA REVIEWED:    Recent Results (from the past 24 hour(s))   CBC WITH AUTOMATED DIFF    Collection Time: 07/04/22 12:18 PM   Result Value Ref Range    WBC 2.2 (L) 4.1 - 11.1 K/uL    RBC 4.12 4.10 - 5.70 M/uL    HGB 12.0 (L) 12.1 - 17.0 g/dL    HCT 36.4 (L) 36.6 - 50.3 %    MCV 88.3 80.0 - 99.0 FL    MCH 29.1 26.0 - 34.0 PG    MCHC 33.0 30.0 - 36.5 g/dL    RDW 13.5 11.5 - 14.5 %    PLATELET 638 489 - 822 K/uL    MPV 9.0 8.9 - 12.9 FL    NRBC 0.0 0  WBC    ABSOLUTE NRBC 0.00 0.00 - 0.01 K/uL    NEUTROPHILS 48 32 - 75 %    LYMPHOCYTES 31 12 - 49 %    MONOCYTES 17 (H) 5 - 13 %    EOSINOPHILS 2 0 - 7 %    BASOPHILS 1 0 - 1 %    IMMATURE GRANULOCYTES 1 (H) 0.0 - 0.5 %    ABS. NEUTROPHILS 1.1 (L) 1.8 - 8.0 K/UL    ABS. LYMPHOCYTES 0.7 (L) 0.8 - 3.5 K/UL    ABS. MONOCYTES 0.4 0.0 - 1.0 K/UL    ABS. EOSINOPHILS 0.0 0.0 - 0.4 K/UL    ABS. BASOPHILS 0.0 0.0 - 0.1 K/UL    ABS. IMM. GRANS. 0.0 0.00 - 0.04 K/UL    DF SMEAR SCANNED      RBC COMMENTS NORMOCYTIC, NORMOCHROMIC     METABOLIC PANEL, COMPREHENSIVE    Collection Time: 07/04/22 12:18 PM   Result Value Ref Range    Sodium 135 (L) 136 - 145 mmol/L    Potassium 3.6 3.5 - 5.1 mmol/L    Chloride 101 97 - 108 mmol/L    CO2 27 21 - 32 mmol/L    Anion gap 7 5 - 15 mmol/L    Glucose 121 (H) 65 - 100 mg/dL    BUN 14 6 - 20 MG/DL    Creatinine 0.73 0.70 - 1.30 MG/DL    BUN/Creatinine ratio 19 12 - 20      GFR est AA >60 >60 ml/min/1.73m2    GFR est non-AA >60 >60 ml/min/1.73m2    Calcium 8.3 (L) 8.5 - 10.1 MG/DL    Bilirubin, total 1.2 (H) 0.2 - 1.0 MG/DL    ALT (SGPT) 82 (H) 12 - 78 U/L    AST (SGOT) 111 (H) 15 - 37 U/L    Alk.  phosphatase 142 (H) 45 - 117 U/L    Protein, total 6.1 (L) 6.4 - 8.2 g/dL    Albumin 2.7 (L) 3.5 - 5.0 g/dL    Globulin 3.4 2.0 - 4.0 g/dL    A-G Ratio 0.8 (L) 1.1 - 2.2     EKG, 12 LEAD, INITIAL    Collection Time: 07/04/22 12:20 PM   Result Value Ref Range    Ventricular Rate 86 BPM    Atrial Rate 86 BPM    P-R Interval 150 ms    QRS Duration 94 ms    Q-T Interval 394 ms    QTC Calculation (Bezet) 471 ms    Calculated P Axis -21 degrees    Calculated R Axis -8 degrees    Calculated T Axis 87 degrees    Diagnosis       Sinus rhythm with frequent premature ventricular complexes in a pattern of   bigeminy  Inferior infarct , age undetermined  No previous ECGs available

## 2022-07-05 NOTE — CONSULTS
Palliative Medicine  [phone]    Received consult  Coordinating with Oncology- will see patient tomorrow after Dr Audi Cartagena has a chance to talk to Mr Pablo Scales about next steps    Gregory Pi, NP

## 2022-07-05 NOTE — TELEPHONE ENCOUNTER
Pt wife left  stating pt is in ED [facility], Yoav Alfred came and got him yesterday\".

## 2022-07-05 NOTE — PROGRESS NOTES
Problem: Dysphagia (Adult)  Goal: *Acute Goals and Plan of Care (Insert Text)  Description: Speech Pathology Goals  Initiated 7/5/2022    1. Patient will tolerate Full Liquid diet without adverse effects within 7 days. Outcome: Progressing Towards Goal     SPEECH LANGUAGE PATHOLOGY BEDSIDE SWALLOW EVALUATION  Patient: Yuliya Dangelo (79 y.o. male)  Date: 7/5/2022  Primary Diagnosis: Acute CVA (cerebrovascular accident) [facility]) [I63.9]        Precautions: Fall    ASSESSMENT :  Based on the objective data described below, the patient presents with a functional oropharyngeal swallow. Per chart review, patient with hx of esophageal cancer with \"distal esophageal mass lesion and known metastasis to liver, pericardial, bone. \" Per RN, patient's daughter reported patient on Full Liquid diet at home. At time of SLP visit, patient tolerated all consistencies without difficulty or signs of aspiration. Patient reported globus sensation (i.e. gesturing to chest) with more solid consistencies for the past 4-5 weeks, suspect esophageal in nature, in addition to significant weight loss (i.e. patient reports 40 lbs in the last 4-5 weeks). Patient presents with a functional oral/pharyngeal swallow, however, given suspected esophageal deficits 2/2 \"metastatic gastroesophageal adenocarcinoma,\" recommend Full Liquid diet. Patient appears to be at increased risk for post-prandial aspiration given suspected esophageal deficits. Agree with Palliative consult. Suspect patient will be unable to meet nutritional needs via PO intake. Consider GI consult and RD consult. RN educated re: SLP evaluation. Patient will benefit from skilled intervention to address the above impairments. Patients rehabilitation potential is considered to be guarded.      PLAN :  Recommendations and Planned Interventions:  -- Full Liquid diet  -- Medication as tolerated  -- Consider GI consult  -- Consider RD consult    Frequency/Duration: Patient will be followed by speech-language pathology 2 times a week to address goals. Discharge Recommendations: To Be Determined     SUBJECTIVE:   Patient stated i'm still coherent after SLP asked patient orientation questions. OBJECTIVE:     Past Medical History:   Diagnosis Date    AAA (abdominal aortic aneurysm) ([facility] [address]     4/4/22 CT: 3.7 cm; stable    Alcohol abuse     20 drinks/week    At risk for sleep apnea 05/22/2019    during phone assessment for PAT, LEVI 6    Colon polyp     Elevated CPK     Esophageal cancer (HCC)     *PROVISIONAL DIAGNOSIS* - MRCP finding with esophageal mass, hepatic, bone, LN mets. LN bx pending.  H/O Referral placed 5/24/22    Former smoker, stopped smoking in distant past     Hyperlipidemia     Hypertension     Obesity     Psoriasis     Recurrent pancreatitis     2/2 ETOH use    Retroperitoneal lymphadenopathy     CT 4/4/22 - most prominent around celiac axis; concern for lymphoma    Rosacea      Past Surgical History:   Procedure Laterality Date    COLONOSCOPY N/A 5/29/2019    COLONOSCOPY performed by Deidra Scherer MD at [facility]    HX COLONOSCOPY  02/20/2014    HX HERNIA REPAIR      umbilical    IR INSERT TUNL CVC W PORT OVER 5 YEARS  6/6/2022     Prior Level of Function/Home Situation:   Home Situation  Home Environment: Private residence  # Steps to Enter: 5  One/Two Story Residence: Two story  # of Interior Steps: 13  Height of Each Step (in): 7 inches  Interior Rails: Both  Lift Chair Available: No  Living Alone: No  Support Systems: Spouse/Significant Other  Patient Expects to be Discharged to[de-identified] Skilled nursing facility  Current DME Used/Available at Home: [address] prior to admission: Full Liquid  Current Diet: Full Liquid    Cognitive and Communication Status:  Neurologic State: Alert  Orientation Level: Oriented X4  Cognition: Appropriate for age attention/concentration,Follows commands  Perception: Appears intact  Perseveration: No perseveration noted  Safety/Judgement: Awareness of environment,Insight into deficits    Oral Assessment:  Oral Assessment  Labial: No impairment  Dentition: Full;Natural  Oral Hygiene: Moist oral mucosa  Lingual: No impairment  Velum: Unable to visualize  Mandible: No impairment    P.O. Trials:  Patient Position: Upright in bed  Vocal quality prior to P.O.: No impairment  Consistency Presented: Thin liquid;Puree; Solid  How Presented: Self-fed/presented;Cup/sip;Cup/gulp; Spoon     Bolus Acceptance: No impairment  Bolus Formation/Control: No impairment     Propulsion: No impairment  Oral Residue: None  Initiation of Swallow: No impairment  Laryngeal Elevation: Functional  Aspiration Signs/Symptoms: None  Pharyngeal Phase Characteristics: No impairment, issues, or problems              Oral Phase Severity: No impairment  Pharyngeal Phase Severity : Other (comment) (WFL pharyngeal phase; At increased risk for post-prandial aspiration)    NOMS:   The NOMS functional outcome measure was used to quantify this patient's level of swallowing impairment. Based on the NOMS, the patient was determined to be at level 7 for swallow function. NOMS Swallowing Levels:  Level 1 (CN): NPO  Level 2 (CM): NPO but takes consistency in therapy  Level 3 (CL): Takes less than 50% of nutrition p.o. and continues with nonoral feedings; and/or safe with mod cues; and/or max diet restriction  Level 4 (CK): Safe swallow but needs mod cues; and/or mod diet restriction; and/or still requires some nonoral feeding/supplements  Level 5 (CJ): Safe swallow with min diet restriction; and/or needs min cues  Level 6 (CI): Independent with p.o.; rare cues; usually self cues; may need to avoid some foods or needs extra time  Level 7 ([address] [place] [address]): Independent for all p.o.  EDWARDO. (2003). National Outcomes Measurement System (NOMS): Adult Speech-Language Pathology User's Guide.        Pain:  Pain Scale 1: Numeric (0 - 10)  Pain Intensity 1: 0       After treatment:   Patient left in no apparent distress in bed, Call bell within reach, Nursing notified, and Caregiver / family present    COMMUNICATION/EDUCATION:   Patient was educated regarding his WFL pharyngeal swallow. He demonstrated good understanding as evidenced by verbalizing understanding. The patient's plan of care including recommendations, planned interventions, and recommended diet changes were discussed with: Registered nurse. Patient/family have participated as able in goal setting and plan of care. Patient/family agree to work toward stated goals and plan of care.     Thank you for this referral.  ALFRED Reyes  Time Calculation: 15 mins

## 2022-07-05 NOTE — PROGRESS NOTES
Physical Therapy  Patient currently off the floor for testing. Will check back at later time for evaluation.   Lacey Baker, PT, DPT

## 2022-07-05 NOTE — PROGRESS NOTES
Transition of Care Plan:    RUR: 15%  [facility]   Follow up appointments: PCP, specialists as indicated   DME needed: TBD, has RW, wheelchair at home   Transportation at Discharge: family vs medical transport   [address] or means to access home: yes       IM Medicare Letter: to be reviewed prior to d/c   Is patient a BCPI-A Bundle:  No   If yes, was Bundle Letter given?:    Is patient a [place] and connected with the [place]? No              If yes, was [employer] transfer form completed and [facility] notified? Caregiver Contact: Radha Ruelas (spouse), Dylan Lozoya (daughter)   Discharge Caregiver contacted prior to discharge? Spoke with daughter  Care Conference needed?: No    Reason for Admission:  Acute CVA                     RUR Score: 15%              PCP: First and Last name:   Nereida Cohen MD     Name of Practice:    Are you a current patient: Yes/No: yes   Approximate date of last visit: April 2022   Can you participate in a virtual visit if needed: yes    Do you (patient/family) have any concerns for transition/discharge? Wife currently ill (COVID+), pt progressively getting weaker and cannot ambulate stairs at home               Plan for utilizing home health:   TBD- likely rehab     Current Advanced Directive/Advance Care Plan:  Full Code      Advance Care Planning   Healthcare Decision Maker: Halle Carter     Today we documented Decision Maker(s) consistent with ACP documents on file. Transition of Care Plan:   Rehab vs home with [address], DME, caregiver support    Chart reviewed. Consult received. Call placed to daughter, Musa Sainz, to complete assessment today. Verified contact information and demographics. Pt resides with spouse in a two level home with 5 JOSSE. Pt's bedroom is located upstairs. Prior to about 3 weeks ago, pt was able to care for self at home. As of last week, pt having to ambulate with a RW and was only able to go up and down the stairs once per day. Prior to admission, pt could not ambulate the stairs at all. He was previously able to perform own ADLs. Spouse assists with IADLs as needed. Pt was driving. PCP is Dr. Abbe Moritz with last visit in April 2022. Pt also followed by Dr. Susu Tai (Oncology). Preferred pharmacy [facility]. No prior hx of SNF/rehab stays or HHC reported at this time. Pt has a RW x 2 and a wheelchair at home. He used to have a stair lift and hospital bed at home but they gave it away. ACP on file. Pt is FULL code. PT/OT recommending IPR vs SNF. Daughter states that pt may be hesitant to agree to placement unless explained in great detail. Will follow up with pt in AM and continue to follow. Respiratory panel pending. Pt's wife tested positive for COVID this AM.    Care Management Interventions  PCP Verified by CM:  Yes  Palliative Care Criteria Met (RRAT>21 & CHF Dx)?: No  Mode of Transport at Discharge: BLS  Transition of Care Consult (CM Consult): Discharge Planning  Discharge Durable Medical Equipment: No  Physical Therapy Consult: Yes  Occupational Therapy Consult: Yes  Speech Therapy Consult: Yes  Support Systems: Spouse/Significant Other,Child(frida)  Confirm Follow Up Transport: Family  The Patient and/or Patient Representative was Provided with a Choice of Provider and Agrees with the Discharge Plan?: Yes  Freedom of Choice List was Provided with Basic Dialogue that Supports the Patient's Individualized Plan of Care/Goals, Treatment Preferences and Shares the Quality Data Associated with the Providers?: Yes  [place] Resource Information Provided?: No  Discharge Location  Patient Expects to be Discharged to[de-identified] Rehab hospital/unit acute    SMILEY Marc   Care Manager, [facility]  [phone]

## 2022-07-05 NOTE — CONSULTS
[facility] at [address]  W: [phone] F: [phone]      Reason for Visit:   Silvia Leggett is a 68 y.o. male who is seen in consultation at the request of Dr. Umu Darling for evaluation of widespread lymphadenopathy, malignancy. 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 Tissue consistent with metastatic gastroesophageal adenocarcinoma. Date of Diagnosis: June 9, 2022    Treatment course: FOLFOX with molecular markers pending      History of Present Illness:     49-year-old male with a relevant medical history significant for abdominal aortic aneurysm, alcohol abuse and dependence, hypertension, dyslipidemia, psoriasis, presents for evaluation and treatment of suspected metastatic esophageal malignancy. MRI of the patient's abdomen shows the following    FINDINGS:     VISUALIZED LOWER CHEST: There is a enhancing mass measuring 2.8 x 2.7 cm  inseparable from the distal esophagus. Right precardial lymph node measuring 2.3  x 2.4 cm. Trace right pericardial effusion. LIVER: There are multiple enhancing masses, the largest which is in the  subcapsular segment 7 posterior laterally measuring 2.8 x 3.5 cm in segment 8  measuring 1.6 x 1.9 cm. GALLBLADDER: Intraluminal gallstones with otherwise unremarkable appearance. BILIARY DUCTS: The bile ducts are nondilated with no evident filling defect,  stricture, or mucosal abnormality. SPLEEN: Unremarkable. PANCREAS: At the terminus of the pancreatic duct in the tail there is a 1.0 x  1.6 cm nonenhancing fluid signal intensity structure with no other mass and no  duct dilation.  There is reduction in previously seen peripancreatic stranding  without peripancreatic fluid collection, vascular abnormality, or other  abnormality. No evidence of pancreas divisum. ADRENALS: Unremarkable. KIDNEYS: Multiple simple fluid signal intensity cysts with a few cysts showing  intrinsic T1 signal intensity but no suspicious enhancing nodular tissue and for  which no follow-up is required. No enhancing mass, hydronephrosis, or other  normality. STOMACH: Unremarkable. VISUALIZED BOWEL: No dilatation or wall thickening. Noninflamed appearing left  and sigmoid colon diverticula. PERITONEUM: No ascites or pneumoperitoneum. RETROPERITONEUM: Extensive lymphadenopathy is redemonstrated with periaortic  nodes measuring up to 3.4 x 4.8 cm and 2.5 x 3.6 cm. VISUALIZED PELVIS: Unremarkable. BONES AND SOFT TISSUES: Enhancing lesions in the L3 and L4 vertebral bodies and  left ilium. Degenerative spine change. No acute fracture. ADDITIONAL COMMENTS: N/A     IMPRESSION     1. Findings most suspicious for primary distal esophageal neoplasm with hepatic  metastases,  precardial and retroperitoneal lymph adela metastases, and osseous  metastases with other neoplastic processes with metastatic periesophageal node  also in the differential.      2. There is decreased peripancreatic stranding compatible with interval  improvement in pancreatitis with no complication identified otherwise.     3. 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 No no thoracic or bone imaging is available for review. Most recent labs available from April 21 show no significant cytopenias or significant LFT abnormalities. On interview, the patient complains of back pain, fatigue. He also has complaints of difficulty with swallowing particularly with food slowly moving into his stomach. No reported choking or regurgitation of food reported.   He denies any blood in his stool and denies any tarry stools. No reported hematemesis. Social history reviewed, notable for 20-pack-year smoking history, now quit. As well as current active alcohol use of about 3 beers per day. Family history reviewed, notable for colon cancer in his mother only. Review of Systems: A complete review of systems was obtained, negative except as described above. 6/15/22: Since last visit, the patient had PET scan that shows widely metastatic malignancy with disease in the chest, lungs, liver, bones. He has clinical complaints of fatigue, and new right lower extremity weakness as well as left sided facial droop. Clinical findings are highly concerning for brain metastasis. He has no headache or any other mental status changes. Tissue biopsy from retroperitoneal lymph node biopsy shows metastatic gastric adenocarcinoma with signet ring cell features. 6/20/22:  Patient has worsening back pain, concern for decreased PO intake. MRI brain reviewed, shows no mass or clear evidence of CVA. Patient presents for start of palliative chemotherapy today. Interval History:     7/5/22: Patient hospitalized with complaints of worsening lower extremity weakness, left upper extremity weakness. Overall functional decline potential.  Symptoms were concerning for possible stroke however MRI brain, MRI C-spine, L-spine, T-spine show no clear cause for patient's neurological deficits. Clinically, his performance status has deteriorated such that he now has an ECOG of 4      Past Medical History:   Diagnosis Date    AAA (abdominal aortic aneurysm) ([address]     4/4/22 CT: 3.7 cm; stable    Alcohol abuse     20 drinks/week    At risk for sleep apnea 05/22/2019    during phone assessment for PAT, LEVI 6    Colon polyp     Elevated CPK     Esophageal cancer ([address]     *PROVISIONAL DIAGNOSIS* - MRCP finding with esophageal mass, hepatic, bone, LN mets. LN bx pending.  H/O Referral placed 5/24/22    Former smoker, stopped smoking in distant past     Hyperlipidemia     Hypertension     Obesity     Psoriasis     Recurrent pancreatitis     2/2 ETOH use    Retroperitoneal lymphadenopathy     CT 22 - most prominent around celiac axis; concern for lymphoma    Rosacea       Past Surgical History:   Procedure Laterality Date    COLONOSCOPY N/A 2019    [facility]    HX COLONOSCOPY  2014    HX HERNIA REPAIR      umbilical    IR INSERT TUNL CVC W PORT OVER 5 YEARS  2022      Social History     Tobacco Use    Smoking status: Former Smoker     Packs/day: 1.00     Years: 20.00     Pack years: 20.00     Quit date:      Years since quittin.5    Smokeless tobacco: Never Used   Substance Use Topics    Alcohol use:  Yes     Alcohol/week: 20.0 standard drinks     Types: 20 Cans of beer per week     Comment: not much since throat issues      Family History   Problem Relation Age of Onset    Cancer Mother         colon     Current Facility-Administered Medications   Medication Dose Route Frequency    acetaminophen (TYLENOL) tablet 650 mg  650 mg Oral Q6H PRN    acetaminophen (TYLENOL) tablet 650 mg  650 mg Oral Q4H PRN    Or    acetaminophen (TYLENOL) solution 650 mg  650 mg Per NG tube Q4H PRN    Or    acetaminophen (TYLENOL) suppository 650 mg  650 mg Rectal Q4H PRN    heparin (porcine) injection 5,000 Units  5,000 Units SubCUTAneous Q8H    bumetanide (BUMEX) tablet 0.5 mg  0.5 mg Oral DAILY    aspirin delayed-release tablet 81 mg  81 mg Oral DAILY    lisinopriL (PRINIVIL, ZESTRIL) tablet 20 mg  20 mg Oral DAILY    magnesium oxide (MAG-OX) tablet 400 mg  400 mg Oral DAILY    verapamil ER (CALAN-SR) tablet 240 mg  240 mg Oral DAILY    oxyCODONE IR (ROXICODONE) tablet 5 mg  5 mg Oral Q4H PRN    sodium chloride (NS) flush 5-40 mL  5-40 mL IntraVENous Q8H    sodium chloride (NS) flush 5-40 mL  5-40 mL IntraVENous PRN    acetaminophen (TYLENOL) tablet 650 mg  650 mg Oral Q6H PRN    Or    acetaminophen (TYLENOL) suppository 650 mg  650 mg Rectal Q6H PRN    polyethylene glycol (MIRALAX) packet 17 g  17 g Oral DAILY PRN    ondansetron (ZOFRAN ODT) tablet 4 mg  4 mg Oral Q8H PRN    Or    ondansetron (ZOFRAN) injection 4 mg  4 mg IntraVENous Q6H PRN    albuterol-ipratropium (DUO-NEB) 2.5 MG-0.5 MG/3 ML  3 mL Nebulization Q4H PRN      Allergies   Allergen Reactions    Prohance [Gadoteridol] Shortness of Breath and Nausea and Vomiting     Pt has had MRI contrast twice now with allergic reaction            Physical Exam:     Visit Vitals  BP (!) 138/90 (BP 1 Location: Right upper arm, BP Patient Position: Semi fowlers)   Pulse (!) 101   Temp 97.6 °F (36.4 °C)   Resp 20   Ht 5' 9\" (1.753 m)   Wt 211 lb (95.7 kg)   SpO2 90%   BMI 31.16 kg/m²     ECOG PS: 2  General: alert, cooperative, no distress   Mental  status: normal mood, behavior, speech, dress, motor activity, and thought processes, able to follow commands   HENT: NCAT   Neck: no visualized mass   Resp: no respiratory distress   Neuro: no gross deficits   Skin: no discoloration or lesions of concern on visible areas   Psychiatric: normal affect, consistent with stated mood, no evidence of hallucinations           Results:     Lab Results   Component Value Date/Time    WBC 2.3 (L) 07/05/2022 02:32 AM    HGB 11.0 (L) 07/05/2022 02:32 AM    HCT 32.3 (L) 07/05/2022 02:32 AM    PLATELET 588 38/79/3853 02:32 AM    MCV 87.8 07/05/2022 02:32 AM    ABS.  NEUTROPHILS 1.1 (L) 07/05/2022 02:32 AM    HGB (POC) 15.1 01/24/2019 01:24 PM    HCT (POC) 43.8 01/24/2019 01:24 PM     Lab Results   Component Value Date/Time    Sodium 135 (L) 07/05/2022 02:32 AM    Potassium 3.6 07/05/2022 02:32 AM    Chloride 102 07/05/2022 02:32 AM    CHLORIDE 103.0 01/10/2018 01:05 PM    CO2 26 07/05/2022 02:32 AM    Glucose 92 07/05/2022 02:32 AM    BUN 13 07/05/2022 02:32 AM    Creatinine 0.55 (L) 07/05/2022 02:32 AM    GFR est AA >60 07/05/2022 02:32 AM    GFR est non-AA >60 07/05/2022 02:32 AM    Calcium 8.3 (L) 07/05/2022 02:32 AM    Sodium (POC) 144.0 01/10/2018 01:05 PM    Potassium (POC) 4.4 01/10/2018 01:05 PM    Glucose (POC) 119 (H) 06/07/2022 01:49 PM    Creatinine (POC) 0.70 04/04/2022 08:58 AM     Lab Results   Component Value Date/Time    Bilirubin, total 1.5 (H) 07/05/2022 02:32 AM    ALT (SGPT) 67 07/05/2022 02:32 AM    Alk. phosphatase 127 (H) 07/05/2022 02:32 AM    Protein, total 5.5 (L) 07/05/2022 02:32 AM    Albumin 2.5 (L) 07/05/2022 02:32 AM    Globulin 3.0 07/05/2022 02:32 AM       CT Results (most recent):  Results from Hospital Encounter encounter on 07/04/22    CT SPINE CERV WO CONT    Narrative  EXAM:  CT CERVICAL SPINE WITHOUT CONTRAST    INDICATION: left arm, leg weakness. COMPARISON: None. CONTRAST:  None. TECHNIQUE: Multislice helical CT of the cervical spine was performed without  intravenous contrast administration. Sagittal and coronal reformats were  generated. CT dose reduction was achieved through use of a standardized  protocol tailored for this examination and automatic exposure control for dose  modulation. FINDINGS:    A right IJ line is in place. There is partial opacification of the left upper  lung. There is mild mucosal thickening involving both maxillary sinuses. The alignment is remarkable for straightening as well as 3 mm retrolisthesis of  C5 relative to C6. Disc space narrowing is most pronounced at C5-6. Superior  endplate deformities are seen involving T2, T3, and T4. . The odontoid process is  intact. The craniocervical junction is within normal limits. The incidentally imaged soft tissues are within normal limits. C2-C3: There is no spinal canal or left neural foraminal stenosis. Mild  right-sided neural foraminal stenosis is present secondary to uncovertebral  joint hypertrophy (axial image 28).     C3-C4: Disc osteophyte complex, eccentric to the left, with mild left-sided  neural foraminal stenosis but no significant central stenosis (series 305, image  34). Jose Vogt C4-C5: There is no spinal canal or neural foraminal stenosis. C5-C6: Disc osteophyte complex, in combination with facet arthropathy, results  in central canal diameter of 6.9 mm. There is severe bilateral neural foraminal  stenosis (axial image 47). .    C6-C7: Disc osteophyte complex. Central canal measures about 9 mm. There is mild  bilateral neural foraminal stenosis secondary to uncovertebral joint hypertrophy  (axial image 52). .    C7-T1: There is a disc osteophyte complex. Mild to moderate right-sided neural  foraminal stenosis is noted secondary to right-sided uncovertebral joint  hypertrophy. (Axial image 59). .    There are bilateral pleural effusions, partial lung opacification, and  significant mediastinal adenopathy. Impression  Severe C5-6 and mild C6-7 central stenosis. Severe bilateral C5-6 neural foraminal stenosis. 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 I discussed that in the case of stage IV malignancies, these cancers are often not curable but can be controlled for some time. I discussed that treatment options will depend on molecular testing and the results of targetable molecular marker findings.   I discussed with the patient that treatment would likely include infusional chemotherapy, possibly with the addition of targeted treatment options. To that end, the patient is agreeable to proceeding with port placement as well as liver biopsy.    -Started FOLFOX 2 weeks ago. The patient tolerated treatment well but has now clinically worsened. # Worsening weakness suspicious for paraneoplastic syndrome   - unclear etiology. No clear focal brain or spinal cord impingement on imaging.    - would recommend contacting neurology for opinion on starting IVIG and/or high dose solumedrol. # Goals of care  -Today I discussed at length the patient's poor prognosis regardless of palliative chemotherapy. Given his deterioration in functional status, he would require marked functional improvement to be a candidate for additional chemotherapy. After discussion, the patient verbalized that he wishes to focus on quality of life above quantity of time remaining.   His biggest concern has to do with new onset weakness and neurological dysfunction.    -Plan for palliative care consultation in the morning      Signed By: Shira Bacon MD      Attending Medical Oncologist   [facility]

## 2022-07-05 NOTE — PROGRESS NOTES
Problem: Mobility Impaired (Adult and Pediatric)  Goal: *Acute Goals and Plan of Care (Insert Text)  Description: FUNCTIONAL STATUS PRIOR TO ADMISSION: Patient was independent and active without use of DME.    HOME SUPPORT PRIOR TO ADMISSION: The patient lived with wife but did not require assist. (wife recently dx with COVID)  Granddaughter living with them also at that moment because she hurt her foot per patient. Physical Therapy Goals  Initiated 7/5/2022  1. Patient will move from supine to sit and sit to supine  in bed with modified independence within 7 day(s). 2.  Patient will transfer from bed to chair and chair to bed with contact guard assist using the least restrictive device within 7 day(s). 3.  Patient will perform sit to stand with contact guard assist within 7 day(s). 4.  Patient will ambulate with contact guard assist for 25 feet with the least restrictive device within 7 day(s). Outcome: Progressing Towards Goal   PHYSICAL THERAPY EVALUATION  Patient: James Toledo (79 y.o. male)  Date: 7/5/2022  Primary Diagnosis: Acute CVA (cerebrovascular accident) [facility]) [I63.9]        Precautions:   Fall    ASSESSMENT  Based on the objective data described below, the patient presents with decreased functional mobility from baseline level of function. Patient limited by generalized weakness, L UE weaker than L LE, impaired balance, and decreased activity. Patient with equal strength in LE;'s but has facial grimacing with testing reporting increased back pain. Overall needing modA to come to EOB and Kellen x 2 to come to stand. Able to amb approx 5 feet with RW and Kellen x 2. Patient very weak and unable to progress further and requesting to return to bed. Patient is well below his functional baseline and may benefit from IPR setting to progress to more independent level of function. Still awaiting imagining to r/o CVA.       Other factors to consider for discharge: at risk for falls, below baseline, has limited assistance at home     Patient will benefit from skilled therapy intervention to address the above noted impairments. PLAN :  Recommendations and Planned Interventions: bed mobility training, transfer training, gait training, therapeutic exercises, neuromuscular re-education, patient and family training/education, and therapeutic activities      Frequency/Duration: Patient will be followed by physical therapy:  5 times a week to address goals. Recommendation for discharge: (in order for the patient to meet his/her long term goals)  Therapy 3 hours per day 5-7 days per week. If he does not qualify for IPR then recommend SNF rehab      IF patient discharges home will need the following DME: to be determined (TBD)         SUBJECTIVE:   Patient stated I can't do it.     OBJECTIVE DATA SUMMARY:   HISTORY:    Past Medical History:   Diagnosis Date    AAA (abdominal aortic aneurysm) ([facility]     4/4/22 CT: 3.7 cm; stable    Alcohol abuse     20 drinks/week    At risk for sleep apnea 05/22/2019    during phone assessment for PAT, LEVI 6    Colon polyp     Elevated CPK     Esophageal cancer ([facility])     *PROVISIONAL DIAGNOSIS* - MRCP finding with esophageal mass, hepatic, bone, LN mets. LN bx pending.  H/O Referral placed 5/24/22    Former smoker, stopped smoking in distant past     Hyperlipidemia     Hypertension     Obesity     Psoriasis     Recurrent pancreatitis     2/2 ETOH use    Retroperitoneal lymphadenopathy     CT 4/4/22 - most prominent around celiac axis; concern for lymphoma    Rosacea      Past Surgical History:   Procedure Laterality Date    COLONOSCOPY N/A 5/29/2019    COL[facility] performed by [facility] at [facility]    HX COLONOSCOPY  02/20/2014    HX HERNIA REPAIR      umbilical    IR INSERT TUNL CVC W PORT OVER 5 YEARS  6/6/2022       Personal factors and/or comorbidities impacting plan of care:     Home Situation  Home Environment: Private residence  # Steps to Enter: 5  One/Two Story Residence: Two story  # of Interior Steps: 13  Height of Each Step (in): 7 inches  Interior Rails: Both  Lift Chair Available: No  Living Alone: No  Support Systems: Spouse/Significant Other  Patient Expects to be Discharged to[de-identified] Skilled nursing facility  Current DME Used/Available at Home: Marlynette King    EXAMINATION/PRESENTATION/DECISION MAKING:   Critical Behavior:  Neurologic State: Alert  Orientation Level: Oriented X4  Cognition: Follows commands     Hearing: Auditory  Auditory Impairment: None    Range Of Motion:  AROM: Generally decreased, functional                       Strength:    Strength: Generally decreased, functional       Functional Mobility:  Bed Mobility:  Rolling: Moderate assistance  Supine to Sit: Moderate assistance  Sit to Supine: Minimum assistance  Scooting: Minimum assistance  Transfers:  Sit to Stand: Minimum assistance;Assist x2  Stand to Sit: Minimum assistance;Assist x2                       Balance:   Sitting: Impaired  Sitting - Static: Good (unsupported)  Sitting - Dynamic: Fair (occasional)  Standing: Impaired  Standing - Static: Constant support; Fair  Standing - Dynamic : Constant support; Fair  Ambulation/Gait Training:  Distance (ft): 5 Feet (ft)  Assistive Device: Gait belt;Walker, rolling  Ambulation - Level of Assistance: Minimal assistance;Assist x2     Gait Description (WDL): Exceptions to WDL  Gait Abnormalities: Decreased step clearance;Shuffling gait        Base of Support: Widened     Speed/Luz: Pace decreased (<100 feet/min); Shuffled; Slow  Step Length: Left shortened;Right shortened       Pain Rating:  Reports pain in lower back but does not rate    Activity Tolerance:   Fair and requires rest breaks    After treatment patient left in no apparent distress:   Sitting in chair and Call bell within reach    COMMUNICATION/EDUCATION:   The patients plan of care was discussed with: Physical therapist, Occupational therapist, and Registered nurse. Fall prevention education was provided and the patient/caregiver indicated understanding., Patient/family have participated as able in goal setting and plan of care. , and Patient/family agree to work toward stated goals and plan of care.     Thank you for this referral.  Anitha Brush, PT, DPT   Time Calculation: 23 mins

## 2022-07-05 NOTE — PROGRESS NOTES
End of Shift Note    Bedside shift change report given to Julian Payne RN (oncoming nurse) by Luz Cabral RN (offgoing nurse). Report included the following information SBAR, Kardex, Intake/Output, MAR and Recent Results    Shift worked:  [id number]     Shift summary and any significant changes:    No significant changes during shift.    Concerns for physician to address: None   Zone phone for oncoming shift:  [phone]

## 2022-07-05 NOTE — CONSULTS
Neurology Note    Patient ID:  Tad Valladares  [id number]  68 y.o.  1945      Date of Consultation:  July 5, 2022    Referring Physician:  Dr. Nathaniel Porter    Reason for Consultation:  Left sided weakness      Assessment and Plan:      The patient is a 59-year-old female with recent diagnosis of metastatic gastroesophageal adenocarcinoma who presents to the hospital with progressive weakness involving his left upper extremity with worsening sensory symptoms in his left hip, lower lumbar spine. His examination does reveal facial asymmetry, left upper extremity weakness and mild distal lower extremity weakness. Progressive asymmetric weakness:    Brain MRI revealed no evidence of an acute stroke. His cervical spine MRI does reveal degenerative disease, worse on the left at C5-C6 which could explain some of his proximal weakness in his left upper extremity. He also does have significant degenerative disease and lytic lesions in his lumbar spine which could explain his lower extremity symptoms. If additional testing is necessary, an EMG/nerve conduction study could be performed to help determine the extent this is coming from the cervical and lumbar spine versus brachial plexopathy/peripheral neuropathy. It would depend on the aggressiveness of treatment whether the testing would need to be performed. Please let me know if emg/ncs requested. Continue pt/ot    Dyslipidemia:   Diabetes: Update hemoglobin A1c 6.3. There is no other additional neurology recommendations at this time. If questions arise, please not hesitate to contact me and I will return to see the patient. Subjective: I have pain and weakness       History of Present Illness:   Tad Valladares is a 68 y.o. male with a history of hypertension, dyslipidemia and a recent diagnosis of metastatic adenocarcinoma who presented to the emergency department with progressive left upper and lower extremity weakness.   The patient has also had increasing low back pain that has been getting worse over the past week. The patient states that he has 3 distinct concerns. His left arm has gotten weaker. He is having trouble lifting his arm up and doing any purposeful movements with that arm. He is also noticing increasing sensory disturbance in his left hip and upper leg as well as across his back. He also has distal right lower extremity weakness. They all started at different times and are bothersome to him. Due to the progressive nature of his symptoms, he was brought to the emergency department for an evaluation. He denies any difficulty with vision, speech. His swallowing is stable. He denies any specific numbness in his distal lower extremities. Past Medical History:   Diagnosis Date    AAA (abdominal aortic aneurysm) ([facility] [address]     4/4/22 CT: 3.7 cm; stable    Alcohol abuse     20 drinks/week    At risk for sleep apnea 05/22/2019    during phone assessment for PAT, LEVI 6    Colon polyp     Elevated CPK     Esophageal cancer (HCC)     *PROVISIONAL DIAGNOSIS* - MRCP finding with esophageal mass, hepatic, bone, LN mets. LN bx pending.  H/O Referral placed 5/24/22    Former smoker, stopped smoking in distant past     Hyperlipidemia     Hypertension     Obesity     Psoriasis     Recurrent pancreatitis     2/2 ETOH use    Retroperitoneal lymphadenopathy     CT 4/4/22 - most prominent around celiac axis; concern for lymphoma    Rosacea         Past Surgical History:   Procedure Laterality Date    COLONOSCOPY N/A 5/29/2019    COLONOSCOPY performed by Earlene Carrasquillo [facility] AM[facility]    HX COLONOSCOPY  02/20/2014    HX HERNIA REPAIR      umbilical    IR INSERT TUNL CVC W PORT OVER 5 YEARS  6/6/2022        Family History   Problem Relation Age of Onset    Cancer Mother         colon        Social History     Tobacco Use    Smoking status: Former Smoker     Packs/day: 1.00     Years: 20.00     Pack years: 20.00     Quit date: 26     Years since quittin.5    Smokeless tobacco: Never Used   Substance Use Topics    Alcohol use: Yes     Alcohol/week: 20.0 standard drinks     Types: 20 Cans of beer per week     Comment: not much since throat issues        No Known Allergies     Prior to Admission medications    Medication Sig Start Date End Date Taking? Authorizing Provider   oxyCODONE IR (ROXICODONE) 5 mg immediate release tablet Take 1 Tablet by mouth every four (4) hours as needed for Pain for up to 14 days. Max Daily Amount: 30 mg. 22  Camelia Duverney, MD   ondansetron (ZOFRAN ODT) 4 mg disintegrating tablet Take 1 Tablet by mouth every eight (8) hours as needed for Nausea or Vomiting. 6/15/22   Camelia Duverney, MD   prochlorperazine (COMPAZINE) 10 mg tablet Take 0.5 Tablets by mouth every six (6) hours as needed for Nausea or Vomiting. 6/15/22   Camelia Duverney, MD   lidocaine-prilocaine (EMLA) topical cream Apply  to affected area as needed for Pain (Apply a quarter size cream over port site 1 hour prior to chemotherapy). 6/15/22   Camelia Duverney, MD   metroNIDAZOLE (METROCREAM) 0.75 % topical cream Apply  to affected area two (2) times a day. Use a thin layer to affected areas after washing    Provider, Historical   verapamil ER (CALAN-SR) 240 mg CR tablet TAKE ONE TABLET BY MOUTH EVERY DAY 3/5/22   Gregory Faria MD   bumetanide (BUMEX) 0.5 mg tablet TAKE ONE TABLET BY MOUTH EVERY DAY 3/5/22   Gregory Faria MD   lisinopriL (PRINIVIL, ZESTRIL) 20 mg tablet TAKE ONE TABLET BY MOUTH EVERY DAY 3/5/22   Gregory Faria MD   fenofibrate (LOFIBRA) 160 mg tablet TAKE ONE TABLET BY MOUTH EVERY DAY 3/5/22   Gregory Faria MD   potassium (POTASSIMIN PO) Take  by mouth daily. Provider, Historical   magnesium oxide (MAG-OX) 400 mg tablet Take 400 mg by mouth daily. Provider, Historical   omega-3 fatty acids Cap Take 500 mg by mouth daily.     Other, MD Jeevan   aspirin 81 mg tablet Take 81 mg by mouth four (4) days a week. Other, MD Jeevan     Current Facility-Administered Medications   Medication Dose Route Frequency    acetaminophen (TYLENOL) tablet 650 mg  650 mg Oral Q6H PRN    acetaminophen (TYLENOL) tablet 650 mg  650 mg Oral Q4H PRN    Or    acetaminophen (TYLENOL) solution 650 mg  650 mg Per NG tube Q4H PRN    Or    acetaminophen (TYLENOL) suppository 650 mg  650 mg Rectal Q4H PRN    heparin (porcine) injection 5,000 Units  5,000 Units SubCUTAneous Q8H    bumetanide (BUMEX) tablet 0.5 mg  0.5 mg Oral DAILY    aspirin delayed-release tablet 81 mg  81 mg Oral DAILY    lisinopriL (PRINIVIL, ZESTRIL) tablet 20 mg  20 mg Oral DAILY    magnesium oxide (MAG-OX) tablet 400 mg  400 mg Oral DAILY    verapamil ER (CALAN-SR) tablet 240 mg  240 mg Oral DAILY    oxyCODONE IR (ROXICODONE) tablet 5 mg  5 mg Oral Q4H PRN    sodium chloride (NS) flush 5-40 mL  5-40 mL IntraVENous Q8H    sodium chloride (NS) flush 5-40 mL  5-40 mL IntraVENous PRN    acetaminophen (TYLENOL) tablet 650 mg  650 mg Oral Q6H PRN    Or    acetaminophen (TYLENOL) suppository 650 mg  650 mg Rectal Q6H PRN    polyethylene glycol (MIRALAX) packet 17 g  17 g Oral DAILY PRN    ondansetron (ZOFRAN ODT) tablet 4 mg  4 mg Oral Q8H PRN    Or    ondansetron (ZOFRAN) injection 4 mg  4 mg IntraVENous Q6H PRN    albuterol-ipratropium (DUO-NEB) 2.5 MG-0.5 MG/3 ML  3 mL Nebulization Q4H PRN       Review of Systems:    General, constitutional:pain and weakness.  Swallowing difficulties  Eyes, vision: negative  Ears, nose, throat: negative  Cardiovascular, heart: negative  Respiratory: negative  Gastrointestinal: negative  Genitourinary: negative  Musculoskeletal: negative  Skin and integumentary: negative  Psychiatric: negative  Endocrine: negative  Neurological: negative, except for HPI  Hematologic/lymphatic: negative  Allergy/immunology: negative    Objective:     Visit Vitals  /77 (BP 1 Location: Right upper arm)   Pulse 71 Temp 98 °F (36.7 °C)   Resp 20   Wt 211 lb (95.7 kg)   SpO2 93%   BMI 31.16 kg/m²       Physical Exam:    General:  appears well nourished in no acute distress  Neck: no carotid bruits  Lungs: clear to auscultation  Heart:  no murmurs, regular rate  Lower extremity: peripheral pulses palpable and no edema  Skin: intact    Neurological exam:    Awake, alert, oriented to person, place and time  Recent and remote memory were normal  Attention and concentration were intact  Language was intact. There was no aphasia  Speech: no dysarthria  Fund of knowledge was preserved    Cranial nerves:   II-XII were tested    Perrrla  Fundoscopic examination revealed venous pulsations and no clear abnormalities  Visual fields were full  Eomi, no evidence of nystagmus  Facial sensation:  normal and symmetric  Facial motor: facial asymmetry  Hearing intact  SCM strength intact  Tongue: midline without fasciculations    Motor: Tone normal  Pronator drift was absent on the right. No evidence of fasciculations    Strength testing:   deltoid triceps biceps Wrist ext. Wrist flex. intrinsics Hip flex. Hip ext. Knee ext. Knee flex Dorsi flex Plantar flex   Right 5 5 5 5 5 5 5 5 5 5 4 4   Left 2 2 2 3 3 3 5 5 5 5 5 5         Sensory:  Upper extremity: intact to pp, light touch, and vibration > 10 seconds  Lower extremity: mild decrease distally. Reflexes:    Right Left  Biceps  1 1  Triceps 1 1  Brachiorad.  1 1  Patella  1 1  Achilles 1 1    Plantar response:  flexor bilaterally    Cerebellar testing:  no tremor apparent, finger/nose and doc were intact on the right      Gait: not assessed due to concerns over safety    Labs:     Lab Results   Component Value Date/Time    Hemoglobin A1c 6.3 (H) 07/05/2022 02:32 AM    Sodium 135 (L) 07/05/2022 02:32 AM    Potassium 3.6 07/05/2022 02:32 AM    Chloride 102 07/05/2022 02:32 AM    Glucose 92 07/05/2022 02:32 AM    BUN 13 07/05/2022 02:32 AM    Creatinine 0.55 (L) 07/05/2022 02:32 AM Calcium 8.3 (L) 07/05/2022 02:32 AM    WBC 2.3 (L) 07/05/2022 02:32 AM    HCT 32.3 (L) 07/05/2022 02:32 AM    HGB 11.0 (L) 07/05/2022 02:32 AM    PLATELET 728 27/97/4251 02:32 AM       Imaging:    Results from Hospital Encounter encounter on 06/16/22    MRI BRAIN W WO CONT    Narrative  EXAM:  MRI BRAIN W WO CONT    INDICATION:    concern for brain metastasis gastric adenocarcinoma    COMPARISON:  None. CONTRAST: 20 ml Dotarem. TECHNIQUE:  Multiplanar multisequence acquisition without and with contrast of the brain. FINDINGS:  Diffusion imaging does not show acute ischemic changes. There is no extra-axial fluid collection, hemorrhage or shift. There is a remote left posterior parietal infarct. There is mild atrophy and nonspecific white matter changes. Flow voids in major vessels at the base of the brain are present. No enhancing lesion or masses her. Impression  1. No acute findings no mass. 2. Mild atrophy and nonspecific white matter changes. Results from [place] encounter on 07/04/22    CT SPINE CERV WO CONT    Narrative  EXAM:  CT CERVICAL SPINE WITHOUT CONTRAST    INDICATION: left arm, leg weakness. COMPARISON: None. CONTRAST:  None. TECHNIQUE: Multislice helical CT of the cervical spine was performed without  intravenous contrast administration. Sagittal and coronal reformats were  generated. CT dose reduction was achieved through use of a standardized  protocol tailored for this examination and automatic exposure control for dose  modulation. FINDINGS:    A right IJ line is in place. There is partial opacification of the left upper  lung. There is mild mucosal thickening involving both maxillary sinuses. The alignment is remarkable for straightening as well as 3 mm retrolisthesis of  C5 relative to C6. Disc space narrowing is most pronounced at C5-6. Superior  endplate deformities are seen involving T2, T3, and T4. . The odontoid process is  intact.  The craniocervical junction is within normal limits. The incidentally imaged soft tissues are within normal limits. C2-C3: There is no spinal canal or left neural foraminal stenosis. Mild  right-sided neural foraminal stenosis is present secondary to uncovertebral  joint hypertrophy (axial image 28). C3-C4: Disc osteophyte complex, eccentric to the left, with mild left-sided  neural foraminal stenosis but no significant central stenosis (series 305, image  34). Jonnathan Salter C4-C5: There is no spinal canal or neural foraminal stenosis. C5-C6: Disc osteophyte complex, in combination with facet arthropathy, results  in central canal diameter of 6.9 mm. There is severe bilateral neural foraminal  stenosis (axial image 47). .    C6-C7: Disc osteophyte complex. Central canal measures about 9 mm. There is mild  bilateral neural foraminal stenosis secondary to uncovertebral joint hypertrophy  (axial image 52). .    C7-T1: There is a disc osteophyte complex. Mild to moderate right-sided neural  foraminal stenosis is noted secondary to right-sided uncovertebral joint  hypertrophy. (Axial image 59). .    There are bilateral pleural effusions, partial lung opacification, and  significant mediastinal adenopathy. Impression  Severe C5-6 and mild C6-7 central stenosis. Severe bilateral C5-6 neural foraminal stenosis. Mild T2, T3, and T4 superior endplate deformities, age indeterminant but of the  osteoporotic type    Lung disease will be separately evaluated by a CTA of the chest      I did independently review the head CT from July 4, 2022. There was no acute abnormality. There was atrophy with chronic microvascular ischemic changes. There is an old left posterior parietal stroke. I did independently review the brain MRI from July 5, 2022. There is no evidence of metastasis. There is chronic microvascular ischemic changes.     MRI of the cervical spine did reveal multilevel degenerative disease which was worse at C5-C6, severe on the left    Preliminary results of the MRI of the lumbar spine reveal metastasis at L3-L4 with compression deformities. Further imaging is scheduled due to noncontrasted images being able to be performed      I spent 70    minutes providing care to this  acutely ill inpatient with > 50% of the time counseling and assisting in the coordination of care of the patient on the patient's hospital floor/unit.             Active Problems:    Acute CVA (cerebrovascular accident) ([facility] [address] (7/4/2022)                   Signed By:  Bryan Ayon DO FAAN    July 5, 2022

## 2022-07-05 NOTE — PROGRESS NOTES
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  -As per last heme-onc note, patient had some low extremity weakness and had MRI brain on  which was negative  -CT abdomen showed lytic lesion at L3 vertebral body  -CT head and CTA head and neck without LVO and no bleed. -MRI brain No evidence of metastasis. No acute abnormality. -MRI cervical spine Multilevel spondylosis as above, worst at C5-6 where there is mild spinal stenosis. Unremarkable cord. - MRI T spine:Chronic mild loss of height in 3, T4, and T5. No evidence of acute fracture. No evidence of osseous metastasis. No significant spinal stenosis or neural foraminal narrowing  -MRI of L spine: Metastases at L3 and L4 with mild pathologic compression deformities  -Neuro consulted, recs EMG/NCS, d/w pt and he agrees  -PT/OT eval, ECHO  -Continue aspirin     Gastroesophageal cancer w Mets  Dysphagia   -Consult oncologist as a courtesy  -Last PET scan showed distal esophageal mass lesion  -Has known metastasis to liver, pericardial, bone  -Has a Chemo-Port  -Elevated LFTs-likely from hepatic metastasis  -Cont full liquid diet for chronic dysphagia      + Covid 19 infection  Asymptomatic, no cough, sputum,m fever, chills   Wife tested + and so pt was checked  Mild hypoxia likely from pleural effusion  Symptomatic Rx  Hold off steroids/anti virals     History of AAA   -Size stable around 3.7, similar to CT on april.  F/u as outpt w PCP     Systolic HF  Moderate bilateral pleural effusions  History of HLD  History of hypertension  Hypoxic resp failure, acute, d/t the above  continue lisinopril, verapamil  Effusion likely from malignancy and mild systolic HF, new   Switch bumex po to IV  Wean off O2     Code Status: Full code  Surrogate Decision Maker wife  DVT Prophylaxis:  Hep sq  GI Prophylaxis: not indicated  Baseline: Full code  Recommended Disposition: TBD   Anticipated Discharge Date:  >48 hours        Subjective:     Discussed with RN events overnight. No SOB  No cough, fever, flu like symptoms  Left side weakness, more on upper extremity  No HA  No N/V     Review of Systems:  Symptom Y/N Comments  Symptom Y/N Comments   Fever/Chills    Chest Pain     Poor Appetite    Edema     Cough    Abdominal Pain     Sputum    Joint Pain     SOB/GUZMAN    Pruritis/Rash     Nausea/vomit    Tolerating PT/OT     Diarrhea    Tolerating Diet     Constipation    Other       PO intake: No data found. Wt Readings from Last 10 Encounters:   07/05/22 95.7 kg (211 lb)   06/20/22 97.9 kg (215 lb 14.4 oz)   06/20/22 97.5 kg (215 lb)   06/15/22 99.8 kg (220 lb)   06/09/22 101.6 kg (224 lb)   06/07/22 101.6 kg (224 lb)   06/06/22 102.1 kg (225 lb)   06/01/22 104.5 kg (230 lb 6.4 oz)   05/09/22 109.8 kg (242 lb)   04/20/22 110.1 kg (242 lb 11.2 oz)       Objective:     VITALS:   Last 24hrs VS reviewed since prior progress note.  Most recent are:  Patient Vitals for the past 24 hrs:   Temp Pulse Resp BP SpO2   07/05/22 1540 97.6 °F (36.4 °C) (!) 101 20 (!) 138/90 90 %   07/05/22 1509    138/77    07/05/22 0737 98 °F (36.7 °C) 71 20 138/77 93 %   07/05/22 0400  89      07/05/22 0239 98.1 °F (36.7 °C) 76 18 (!) 144/61 95 %   07/05/22 0000  81      07/04/22 2311 98.3 °F (36.8 °C) 71 18 (!) 116/58    07/04/22 2000 98 °F (36.7 °C) 78 18 125/66      No intake or output data in the 24 hours ending 07/05/22 1700     I had a face to face encounter, and independently examined this patient on 7/5/2022, as outlined below:    PHYSICAL EXAM:  General:    No distress     HEENT: Atraumatic, anicteric sclerae, pink conjunctivae, MMM  Neck:  Supple, symmetrical  Lungs:   CTA. No Wheezing/Rhonchi. No rales. No tenderness. No Accessory muscle use. Heart:   Regular rhythm. No murmur. No JVD   GI/:   Soft. NT. ND. BS normal  Extremities: No edema. No cyanosis. No clubbing. Skin:     Not pale. Not Jaundiced. No rashes   Psych:  Good insight. Not depressed. Not anxious or agitated. Neurologic: Alert and oriented X 4. EOMs intact. No facial asymmetry. No slurred speech. L side weakness, power 3/5 proximal L UE, L LE power 4-5/5. Sensation grossly intact. Labs     I reviewed today's most current labs and imaging studies. Pertinent labs include:  Recent Labs     07/05/22  0232 07/04/22  1218   WBC 2.3* 2.2*   HGB 11.0* 12.0*   HCT 32.3* 36.4*    172     Recent Labs     07/05/22  0232 07/04/22  1218   * 135*   K 3.6 3.6    101   CO2 26 27   GLU 92 121*   BUN 13 14   CREA 0.55* 0.73   CA 8.3* 8.3*   ALB 2.5* 2.7*   TBILI 1.5* 1.2*   ALT 67 82*     MRI BRAIN W WO CONT    Result Date: 7/5/2022  No evidence of metastasis. No acute abnormality. Chronic changes as above. MRI CERV SPINE W WO CONT    Result Date: 7/5/2022  1. No acute fracture. 2. Multilevel spondylosis as above, worst at C5-6 where there is mild spinal stenosis. 3. Unremarkable cord. 4. Moderate bilateral pleural effusions. 5. Thoracic lymphadenopathy again seen. MRI [facility] SPINE W WO CONT    Result Date: 7/5/2022  1. Chronic mild loss of height in 3, T4, and T5. No evidence of acute fracture. No evidence of osseous metastasis. 2. No significant spinal stenosis or neural foraminal narrowing. 3. Unchanged moderate bilateral pleural effusions. 4. Thoracic lymphadenopathy and mass in the distal esophagus are better seen on the prior CT. CT HEAD WO CONT    Result Date: 7/4/2022  Central atrophy and old left posterior parietal infarct. No acute process identified.  Mild bilateral maxillary sinus disease    CTA CHEST W OR W WO CONT    Result Date: 7/4/2022  1.  3.8 x 3.7 cm infrarenal abdominal aortic aneurysm without evidence of rupture. 2.  Extensive mesenteric, retroperitoneal, and intrathoracic lymphadenopathy in addition to liver lesions, lingular lung nodule, and distal esophageal wall thickening indicative of malignancy, as better evaluated on the recent PET/CT. CT SPINE CERV WO CONT    Result Date: 7/4/2022  Severe C5-6 and mild C6-7 central stenosis. Severe bilateral C5-6 neural foraminal stenosis. Mild T2, T3, and T4 superior endplate deformities, age indeterminant but of the osteoporotic type Lung disease will be separately evaluated by a CTA of the chest    CTA ABDOMEN PELV W CONT    Result Date: 7/4/2022  1.  3.8 x 3.7 cm infrarenal abdominal aortic aneurysm without evidence of rupture. 2.  Extensive mesenteric, retroperitoneal, and intrathoracic lymphadenopathy in addition to liver lesions, lingular lung nodule, and distal esophageal wall thickening indicative of malignancy, as better evaluated on the recent PET/CT. MRI BRAIN W WO CONT    Result Date: 7/5/2022  No evidence of metastasis. No acute abnormality. Chronic changes as above. MRI CERV SPINE W WO CONT    Result Date: 7/5/2022  1. No acute fracture. 2. Multilevel spondylosis as above, worst at C5-6 where there is mild spinal stenosis. 3. Unremarkable cord. 4. Moderate bilateral pleural effusions. 5. Thoracic lymphadenopathy again seen. MRI [facility] SPINE W WO CONT    Result Date: 7/5/2022  1. Chronic mild loss of height in 3, T4, and T5. No evidence of acute fracture. No evidence of osseous metastasis. 2. No significant spinal stenosis or neural foraminal narrowing. 3. Unchanged moderate bilateral pleural effusions.  4. Thoracic lymphadenopathy and mass in the distal esophagus are better seen on the prior CT.    07/04/22    ECHO ADULT COMPLETE 07/05/2022 7/5/2022    Interpretation Summary    Left Ventricle: Mildly reduced left ventricular systolic function with a visually estimated EF of 45 - 50%. Left ventricle size is normal. Normal wall thickness. Mild global hypokinesis present. Normal diastolic function.     Signed by: Telma Luz MD on 7/5/2022  4:06 PM     All Micro Results     Procedure Component Value Units Date/Time    RESPIRATORY VIRUS PANEL W/COVID-19, PCR [[id number]] Collected: 07/05/22 1415    Order Status: Completed Specimen: NASOPHARYNGEAL SWAB Updated: 07/05/22 1422            Current Medications:     Current Facility-Administered Medications:     acetaminophen (TYLENOL) tablet 650 mg, 650 mg, Oral, Q6H PRN, Prabha Rae MD    acetaminophen (TYLENOL) tablet 650 mg, 650 mg, Oral, Q4H PRN **OR** acetaminophen (TYLENOL) solution 650 mg, 650 mg, Per NG tube, Q4H PRN **OR** acetaminophen (TYLENOL) suppository 650 mg, 650 mg, Rectal, Q4H PRN, Elvira Tsang MD    heparin (porcine) injection 5,000 Units, 5,000 Units, SubCUTAneous, Q8H, Elvira Tsang MD, 5,000 Units at 07/05/22 1422    bumetanide (BUMEX) tablet 0.5 mg, 0.5 mg, Oral, DAILY, Elvira Tsang MD    aspirin delayed-release tablet 81 mg, 81 mg, Oral, DAILY, Elvira Tsang MD    lisinopriL (PRINIVIL, ZESTRIL) tablet 20 mg, 20 mg, Oral, DAILY, Elvira Tsang MD    magnesium oxide (MAG-OX) tablet 400 mg, 400 mg, Oral, DAILY, Elvira Tsang MD    verapamil ER (CALAN-SR) tablet 240 mg, 240 mg, Oral, DAILY, Elvira Tsang MD, 240 mg at 07/05/22 1235    oxyCODONE IR (ROXICODONE) tablet 5 mg, 5 mg, Oral, Q4H PRN, Elvira Tsang MD    sodium chloride (NS) flush 5-40 mL, 5-40 mL, IntraVENous, Q8H, Elvira Tsang MD, 10 mL at 07/05/22 0545    sodium chloride (NS) flush 5-40 mL, 5-40 mL, IntraVENous, PRN, Elvira Tsang MD    acetaminophen (TYLENOL) tablet 650 mg, 650 mg, Oral, Q6H PRN **OR** acetaminophen (TYLENOL) suppository 650 mg, 650 mg, Rectal, Q6H PRN, Elvira Tsang MD    polyethylene glycol (MIRALAX) packet 17 g, 17 g, Oral, DAILY PRN, Isael Valle MD    ondansetron [facility] ODT) tablet 4 mg, 4 mg, Oral, Q8H PRN **OR** ondansetron (ZOFRAN) injection 4 mg, 4 mg, IntraVENous, Q6H PRN, Isael Valle MD    albuterol-ipratropium (DUO-NEB) 2.5 MG-0.5 MG/3 ML, 3 mL, Nebulization, Q4H PRN, Isael Valle MD     Procedures: see electronic medical records for all procedures/Xrays and details which were not copied into this note but were reviewed prior to creation of Plan. Reviewed most current lab test results and cultures  YES  Reviewed most current radiology test results   YES  Review and summation of old records today    NO  Reviewed patient's current orders and MAR    YES  PMH/ reviewed - no change compared to H&P  ________________________________________________________________________  Care Plan discussed with:    Comments   Patient x    Family      RN x    Care Manager     Consultant                        Multidiciplinary team rounds were held today with , nursing, pharmacist and clinical coordinator. Patient's plan of care was discussed; medications were reviewed and discharge planning was addressed.      ________________________________________________________________________  Total NON critical care TIME:  45   Minutes    Total CRITICAL CARE TIME Spent:   Minutes non procedure based      Comments   >50% of visit spent in counseling and coordination of care x     This includes time during multidisciplinary rounds if indicated above   ________________________________________________________________________  Tiffany English MD

## 2022-07-05 NOTE — PROGRESS NOTES
Problem: Self Care Deficits Care Plan (Adult)  Goal: *Acute Goals and Plan of Care (Insert Text)  Description: FUNCTIONAL STATUS PRIOR TO ADMISSION: Patient was independent and did not require AD. HOME SUPPORT: The patient lived with spouse (spouse is currently COVID +). Has granddaughter at home as well. Occupational Therapy Goals  Initiated 7/5/2022  1. Patient will perform upper body dressing with supervision/set-up within 7 day(s). 2.  Patient will perform lower body dressing with minimal assistance/contact guard assist within 7 day(s). 3.  Patient will perform bathing with moderate assistance  within 7 day(s). 4.  Patient will perform toilet transfers with minimal assistance/contact guard assist within 7 day(s). 5.  Patient will perform all aspects of toileting with minimal assistance/contact guard assist within 7 day(s). 6.  Patient will utilize energy conservation techniques during functional activities with verbal cues within 7 day(s). Outcome: Progressing Towards Goal   OCCUPATIONAL THERAPY EVALUATION  Patient: Yuliana Holt (79 y.o. male)  Date: 7/5/2022  Primary Diagnosis: Acute CVA (cerebrovascular accident) [facility]) [I63.9]        Precautions:   Fall    ASSESSMENT  Based on the objective data described below, the patient presents with impaired balance, limited endurance, increased back pain, LUE weakness, and decreased sensation in distal L fingers. Patient is functioning below their independent baseline for self-care and functional mobility, now completing self-care with up to max assist and functional mobility with min Ax2 at RW level. CT and MRI negative for acute CVA. MRI did show metastases at L3 and L4 as well as mild stenosis at C5-6. Patient with limited LUE function, unable to complete shoulder flexion past ~75*, bicep flexion weak 3/5 but able to bring hand to nose, tricep extension 3-/5, very weak L handed grasp compared to dominant RUE.  Required mod Ax2 to complete bed mob and come to sit EOB, required max A to don socks unwilling to attempt due to back pain. Patient able to complete brief standing and sidestepping to change linens with min A x2 at Rw level. Continuing to endorse back pain, returned to bed with mod A to manage BLEs and trunk. Recommend IPR at DC as pt is well below baseline LOF and high fall risk. Current Level of Function Impacting Discharge (ADLs/self-care):   Feeding: Setup;Supervision    Oral Facial Hygiene/Grooming: Setup;Supervision    Bathing: Maximum assistance         Upper Body Dressing: Minimum assistance    Lower Body Dressing: Maximum assistance    Toileting: Maximum assistance    Functional Outcome Measure: The patient scored Total: 25/100 on the Barthel Index outcome measure which is indicative of being very dependent in basic self-care. Other factors to consider for discharge: below baseline, METs     Patient will benefit from skilled therapy intervention to address the above noted impairments. PLAN :  Recommendations and Planned Interventions: self care training, functional mobility training, therapeutic exercise, balance training, therapeutic activities, endurance activities, patient education, and home safety training    Frequency/Duration: Patient will be followed by occupational therapy 4 times a week to address goals. Recommendation for discharge: (in order for the patient to meet his/her long term goals)  Therapy 3 hours per day 5-7 days per week If pt does not qualify/have coverage, would benefit from SNF. This discharge recommendation:  Has been made in collaboration with the attending provider and/or case management    IF patient discharges home will need the following DME: TBD - likely RW       SUBJECTIVE:   Patient stated I cant sit in that chair, dont even think about it.     OBJECTIVE DATA SUMMARY:   HISTORY:   Past Medical History:   Diagnosis Date    AAA (abdominal aortic aneurysm) ([facility] [address]     4/4/22 CT: 3.7 cm; stable Alcohol abuse     20 drinks/week    At risk for sleep apnea 05/22/2019    during phone assessment for PAT, LEVI 6    Colon polyp     Elevated CPK     Esophageal cancer (HCC)     *PROVISIONAL DIAGNOSIS* - MRCP finding with esophageal mass, hepatic, bone, LN mets. LN bx pending. H/O Referral placed 5/24/22    Former smoker, stopped smoking in distant past     Hyperlipidemia     Hypertension     Obesity     Psoriasis     Recurrent pancreatitis     2/2 ETOH use    Retroperitoneal lymphadenopathy     CT 4/4/22 - most prominent around celiac axis; concern for lymphoma    Rosacea      Past Surgical History:   Procedure Laterality Date    COLONOSCOPY N/A 5/29/2019    COLONOSCOPY performed by James [facility]barra MD at [facility] AM[facility]UL[facility]    HX COLONOSCOPY  02/20/2014    HX HERNIA REPAIR      umbilical    IR INSERT TUNL CVC W PORT OVER 5 YEARS  6/6/2022       Expanded or extensive additional review of patient history:     Home Situation  Home Environment: Private residence  # Steps to Enter: 5  One/Two Story Residence: Two story  # of Interior Steps: 13  Height of Each Step (in): 7 inches  Interior Rails: Both  Lift Chair Available: No  Living Alone: No  Support Systems: Spouse/Significant Other  Patient Expects to be Discharged to[de-identified] Skilled nursing facility  Current DME Used/Available at Home: Walker    Hand dominance: Right    EXAMINATION OF PERFORMANCE DEFICITS:  Cognitive/Behavioral Status:  Neurologic State: Alert  Orientation Level: Oriented X4  Cognition: Follows commands  Perception: Appears intact  Perseveration: No perseveration noted  Safety/Judgement: Awareness of environment    Skin: intact    Edema: no edema noted    Hearing:   Auditory  Auditory Impairment: None    Vision/Perceptual:                           Acuity: Within Defined Limits         Range of Motion:    AROM: Generally decreased, functional                         Strength:    Strength: Generally decreased, functional Coordination:     Fine Motor Skills-Upper: Left Impaired;Right Intact    Gross Motor Skills-Upper: Left Intact; Right Intact    Tone & Sensation:                              Balance:  Sitting: Impaired  Sitting - Static: Good (unsupported)  Sitting - Dynamic: Fair (occasional)  Standing: Impaired  Standing - Static: Constant support; Fair  Standing - Dynamic : Constant support; Fair    Functional Mobility and Transfers for ADLs:  Bed Mobility:  Rolling: Moderate assistance  Supine to Sit: Moderate assistance  Sit to Supine: Minimum assistance  Scooting: Minimum assistance    Transfers:  Sit to Stand: Minimum assistance;Assist x2  Stand to Sit: Minimum assistance;Assist x2    ADL Assessment:  Feeding: Setup;Supervision    Oral Facial Hygiene/Grooming: Setup;Supervision    Bathing: Maximum assistance         Upper Body Dressing: Minimum assistance    Lower Body Dressing: Maximum assistance    Toileting: Maximum assistance                ADL Intervention and task modifications:                                Lower Body Dressing Assistance  Socks: Maximum assistance         Cognitive Retraining  Safety/Judgement: Awareness of environment     Functional Measure:    Barthel Index:  Bathin  Bladder: 5  Bowels: 5  Groomin  Dressin  Feedin  Mobility: 0  Stairs: 0  Toilet Use: 0  Transfer (Bed to Chair and Back): 5  Total: 25/100      The Barthel ADL Index: Guidelines  1. The index should be used as a record of what a patient does, not as a record of what a patient could do. 2. The main aim is to establish degree of independence from any help, physical or verbal, however minor and for whatever reason. 3. The need for supervision renders the patient not independent. 4. A patient's performance should be established using the best available evidence. Asking the patient, friends/relatives and nurses are the usual sources, but direct observation and common sense are also important.  However direct testing is not needed. 5. Usually the patient's performance over the preceding 24-48 hours is important, but occasionally longer periods will be relevant. 6. Middle categories imply that the patient supplies over 50 per cent of the effort. 7. Use of aids to be independent is allowed. Score Interpretation (from [address])    Independent   60-79 Minimally independent   40-59 Partially dependent   20-39 Very dependent   <20 Totally dependent     -Miriam Nguyen., Barthel, DFEROZ. (1965). Functional evaluation: the Barthel Index. [address] ([address] (1997). The Barthel activities of daily living index: self-reporting versus actual performance in the old (> or = 75 years). Journal [address] 45(7), [facility], JTOMMYF, Elidia Burrows., Lyndon Wilks. (1999). Measuring the change in disability after inpatient rehabilitation; comparison of the responsiveness of the Barthel Index and Functional [place] Measure. Journal of Neurology, Neurosurgery, and Psychiatry, 66(4), 134-592. Mackenzie Ellis, [place].MONY, PATRICK Jade, & Marva Guillaume M.A. (2004) Assessment of post-stroke quality of life in cost-effectiveness studies: The usefulness of the Barthel Index and the EuroQoL-5D. Quality of Life Research, 15, 007-08     Occupational Therapy Evaluation Charge Determination   History Examination Decision-Making   MEDIUM Complexity : Expanded review of history including physical, cognitive and psychosocial  history  MEDIUM Complexity : 3-5 performance deficits relating to physical, cognitive , or psychosocial skils that result in activity limitations and / or participation restrictions MEDIUM Complexity : Patient may present with comorbidities that affect occupational performnce.  Miniml to moderate modification of tasks or assistance (eg, physical or verbal ) with assesment(s) is necessary to enable patient to complete evaluation       Based on the above components, the patient evaluation is determined to be of the following complexity level: MEDIUM  Pain Rating:  Patient reporting increased lower back pain during session. Activity Tolerance:   Fair    After treatment patient left in no apparent distress:    Supine in bed, Call bell within reach, Bed / chair alarm activated, and Side rails x 3    COMMUNICATION/EDUCATION:   The patients plan of care was discussed with: Physical therapist, Occupational therapist, and Registered nurse. Patient/family have participated as able in goal setting and plan of care. This patients plan of care is appropriate for delegation to \[facility]\"".     Thank you for this referral.  Ara Shearer OT  Time Calculation: 24 mins

## 2022-07-05 NOTE — PROGRESS NOTES
Spiritual Care Assessment/Progress Note  [facility]      NAME: Jasbir Hooks      MRN: [id number]  AGE: 68 y.o. SEX: male  Druze Affiliation: Orthodoxy   Language: English     7/5/2022     Total Time (in minutes): 5     Spiritual Assessment begun in [facility] NEUROSCIENCE TELEMETRY through conversation with:         []Patient        [] Family    [] Friend(s)        Reason for Consult: Palliative Care, Initial/Spiritual Assessment     Spiritual beliefs: (Please include comment if needed)     [] Identifies with a lianet tradition:         [] Supported by a lianet community:            [] Claims no spiritual orientation:           [] Seeking spiritual identity:                [] Adheres to an individual form of spirituality:           [x] Not able to assess:                           Identified resources for coping:      [] Prayer                               [] Music                  [] Guided Imagery     [] Family/friends                 [] Pet visits     [] Devotional reading                         [] Unknown     [] Other:                                              Interventions offered during this visit: (See comments for more details)    Patient Interventions: Initial visit           Plan of Care:     [] Support spiritual and/or cultural needs    [] Support AMD and/or advance care planning process      [] Support grieving process   [] Coordinate Rites and/or Rituals    [] Coordination with community clergy   [] No spiritual needs identified at this time   [] Detailed Plan of Care below (See Comments)  [] Make referral to Music Therapy  [] Make referral to Pet Therapy     [] Make referral to Addiction services  [] Make referral to [facility]  [] Make referral to Spiritual Care Partner  [] No future visits requested        [] Contact Spiritual Care for further referrals     Comments: Attempted initial spiritual assessment with palliative pt in 3304. Pt out of room.  Left a personalized business card on tray table. No family present. Contact Spiritual Care for any further referrals.   Myra Cevallos M.Div, [facility]   Paging Service [phone])

## 2022-07-05 NOTE — PROGRESS NOTES
End of Shift Note     Bedside shift change report given to  R.N (oncoming nurse) by Mayuri Leiva RN (offgoing nurse). Report included the following information SBAR, Kardex, Procedure Summary, MAR and Cardiac Rhythm sr     Shift worked: 7a-7p   Shift summary and any significant changes:     MRI and echo complete. Covid PCR done. Patient is now currently COVID positive.         Concerns for physician to address:  none   Zone phone for oncoming shift:   [phone]      Patient Information  Dami Gomez  68 y.o.  7/4/2022 11:01 AM by Danielle Hernandez MD. Dami Gomez was admitted from Home     Problem List       Patient Active Problem List     Diagnosis Date Noted    Colon polyp 01/03/2018    Hypertension 01/03/2018    Elevated CPK 01/03/2018    Alcohol abuse 06/01/2012    Hyperlipidemia 06/01/2012    Acute CVA (cerebrovascular accident) ([address] 07/04/2022    [facility]      AAA (abdominal aortic aneurysm) ([address]      Recurrent pancreatitis      Obesity      Former smoker, stopped smoking in distant past      Rosacea      Retroperitoneal lymphadenopathy      Abdominal aortic aneurysm (AAA) without rupture ([address] 02/12/2021    Acute pancreatitis 02/11/2021    Pancreatitis 02/11/2021    At risk for sleep apnea 05/22/2019    Psoriasis 01/03/2018    History of pancreatitis 05/31/2012    Sepsis(995.91) 05/31/2012    UTI (urinary tract infection) 05/31/2012    Obesity (BMI 30-39.9) 05/31/2012           Past Medical History:   Diagnosis Date    AAA (abdominal aortic aneurysm) (HCC)       4/4/22 CT: 3.7 cm; stable    Alcohol abuse       20 drinks/week    At risk for sleep apnea 05/22/2019     during phone assessment for PAT, LEVI 6    Colon polyp      Elevated CPK      Esophageal cancer (HCC)       *PROVISIONAL DIAGNOSIS* - MRCP finding with esophageal mass, hepatic, bone, LN mets. LN bx pending.  H/O Referral placed 5/24/22    Former smoker, stopped smoking in distant past      Hyperlipidemia      Hypertension      Obesity      Psoriasis      Recurrent pancreatitis       2/2 ETOH use    Retroperitoneal lymphadenopathy       CT 4/4/22 - most prominent around celiac axis; concern for lymphoma    Rosacea           Core Measures:  CVA: Yes Yes  CHF:No No  PNA:No No     Activity:  Activity Level: Bed Rest  Number times ambulated in hallways past shift: 0  Number of times OOB to chair past shift: 0     Cardiac:   Cardiac Monitoring: Yes         Access:   Current line(s): PIV   Central Line? No   PICC LINE? No      Genitourinary:   Urinary status: voiding  Urinary Catheter? No      Respiratory:   O2 Device: Nasal cannula  Chronic home O2 use?: NO  Incentive spirometer at bedside: no     GI:  Current diet:  Full Liquid  Passing flatus: YES  Tolerating current diet: YES     Pain Management:   Patient states pain is manageable on current regimen: YES     Skin:  Shahzad Score: 16  Interventions: increase time out of bed and PT/OT consult    Patient Safety:  Fall Score: Total Score: 3  Interventions: bed/chair alarm, gripper socks, pt to call before getting OOB and stay with me (per policy)  High Fall Risk:  Yes     DVT prophylaxis:  DVT prophylaxis Med- Yes  DVT prophylaxis SCD or RUTH- No      Wounds: (If Applicable)  Wounds- No  Location      Active Consults:  IP CONSULT TO HOSPITALIST  IP CONSULT TO NEUROLOGY  IP CONSULT TO HEMATOLOGY     Length of Stay:  Expected LOS: - - -  Actual LOS: 1  Discharge Plan: skilled nursing facility vs inpatient rehab upon discharge

## 2022-07-05 NOTE — PROGRESS NOTES
Occupational Therapy    Patient currently off the floor for testing. Will check back at later time for evaluation.     SOTERO Conde, OTR/L

## 2022-07-05 NOTE — PROGRESS NOTES
Participated in Palliative IDR where pt was discussed.   BUBBA MurphyDiv, [id number]  [facility] [phone]

## 2022-07-05 NOTE — PROGRESS NOTES
Speech Pathology Note    Chart reviewed and spoke with RN. Patient currently NICKI for testing. Will defer and follow up once patient returns to room. Thank you.     Akila Giordano M.S., CCC-SLP

## 2022-07-06 NOTE — PROGRESS NOTES
Problem: Falls - Risk of  Goal: *Absence of Falls  Description: Document Nunomon Yuliya Fall Risk and appropriate interventions in the flowsheet.   Outcome: Progressing Towards Goal  Note: Fall Risk Interventions:  Mobility Interventions: Bed/chair exit alarm,Patient to call before getting OOB    Medication Interventions: Bed/chair exit alarm,Patient to call before getting OOB,Teach patient to arise slowly    Elimination Interventions: Bed/chair exit alarm,Call light in reach,Patient to call for help with toileting needs,Stay With Me (per policy)

## 2022-07-06 NOTE — PROGRESS NOTES
Comprehensive Nutrition Assessment    Type and Reason for Visit: Initial,Positive nutrition screen    Nutrition Recommendations/Plan:   1. Continue full liquids  2. Add ensure enlive TID     Nutrition Assessment:    Patient medically noted for left sided weakness and COVID-19. PMH HTN, pancreatitis, AAA, and metastatic gastroesophageal adenocarcinoma. Attempted to contact patient by phone but there was no answer. Significant weight loss noted over 1 month and ~6 months per chart review. Only able to tolerate full liquids at home. Drinking boost per prior RD notes. Will add ensure enlive while in the hospital. Unable to perform NFPE but based on weight loss and decreased PO intake, patient meets criteria for at least moderate malnutrition. Malnutrition Assessment:  Malnutrition Status: Moderate malnutrition (07/06/22 2126)    Context:  Acute illness     Findings of the 6 clinical characteristics of malnutrition:   Energy Intake:  75% or less of est energy req for 7 or more days  Weight Loss:  Greater than 5% over 1 month     Body Fat Loss:  Unable to assess,     Muscle Mass Loss:  Unable to assess,    Fluid Accumulation:  Unable to assess,     Strength:  Not performed     Wt Readings from Last 10 Encounters:   07/05/22 95.7 kg (211 lb)   06/20/22 97.9 kg (215 lb 14.4 oz)   06/20/22 97.5 kg (215 lb)   06/15/22 99.8 kg (220 lb)   06/09/22 101.6 kg (224 lb)   06/07/22 101.6 kg (224 lb)   06/06/22 102.1 kg (225 lb)   06/01/22 104.5 kg (230 lb 6.4 oz)   05/09/22 109.8 kg (242 lb)   04/20/22 110.1 kg (242 lb 11.2 oz)     Nutrition Related Findings:    BM 7/4  A1c 6.3  Bumex, Lisinopril, Mag-ox         Current Nutrition Intake & Therapies:        ADULT DIET Full Liquid  DIET ONE TIME MESSAGE    Anthropometric Measures:  Height: 5' 9\" (175.3 cm)  Ideal Body Weight (IBW): 160 lbs (73 kg)     Current Body Wt:  95.7 kg (210 lb 15.7 oz), 131.9 % IBW.     Current BMI (kg/m2): 31.1                          BMI Category: Obese class 1 (BMI 30.0-34. 9)    Estimated Daily Nutrient Needs:  Energy Requirements Based On: Formula  Weight Used for Energy Requirements: Current  Energy (kcal/day): 2178 kcal (BMR 1675 x 1. 3AF)  Weight Used for Protein Requirements: Current  Protein (g/day): 96-115g (1.0-1.2 g/kg bw)  Method Used for Fluid Requirements: 1 ml/kcal  Fluid (ml/day): 2200 mL    Nutrition Diagnosis:   · Inadequate protein-energy intake related to catabolic illness,swallowing difficulty as evidenced by weight loss (full liquids)    Nutrition Interventions:   Food and/or Nutrient Delivery: Continue current diet,Start oral nutrition supplement  Nutrition Education/Counseling: No recommendations at this time  Coordination of Nutrition Care: Continue to monitor while inpatient       Goals:     Goals:  (PO intake >50% of meals/ONS next 3-5 days)       Nutrition Monitoring and Evaluation:   Behavioral-Environmental Outcomes: None identified  Food/Nutrient Intake Outcomes: Food and nutrient intake,Supplement intake  Physical Signs/Symptoms Outcomes: Biochemical data,Chewing or swallowing,Weight    Discharge Planning:    Continue current diet,Continue oral nutrition supplement    Stephanie Buitrago RD  Contact: ext [phone]

## 2022-07-06 NOTE — PROGRESS NOTES
Transition of Care Plan:     RUR: 15%  Disposition: rehab (needs COVID + Bed)   Follow up appointments: PCP, specialists as indicated   DME needed: TBD, has RW, wheelchair at home   Transportation at Discharge: medical transport   [address] or means to access home: yes       IM Medicare Letter: to be reviewed prior to d/c   Is patient a BCPI-A Bundle:  No              If yes, was Bundle Letter given?:    Is patient a  and connected with the [facility]? No              If yes, was [place] transfer form completed and [facility] notified? Caregiver Contact: Dino Gordon (spouse), Kai Gentile (daughter)   Discharge Caregiver contacted prior to discharge? Spoke with daughter  Care Conference needed?: No    Chart reviewed. Spoke with pt today regarding rehab placement. Pt is agreeable. Note that pt is now COVID +. No preference indicated for IPR placement. Referrals sent to [facility], [facility], and [facility]. Pending review. Will continue to follow.     SMILEY Solis   Care Manager, [id number]  [phone]

## 2022-07-06 NOTE — CONSULTS
Palliative Medicine Consult  [place]: [phone] ([id number])    Patient Name: Kayode Gonzales  YOB: 1945    Date of Initial Consult: 7/5/22  Reason for Consult: End Stage Disease  Requesting Provider: Deejay Gusman MD  Primary Care Physician: Carolee Holder MD     SUMMARY:   Kayode Gonzales is a 68 y.o. 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 Current medical issues leading to Palliative Medicine involvement include: goals of care in the setting of advanced disease and now progressive functional decline and decreased PO intake 2/2 esophageal mass     PALLIATIVE DIAGNOSES:   1. Goals of care  2. DNR Discussion  3. Physical Debility  4. Palliative Encounter     PLAN:   1. Met with Mr Sudhir Gabriel- he is lying in bed, in no distress, but very frustrated at the profound weakness of his left arm and left leg  2. He tells me he talked to Dr Devon Maier yesterday evening, and he understands that at this point, he would not benefit from more chemotherapy as its intent is palliative. 3. He is committed to going to rehab to get back on his feet, so that he can get back to Dr Celine Laurent office to see If he can restart his treatment  4. He does understand that the intent is not curative, and that his life is limited. He thought that his living will was the same as a DNR, and wishes to completed a DDNR when I come back tomorrow. Put DNR order into the computer as well  5. I did talk to him about hospice- he needs to complete rehab first, but depending on how that goes, I wanted him to have this option in his pocket.   He seems to have a good handle on his options, but for now the idea of doing hospice and resigning himself to just lying in a bed for the rest of his life, is not achieving any quality. 6. Will do DDNR with him tomorrow, and I let the  know that he is open to rehab.  7. Initial consult note routed to primary continuity provider and/or primary health care team members  8. Communicated plan of care with: Palliative IDT, [address] Team     GOALS OF CARE / TREATMENT PREFERENCES:     GOALS OF CARE:  Patient/Health Care Proxy Stated Goals: Prolong life    TREATMENT PREFERENCES:   Code Status: DNR    Advance Care Planning:  [x] The [facility] Interdisciplinary Team has updated the ACP Navigator with Health Care Decision Maker and Patient Capacity      Primary Decision Maker: Susanne Treviño - Spouse - [phone]    Medical Interventions: Limited additional interventions       Other:    As far as possible, the palliative care team has discussed with patient / health care proxy about goals of care / treatment preferences for patient.      HISTORY:     History obtained from: chart, and patient    CHIEF COMPLAINT: \"I just need to get back on my feet again\"  HPI/SUBJECTIVE:    The patient is:   [x] Verbal and participatory  [] Non-participatory due to:       Clinical Pain Assessment (nonverbal scale for severity on nonverbal patients):   Clinical Pain Assessment  Severity: 0          Duration: for how long has pt been experiencing pain (e.g., 2 days, 1 month, years)  Frequency: how often pain is an issue (e.g., several times per day, once every few days, constant)     FUNCTIONAL ASSESSMENT:     Palliative Performance Scale (PPS):  PPS: 30       PSYCHOSOCIAL/SPIRITUAL SCREENING:     Palliative IDT has assessed this patient for cultural preferences / practices and a referral made as appropriate to needs (Cultural Services, Patient Advocacy, Ethics, etc.)    Any spiritual / Sabianism concerns:  [] Yes /  [x] No   If \"Yes\" to discuss with pastoral care during IDT     Does caregiver feel burdened by caring for their loved one:   [] Yes /  [x] No /  [] No Caregiver Present/Available [] No Caregiver [] Pt Lives at Facility  If \"Yes\" to discuss with social work during IDT    Anticipatory grief assessment:   [x] Normal  / [] Maladaptive     If \"Maladaptive\" to discuss with social work during IDT    ESAS Anxiety: Anxiety: 0    ESAS Depression: Depression: 0        REVIEW OF SYSTEMS:     Positive and pertinent negative findings in ROS are noted above in HPI. The following systems were [x] reviewed / [] unable to be reviewed as noted in HPI  Other findings are noted below. Systems: constitutional, ears/nose/mouth/throat, respiratory, gastrointestinal, genitourinary, musculoskeletal, integumentary, neurologic, psychiatric, endocrine. Positive findings noted below. Modified ESAS Completed by: provider   Fatigue: 3     Depression: 0 Pain: 0   Anxiety: 0 Nausea: 0   Anorexia: 3 Dyspnea: 0     Constipation: No     Stool Occurrence(s): 1        PHYSICAL EXAM:     From RN flowsheet:  Wt Readings from Last 3 Encounters:   07/05/22 211 lb (95.7 kg)   06/20/22 215 lb 14.4 oz (97.9 kg)   06/20/22 215 lb (97.5 kg)     Blood pressure 118/74, pulse 65, temperature 97.8 °F (36.6 °C), resp. rate 17, height 5' 9\" (1.753 m), weight 211 lb (95.7 kg), SpO2 91 %.     Pain Scale 1: Numeric (0 - 10)  Pain Intensity 1: 0                 Last bowel movement, if known:     Constitutional: Awake, alert, interactive, has good insight  Eyes: pupils equal, anicteric  ENMT: no nasal discharge, moist mucous membranes  Cardiovascular: regular rhythm, distal pulses intact  Respiratory: breathing not labored, symmetric  Gastrointestinal: soft non-tender, +bowel sounds  Musculoskeletal: no deformity, no tenderness to palpation  Skin: warm, dry  Neurologic: following commands, moving all extremities but very weak on left side  Psychiatric: full affect, no hallucinations  Other:       HISTORY:     Active Problems:    Acute CVA (cerebrovascular accident) ([facility] (7/4/2022)      Past Medical History:   Diagnosis Date    AAA (abdominal aortic aneurysm) ([facility]     22 CT: 3.7 cm; stable    Alcohol abuse     20 drinks/week    At risk for sleep apnea 2019    during phone assessment for PAT, LEVI 6    Colon polyp     Elevated CPK     Esophageal cancer (HCC)     *PROVISIONAL DIAGNOSIS* - MRCP finding with esophageal mass, hepatic, bone, LN mets. LN bx pending. H/O Referral placed 22    Former smoker, stopped smoking in distant past     Hyperlipidemia     Hypertension     Obesity     Psoriasis     Recurrent pancreatitis     2/2 ETOH use    Retroperitoneal lymphadenopathy     CT 22 - most prominent around celiac axis; concern for lymphoma    Rosacea       Past Surgical History:   Procedure Laterality Date    COLONOSCOPY N/A 2019    COLONOSCOPY performed by Sidra Cruz MD at [place] [id number] HX COLONOSCOPY  2014    HX HERNIA REPAIR      umbilical    IR INSERT TUNL CVC W PORT OVER 5 YEARS  2022      Family History   Problem Relation Age of Onset    Cancer Mother         colon      History reviewed, no pertinent family history. Social History     Tobacco Use    Smoking status: Former Smoker     Packs/day: 1.00     Years: 20.00     Pack years: 20.00     Quit date: 26     Years since quittin.5    Smokeless tobacco: Never Used   Substance Use Topics    Alcohol use:  Yes     Alcohol/week: 20.0 standard drinks     Types: 20 Cans of beer per week     Comment: not much since throat issues     Allergies   Allergen Reactions    Prohance [Gadoteridol] Shortness of Breath and Nausea and Vomiting     Pt has had MRI contrast twice now with allergic reaction      Current Facility-Administered Medications   Medication Dose Route Frequency    bumetanide (BUMEX) injection 1 mg  1 mg IntraVENous DAILY    acetaminophen (TYLENOL) tablet 650 mg  650 mg Oral Q4H PRN    Or    acetaminophen (TYLENOL) solution 650 mg  650 mg Per NG tube Q4H PRN    Or    acetaminophen (TYLENOL) suppository 650 mg  650 mg Rectal Q4H PRN    heparin (porcine) injection 5,000 Units  5,000 Units SubCUTAneous Q8H    aspirin delayed-release tablet 81 mg  81 mg Oral DAILY    lisinopriL (PRINIVIL, ZESTRIL) tablet 20 mg  20 mg Oral DAILY    magnesium oxide (MAG-OX) tablet 400 mg  400 mg Oral DAILY    verapamil ER (CALAN-SR) tablet 240 mg  240 mg Oral DAILY    oxyCODONE IR (ROXICODONE) tablet 5 mg  5 mg Oral Q4H PRN    sodium chloride (NS) flush 5-40 mL  5-40 mL IntraVENous Q8H    sodium chloride (NS) flush 5-40 mL  5-40 mL IntraVENous PRN    polyethylene glycol (MIRALAX) packet 17 g  17 g Oral DAILY PRN    ondansetron (ZOFRAN ODT) tablet 4 mg  4 mg Oral Q8H PRN    Or    ondansetron (ZOFRAN) injection 4 mg  4 mg IntraVENous Q6H PRN    albuterol-ipratropium (DUO-NEB) 2.5 MG-0.5 MG/3 ML  3 mL Nebulization Q4H PRN          LAB AND IMAGING FINDINGS:     Lab Results   Component Value Date/Time    WBC 2.5 (L) 07/06/2022 05:10 AM    HGB 11.4 (L) 07/06/2022 05:10 AM    PLATELET 524 34/99/3337 05:10 AM     Lab Results   Component Value Date/Time    Sodium 137 07/06/2022 05:10 AM    Potassium 3.7 07/06/2022 05:10 AM    Chloride 104 07/06/2022 05:10 AM    CO2 25 07/06/2022 05:10 AM    BUN 12 07/06/2022 05:10 AM    Creatinine 0.58 (L) 07/06/2022 05:10 AM    Calcium 8.4 (L) 07/06/2022 05:10 AM    Magnesium 2.0 02/11/2021 04:54 AM    Phosphorus 2.3 (L) 02/11/2021 04:54 AM      Lab Results   Component Value Date/Time    Alk.  phosphatase 145 (H) 07/06/2022 05:10 AM    Protein, total 5.7 (L) 07/06/2022 05:10 AM    Albumin 2.6 (L) 07/06/2022 05:10 AM    Globulin 3.1 07/06/2022 05:10 AM     No results found for: INR, PTMR, PTP, PT1, PT2, APTT, INREXT, INREXT   No results found for: IRON, FE, TIBC, IBCT, PSAT, FERR   No results found for: PH, PCO2, PO2  No components found for: [place]   Lab Results   Component Value Date/Time     (H) 12/16/2021 02:44 PM                Total time: 50m  Counseling / coordination time, spent as noted above: 30m  > 50% counseling / coordination?: y  Prolonged service was provided for  []30 min   []75 min in face to face time in the presence of the patient, spent as noted above. Time Start:   Time End:   Note: this can only be billed with 05082 (initial) or 00234 (follow up). If multiple start / stop times, list each separately.

## 2022-07-06 NOTE — PROGRESS NOTES
Problem: Falls - Risk of  Goal: *Absence of Falls  Description: Document Edithfritz Zambrano Fall Risk and appropriate interventions in the flowsheet. Outcome: Progressing Towards Goal  Note: Fall Risk Interventions:  Mobility Interventions: Bed/chair exit alarm,Patient to call before getting OOB         Medication Interventions: Bed/chair exit alarm,Patient to call before getting OOB,Teach patient to arise slowly    Elimination Interventions: Bed/chair exit alarm,Call light in reach,Patient to call for help with toileting needs              Problem: Pressure Injury - Risk of  Goal: *Prevention of pressure injury  Description: Document Shahzad Scale and appropriate interventions in the flowsheet.   Outcome: Progressing Towards Goal  Note: Pressure Injury Interventions:  Sensory Interventions: Keep linens dry and wrinkle-free,Minimize linen layers    Moisture Interventions: Absorbent underpads,Apply protective barrier, creams and emollients    Activity Interventions: Chair cushion,Increase time out of bed    Mobility Interventions: HOB 30 degrees or less,Pressure redistribution bed/mattress (bed type)    Nutrition Interventions: Document food/fluid/supplement intake                     Problem: Patient Education: Go to Patient Education Activity  Goal: Patient/Family Education  Outcome: Progressing Towards Goal

## 2022-07-06 NOTE — PROGRESS NOTES
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  -As per last heme-onc note, patient had some low extremity weakness and had MRI brain on  which was negative  -CT abdomen showed lytic lesion at L3 vertebral body  -CT head and CTA head and neck without LVO and no bleed. -MRI brain No evidence of metastasis. No acute abnormality. -MRI cervical spine Multilevel spondylosis as above, worst at C5-6 where there is mild spinal stenosis. Unremarkable cord. - MRI T spine:Chronic mild loss of height in 3, T4, and T5. No evidence of acute fracture. No evidence of osseous metastasis. No significant spinal stenosis or neural foraminal narrowing  -MRI of L spine: Metastases at L3 and L4 with mild pathologic compression deformities  -Neuro consulted, recs EMG/NCS, d/w pt and he agrees  -PT/OT eval, ECHO  -Continue aspirin     Gastroesophageal cancer w Mets  Dysphagia   -Consult oncologist as a courtesy  -Last PET scan showed distal esophageal mass lesion  -Has known metastasis to liver, pericardial, bone  -Has a Chemo-Port  -Elevated LFTs-likely from hepatic metastasis  -Cont full liquid diet for chronic dysphagia      + Covid 19 infection POA  Asymptomatic, no cough, sputum,m fever, chills   Wife tested + and so pt was checked  Mild hypoxia likely from pleural effusion  Cont Symptomatic Rx  Hold off steroids/anti virals     History of AAA   -Size stable around 3.7, similar to CT on april.  F/u as outpt w PCP     Systolic HF  Moderate bilateral pleural effusions  History of HLD  History of hypertension  Hypoxic resp failure, acute, d/t the above- needing 2L/m oxygen for now  continue lisinopril, verapamil  Effusion likely from malignancy and mild systolic HF, new   Switch bumex po to IV  Wean off O2 as able     Code Status: Full code  Surrogate Decision Maker wife  DVT Prophylaxis:  Hep sq  GI Prophylaxis: not indicated  Baseline: DNR as per pt's wishes today  Recommended Disposition: TBD SNF likely as per pt's wishes, Hospice in future if fails per pt's wishes  Anticipated Discharge Date:  >48 hours        Subjective:     Discussed with RN events overnight. No SOB  No cough, fever, flu like symptoms  Left side weakness, more on upper extremity  No HA  No N/V     Review of Systems:  Symptom Y/N Comments  Symptom Y/N Comments   Fever/Chills    Chest Pain     Poor Appetite    Edema     Cough    Abdominal Pain     Sputum    Joint Pain     SOB/GUZMAN    Pruritis/Rash     Nausea/vomit    Tolerating PT/OT     Diarrhea    Tolerating Diet     Constipation    Other       PO intake: No data found. Wt Readings from Last 10 Encounters:   07/05/22 95.7 kg (211 lb)   06/20/22 97.9 kg (215 lb 14.4 oz)   06/20/22 97.5 kg (215 lb)   06/15/22 99.8 kg (220 lb)   06/09/22 101.6 kg (224 lb)   06/07/22 101.6 kg (224 lb)   06/06/22 102.1 kg (225 lb)   06/01/22 104.5 kg (230 lb 6.4 oz)   05/09/22 109.8 kg (242 lb)   04/20/22 110.1 kg (242 lb 11.2 oz)       Objective:     VITALS:   Last 24hrs VS reviewed since prior progress note.  Most recent are:  Patient Vitals for the past 24 hrs:   Temp Pulse Resp BP SpO2   07/06/22 1552 98.7 °F (37.1 °C) 74 16 122/75 92 %   07/06/22 1139 97.8 °F (36.6 °C) 65 17 118/74 91 %   07/06/22 0854 97.9 °F (36.6 °C) 70 18 (!) 143/74 92 %   07/06/22 0521 98.3 °F (36.8 °C) 74 18 (!) 143/77 92 %   07/06/22 0000  89      07/05/22 2308 98.7 °F (37.1 °C) 64 20 (!) 140/74 93 %   07/05/22 2023     92 %   07/05/22 2016 98.9 °F (37.2 °C) 87 20 137/63 (!) 88 %   07/05/22 2000  87          Intake/Output Summary (Last 24 hours) at 7/6/2022 1610  Last data filed at 7/6/2022 0850  Gross per 24 hour   Intake 360 ml   Output 1100 ml   Net -740 ml        I had a face to face encounter, and independently examined this patient on 7/6/2022, as outlined below:    PHYSICAL EXAM:  General:    No distress     HEENT: Atraumatic, anicteric sclerae, pink conjunctivae, MMM  Neck:  Supple, symmetrical  Lungs:   CTA. No Wheezing/Rhonchi. No rales. No tenderness. No Accessory muscle use. Heart:   Regular rhythm. No murmur. No JVD   GI/:   Soft. NT. ND. BS normal  Extremities: No edema. No cyanosis. No clubbing. Skin:     Not pale. Not Jaundiced. No rashes   Psych:  Good insight. Not depressed. Not anxious or agitated. Neurologic: Alert and oriented X 4. EOMs intact. No facial asymmetry. No slurred speech. L side weakness, power 3/5 proximal L UE, L LE power 4-5/5. Sensation grossly intact. Labs     I reviewed today's most current labs and imaging studies. Pertinent labs include:  Recent Labs     07/06/22  0510 07/05/22  0232 07/04/22  1218   WBC 2.5* 2.3* 2.2*   HGB 11.4* 11.0* 12.0*   HCT 33.9* 32.3* 36.4*    156 172     Recent Labs     07/06/22  0510 07/05/22  0232 07/04/22  1218    135* 135*   K 3.7 3.6 3.6    102 101   CO2 25 26 27   GLU 94 92 121*   BUN 12 13 14   CREA 0.58* 0.55* 0.73   CA 8.4* 8.3* 8.3*   ALB 2.6* 2.5* 2.7*   TBILI 1.3* 1.5* 1.2*   ALT 63 67 82*     MRI BRAIN W WO CONT    Result Date: 7/5/2022  No evidence of metastasis. No acute abnormality. Chronic changes as above. MRI CERV SPINE W WO CONT    Result Date: 7/5/2022  1. No acute fracture. 2. Multilevel spondylosis as above, worst at C5-6 where there is mild spinal stenosis. 3. Unremarkable cord. 4. Moderate bilateral pleural effusions. 5. Thoracic lymphadenopathy again seen. MRI [facility] SPINE W WO CONT    Result Date: 7/5/2022  1. Chronic mild loss of height in 3, T4, and T5. No evidence of acute fracture. No evidence of osseous metastasis.  2. No significant spinal stenosis or neural foraminal narrowing. 3. Unchanged moderate bilateral pleural effusions. 4. Thoracic lymphadenopathy and mass in the distal esophagus are better seen on the prior CT. MRI LUMB SPINE W CONT    Result Date: 7/5/2022  1. Study is suboptimally obtained and at was performed following earlier contrast administration for another study. This study should be repeated after contrast material clears. 2. Pathological vertebral compression fractures at L3 and L4 with loss of height and L3 left paracentral and lateral posterior cortical expansion. 3. Retroperitoneal lymphadenopathy. 4. Multilevel spondylosis as detailed above 5. Abdominal aortic aneurysm measuring 3.9 cm AP. CT HEAD WO CONT    Result Date: 7/4/2022  Central atrophy and old left posterior parietal infarct. No acute process identified. Mild bilateral maxillary sinus disease    CTA CHEST W OR W WO CONT    Result Date: 7/4/2022  1.  3.8 x 3.7 cm infrarenal abdominal aortic aneurysm without evidence of rupture. 2.  Extensive mesenteric, retroperitoneal, and intrathoracic lymphadenopathy in addition to liver lesions, lingular lung nodule, and distal esophageal wall thickening indicative of malignancy, as better evaluated on the recent PET/CT. CT SPINE CERV WO CONT    Result Date: 7/4/2022  Severe C5-6 and mild C6-7 central stenosis. Severe bilateral C5-6 neural foraminal stenosis. Mild T2, T3, and T4 superior endplate deformities, age indeterminant but of the osteoporotic type Lung disease will be separately evaluated by a CTA of the chest    CTA ABDOMEN PELV W CONT    Result Date: 7/4/2022  1.  3.8 x 3.7 cm infrarenal abdominal aortic aneurysm without evidence of rupture. 2.  Extensive mesenteric, retroperitoneal, and intrathoracic lymphadenopathy in addition to liver lesions, lingular lung nodule, and distal esophageal wall thickening indicative of malignancy, as better evaluated on the recent PET/CT.      No results found.  07/04/22    ECHO ADULT COMPLETE 07/05/2022 7/5/2022    Interpretation Summary    Left Ventricle: Mildly reduced left ventricular systolic function with a visually estimated EF of 45 - 50%. Left ventricle size is normal. Normal wall thickness. Mild global hypokinesis present. Normal diastolic function. Signed by: Jose Taylor MD on 7/5/2022  4:06 PM     All Micro Results     Procedure Component Value Units Date/Time    RESPIRATORY VIRUS PANEL W/COVID-19, PCR [[id number]]  (Abnormal) Collected: 07/05/22 1415    Order Status: Completed Specimen: Nasopharyngeal Updated: 07/05/22 1907     Adenovirus Not detected        Coronavirus 229E Not detected        Coronavirus HKU1 Not detected        Coronavirus CVNL63 Not detected        Coronavirus OC43 Not detected        SARS-CoV-2, PCR Detected        Comment: CALLED TO AND READ BACK BY  Cornelia Saenz RN AT 1905  ON 7/5/22. AT          [facility] Not detected        Rhinovirus and Enterovirus Not detected        Influenza A Not detected        Influenza A, subtype H1 Not detected        Influenza A, subtype H3 Not detected        INFLUENZA A H1N1 PCR Not detected        Influenza B Not detected        Parainfluenza 1 Not detected        Parainfluenza 2 Not detected        Parainfluenza 3 Not detected        Parainfluenza virus 4 Not detected        RSV by PCR Not detected        B. parapertussis, PCR Not detected        Bordetella pertussis - PCR Not detected        Chlamydophila pneumoniae DNA, QL, PCR Not detected        Mycoplasma pneumoniae DNA, QL, PCR Not detected               Current Medications:     Current Facility-Administered Medications:     bumetanide (BUMEX) injection 1 mg, 1 mg, IntraVENous, DAILY, [facility], Hipolito Porter MD, 1 mg at 07/05/22 2310    acetaminophen (TYLENOL) tablet 650 mg, 650 mg, Oral, Q4H PRN **OR** acetaminophen (TYLENOL) solution 650 mg, 650 mg, Per NG tube, Q4H PRN **OR** acetaminophen (TYLENOL) suppository 650 mg, 650 mg, Rectal, Q4H PRN, Vanessa Garrison MD    heparin (porcine) injection 5,000 Units, 5,000 Units, SubCUTAneous, Q8H, Vanessa Garrison MD, 5,000 Units at 07/06/22 1537    aspirin delayed-release tablet 81 mg, 81 mg, Oral, DAILY, Patel, Cathlean Koyanagi, MD    lisinopriL (PRINIVIL, ZESTRIL) tablet 20 mg, 20 mg, Oral, DAILY, Patel, Cathlean Koyanagi, MD    magnesium oxide (MAG-OX) tablet 400 mg, 400 mg, Oral, DAILY, Vanessa Garrison MD    verapamil ER (CALAN-SR) tablet 240 mg, 240 mg, Oral, DAILY, Vanessa Garrison MD, 240 mg at 07/06/22 0859    oxyCODONE IR (ROXICODONE) tablet 5 mg, 5 mg, Oral, Q4H PRN, Vanessa Garrison MD    sodium chloride (NS) flush 5-40 mL, 5-40 mL, IntraVENous, Q8H, Vanessa Garrison MD, 10 mL at 07/06/22 1540    sodium chloride (NS) flush 5-40 mL, 5-40 mL, IntraVENous, PRN, Vanessa Garrison MD    polyethylene glycol (MIRALAX) packet 17 g, 17 g, Oral, DAILY PRN, Vanessa Garrison MD    ondansetron (ZOFRAN ODT) tablet 4 mg, 4 mg, Oral, Q8H PRN **OR** ondansetron (ZOFRAN) injection 4 mg, 4 mg, IntraVENous, Q6H PRN, Vanessa Garrison MD    albuterol-ipratropium (DUO-NEB) 2.5 MG-0.5 MG/3 ML, 3 mL, Nebulization, Q4H PRN, Vanessa Garrison MD     Procedures: see electronic medical records for all procedures/Xrays and details which were not copied into this note but were reviewed prior to creation of Plan. Reviewed most current lab test results and cultures  YES  Reviewed most current radiology test results   YES  Review and summation of old records today    NO  Reviewed patient's current orders and MAR    YES  PMH/ reviewed - no change compared to H&P  ________________________________________________________________________  Care Plan discussed with:    Comments   Patient x    Family      RN x    Care Manager     Consultant                        Multidiciplinary team rounds were held today with , nursing, pharmacist and clinical coordinator.   Patient's plan of care was discussed; medications were reviewed and discharge planning was addressed.      ________________________________________________________________________  Total NON critical care TIME:  26   Minutes    Total CRITICAL CARE TIME Spent:   Minutes non procedure based      Comments   >50% of visit spent in counseling and coordination of care x     This includes time during multidisciplinary rounds if indicated above   ________________________________________________________________________  Holli Guerra MD

## 2022-07-06 NOTE — PROGRESS NOTES
End of Shift Note     Bedside shift change report given to QUEENIE Fitch (oncoming nurse) by Milagros Knapp RN (offgoing nurse).  Report included the following information SBAR, Kardex, Procedure Summary, MAR and Cardiac Rhythm sr     Shift worked: 7a-7p   Shift summary and any significant changes:     Code status changed to DNR  On full liquid diet but is only drinking ensure.      Concerns for physician to address:  none   Zone phone for oncoming shift:   [phone]      Patient Information  Josh Reinoso  68 y.o.  7/4/2022 11:01 AM by Cindi Teran MD. Fermin Manzano was admitted from Home     Problem List          Patient Active Problem List     Diagnosis Date Noted    Colon polyp 01/03/2018    Hypertension 01/03/2018    Elevated CPK 01/03/2018    Alcohol abuse 06/01/2012    Hyperlipidemia 06/01/2012    Acute CVA (cerebrovascular accident) ([address] 07/04/2022    [facility]      AAA (abdominal aortic aneurysm) ([facility] [facility]      Recurrent pancreatitis      Obesity      Former smoker, stopped smoking in distant past      Rosacea      Retroperitoneal lymphadenopathy      Abdominal aortic aneurysm (AAA) without rupture ([facility] [address] 02/12/2021    Acute pancreatitis 02/11/2021    Pancreatitis 02/11/2021    At risk for sleep apnea 05/22/2019    Psoriasis 01/03/2018    History of pancreatitis 05/31/2012    Sepsis(995.91) 05/31/2012    UTI (urinary tract infection) 05/31/2012    Obesity (BMI 30-39.9) 05/31/2012              Past Medical History:   Diagnosis Date    AAA (abdominal aortic aneurysm) (HCC)       4/4/22 CT: 3.7 cm; stable    Alcohol abuse       20 drinks/week    At risk for sleep apnea 05/22/2019     during phone assessment for PAT, LEIV 6    Colon polyp      Elevated CPK      Esophageal cancer (HCC)       *PROVISIONAL DIAGNOSIS* - MRCP finding with esophageal mass, hepatic, bone, LN mets. LN bx pending.  H/O Referral placed 5/24/22    Former smoker, stopped smoking in distant past      Hyperlipidemia      Hypertension      Obesity      Psoriasis      Recurrent pancreatitis       2/2 ETOH use    Retroperitoneal lymphadenopathy       CT 4/4/22 - most prominent around celiac axis; concern for lymphoma    Rosacea           Core Measures:  CVA: Yes Yes  CHF:No No  PNA:No No     Activity:  Activity Level: Bed Rest  Number times ambulated in hallways past shift: 0  Number of times OOB to chair past shift: 0     Cardiac:   Cardiac Monitoring: Yes         Access:   Current line(s): PIV   Central Line? No   PICC LINE? No      Genitourinary:   Urinary status: voiding  Urinary Catheter? No      Respiratory:   O2 Device: Nasal cannula  Chronic home O2 use?: NO  Incentive spirometer at bedside: no     GI:  Current diet:  Full Liquid  Passing flatus: YES  Tolerating current diet: YES   Date of Last Bowel Movement-7/6/22  Pain Management:   Patient states pain is manageable on current regimen: YES     Skin:  Shahzad Score: 16  Interventions: increase time out of bed and PT/OT consult    Patient Safety:  Fall Score: Total Score: 3  Interventions: bed/chair alarm, gripper socks, pt to call before getting OOB and stay with me (per policy)  High Fall Risk:  Yes     DVT prophylaxis:  DVT prophylaxis Med- Yes  DVT prophylaxis SCD or RUTH- No      Wounds: (If Applicable)  Wounds- No  Location      Active Consults:  IP CONSULT TO HOSPITALIST  IP CONSULT TO NEUROLOGY  IP CONSULT TO HEMATOLOGY     Length of Stay:  Expected LOS: - - -  Actual LOS: 2  Discharge Plan: Inpatient rehab-referrals sent.

## 2022-07-06 NOTE — PROGRESS NOTES
Occupational Therapy    Chart reviewed. Noted palliative consult pending. Will defer and continue to follow pending POC.      Iva Andrew, OTD, OTR/L

## 2022-07-06 NOTE — PROGRESS NOTES
End of Shift Note    Bedside shift change report given to Barber Chong (oncoming nurse) by Umu Snow RN (offgoing nurse). Report included the following information . Shift worked: 4953-4935     Shift summary and any significant changes:    Pt. Required 1L to keep sats >89 overnight. Started on bumex. Covid isolation precautions ongoing. No acute symptomatic change noted.       Concerns for physician to address:      Zone phone for oncoming shift:           Length of Stay:  Expected LOS: - - -  Actual LOS: 1      Umu Snow, RN

## 2022-07-06 NOTE — PROGRESS NOTES
Physical Therapy    Chart reviewed, patient is pending palliative consult for plan of care. Will defer today and continue to follow.     Dang Stewart

## 2022-07-07 NOTE — PROGRESS NOTES
Problem: Falls - Risk of  Goal: *Absence of Falls  Description: Document Dustin Myers Fall Risk and appropriate interventions in the flowsheet.   Note: Fall Risk Interventions:  Mobility Interventions: Bed/chair exit alarm,Patient to call before getting OOB         Medication Interventions: Bed/chair exit alarm,Patient to call before getting OOB    Elimination Interventions: Bed/chair exit alarm,Call light in reach,Patient to call for help with toileting needs,Stay With Me (per policy),Urinal in reach

## 2022-07-07 NOTE — PROGRESS NOTES
End of Shift Note     Bedside shift change report given to [facility] R.N (oncoming nurse) by Juana Baker RN (offgoing nurse). Report included the following information SBAR, Kardex, Procedure Summary, MAR and Cardiac Rhythm sr     Shift worked:  days   Shift summary and any significant changes:      d/c telemetry         Concerns for physician to address:  none   Zone phone for oncoming shift:   [phone]      Patient Information  Silvia Leggett  68 y.o.  7/4/2022 11:01 AM by Raj Du MD. Silvia Leggett was admitted from Home     Problem List       Patient Active Problem List     Diagnosis Date Noted    Colon polyp 01/03/2018    Hypertension 01/03/2018    Elevated CPK 01/03/2018    Alcohol abuse 06/01/2012    Hyperlipidemia 06/01/2012    Acute CVA (cerebrovascular accident) ([address] 07/04/2022    Esophageal cancer ([address]      AAA (abdominal aortic aneurysm) ([address]      Recurrent pancreatitis      Obesity      Former smoker, stopped smoking in distant past      Rosacea      Retroperitoneal lymphadenopathy      Abdominal aortic aneurysm (AAA) without rupture ([address] 02/12/2021    Acute pancreatitis 02/11/2021    Pancreatitis 02/11/2021    At risk for sleep apnea 05/22/2019    Psoriasis 01/03/2018    History of pancreatitis 05/31/2012    Sepsis(995.91) 05/31/2012    UTI (urinary tract infection) 05/31/2012    Obesity (BMI 30-39.9) 05/31/2012           Past Medical History:   Diagnosis Date    AAA (abdominal aortic aneurysm) (HCC)       4/4/22 CT: 3.7 cm; stable    Alcohol abuse       20 drinks/week    At risk for sleep apnea 05/22/2019     during phone assessment for PAT, LEVI 6    Colon polyp      Elevated CPK      Esophageal cancer (HCC)       *PROVISIONAL DIAGNOSIS* - MRCP finding with esophageal mass, hepatic, bone, LN mets. LN bx pending.  H/O Referral placed 5/24/22    Former smoker, stopped smoking in distant past      Hyperlipidemia      Hypertension      Obesity      Psoriasis      Recurrent pancreatitis       2/2 ETOH use    Retroperitoneal lymphadenopathy       CT 4/4/22 - most prominent around celiac axis; concern for lymphoma    Rosacea           Core Measures:  CVA: Yes Yes  CHF:No No  PNA:No No     Activity:  Activity Level: Bed Rest  Number times ambulated in hallways past shift: 0  Number of times OOB to chair past shift: 0     Cardiac:   Cardiac Monitoring: no       Access:   Current line(s): PIV   Central Line? No   PICC LINE? No      Genitourinary:   Urinary status: voiding  Urinary Catheter? No      Respiratory:   O2 Device: Nasal cannula, 1L  Chronic home O2 use?: NO  Incentive spirometer at bedside: no     GI:  Current diet:  LIQUID  Passing flatus: YES  Tolerating current diet: YES     Pain Management:   Patient states pain is manageable on current regimen: YES     Skin:  Shahzad Score: 16  Interventions: increase time out of bed and PT/OT consult    Patient Safety:  Fall Score:  Total Score: 3  Interventions: bed/chair alarm, gripper socks, pt to call before getting OOB and stay with me (per policy)  High Fall Risk: Yes  @Rollbelt  @dexterity to release roll belt  Yes/No ( must document dexterity  here by stating Yes or No here, otherwise this is a restraint and must follow restraint documentation policy.)     DVT prophylaxis:  DVT prophylaxis Med- Yes  DVT prophylaxis SCD or RUTH- No      Wounds: (If Applicable)  Wounds- No  Location      Active Consults:  IP CONSULT TO HOSPITALIST  IP CONSULT TO NEUROLOGY  IP CONSULT TO HEMATOLOGY     Length of Stay:  Expected LOS: - - -  Actual LOS: 0  Discharge Plan: No         Anatoliy Heaton RN

## 2022-07-07 NOTE — PROGRESS NOTES
Problem: Mobility Impaired (Adult and Pediatric)  Goal: *Acute Goals and Plan of Care (Insert Text)  Description: FUNCTIONAL STATUS PRIOR TO ADMISSION: Patient was independent and active without use of DME.    HOME SUPPORT PRIOR TO ADMISSION: The patient lived with wife but did not require assist. (wife recently dx with COVID)  Granddaughter living with them also at that moment because she hurt her foot per patient. Physical Therapy Goals  Initiated 7/5/2022  1. Patient will move from supine to sit and sit to supine  in bed with modified independence within 7 day(s). 2.  Patient will transfer from bed to chair and chair to bed with contact guard assist using the least restrictive device within 7 day(s). 3.  Patient will perform sit to stand with contact guard assist within 7 day(s). 4.  Patient will ambulate with contact guard assist for 25 feet with the least restrictive device within 7 day(s). Outcome: Progressing Towards Goal   PHYSICAL THERAPY TREATMENT  Patient: Silvia Leggett (79 y.o. male)  Date: 7/7/2022  Diagnosis: Acute CVA (cerebrovascular accident) ([facility] [address] [I63.9] <principal problem not specified>       Precautions: Fall  Chart, physical therapy assessment, plan of care and goals were reviewed. ASSESSMENT  Patient continues with skilled PT services and is slowly progressing towards goals. Patient received in bed and agreeable to participate, able to ambulate in room x approx 25 with RW and min assist including into bathroom to toilet, required min assist to stand from toilet. HR elevated to 123 with activity, mobilized on 1 liter 02 with sats at 94%. Patient performed seated ther ex for LEs , but complained of increased back pain and was unable to tolerate sitting up in chair. Returned to supine and left with call bell in reach. Patient remains below baseline and will benefit from rehab in SNF.       Current Level of Function Impacting Discharge (mobility/balance): min assist to ambulate    Other factors to consider for discharge: falls risk, below baseline         PLAN :  Patient continues to benefit from skilled intervention to address the above impairments. Continue treatment per established plan of care. to address goals. Recommendation for discharge: (in order for the patient to meet his/her long term goals)  Therapy up to 5 days/week in SNF setting    This discharge recommendation:  Has been made in collaboration with the attending provider and/or case management    IF patient discharges home will need the following DME: to be determined (TBD)       SUBJECTIVE:   Patient stated I need to get stronger.     OBJECTIVE DATA SUMMARY:   Critical Behavior:  Neurologic State: Alert  Orientation Level: Oriented X4  Cognition: Follows commands (cues needed for hand placement sit to stand and stand to sit)  Safety/Judgement: Awareness of environment,Fall prevention  Functional Mobility Training:  Bed Mobility:  Rolling: Supervision  Supine to Sit: Stand-by assistance  Sit to Supine: Stand-by assistance  Scooting: Contact guard assistance        Transfers:  Sit to Stand: Contact guard assistance;Minimum assistance  Stand to Sit: Contact guard assistance (cues to reach back to sit)        Bed to Chair: Contact guard assistance (with RW)                    Balance:  Sitting - Static: Good (unsupported)  Sitting - Dynamic: Good (unsupported)  Standing: Impaired  Standing - Static: Fair  Standing - Dynamic : Fair  Ambulation/Gait Training:  Distance (ft): 25 Feet (ft)  Assistive Device: Walker, rolling  Ambulation - Level of Assistance: Contact guard assistance;Minimal assistance        Gait Abnormalities: Decreased step clearance        Base of Support: Widened     Speed/Luz: Pace decreased (<100 feet/min)  Step Length: Left shortened;Right shortened                    Stairs:               Therapeutic Exercises:   Seated LAQ, marching, ankle pumps  Pain Rating:      Activity Tolerance: Fair and requires rest breaks    After treatment patient left in no apparent distress:   Supine in bed and Call bell within reach    COMMUNICATION/COLLABORATION:   The patients plan of care was discussed with: Occupational therapist and Registered nurse.      Raiza Gardner, PT   Time Calculation: 29 mins

## 2022-07-07 NOTE — PROGRESS NOTES
Encouraged to call for assistance when going to bathroom, call bell at bedside. Gripper socks on, side rails up X3.

## 2022-07-07 NOTE — TELEPHONE ENCOUNTER
Patient's wife said the patient is at [facility] and missed his last chemo appt due to being admitted. Trying to figure out next step. Patient is now covid positive and so is the wife.

## 2022-07-07 NOTE — PROGRESS NOTES
Transition of Care Plan:     RUR: 14%  Disposition: rehab (needs COVID + Bed)   >referrals pending   Follow up appointments: PCP, specialists as indicated   DME needed: TBD, has RW, wheelchair at [facility]. or means to access home: yes       IM Medicare Letter: to be reviewed prior to d/c   Is patient a BCPI-A Bundle:  No              If yes, was Bundle Letter given?:    Is patient a  and connected with the  [employer]  If yes, was [employer] transfer form completed and [facility] notified? Caregiver Contact: Carlosfrandy Camden Stovall (spouse), Stella Amaya (daughter)   Discharge Caregiver contacted prior to discharge? Spoke with daughter  Care Conference needed?: No    Chart reviewed. CM continues to seek rehab bed (COVID + for pt). At this time, pt has not been accepted to [facility] facility to concern of pt's ability to tolerate intensity of therapy. Spoke with pt today via phone. He is agreeable to moving forward with SNF placement. No preference indicated at this time. He prefers to stay in the [place] area, but [facility] currently does not have a covid+ bed available. Additional referrals remain pending at this time. Will continue to follow.     Juan Miguel Vizcaino, MSW   Care Manager, [facility]  [phone]

## 2022-07-07 NOTE — PROGRESS NOTES
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  -As per last heme-onc note, patient had some low extremity weakness and had MRI brain on  which was negative  -CT abdomen showed lytic lesion at L3 vertebral body  -CT head and CTA head and neck without LVO and no bleed. -MRI brain No evidence of metastasis. No acute abnormality. -MRI cervical spine Multilevel spondylosis as above, worst at C5-6 where there is mild spinal stenosis. Unremarkable cord. - MRI T spine:Chronic mild loss of height in 3, T4, and T5. No evidence of acute fracture. No evidence of osseous metastasis. No significant spinal stenosis or neural foraminal narrowing  -MRI of L spine: Metastases at L3 and L4 with mild pathologic compression deformities  -Neuro consulted, recs EMG/NCS, d/w pt and he agrees  -PT/OT eval, ECHO  -Continue aspirin  IP PT/OT eval for DC planning- SNF likely as pt desires rehab for now per palliative noted      Gastroesophageal cancer w Mets  Dysphagia   -Consult oncologist as a courtesy  -Last PET scan showed distal esophageal mass lesion  -Has known metastasis to liver, pericardial, bone  -Has a Chemo-Port  -Elevated LFTs-likely from hepatic metastasis  -Cont full liquid diet for chronic dysphagia      + Covid 19 infection POA  Asymptomatic, no cough, sputum,m fever, chills   Wife tested + and so pt was checked  Mild hypoxia likely from pleural effusion  Cont Symptomatic Rx  Hold off steroids/anti virals     History of AAA   -Size stable around 3.7, similar to CT on april.  F/u as outpt w PCP     Systolic HF  Moderate bilateral pleural effusions  History of HLD  History of hypertension  Hypoxic resp failure, acute, d/t the above- needing 2L/m oxygen for now  continue lisinopril, verapamil  Effusion likely from malignancy and mild systolic HF, new   Switch bumex po to IV  Wean off O2 as able     Code Status: Full code  Surrogate Decision Maker wife  DVT Prophylaxis:  Hep sq  GI Prophylaxis: not indicated  Baseline: DNR as per pt's wishes 7/6  Recommended Disposition: TBD SNF likely as per pt's wishes, Hospice in future if fails per pt's wishes  Anticipated Discharge Date:  24-48 hours        Subjective:     Discussed with RN events overnight. No SOB  No cough, fever, flu like symptoms  Left side weakness, more on upper extremity  No HA  No N/V     Review of Systems:  Symptom Y/N Comments  Symptom Y/N Comments   Fever/Chills    Chest Pain     Poor Appetite    Edema     Cough    Abdominal Pain     Sputum    Joint Pain     SOB/GUZMAN    Pruritis/Rash     Nausea/vomit    Tolerating PT/OT     Diarrhea    Tolerating Diet     Constipation    Other       PO intake: No data found. Wt Readings from Last 10 Encounters:   07/05/22 95.7 kg (211 lb)   06/20/22 97.9 kg (215 lb 14.4 oz)   06/20/22 97.5 kg (215 lb)   06/15/22 99.8 kg (220 lb)   06/09/22 101.6 kg (224 lb)   06/07/22 101.6 kg (224 lb)   06/06/22 102.1 kg (225 lb)   06/01/22 104.5 kg (230 lb 6.4 oz)   05/09/22 109.8 kg (242 lb)   04/20/22 110.1 kg (242 lb 11.2 oz)       Objective:     VITALS:   Last 24hrs VS reviewed since prior progress note.  Most recent are:  Patient Vitals for the past 24 hrs:   Temp Pulse Resp BP SpO2   07/07/22 0904  72  139/63    07/07/22 0841 98 °F (36.7 °C) (!) 48 16 (!) 125/59 92 %   07/07/22 0400 98 °F (36.7 °C) 63 16 135/71 95 %   07/07/22 0000 98.4 °F (36.9 °C) 82 16 (!) 128/59 95 %   07/06/22 2000 98.4 °F (36.9 °C) 78 16 135/70 94 %   07/06/22 1552 98.7 °F (37.1 °C) 74 16 122/75 92 %   07/06/22 1139 97.8 °F (36.6 °C) 65 17 118/74 91 % Intake/Output Summary (Last 24 hours) at 7/7/2022 0959  Last data filed at 7/6/2022 1300  Gross per 24 hour   Intake 240 ml   Output    Net 240 ml        I had a face to face encounter, and independently examined this patient on 7/7/2022, as outlined below:    PHYSICAL EXAM:  General:    No distress     HEENT: Atraumatic, anicteric sclerae, pink conjunctivae, MMM  Neck:  Supple, symmetrical  Lungs:   CTA. No Wheezing/Rhonchi. No rales. No tenderness. No Accessory muscle use. Heart:   Regular rhythm. No murmur. No JVD   GI/:   Soft. NT. ND. BS normal  Extremities: No edema. No cyanosis. No clubbing. Skin:     Not pale. Not Jaundiced. No rashes   Psych:  Good insight. Not depressed. Not anxious or agitated. Neurologic: Alert and oriented X 4. EOMs intact. No facial asymmetry. No slurred speech. L side weakness, power 3/5 proximal L UE, L LE power 4-5/5. Sensation grossly intact. Labs     I reviewed today's most current labs and imaging studies. Pertinent labs include:  Recent Labs     07/06/22  0510 07/05/22  0232 07/04/22  1218   WBC 2.5* 2.3* 2.2*   HGB 11.4* 11.0* 12.0*   HCT 33.9* 32.3* 36.4*    156 172     Recent Labs     07/06/22  0510 07/05/22  0232 07/04/22  1218    135* 135*   K 3.7 3.6 3.6    102 101   CO2 25 26 27   GLU 94 92 121*   BUN 12 13 14   CREA 0.58* 0.55* 0.73   CA 8.4* 8.3* 8.3*   ALB 2.6* 2.5* 2.7*   TBILI 1.3* 1.5* 1.2*   ALT 63 67 82*     MRI BRAIN W WO CONT    Result Date: 7/5/2022  No evidence of metastasis. No acute abnormality. Chronic changes as above. MRI CERV SPINE W WO CONT    Result Date: 7/5/2022  1. No acute fracture. 2. Multilevel spondylosis as above, worst at C5-6 where there is mild spinal stenosis. 3. Unremarkable cord. 4. Moderate bilateral pleural effusions. 5. Thoracic lymphadenopathy again seen. MRI [facility] SPINE W WO CONT    Result Date: 7/5/2022  1. Chronic mild loss of height in 3, T4, and T5. No evidence of acute fracture.  No evidence of osseous metastasis. 2. No significant spinal stenosis or neural foraminal narrowing. 3. Unchanged moderate bilateral pleural effusions. 4. Thoracic lymphadenopathy and mass in the distal esophagus are better seen on the prior CT. MRI LUMB SPINE W CONT    Result Date: 7/5/2022  1. Study is suboptimally obtained and at was performed following earlier contrast administration for another study. This study should be repeated after contrast material clears. 2. Pathological vertebral compression fractures at L3 and L4 with loss of height and L3 left paracentral and lateral posterior cortical expansion. 3. Retroperitoneal lymphadenopathy. 4. Multilevel spondylosis as detailed above 5. Abdominal aortic aneurysm measuring 3.9 cm AP. CT HEAD WO CONT    Result Date: 7/4/2022  Central atrophy and old left posterior parietal infarct. No acute process identified. Mild bilateral maxillary sinus disease    CTA CHEST W OR W WO CONT    Result Date: 7/4/2022  1.  3.8 x 3.7 cm infrarenal abdominal aortic aneurysm without evidence of rupture. 2.  Extensive mesenteric, retroperitoneal, and intrathoracic lymphadenopathy in addition to liver lesions, lingular lung nodule, and distal esophageal wall thickening indicative of malignancy, as better evaluated on the recent PET/CT. CT SPINE CERV WO CONT    Result Date: 7/4/2022  Severe C5-6 and mild C6-7 central stenosis. Severe bilateral C5-6 neural foraminal stenosis. Mild T2, T3, and T4 superior endplate deformities, age indeterminant but of the osteoporotic type Lung disease will be separately evaluated by a CTA of the chest    CTA ABDOMEN PELV W CONT    Result Date: 7/4/2022  1.  3.8 x 3.7 cm infrarenal abdominal aortic aneurysm without evidence of rupture.  2.  Extensive mesenteric, retroperitoneal, and intrathoracic lymphadenopathy in addition to liver lesions, lingular lung nodule, and distal esophageal wall thickening indicative of malignancy, as better evaluated on the recent PET/CT. No results found. 07/04/22    ECHO ADULT COMPLETE 07/05/2022 7/5/2022    Interpretation Summary    Left Ventricle: Mildly reduced left ventricular systolic function with a visually estimated EF of 45 - 50%. Left ventricle size is normal. Normal wall thickness. Mild global hypokinesis present. Normal diastolic function. Signed by: Guicho Bello MD on 7/5/2022  4:06 PM     All Micro Results     Procedure Component Value Units Date/Time    RESPIRATORY VIRUS PANEL W/COVID-19, PCR [[id number]]  (Abnormal) Collected: 07/05/22 1415    Order Status: Completed Specimen: Nasopharyngeal Updated: 07/05/22 1907     Adenovirus Not detected        Coronavirus 229E Not detected        Coronavirus HKU1 Not detected        Coronavirus CVNL63 Not detected        Coronavirus OC43 Not detected        SARS-CoV-2, PCR Detected        Comment: CALLED TO AND READ BACK BY  Trinity Ernst RN AT 1905  ON 7/5/22. AT          [facility]  Not detected        Rhinovirus and Enterovirus Not detected        Influenza A Not detected        Influenza A, subtype H1 Not detected        Influenza A, subtype H3 Not detected        INFLUENZA A H1N1 PCR Not detected        Influenza B Not detected        Parainfluenza 1 Not detected        Parainfluenza 2 Not detected        Parainfluenza 3 Not detected        Parainfluenza virus 4 Not detected        RSV by PCR Not detected        B. parapertussis, PCR Not detected        Bordetella pertussis - PCR Not detected        Chlamydophila pneumoniae DNA, QL, PCR Not detected        Mycoplasma pneumoniae DNA, QL, PCR Not detected               Current Medications:     Current Facility-Administered Medications:     bumetanide (BUMEX) injection 1 mg, 1 mg, IntraVENous, DAILY, Jose Ibanez MD, 1 mg at 07/07/22 0906    acetaminophen (TYLENOL) tablet 650 mg, 650 mg, Oral, Q4H PRN **OR** acetaminophen (TYLENOL) solution 650 mg, 650 mg, Per NG tube, Q4H PRN **OR** acetaminophen (TYLENOL) suppository 650 mg, 650 mg, Rectal, Q4H PRN, Barry Moralez MD    heparin (porcine) injection 5,000 Units, 5,000 Units, SubCUTAneous, Q8H, Barry Moralez MD, 5,000 Units at 07/07/22 0510    aspirin delayed-release tablet 81 mg, 81 mg, Oral, DAILY, Barry Moralez MD, 81 mg at 07/07/22 0906    lisinopriL (PRINIVIL, ZESTRIL) tablet 20 mg, 20 mg, Oral, DAILY, Barry Moralez MD, 20 mg at 07/07/22 0906    magnesium oxide (MAG-OX) tablet 400 mg, 400 mg, Oral, DAILY, Barry Moralez MD, 400 mg at 07/07/22 0262    verapamil ER (CALAN-SR) tablet 240 mg, 240 mg, Oral, DAILY, Barry Moralez MD, 240 mg at 07/07/22 0910    oxyCODONE IR (ROXICODONE) tablet 5 mg, 5 mg, Oral, Q4H PRN, Barry Moralez MD    sodium chloride (NS) flush 5-40 mL, 5-40 mL, IntraVENous, Q8H, Barry Moralez MD, 10 mL at 07/07/22 0511    sodium chloride (NS) flush 5-40 mL, 5-40 mL, IntraVENous, PRN, Barry Moralez MD    polyethylene glycol (MIRALAX) packet 17 g, 17 g, Oral, DAILY PRN, Baryr Moralez MD    ondansetron (ZOFRAN ODT) tablet 4 mg, 4 mg, Oral, Q8H PRN **OR** ondansetron (ZOFRAN) injection 4 mg, 4 mg, IntraVENous, Q6H PRN, Barry Moralez MD    albuterol-ipratropium (DUO-NEB) 2.5 MG-0.5 MG/3 ML, 3 mL, Nebulization, Q4H PRN, Barry Moralez MD     Procedures: see electronic medical records for all procedures/Xrays and details which were not copied into this note but were reviewed prior to creation of Plan.     Reviewed most current lab test results and cultures  YES  Reviewed most current radiology test results   YES  Review and summation of old records today    NO  Reviewed patient's current orders and MAR    YES  PMH/ reviewed - no change compared to H&P  ________________________________________________________________________  Care Plan discussed with:    Comments   Patient x    Family      RN x    Care Manager x    Consultant                        Multidiciplinary team rounds were held today with , nursing, pharmacist and clinical coordinator. Patient's plan of care was discussed; medications were reviewed and discharge planning was addressed.      ________________________________________________________________________  Total NON critical care TIME:  16   Minutes    Total CRITICAL CARE TIME Spent:   Minutes non procedure based      Comments   >50% of visit spent in counseling and coordination of care x     This includes time during multidisciplinary rounds if indicated above   ________________________________________________________________________  Concepción Garcia MD

## 2022-07-07 NOTE — PROGRESS NOTES
Problem: Self Care Deficits Care Plan (Adult)  Goal: *Acute Goals and Plan of Care (Insert Text)  Description: FUNCTIONAL STATUS PRIOR TO ADMISSION: Patient was independent and did not require AD. HOME SUPPORT: The patient lived with spouse (spouse is currently COVID +). Has granddaughter at home as well. Occupational Therapy Goals  Initiated 7/5/2022  1. Patient will perform upper body dressing with supervision/set-up within 7 day(s). 2.  Patient will perform lower body dressing with minimal assistance/contact guard assist within 7 day(s). 3.  Patient will perform bathing with moderate assistance  within 7 day(s). 4.  Patient will perform toilet transfers with minimal assistance/contact guard assist within 7 day(s). 5.  Patient will perform all aspects of toileting with minimal assistance/contact guard assist within 7 day(s). 6.  Patient will utilize energy conservation techniques during functional activities with verbal cues within 7 day(s). Outcome: Progressing Towards Goal   OCCUPATIONAL THERAPY TREATMENT  Patient: Daniel Duque (79 y.o. male)  Date: 7/7/2022  Diagnosis: Acute CVA (cerebrovascular accident) [facility]) [I63.9] <principal problem not specified>       Precautions: Fall  Chart, occupational therapy assessment, plan of care, and goals were reviewed. ASSESSMENT  Patient continues with skilled OT services and is progressing towards goals. Pt was supine upon arrival and willing to participate. Improved overall mobility and activity tolerance. He reported continued numbness in left UE but was able to functionally use BUE during managing of cell phone, combing hair and toileting. Increased effort and time needed for sit to stand from lower surface requiring min assist.  CGA to min assist for mobility with RW and CGA to toilet. Pt was unable to tolerate sitting in chair at end of session due to report of back pain.        O2 sat on 1L 94% with activity and     Current Level of Function Impacting Discharge (ADLs): CGA to min assist mobility, CGA standing to groom, CGA toileting    Other factors to consider for discharge: decreased tolerance to upright         PLAN :  Patient continues to benefit from skilled intervention to address the above impairments. Continue treatment per established plan of care to address goals. Recommend with staff: mobilize    Recommend next OT session: ADLS    Recommendation for discharge: (in order for the patient to meet his/her long term goals)  Therapy up to 5 days/week in SNF setting    This discharge recommendation:  Has been made in collaboration with the attending provider and/or case management    IF patient discharges home will need the following DME: rolling walker, shower chair       SUBJECTIVE:   Patient stated Pop Red you sure it is ok for me to move around and do things for myself.     OBJECTIVE DATA SUMMARY:   Cognitive/Behavioral Status:  Neurologic State: Alert  Orientation Level: Oriented X4  Cognition: Follows commands (cues needed for hand placement sit to stand and stand to sit)  Perception: Appears intact  Perseveration: No perseveration noted  Safety/Judgement: Awareness of environment; Fall prevention    Functional Mobility and Transfers for ADLs:  Bed Mobility:  Rolling: Supervision  Supine to Sit: Stand-by assistance  Sit to Supine: Stand-by assistance  Scooting: Contact guard assistance    Transfers:  Sit to Stand: Contact guard assistance;Minimum assistance  Functional Transfers  Bathroom Mobility: Contact guard assistance (with RW)  Toilet Transfer : Minimum assistance (sit to stand with increased effort and left grab bar)  Bed to Chair: Contact guard assistance (with RW)    Balance:  Sitting - Static: Good (unsupported)  Sitting - Dynamic: Good (unsupported)  Standing: Impaired  Standing - Static: Fair  Standing - Dynamic : Fair    ADL Intervention:       Grooming  Brushing/Combing Hair: Contact guard assistance (standing at sink)    CGA toileting managing underwear    Cognitive Retraining  Safety/Judgement: Awareness of environment; Fall prevention    Therapeutic Exercises:   SROM shoulder flexion seated at bedside chair, towel slides     Pain:  Moderate back pain seated    Activity Tolerance:   Fair and requires rest breaks    After treatment patient left in no apparent distress:   Supine in bed, Call bell within reach, Bed / chair alarm activated, and Side rails x 3    COMMUNICATION/COLLABORATION:   The patients plan of care was discussed with: Physical therapist, Registered nurse, and patient .      Lulu Hayes OTR/L  Time Calculation: 24 mins

## 2022-07-08 NOTE — PROGRESS NOTES
Attempted to call report to [facility].     13:12 Called report to Willem Krueger at [facility] and [address] [place] [address].

## 2022-07-08 NOTE — PROGRESS NOTES
Patient will dc today. Problem: Falls - Risk of  Goal: *Absence of Falls  Description: Document Cyrus Dwyer Fall Risk and appropriate interventions in the flowsheet. Outcome: Progressing Towards Goal  Note: Fall Risk Interventions:  Mobility Interventions: OT consult for ADLs,Bed/chair exit alarm,Patient to call before getting OOB,PT Consult for mobility concerns,PT Consult for assist device competence         Medication Interventions: Bed/chair exit alarm,Patient to call before getting OOB    Elimination Interventions: Toileting schedule/hourly rounds,Bed/chair exit alarm,Call light in reach,Urinal in reach,Patient to call for help with toileting needs              Problem: Patient Education: Go to Patient Education Activity  Goal: Patient/Family Education  Outcome: Progressing Towards Goal     Problem: Pressure Injury - Risk of  Goal: *Prevention of pressure injury  Description: Document Shahzad Scale and appropriate interventions in the flowsheet.   Outcome: Progressing Towards Goal  Note: Pressure Injury Interventions:  Sensory Interventions: Assess changes in LOC,Float heels,Keep linens dry and wrinkle-free,Maintain/enhance activity level,Minimize linen layers,Monitor skin under medical devices    Moisture Interventions: Absorbent underpads    Activity Interventions: Pressure redistribution bed/mattress(bed type),PT/OT evaluation    Mobility Interventions: Pressure redistribution bed/mattress (bed type),PT/OT evaluation    Nutrition Interventions: Document food/fluid/supplement intake,Offer support with meals,snacks and hydration    Friction and Shear Interventions: Lift sheet,Apply protective barrier, creams and emollients,Minimize layers

## 2022-07-08 NOTE — DISCHARGE SUMMARY
Hospitalist Discharge Summary     Patient ID:  Vasile Wei  [id number]  68 y.o.  1945  7/4/2022    PCP on record: Kenny Magana MD    Admit date: 7/4/2022  Discharge date and time: 7/8/2022    DISCHARGE DIAGNOSIS:    Left-sided weakness UE>RLE, progressive POA- short term Rehab opted by pt before considering ? Hospice  Lower back pain POA- controlled, not using anyt opioids in past few days  CVA ruled out  Gastroesophageal cancer w Mets  Dysphagia  Covid 19 infection POA- asymptomatic,supportive care as needed  History of AAA   Acute on chronic Systolic HF- increased PO Bumex to 1mg daily on discharge  Moderate bilateral pleural effusions  History of HLD  History of hypertension  Hypoxic resp failure, acute, d/t the above- needing 2L/m oxygen for now, taper as able  DNR    CONSULTATIONS:  IP CONSULT TO HOSPITALIST  IP CONSULT TO NEUROLOGY  IP CONSULT TO HEMATOLOGY  IP CONSULT TO PALLIATIVE CARE - PROVIDER    Excerpted HPI from H&P of Dorrie Schlatter, MD:  \"52 y.o.  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 Who presented to ED with a chief complaint of progressive left lower extremity and left upper extremity weakness. He says he has been having lower back pain which is getting worse. He also reports that he is feeling his left leg becoming weaker for 1 week. And for past 3 days his left upper arm has been getting weaker. He say its progressive weakness. Denies any numbness or tingling. Denies any speech issue. No recent fever , chills, shortness of breath        We were asked to admit for work up and evaluation of the above problems. \"    ______________________________________________________________________  DISCHARGE SUMMARY/HOSPITAL COURSE:  for full details see H&P, daily progress notes, labs, consult notes.      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  -As per last heme-onc note, patient had some low extremity weakness and had MRI brain on 6/22 which was negative  -CT abdomen showed lytic lesion at L3 vertebral body  -CT head and CTA head and neck without LVO and no bleed. -MRI brain No evidence of metastasis. No acute abnormality. -MRI cervical spine Multilevel spondylosis as above, worst at C5-6 where there is mild spinal stenosis. Unremarkable cord. - MRI T spine:Chronic mild loss of height in 3, T4, and T5. No evidence of acute fracture. No evidence of osseous metastasis. No significant spinal stenosis or neural foraminal narrowing  -MRI of L spine: Metastases at L3 and L4 with mild pathologic compression deformities  -Neuro consulted, recs EMG/NCS, d/w pt and he agrees  -PT/OT eval, ECHO  -Continue aspirin  IP PT/OT eval for DC planning- SNF likely as pt desires rehab for now per palliative noted 7/6     Gastroesophageal cancer w Mets  Dysphagia   -Consult oncologist as a courtesy  -Last PET scan showed distal esophageal mass lesion  -Has known metastasis to liver, pericardial, bone  -Has a Chemo-Port  -Elevated LFTs-likely from hepatic metastasis  -Cont full liquid diet for chronic dysphagia      + Covid 19 infection POA  Asymptomatic, no cough, sputum,m fever, chills   Wife tested + and so pt was checked  Mild hypoxia likely from pleural effusion  Cont Symptomatic Rx  Hold off steroids/anti virals     History of AAA   -Size stable around 3.7, similar to CT on april.  F/u as outpt w PCP     Systolic HF  Moderate bilateral pleural effusions  History of HLD  History of hypertension  Hypoxic resp failure, acute, d/t the above- needing 2L/m oxygen for now  continue lisinopril, verapamil  Effusion likely from malignancy and mild systolic HF, new   Switch bumex po to IV  Wean off O2 as able     Code Status: Full code        _______________________________________________________________________  Patient seen and examined by me on discharge day. Pertinent Findings:  Gen:    Not in distress  Chest: Clear lungs  CVS:   Regular rhythm. No edema  Abd:  Soft, not distended, not tender  Neuro:  Alert, oriented x 3  _______________________________________________________________________  DISCHARGE MEDICATIONS:   Current Discharge Medication List      CONTINUE these medications which have CHANGED    Details   bumetanide (BUMEX) 0.5 mg tablet Take 2 Tablets by mouth daily. Qty: 30 Tablet, Refills: 0  Start date: 7/8/2022      oxyCODONE IR (ROXICODONE) 5 mg immediate release tablet Take 1 Tablet by mouth every four (4) hours as needed for Pain for up to 14 days. Max Daily Amount: 30 mg.  Qty: 30 Tablet, Refills: 0  Start date: 7/8/2022, End date: 7/22/2022    Associated Diagnoses: Cancer related pain         CONTINUE these medications which have NOT CHANGED    Details   ondansetron (ZOFRAN ODT) 4 mg disintegrating tablet Take 1 Tablet by mouth every eight (8) hours as needed for Nausea or Vomiting. Qty: 40 Tablet, Refills: 1      prochlorperazine (COMPAZINE) 10 mg tablet Take 0.5 Tablets by mouth every six (6) hours as needed for Nausea or Vomiting. Qty: 60 Tablet, Refills: 3      lidocaine-prilocaine (EMLA) topical cream Apply  to affected area as needed for Pain (Apply a quarter size cream over port site 1 hour prior to chemotherapy). Qty: 30 g, Refills: 1      metroNIDAZOLE (METROCREAM) 0.75 % topical cream Apply  to affected area two (2) times a day.  Use a thin layer to affected areas after washing      verapamil ER (CALAN-SR) 240 mg CR tablet TAKE ONE TABLET BY MOUTH EVERY DAY  Qty: 90 Tablet, Refills: 0    Associated Diagnoses: Essential hypertension      lisinopriL (PRINIVIL, ZESTRIL) 20 mg tablet TAKE ONE TABLET BY MOUTH EVERY DAY  Qty: 90 Tablet, Refills: 0    Associated Diagnoses: Essential hypertension      fenofibrate (LOFIBRA) 160 mg tablet TAKE ONE TABLET BY MOUTH EVERY DAY  Qty: 90 Tablet, Refills: 0    Associated Diagnoses: Mixed hyperlipidemia      magnesium oxide (MAG-OX) 400 mg tablet Take 400 mg by mouth daily. omega-3 fatty acids Cap Take 500 mg by mouth daily. aspirin 81 mg tablet Take 81 mg by mouth four (4) days a week. STOP taking these medications       potassium (POTASSIMIN PO) Comments:   Reason for Stopping:                 Patient Follow Up Instructions:    Activity: Activity as tolerated  Diet: Full liquid diet for dysphagia      Follow-up with PCP in 2 weeks as needed      Follow-up Information     Follow up With Specialties Details Why Mendez MENDOZA [place] [address] [address] [facility]  [phone]    Francois Steele MD Internal Medicine Physician   Rosalba Nicole [address] [place] [address]  [phone]          ________________________________________________________________    Risk of deterioration: Moderate    Condition at Discharge:  Stable  __________________________________________________________________    Disposition  SNF/LTC    ____________________________________________________________________    Code Status: DNR/DNI  ___________________________________________________________________      Total time in minutes spent coordinating this discharge (includes going over instructions, follow-up, prescriptions, and preparing report for sign off to her PCP) :  >30 minutes    Signed:  Keith Lesch, MD

## 2022-07-08 NOTE — PROGRESS NOTES
Physician Progress Note      PATIENT:               Isa Hall  CSN #:                  [id number]  :                       1945  ADMIT DATE:       2022 11:01 AM  DISCH DATE:  RESPONDING  PROVIDER #:        Chema Lozoya MD          QUERY TEXT:    Pt admitted with Back and noted to have Left-sided weakness UE>RLE, progressive. Pt also noted to have  Multilevel spondylosis as above, worst at C5-6 where there is mild spinal stenosis per MRI Cervical Spine. Unremarkable cord. If possible, please document in progress notes and discharge summary the relationship, if any, between Left-sided weakness UE>RLE, progressive and . The medical record reflects the following:  Risk Factors: Hx:  metastatic adenocarcinoma from gastroesophageal cancer. Chuck Gomez C/o Back pain  Clinical Indicators: -MRI cervical spine Multilevel spondylosis as above, worst at C5-6 where there is mild spinal stenosis. Unremarkable cord. ...... MRI T spine:Chronic mild loss of height in 3, T4, and T5. No evidence of acute fracture. No evidence of osseous metastasis. No significant spinal stenosis or neural foraminal narrowing. ...... MRI of L spine: Metastases at L3 and L4 with mild pathologic compression deformities. Chuck Gomez Noted: His presentation is likely 2/2 radiculopathy/myelopathy    Treatment: CT Head; CTA Head/Neck; MRI Cervical Spine; MRI T Spine; MRI L Spine; Neuro consult; PT/OT eval; ASA;       Thank you,    Dhaval Hensley  CDI  Options provided:  -- Left-sided weakness UE>RLE, progressive due to Cervical spinal stenosis  -- Left-sided weakness UE>RLE, progressive due to lytic lesions in his lumbar spine  -- Left-sided weakness UE>RLE, progressive due to metastatic gastroesophageal adenocarcinoma  -- Left-sided weakness UE>RLE, progressive due to (please state etiology, if known)  -- Other - I will add my own diagnosis  -- Disagree - Not applicable / Not valid  -- Disagree - Clinically unable to determine / Unknown  -- Refer to Clinical Documentation Reviewer    PROVIDER RESPONSE TEXT:    This patient has Left-sided weakness UE>RLE, progressive due to Cervical spinal stenosis.     Query created by: Tiffany Alexander on 7/8/2022 3:21 PM      Electronically signed by:  Francie Bryant MD 7/8/2022 3:36 PM

## 2022-07-08 NOTE — PROGRESS NOTES
Transition of Care Plan:     RUR: 12%  Disposition: rehab (needs COVID + Bed)   >[facility] accepted, can admit today   Follow up appointments: PCP, specialists as indicated   DME needed: TBD after rehab. has RW, wheelchair at [facility] transport, ETA 2:45PM  Keys or means to access home: yes       IM Medicare Letter: reviewed on 7/8/22  Is patient a BCPI-A Bundle:  No              If yes, was Bundle Letter given?:    [facility] patient a  [facility]  If yes, was [place] transfer form completed and [facility] notified? Caregiver Contact: Blessing Lombardi (spouse), Kim Eugene (daughter)   Discharge Caregiver contacted prior to y [address] needed?: No     Chart reviewed. CM aware of discharge order. Pt accepted to [facility] for SNF placement. COVID + bed available today at both facilities. Contacted pt by phone to obtain choice. Pt has chosen [address] RN to call report to [phone]. CM has secured [facility] medical transport, ETA is 2:45PM. PCS form completed and placed into chart with copy of DDNR attached. 2nd IMM letter delivered to pt at bedside. Signed copy placed into chart. Please see scanned documents. Pt agreeable to d/c plan. Call placed to spouse, Jodi Romero, to finalize and review d/c plan. She verbalized understanding. CM asked Heena Winkler liaison to contact spouse with additional information (room#, #report, etc.). No further questions/concerns voiced at this time. Rehab to identify DME needs prior to d/c and informed spouse of that. Pt ready for d/c from CM standpoint. Care Management Interventions  PCP Verified by CM:  Yes  Palliative Care Criteria Met (RRAT>21 & CHF Dx)?: No  Mode of Transport at Discharge: [facility]  Hospital Transport Time of Discharge: [address] (CM Consult): Discharge Planning  Discharge Durable Medical Equipment: No  Physical Therapy Consult: Yes  Occupational Therapy Consult: Yes  Speech Therapy Consult: Yes  Support Systems: Spouse/Significant Other,Child(frida),Other Family Member(s)  Confirm Follow Up Transport: Family  The Plan for Transition of Care is Related to the Following Treatment Goals : SNF  The Patient and/or Patient Representative was Provided with a Choice of Provider and Agrees with the Discharge Plan?: Yes  Name of the Patient Representative Who was Provided with a Choice of Provider and Agrees with the Discharge Plan: spouse  Freedom of Choice List was Provided with Basic Dialogue that Supports the Patient's Individualized Plan of Care/Goals, Treatment Preferences and Shares the Quality Data Associated with the Providers?: Yes   Resource Information Provided?: No  Discharge Location  Patient Expects to be Discharged to[de-identified] [place] [id number] [address] [facility]  [phone]

## 2022-07-08 NOTE — PROGRESS NOTES
Problem: Dysphagia (Adult)  Goal: *Acute Goals and Plan of Care (Insert Text)  Description: Speech Pathology Goals  Initiated 7/5/2022    1. Patient will tolerate Full Liquid diet without adverse effects within 7 days. Outcome: Resolved/Met     SPEECH LANGUAGE PATHOLOGY DYSPHAGIA TREATMENT/DISCHARGE  Patient: Lawyer Dong (79 y.o. male)  Date: 7/8/2022  Diagnosis: Acute CVA (ce[facility]brovascular accident) ([facility] [I63.9] <principal problem not specified>       Precautions:  Fall    ASSESSMENT:  Patient seen upright in bed for dysphagia treatment. He notes to not like the hospital food, but reports no difficulty with getting food down noting he is primarily drinking glucerna. He notes to have consumed a liquid diet at home with minimal amounts of very soft foods. Observed with thin liquids on this date with suspect timely pharyngeal swallow initiation and no overt s/s aspiration. Patient and RN note that he takes his pills with liquids with no difficulty. Reviewed importance of taking one pill at a time. Patient refused trial of puree/applesauce. Patient overall tolerating liquid diet - his baseline diet secondary to esophageal cancer - with no difficulty. Will plan to discharge from SLP services at this time. PLAN:  --continue liquid diet  --meds as tolerated one at a time  --no further SLP treatment indicated. Patient will be discharged from acute skilled speech therapy at this time. Rationale for discharge:  Goals achieved    Discharge Recommendations:  [facility] [address]:   Patient stated I'm just concerned with getting out of here. OBJECTIVE:   Cognitive and Communication Status:  Neurologic State: Alert  Orientation Level: Oriented X4  Cognition: Follows commands    Perception: Appears intact    Perseveration: No perseveration noted    Safety/Judgement: Awareness of environment,Fall prevention  Dysphagia Treatment:  Oral Assessment:     P.O.  Trials:  Patient Position: upright in bed  Vocal quality prior to P.O.: No impairment  Consistency Presented: Thin liquid  How Presented: Straw     Bolus Acceptance: No impairment  Bolus Formation/Control: No impairment     Propulsion: No impairment  Oral Residue: None  Initiation of Swallow: No impairment  Laryngeal Elevation: Functional  Aspiration Signs/Symptoms: None  Pharyngeal Phase Characteristics: No impairment, issues, or problems            Oral Phase Severity: No impairment  Pharyngeal Phase Severity : No impairment  Exercises:  Laryngeal Exercises:                                                                                                                                     NOMS:   The NOMS functional outcome measure was used to quantify this patient's level of swallowing impairment. Based on the NOMS, the patient was determined to be at level 4 for swallow function     NOMS Swallowing Levels:  Level 1 (CN): NPO  Level 2 (CM): NPO but takes consistency in therapy  Level 3 (CL): Takes less than 50% of nutrition p.o. and continues with nonoral feedings; and/or safe with mod cues; and/or max diet restriction  Level 4 (CK): Safe swallow but needs mod cues; and/or mod diet restriction; and/or still requires some nonoral feeding/supplements  Level 5 (CJ): Safe swallow with min diet restriction; and/or needs min cues  Level 6 (CI): Independent with p.o.; rare cues; usually self cues; may need to avoid some foods or needs extra time  Level 7 ([address] [place] [address]): Independent for all p.o.  EDWARDO. (2003). National Outcomes Measurement System (NOMS): Adult Speech-Language Pathology User's Guide. Pain:  Pain Scale 1: Numeric (0 - 10)  Pain Intensity 1: 0       After treatment:   Patient left in no apparent distress in bed, Call bell within reach, and Nursing notified    COMMUNICATION/EDUCATION:   Patient was educated regarding his deficit(s) of dysphagia as this relates to his diagnosis of esophageal cancer.   He demonstrated Good understanding as evidenced by verbalized understanding. The patient's plan of care including recommendations, planned interventions, and recommended diet changes were discussed with: Registered nurse.      Trena Naqvi SLP  Time Calculation: 10 mins

## 2022-07-08 NOTE — PROGRESS NOTES
Transition of Care Plan to SNF/Rehab    Communication to Patient/Family:  Met with patient and family and they are agreeable to the transition plan. The Plan for Transition of Care is related to the following treatment goals: SNF/rehab placement     The Patient and/or patient representative was provided with a choice of provider and agrees  with the discharge plan. Yes [x] No []    A Freedom of choice list was provided with basic dialogue that supports the patient's individualized plan of care/goals and shares the quality data associated with the providers. Yes [x] No []    SNF/Rehab Transition:  Patient has been accepted to [facility] and meets criteria for admission. Patient will transported by [facility] and expected to leave at 2:45PM.    Communication to SNF/Rehab:  Bedside RN, Robyn Florence, has been notified to update the transition plan to the facility and call report ([phone]). Discharge information has been updated on the AVS. And communicated to facility via CC link. Discharge instructions to be fax'd to facility at (f) [phone]      Nursing Please include all hard scripts for controlled substances, med rec and dc summary, and AVS in packet. Reviewed and confirmed with facility, [facility], can manage the patient care needs for the following:     Ramirez Brown with (X) only those applicable:  Medication:  [x]Medications are available at the facility  []IV Antibiotics    [x]Controlled Substance  hard copies available sent.   []Weekly Labs    Equipment:  []CPAP/BiPAP  []Wound Vacuum  [][place] or Urinary Device  []PICC/Central Line  []Nebulizer  []Ventilator    Treatment:  [x]Isolation (for MRSA, VRE, etc.)  []Surgical Drain Management  []Tracheostomy Care  []Dressing Changes  []Dialysis with transportation  []PEG Care  [x]Oxygen  []Daily Weights for Heart Failure    Dietary:  []Any diet limitations  []Tube Feedings   []Total Parenteral Management (TPN)    Financial Resources:  []Medicaid Application Completed    []UAI Completed and copy given to pt/family  and copy given to pt/family  []A screening has previously been completed. []Level II Completed    [x] Private pay individual who will not become   financially eligible for Medicaid within 6 months from admission to a [address] [place] [address] facility. [] Individual refused to have screening conducted. []Medicaid Application Completed    []The screening denied because it was determined individual did not need/did not qualify for nursing facility level of care. [] Out of state residents seeking direct admission to a [address] facility. [] Individuals who are inpatients of an out of state hospital, or in state or out of state veterans/ hospital and seek direct admission to a [address] facility  [] Individuals who are pateints or residents of a state owned/operated facility that is licensed by Department of [employer] ([employer]) and seek direct admission to [address] [place] [address]  [] A screening not required for enrollment in [facility] as set out in [address] [place] [address] 96-  [] [facility]) staff shall perform screenings of the [facility] clients. Advanced Care Plan:  []Surrogate Decision Maker of Care  [x]POA  [x]Communicated Code Status and copy sent.     Other:  COVID positive         SMILEY Dumont   Care Manager, [facility]  [phone]

## 2022-07-11 NOTE — TELEPHONE ENCOUNTER
Patient's wife called re patient's status. He is in a nursing facility after having been in hospital.  I concerned about the lack of care he is getting there. This PSR encouraged her to speak with the nurse manager there and the physician that oversees his care. Wife states he is still have bad back pain and this is what drove the decision to go to the hospital initially.

## 2022-07-12 NOTE — TELEPHONE ENCOUNTER
Patient's wife called in and stated that the patient was removed per his request from assisted care to his home due to not being happy with their care. Patient's wife stated the patient is currently covid positive and also has esophageal cancer and his oxygen saturations are getting as low a 85 but with an average around 89-90. Patient's wife is requesting at home oxygen to help the patient be more comfortable.

## 2022-07-13 NOTE — TELEPHONE ENCOUNTER
I contacted this patient at home earlier today. The patient reports having had steadily declining oxygen saturation following his recent diagnosis of COVID. He is somewhat dyspneic. However, when I advised him that because of his significant comorbidities, that he would be at extremely high risk of morbidity and mortality from COVID, and given the duration of time since his initial symptom onset and diagnosis, that outpatient treatment would probably not be appropriate. I strongly urged the patient that he should go to the emergency room for consideration of admission or at minimum evaluation for additional treatment options. I furthermore noted that oxygen therapy would not become available to him for several more days. I advised that I could still sign such an order but recommended more urgent evaluation. The patient acknowledged understanding of this and also acknowledged my advice to him that he could die if his hypoxia worsened. He still refused to go to the hospital.  Oxygen orders and signed; continued to urged patient to go for additional evaluation and treatment on an inpatient or expedited basis.

## 2022-07-15 NOTE — TELEPHONE ENCOUNTER
Patient's wife called wondering what's going on with the oxygen and I had told her that Eduardo need oxygen sats and office notes. I had tried to tell the wife that he needs to go to the hospital, she said she can not get him to move. Patient had seen laying down most of the day. Oxygen level goes down to 87 and go back up to 91.

## 2022-07-15 NOTE — TELEPHONE ENCOUNTER
I contacted this patient at home. Patient reports that he feels like he is OK whenever he is still not moving. He has difficulty with dyspnea with performing minimal activity. Reported oxygen saturation at rest is 86% to 91%. He otherwise feels about the same. I reiterated to him my strong recommendation that he be seen in person for evaluation for possible admission given his hypoxia. I noted to him that it would be difficult to get hi m oxygen supplies in a timely manner, and regardless given his high likelihood of decompensation I would want him seen in person regardless. I emphasized to him that he could die if his oxygen saturation were to worsen or if he were to otherwise experienced clinical deterioration. He once again refuses to consider this. He says that he understands he could die but does not think that this will happen. He has no further concerns at this  time. He is urged to go seek emergency medical care should he feel worse.

## 2022-07-17 PROBLEM — J18.9 HCAP (HEALTHCARE-ASSOCIATED PNEUMONIA): Status: ACTIVE | Noted: 2022-01-01

## 2022-07-17 PROBLEM — J18.9 HCAP (HEALTHCARE-ASSOCIATED PNEUMONIA): Status: ACTIVE | Noted: 2022-07-17

## 2022-07-17 NOTE — ED NOTES
Pt arrives from home d/t hypoxia per home pulse ox, endorses SOB worsening over the past 4 days. Per EMS pt sats 80's on arrival, improved to 93% on 6 l/min NC. Pt has hx of esophageal cx, pt has had poor appettite and weakness x 4 weeks. Pt last seen on 7/4/2022. 1415 Attempted to call report x 3 to [facility] without answer     Patient is being transferred to [facility] 3 Med Tele, Room # 2142. Report given to  RN on [facility] for routine progression of care. Report consisted of the following information SBAR, Kardex, ED Summary, MAR and Recent Results. Patient to be transferred to receiving unit by: transport (RN or tech name). Outstanding consults needed: No     Next labs due: 0400 am     The following personal items will be sent with the patient during transfer to the floor:     All valuables:    Cardiac monitoring ordered: Yes    The following CURRENT information was reported to the receiving RN:    Code status: DNR at time of transfer    Last set of vital signs:  Vital Signs  Level of Consciousness: Alert (0) (07/17/22 1025)  Temp: 98.4 °F (36.9 °C) (07/17/22 1330)  Temp Source: Oral (07/17/22 1330)  Pulse (Heart Rate): 88 (07/17/22 1330)  Cardiac Rhythm: Sinus Tachy;PVC (07/17/22 1034)  Resp Rate: 24 (07/17/22 1330)  BP: (!) 121/53 (07/17/22 1330)  MAP (Monitor): 72 (07/17/22 1330)  MAP (Calculated): 76 (07/17/22 1330)  BP 1 Location: Right upper arm (07/17/22 1025)  BP 1 Method: Automatic (07/17/22 1025)  BP Patient Position: At rest (07/17/22 1025)  MEWS Score: 5 (07/17/22 1025)         Oxygen Therapy  O2 Sat (%): 93 % (07/17/22 1330)  Pulse via Oximetry: 78 beats per minute (07/17/22 1330)  O2 Device: Nasal cannula (07/17/22 1330)  O2 Flow Rate (L/min): 4 l/min (07/17/22 1330)      Last pain assessment:  Pain 1  Pain Scale 1: Numeric (0 - 10)  Pain Intensity 1: 0      Wounds: No     Urinary catheter: voiding  Is there a godoy order: No     LDAs:       Peripheral IV 07/17/22 Left Antecubital (Active) Site Assessment Clean, dry, & intact 07/17/22 1027   Phlebitis Assessment 0 07/17/22 1027   Infiltration Assessment 0 07/17/22 1027   Dressing Status Clean, dry, & intact 07/17/22 1027   Dressing Type Transparent 07/17/22 1027   Hub Color/Line Status Pink;Flushed 07/17/22 1027   Action Taken Blood drawn 07/17/22 1027         Opportunity for questions and clarification was provided.     Judith Bustos RN

## 2022-07-17 NOTE — H&P
Hospitalist Admission Note    NAME: Silvia Leggett   :  1945   MRN:  [id number]     Date/Time:  2022 1:44 PM    Patient PCP: Farzana Pérez MD  ______________________________________________________________________  Given the patient's current clinical presentation, I have a high level of concern for decompensation if discharged from the emergency department. Complex decision making was performed, which includes reviewing the patient's available past medical records, laboratory results, and x-ray films. My assessment of this patient's clinical condition and my plan of care is as follows. Assessment / Plan:  Healthcare associated pneumonia  COVID-19 pneumonia  Severe sepsis  Acute hypoxic respiratory failure due to above  -Patient has leukocytosis and is also tachycardic and tachypneic. Lactic acid is within normal  -Chest x-ray shows bilateral lung infiltrates moderate pleural effusion  -Blood culture sent in the emergency department. Start vancomycin and cefepime. Avoid Levaquin due to history of AAA. Check sputum culture results. -Rapid SARS-CoV-2 is still positive today. Check start Decadron. Check CRP and D-dimer. If they are elevated, consider further treatment based on oxygen needs  -No IV fluids due to hypoxia and pleural effusion. Blood pressure is currently stable. -We will consult IR for thoracentesis. Send pleural studies. Systolic congestive heart failure with mild decompensation  -Last echo showed ejection fraction of 40 to 45%. proBNP is around 500.  -Start Bumex 1 mg IV twice daily due to pleural effusion.   Continue lisinopril.  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 Marizol Vaz    Hypertension  Dyslipidemia  History of AAA  -Continue verapamil, lisinopril, Bumex  -Continue home Tricor  -Avoid quinolones due to AAA due to risk of rupture    History of bilateral upper extremity weakness  -Evaluated by neurology extensively during last admission and recommended to have a EMG/nerve conduction study on outpatient basis    Mild elevation of AST chronic  -AST is trending down to 64 recheck in few days          Code Status: DNR  Surrogate Decision Maker:    DVT Prophylaxis: Lovenox  GI Prophylaxis: not indicated    Baseline: From home, independent of ADLs, not on oxygen at baseline      Subjective:   CHIEF COMPLAINT: Shortness of breath    HISTORY OF PRESENT ILLNESS:     Denia Dukes is a 68 y.o.   male who presents with past medical history of congestive heart failure, gastric adenocarcinoma on chemotherapy, hypertension, dyslipidemia is coming the hospital chief complaint of shortness of breath and also low oxygen numbers. Patient reports that he started having shortness of breath since the last 3 days associated with some cough and small amount of phlegm. Does not report any chest pain. Does not report any abdominal pain, nausea or vomiting. He recently tested positive for COVID-19 about 6 days ago. On arrival to ED, he was hypoxic and required oxygen by nasal cannula. He was tachycardic and also tachypneic. On labs white blood cell count is 12,000, hemoglobin is 12 and platelet count is 546. BMP shows normal creatinine. Troponin is 8.  proBNP is 443. AST 64 and alkaline phosphatase is 204. Chest x-ray shows bilateral lung infiltrates concerning for pneumonia versus on the right and a moderate right-sided pleural effusion. We were asked to admit for work up and evaluation of the above problems.      Past Medical History:   Diagnosis Date    AAA (abdominal aortic aneurysm) ([facility] [address]     4/4/22 CT: 3.7 cm; stable    Alcohol abuse     20 drinks/week    At risk for sleep apnea 05/22/2019    during phone assessment for PAT, LEVI 6    Colon polyp     Elevated CPK     Esophageal cancer (HCC)     *PROVISIONAL DIAGNOSIS* - MRCP finding with esophageal mass, hepatic, bone, LN mets. LN bx pending. H/O Referral placed 22    Former smoker, stopped smoking in distant past     Hyperlipidemia     Hypertension     Obesity     Psoriasis     Recurrent pancreatitis     2/2 ETOH use    Retroperitoneal lymphadenopathy     CT 22 - most prominent around celiac axis; concern for lymphoma    Rosacea         Past Surgical History:   Procedure Laterality Date    COLONOSCOPY N/A 2019    COLONOSCOPY performed by Ravi Esqueda MD at [facility] HX COLONOSCOPY  2014    HX HERNIA REPAIR      umbilical    IR INSERT TUNL CVC W PORT OVER 5 YEARS  2022       Social History     Tobacco Use    Smoking status: Former Smoker     Packs/day: 1.00     Years: 20.00     Pack years: 20.00     Quit date:      Years since quittin.5    Smokeless tobacco: Never Used   Substance Use Topics    Alcohol use: Yes     Alcohol/week: 20.0 standard drinks     Types: 20 Cans of beer per week     Comment: not much since throat issues        Family History   Problem Relation Age of Onset    Cancer Mother         colon     Allergies   Allergen Reactions    Prohance [Gadoteridol] Shortness of Breath and Nausea and Vomiting     Pt has had MRI contrast twice now with allergic reaction        Prior to Admission medications    Medication Sig Start Date End Date Taking? Authorizing Provider   bumetanide (BUMEX) 0.5 mg tablet Take 2 Tablets by mouth daily. Patient not taking: Reported on 2022   Marcia Sparks MD   oxyCODONE IR (ROXICODONE) 5 mg immediate release tablet Take 1 Tablet by mouth every four (4) hours as needed for Pain for up to 14 days.  Max Daily Amount: 30 mg. 22  Marcia Sparks MD   ondansetron (ZOFRAN ODT) 4 mg disintegrating tablet Take 1 Tablet by mouth every eight (8) hours as needed for Nausea or Vomiting. 6/15/22   Carlotta Stauffer MD   prochlorperazine (COMPAZINE) 10 mg tablet Take 0.5 Tablets by mouth every six (6) hours as needed for Nausea or Vomiting. Patient not taking: Reported on 7/17/2022 6/15/22   Jannet Palma MD   lidocaine-prilocaine (EMLA) topical cream Apply  to affected area as needed for Pain (Apply a quarter size cream over port site 1 hour prior to chemotherapy). Patient not taking: Reported on 7/17/2022 6/15/22   Jannet Palma MD   metroNIDAZOLE (METROCREAM) 0.75 % topical cream Apply  to affected area two (2) times a day. Use a thin layer to affected areas after washing  Patient not taking: Reported on 7/17/2022    Provider, Historical   verapamil ER (CALAN-SR) 240 mg CR tablet TAKE ONE TABLET BY MOUTH EVERY DAY  Patient not taking: Reported on 7/17/2022 3/5/22   Akosua Lazo MD   lisinopriL (PRINIVIL, ZESTRIL) 20 mg tablet TAKE ONE TABLET BY MOUTH EVERY DAY 3/5/22   Akosua Lazo MD   fenofibrate (LOFIBRA) 160 mg tablet TAKE ONE TABLET BY MOUTH EVERY DAY  Patient not taking: Reported on 7/17/2022 3/5/22   Akosua Lazo MD   magnesium oxide (MAG-OX) 400 mg tablet Take 400 mg by mouth daily. Patient not taking: Reported on 7/17/2022    Provider, Danna   omega-3 fatty acids Cap Take 500 mg by mouth daily. Patient not taking: Reported on 7/17/2022    Jeevan Castaneda MD   aspirin 81 mg tablet Take 81 mg by mouth four (4) days a week. Patient not taking: Reported on 7/17/2022    Jeevan Castaneda MD       REVIEW OF SYSTEMS:     I am not able to complete the review of systems because:    The patient is intubated and sedated    The patient has altered mental status due to his acute medical problems    The patient has baseline aphasia from prior stroke(s)    The patient has baseline dementia and is not reliable historian    The patient is in acute medical distress and unable to provide information           Total of 12 systems reviewed as follows:       POSITIVE= underlined text  Negative = text not underlined  General:  fever, chills, sweats, generalized weakness, weight loss/gain,      loss of appetite   Eyes:    blurred vision, eye pain, loss of vision, double vision  ENT:    rhinorrhea, pharyngitis   Respiratory:   cough, sputum production, SOB, GUZMAN, wheezing, pleuritic pain   Cardiology:   chest pain, palpitations, orthopnea, PND, edema, syncope   Gastrointestinal:  abdominal pain , N/V, diarrhea, dysphagia, constipation, bleeding   Genitourinary:  frequency, urgency, dysuria, hematuria, incontinence   Muskuloskeletal :  arthralgia, myalgia, back pain  Hematology:  easy bruising, nose or gum bleeding, lymphadenopathy   Dermatological: rash, ulceration, pruritis, color change / jaundice  Endocrine:   hot flashes or polydipsia   Neurological:  headache, dizziness, confusion, focal weakness, paresthesia,     Speech difficulties, memory loss, gait difficulty  Psychological: Feelings of anxiety, depression, agitation    Objective:   VITALS:    Visit Vitals  BP (!) 121/53   Pulse 88   Temp 98.4 °F (36.9 °C)   Resp 24   Ht 5' 9\" (1.753 m)   Wt 94.6 kg (208 lb 8.9 oz)   SpO2 93%   BMI 30.80 kg/m²       PHYSICAL EXAM:    General:    Alert, cooperative, no distress, appears stated age. HEENT: Atraumatic, anicteric sclerae, pink conjunctivae     No oral ulcers, mucosa moist, throat clear, dentition fair  Neck:  Supple, symmetrical,  thyroid: non tender  Lungs:   Bilateral air entry is diminished, crackles present, no wheezing  Chest wall:  No tenderness  No Accessory muscle use.,  Left chest wall port  Heart:   Regular  rhythm,  No  murmur   No edema  Abdomen:   Soft, non-tender. Not distended. Bowel sounds normal  Extremities: No cyanosis. No clubbing,      Skin turgor normal, Capillary refill normal, Radial dial pulse 2+  Skin:     Not pale. Not Jaundiced  No rashes   Psych:  Not anxious or agitated. Neurologic: EOMs intact. No facial asymmetry. No aphasia or slurred speech. Symmetrical strength, Sensation grossly intact.  Alert and awake.    _______________________________________________________________________  Care Plan discussed with:    Comments   Patient y    Family      RN y    Care Manager                    Consultant:      _______________________________________________________________________  Expected  Disposition:   Home with Family y   HH/PT/OT/RN    SNF/LTC    [facility]AVANA    ________________________________________________________________________  TOTAL TIME:  61  Minutes    Critical Care Provided     Minutes non procedure based      Comments    y Reviewed previous records   >50% of visit spent in counseling and coordination of care y Discussion with patient and/or family and questions answered       ________________________________________________________________________  Signed: Diony Dvoe MD    Procedures: see electronic medical records for all procedures/Xrays and details which were not copied into this note but were reviewed prior to creation of Plan. LAB DATA REVIEWED:    Recent Results (from the past 24 hour(s))   EKG, 12 LEAD, INITIAL    Collection Time: 07/17/22 10:24 AM   Result Value Ref Range    Ventricular Rate 110 BPM    Atrial Rate 110 BPM    P-R Interval 144 ms    QRS Duration 96 ms    Q-T Interval 360 ms    QTC Calculation (Bezet) 487 ms    Calculated P Axis -20 degrees    Calculated R Axis 13 degrees    Calculated T Axis 111 degrees    Diagnosis       Sinus tachycardia with frequent premature ventricular complexes  ST & T wave abnormality, consider lateral ischemia  When compared with ECG of 04-JUL-2022 12:20,  Criteria for Inferior infarct are no longer present  Inverted T waves have replaced nonspecific T wave abnormality in Lateral   leads     CBC WITH AUTOMATED DIFF    Collection Time: 07/17/22 10:27 AM   Result Value Ref Range    WBC 12.2 (H) 4.1 - 11.1 K/uL    RBC 4.23 4. 10 - 5.70 M/uL    HGB 12.6 12.1 - 17.0 g/dL    HCT 37.3 36.6 - 50.3 %    MCV 88.2 80.0 - 99.0 FL    MCH 29.8 26.0 - 34.0 PG    MCHC 33.8 30.0 - 36.5 g/dL    RDW 13.7 11.5 - 14.5 %    PLATELET 688 095 - 261 K/uL    MPV 8.4 (L) 8.9 - 12.9 FL    NRBC 0.0 0  WBC    ABSOLUTE NRBC 0.00 0.00 - 0.01 K/uL    NEUTROPHILS 76 (H) 32 - 75 %    LYMPHOCYTES 10 (L) 12 - 49 %    MONOCYTES 11 5 - 13 %    EOSINOPHILS 1 0 - 7 %    BASOPHILS 1 0 - 1 %    IMMATURE GRANULOCYTES 1 (H) 0.0 - 0.5 %    ABS. NEUTROPHILS 9.4 (H) 1.8 - 8.0 K/UL    ABS. LYMPHOCYTES 1.2 0.8 - 3.5 K/UL    ABS. MONOCYTES 1.3 (H) 0.0 - 1.0 K/UL    ABS. EOSINOPHILS 0.1 0.0 - 0.4 K/UL    ABS. BASOPHILS 0.1 0.0 - 0.1 K/UL    ABS. IMM. GRANS. 0.1 (H) 0.00 - 0.04 K/UL    DF AUTOMATED     METABOLIC PANEL, COMPREHENSIVE    Collection Time: 07/17/22 10:27 AM   Result Value Ref Range    Sodium 130 (L) 136 - 145 mmol/L    Potassium 3.9 3.5 - 5.1 mmol/L    Chloride 94 (L) 97 - 108 mmol/L    CO2 29 21 - 32 mmol/L    Anion gap 7 5 - 15 mmol/L    Glucose 116 (H) 65 - 100 mg/dL    BUN 28 (H) 6 - 20 MG/DL    Creatinine 0.94 0.70 - 1.30 MG/DL    BUN/Creatinine ratio 30 (H) 12 - 20      GFR est AA >60 >60 ml/min/1.73m2    GFR est non-AA >60 >60 ml/min/1.73m2    Calcium 8.6 8.5 - 10.1 MG/DL    Bilirubin, total 1.4 (H) 0.2 - 1.0 MG/DL    ALT (SGPT) 34 12 - 78 U/L    AST (SGOT) 64 (H) 15 - 37 U/L    Alk.  phosphatase 204 (H) 45 - 117 U/L    Protein, total 6.6 6.4 - 8.2 g/dL    Albumin 2.3 (L) 3.5 - 5.0 g/dL    Globulin 4.3 (H) 2.0 - 4.0 g/dL    A-G Ratio 0.5 (L) 1.1 - 2.2     TROPONIN-HIGH SENSITIVITY    Collection Time: 07/17/22 10:27 AM   Result Value Ref Range    Troponin-High Sensitivity 8 0 - 76 ng/L   NT-PRO BNP    Collection Time: 07/17/22 10:27 AM   Result Value Ref Range    NT pro- <450 PG/ML   COVID-19 RAPID TEST    Collection Time: 07/17/22 11:14 AM   Result Value Ref Range    Specimen source Nasopharyngeal      COVID-19 rapid test Detected (A) NOTD     POC LACTIC ACID    Collection Time: 07/17/22 11:57 AM   Result Value Ref Range    Lactic Acid (POC) 1.25 0.40 - 2.00 mmol/L

## 2022-07-17 NOTE — CONSULTS
[facility]  at [facility] [address], [facility], [address] [facility], [address] [place] [address]  [phone]        Brief Consult Note      77/M  Advanced gastric cancer. Now admitted with COVID 19 PNA. My suggestion is continue treatment for COVID 19. Not much to add from Oncology standpoint. Dr. Maranda Crow will review the case tomorrow.       Signed by: Evangelist Nelson MD                     July 17, 2022

## 2022-07-17 NOTE — PROGRESS NOTES
Pharmacy Antimicrobial Kinetic Dosing    Indication for Antimicrobials: Sepsis of Unknown Etiology    Current Regimen of Each Antimicrobial:  Vancomycin - Pharmacy to Dose  (Start Date ; Day # 1)  Cefepime 2g IV Q8H   Previous Antimicrobial Therapy:      Goal Level: AUC: 400-600 mg/hr/Liter/day    Date Dose & Interval Measured (mcg/mL) Predicted AUC/SALVATORE                       Date & time of next level:     Dosing calculator used: [employer] calculator    Significant Positive Cultures:    Blood - Pending    Conditions for Dosing Consideration: None    Labs:  Recent Labs     22  1027   CREA 0.94   BUN 28*     Recent Labs     22  1027   WBC 12.2*     Temp (24hrs), Av.3 °F (36.8 °C), Min:98.3 °F (36.8 °C), Max:98.3 °F (36.8 °C)        Creatinine Clearance (mL/min):   CrCl (Ideal Body Weight): 65.8   If actual weight < IBW: CrCl (Actual Body Weight) 88.1    Impression/Plan:   Vancomycin dosed for estimated AUC of 535  Cefepime does not require adjustment at this time. Antimicrobial stop date TBD     Pharmacy will follow daily and adjust medications as appropriate for renal function and/or serum levels.     Thank you,  Lorena Aguayo, PHARMD    Vancomycin Dosing Document    Documents located on pharmacy Teams site: Clinical Practice -> Antimicrobial Stewardship -> Antibiotics_Vancomycin     Aminoglycoside Dosing Document    Documents located on pharmacy Teams site: Clinical Practice -> Antimicrobial Stewardship -> Antibiotics_Aminoglycosides

## 2022-07-17 NOTE — ED PROVIDER NOTES
EMERGENCY DEPARTMENT HISTORY AND PHYSICAL EXAM      Date: 7/17/2022  Patient Name: Maureen Stringer    History of Presenting Illness     Chief Complaint   Patient presents with    Shortness of Breath     Pt arrives from home d/t hypoxia per home pulse ox, endorses SOB worsening over the past 4 days. Per EMS pt sats 80's on arrival, improved to 93% on 6 l/min NC. Pt has hx of esophageal cx, pt has had poor appettite and weakness x 4 weeks. Pt last seen on 7/4/2022. HPI: Maureen Stringer, 68 y.o. male presents to the ED with cc of shortness of breath. This has been going on for a couple days. He says that he feels constantly short of breath. He is not on any home oxygen. Oxygen sats in the 80s on EMS arrival, placed him on 6 L nasal cannula. He denies any chest pain, no noticeable lower extremity edema, no fevers or coughing. He has received both COVID vaccines and booster. He was previously on Bumex, however stopped taking all of his medications except his antihypertensives about 2 weeks ago, he says that this was because he he was having trouble swallowing his pills, and made decision in conjunction with his doctor. Currently being treated for esophageal cancer, states that his last treatment was before 4 July, chemotherapy. There are no other complaints, changes, or physical findings at this time. PCP: Ata Ramesh MD    No current facility-administered medications on file prior to encounter. Current Outpatient Medications on File Prior to Encounter   Medication Sig Dispense Refill    bumetanide (BUMEX) 0.5 mg tablet Take 2 Tablets by mouth daily. (Patient not taking: Reported on 7/17/2022) 30 Tablet 0    oxyCODONE IR (ROXICODONE) 5 mg immediate release tablet Take 1 Tablet by mouth every four (4) hours as needed for Pain for up to 14 days.  Max Daily Amount: 30 mg. 30 Tablet 0    ondansetron (ZOFRAN ODT) 4 mg disintegrating tablet Take 1 Tablet by mouth every eight (8) hours as needed for Nausea or Vomiting. 40 Tablet 1    prochlorperazine (COMPAZINE) 10 mg tablet Take 0.5 Tablets by mouth every six (6) hours as needed for Nausea or Vomiting. (Patient not taking: Reported on 7/17/2022) 60 Tablet 3    lidocaine-prilocaine (EMLA) topical cream Apply  to affected area as needed for Pain (Apply a quarter size cream over port site 1 hour prior to chemotherapy). (Patient not taking: Reported on 7/17/2022) 30 g 1    metroNIDAZOLE (METROCREAM) 0.75 % topical cream Apply  to affected area two (2) times a day. Use a thin layer to affected areas after washing (Patient not taking: Reported on 7/17/2022)      verapamil ER (CALAN-SR) 240 mg CR tablet TAKE ONE TABLET BY MOUTH EVERY DAY (Patient not taking: Reported on 7/17/2022) 90 Tablet 0    lisinopriL (PRINIVIL, ZESTRIL) 20 mg tablet TAKE ONE TABLET BY MOUTH EVERY DAY 90 Tablet 0    fenofibrate (LOFIBRA) 160 mg tablet TAKE ONE TABLET BY MOUTH EVERY DAY (Patient not taking: Reported on 7/17/2022) 90 Tablet 0    magnesium oxide (MAG-OX) 400 mg tablet Take 400 mg by mouth daily. (Patient not taking: Reported on 7/17/2022)      omega-3 fatty acids Cap Take 500 mg by mouth daily. (Patient not taking: Reported on 7/17/2022)      aspirin 81 mg tablet Take 81 mg by mouth four (4) days a week. (Patient not taking: Reported on 7/17/2022)         Past History     Past Medical History:  Past Medical History:   Diagnosis Date    AAA (abdominal aortic aneurysm) ([facility] [address]     4/4/22 CT: 3.7 cm; stable    Alcohol abuse     20 drinks/week    At risk for sleep apnea 05/22/2019    during phone assessment for PAT, LEVI 6    Colon polyp     Elevated CPK     Esophageal cancer ([address]     *PROVISIONAL DIAGNOSIS* - MRCP finding with esophageal mass, hepatic, bone, LN mets. LN bx pending.  H/O Referral placed 5/24/22    Former smoker, stopped smoking in distant past     Hyperlipidemia     Hypertension     Obesity     Psoriasis     Recurrent pancreatitis     2/2 ETOH use  Retroperitoneal lymphadenopathy     CT 22 - most prominent around celiac axis; concern for lymphoma    Rosacea        Past Surgical History:  Past Surgical History:   Procedure Laterality Date    COLONOSCOPY N/A 2019    COLONOSCOPY performed by Galo Jarvis [facility]    HX COLONOSCOPY  2014    HX HERNIA REPAIR      umbilical    IR INSERT TUNL CVC W PORT OVER 5 YEARS  2022       Family History:  Family History   Problem Relation Age of Onset    Cancer Mother         colon       Social History:  Social History     Tobacco Use    Smoking status: Former Smoker     Packs/day: 1.00     Years: 20.00     Pack years: 20.00     Quit date:      Years since quittin.5    Smokeless tobacco: Never Used   Vaping Use    Vaping Use: Never used   Substance Use Topics    Alcohol use: Yes     Alcohol/week: 20.0 standard drinks     Types: 20 Cans of beer per week     Comment: not much since throat issues    Drug use: Never       Allergies: Allergies   Allergen Reactions    Prohance [Gadoteridol] Shortness of Breath and Nausea and Vomiting     Pt has had MRI contrast twice now with allergic reaction         Review of Systems   no fever  No ear pain  No eye pain  Reports shortness of breath  no chest pain  no abdominal pain  no dysuria  no leg pain  No rash  No lymphadenopathy  No weight loss    Physical Exam   Physical Exam  Constitutional:       General: He is in acute distress. Appearance: He is not toxic-appearing. Comments: Tachypneic   HENT:      Head: Normocephalic and atraumatic. Eyes:      Extraocular Movements: Extraocular movements intact. Cardiovascular:      Rate and Rhythm: Normal rate and regular rhythm.    Pulmonary:      Comments: Patient is tachypneic with moderately labored respirations, diminished breath sounds at bilateral bases  Chest:      Comments: Palpable subcutaneous port in left anterior chest wall without overlying erythema or induration  Abdominal:      Palpations: Abdomen is soft. Tenderness: There is no abdominal tenderness. Musculoskeletal:         General: No deformity. Cervical back: Neck supple. Skin:     General: Skin is warm and dry. Neurological:      General: No focal deficit present. Mental Status: He is alert. Psychiatric:         Mood and Affect: Mood normal.         Diagnostic Study Results     Labs -     Recent Results (from the past 24 hour(s))   EKG, 12 LEAD, INITIAL    Collection Time: 07/17/22 10:24 AM   Result Value Ref Range    Ventricular Rate 110 BPM    Atrial Rate 110 BPM    P-R Interval 144 ms    QRS Duration 96 ms    Q-T Interval 360 ms    QTC Calculation (Bezet) 487 ms    Calculated P Axis -20 degrees    Calculated R Axis 13 degrees    Calculated T Axis 111 degrees    Diagnosis       Sinus tachycardia with frequent premature ventricular complexes  ST & T wave abnormality, consider lateral ischemia  When compared with ECG of 04-JUL-2022 12:20,  Criteria for Inferior infarct are no longer present  Inverted T waves have replaced nonspecific T wave abnormality in Lateral   leads         Radiologic Studies -   XR CHEST PORT    (Results Pending)     CT Results  (Last 48 hours)    None        CXR Results  (Last 48 hours)    None            Medical Decision Making   I am the first provider for this patient. I reviewed the vital signs, available nursing notes, past medical history, past surgical history, family history and social history. Vital Signs-Reviewed the patient's vital signs. Patient Vitals for the past 24 hrs:   Temp Pulse Resp BP SpO2   07/17/22 1035 -- -- -- -- 93 %   07/17/22 1034 -- -- -- -- 91 %   07/17/22 1029 -- -- -- -- 93 %   07/17/22 1025 98.3 °F (36.8 °C) (!) 111 (!) 34 (!) 142/83 93 %         Provider Notes (Medical Decision Making):   49-year-old male presenting with shortness of breath. He is tachypneic and tachycardic on arrival, on 6 L.   Saturating 100%, this is titrated down to 2 L with maintaining saturations greater than 90. Differential includes CHF especially in the setting of recent discontinuation of diuretic, atypical ACS, respiratory infection, arrhythmia, pleural effusion. ED Course:     Initial assessment performed. The patients presenting problems have been discussed, and they are in agreement with the care plan formulated and outlined with them. I have encouraged them to ask questions as they arise throughout their visit. EKG is performed at 10: 24, shows sinus tachycardia at a rate of 110, , QRS 96, QTc 487, axis upright, no ST segment elevation or depression concerning for ACS, there are frequent PVCs, this is interpreted as sinus tachycardia with PVCs. 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 Patient is hypoxic again and titrated back up to 4 L nasal cannula. Lactic acid not significantly elevated. CBC with leukocytosis of 55.4, base metabolic panel normal renal function creatinine 0.94. Chest x-ray shows bilateral lung infiltrates concerning for pneumonia, worse on the right. Blood cultures obtained and antibiotics ordered to cover for HCAP given recent hospitalization. COVID test is positive. Per chart review, was positive for COVID on last hospitalization, however given that he is now more symptomatic, will also treat with IV steroids. He is resting comfortably on reevaluation, no longer tachycardic, saturating greater than 90% on 4 L.     Critical Care Time:     CRITICAL CARE NOTE :    2:05 PM    IMPENDING DETERIORATION -Respiratory and Cardiovascular  ASSOCIATED RISK FACTORS - Hypotension, Shock and Hypoxia  MANAGEMENT- Bedside Assessment and Supervision of Care  INTERPRETATION -  Xrays, ECG and Blood Pressure  INTERVENTIONS -diuresis, antibiotics, oxygen, steroids  CASE REVIEW - Hospitalist/Intensivist, Nursing and Family  TREATMENT RESPONSE -Improved  PERFORMED BY - Self    NOTES   :  I have spent 35 minutes of critical care time involved in lab review, consultations with specialist, family decision- making, bedside attention and documentation. This time excludes time spent in any separate billed procedures. During this entire length of time I was immediately available to the patient . Lorena Ennis MD      Disposition:  Admit    PLAN:  1. Current Discharge Medication List        2.    Follow-up Information    None       Return to ED if worse     Diagnosis     Clinical Impression: Acute hypoxic respiratory failure secondary to hospital-acquired pneumonia and/or COVID-19 pneumonia

## 2022-07-18 NOTE — PROGRESS NOTES
Hospitalist Progress Note    Subjective:   Daily Progress Note: 7/18/2022 5:07 PM    Hospital Course:  Pt admitted for acute dyspnea, found to be covid positive, CXR showing bilateral lung infiltrates concerning for pneumonia, right worse and a moderate volume right side pleural effusion. PMH includes hypertension, esophageal cancer with metastases, was on chemotherapy, chronic systolic heart failure EF 40-45%, and back pain. MRI showing pathological vertebral compression fractures at L3 and L4. Subjective: Pt seen in room, complaints of back pain, mild dyspnea.     Current Facility-Administered Medications   Medication Dose Route Frequency    benzonatate (TESSALON) capsule 100 mg  100 mg Oral TID PRN    albuterol (PROVENTIL HFA, VENTOLIN HFA, PROAIR HFA) inhaler 2 Puff  2 Puff Inhalation Q4H PRN    melatonin tablet 3 mg  3 mg Oral QHS PRN    cefepime (MAXIPIME) 2 g in 0.9% sodium chloride (MBP/ADV) 100 mL MBP  2 g IntraVENous Q8H    baricitinib (OLUMIANT) tablet 4 mg  4 mg Oral DAILY    [START ON 7/19/2022] Vancomycin trough: please draw 7/19 before the 03:00 dose    Other ONCE    aspirin delayed-release tablet 81 mg  81 mg Oral Once per day on Mon Tue Wed Thu    fenofibrate nanocrystallized (TRICOR) tablet 145 mg  145 mg Oral DAILY    lisinopriL (PRINIVIL, ZESTRIL) tablet 20 mg  20 mg Oral DAILY    magnesium oxide (MAG-OX) tablet 400 mg  400 mg Oral DAILY    verapamil ER (CALAN-SR) tablet 240 mg  240 mg Oral DAILY    sodium chloride (NS) flush 5-40 mL  5-40 mL IntraVENous Q8H    sodium chloride (NS) flush 5-40 mL  5-40 mL IntraVENous PRN    acetaminophen (TYLENOL) tablet 650 mg  650 mg Oral Q6H PRN    Or    acetaminophen (TYLENOL) suppository 650 mg  650 mg Rectal Q6H PRN    polyethylene glycol (MIRALAX) packet 17 g  17 g Oral DAILY PRN    ondansetron (ZOFRAN ODT) tablet 4 mg  4 mg Oral Q8H PRN    Or    ondansetron (ZOFRAN) injection 4 mg  4 mg IntraVENous Q6H PRN    enoxaparin (LOVENOX) injection 40 mg  40 mg SubCUTAneous DAILY    bumetanide (BUMEX) injection 1 mg  1 mg IntraVENous BID    dexamethasone (DECADRON) 4 mg/mL injection 6 mg  6 mg IntraVENous Q24H    vancomycin (VANCOCIN) 1,000 mg in 0.9% sodium chloride 250 mL (VIAL-MATE)  1,000 mg IntraVENous Q12H        Review of Systems:    Review of Systems   Constitutional: Positive for malaise/fatigue. Negative for fever. Respiratory: Positive for shortness of breath. Cardiovascular: Negative for chest pain and palpitations. Gastrointestinal: Negative for heartburn, nausea and vomiting. Musculoskeletal: Positive for back pain. Neurological: Negative for dizziness and headaches. Objective:     Visit Vitals  /79   Pulse (!) 106   Temp 97.9 °F (36.6 °C)   Resp 22   Ht 5' 9\" (1.753 m)   Wt 94.6 kg (208 lb 8.9 oz)   SpO2 90%   BMI 30.80 kg/m²    O2 Flow Rate (L/min): 4 l/min O2 Device: Nasal cannula    Temp (24hrs), Av.1 °F (36.7 °C), Min:97.9 °F (36.6 °C), Max:98.2 °F (36.8 °C)       07 -  1900  In: 100 [P.O.:100]  Out: 100 [Urine:100]   190 -  0700  In: 250 [I.V.:250]  Out: 1050 [Urine:1050]    PHYSICAL EXAM:    Physical Exam  Constitutional:       General: He is not in acute distress. Cardiovascular:      Rate and Rhythm: Normal rate and regular rhythm. Pulses: Normal pulses. Heart sounds: Normal heart sounds. Pulmonary:      Breath sounds: Rhonchi present. Abdominal:      Palpations: Abdomen is soft. Musculoskeletal:         General: Normal range of motion. Skin:     General: Skin is warm and dry. Neurological:      Mental Status: He is oriented to person, place, and time. Psychiatric:         Mood and Affect: Mood is anxious.          Behavior: Behavior normal.            Data Review    Recent Results (from the past 24 hour(s))   METABOLIC PANEL, BASIC    Collection Time: 22  3:37 AM   Result Value Ref Range    Sodium 134 (L) 136 - 145 mmol/L    Potassium 4.2 3.5 - 5.1 mmol/L    Chloride 99 97 - 108 mmol/L    CO2 25 21 - 32 mmol/L    Anion gap 10 5 - 15 mmol/L    Glucose 141 (H) 65 - 100 mg/dL    BUN 32 (H) 6 - 20 MG/DL    Creatinine 0.89 0.70 - 1.30 MG/DL    BUN/Creatinine ratio 36 (H) 12 - 20      GFR est AA >60 >60 ml/min/1.73m2    GFR est non-AA >60 >60 ml/min/1.73m2    Calcium 8.0 (L) 8.5 - 10.1 MG/DL   CBC W/O DIFF    Collection Time: 07/18/22  3:37 AM   Result Value Ref Range    WBC 11.2 (H) 4.1 - 11.1 K/uL    RBC 4.06 (L) 4.10 - 5.70 M/uL    HGB 11.9 (L) 12.1 - 17.0 g/dL    HCT 35.1 (L) 36.6 - 50.3 %    MCV 86.5 80.0 - 99.0 FL    MCH 29.3 26.0 - 34.0 PG    MCHC 33.9 30.0 - 36.5 g/dL    RDW 13.7 11.5 - 14.5 %    PLATELET 936 071 - 131 K/uL    MPV 8.1 (L) 8.9 - 12.9 FL    NRBC 0.0 0  WBC    ABSOLUTE NRBC 0.00 0.00 - 0.01 K/uL   D DIMER    Collection Time: 07/18/22  3:37 AM   Result Value Ref Range    D-dimer 6.05 (H) 0.00 - 0.65 mg/L FEU   C REACTIVE PROTEIN, QT    Collection Time: 07/18/22  3:37 AM   Result Value Ref Range    C-Reactive protein 9.24 (H) 0.00 - 0.60 mg/dL   LD    Collection Time: 07/18/22  3:37 AM   Result Value Ref Range     (H) 85 - 241 U/L   PROCALCITONIN    Collection Time: 07/18/22 12:54 PM   Result Value Ref Range    Procalcitonin 0.19 ng/mL       US CHEST   Final Result   Small right pleural effusion. XR CHEST PORT   Final Result      Bilateral lung infiltrates concerning for pneumonia, worse on the RIGHT, and a   suspected moderate volume right-sided pleural effusion. Active Problems:    HCAP (healthcare-associated pneumonia) (7/17/2022)        Assessment/Plan:   1. Covid 19 pneumonia- on olumiant, decadron , pulmonary following  Wean O2 as tolerated. Consult to IR to evaluate pleural effusion for thoracentesis    2. Advanced gastric cancer- wife requesting palliative consult,  Oncology following as outpatient. 3. Hx of dysphagia- speech consulted, recommend full liquid diet    4.  Back pain- pathological compression fracture at L3 and L4, prn oxycodone  Palliative consulted for recommendations. 5. Suspected sepsis- ruled out procalcitonin is 0.19,will  d/c IV abx.    6. Hypertension- controlled, on lisinopril, verapmil    7.  GI prophylaxis- pepcid      DVT Prophylaxis: lovenox  Code Status:  DNR  POA: wife Dottie Combs     Care Plan discussed with:   ___________patient, staff nurse, wife____________________________________________________    Jones Ramirez NP

## 2022-07-18 NOTE — PROGRESS NOTES
Speech pathology  Consult received and appreciated, however order entered per protocol, which is a nursing order. SLP requires a physician's order to complete swallowing evaluation. If formal evaluation is desired, please re-consult with MD order. Of note, patient last seen by SLP on 7/8/22 during hospital admission. At that time, patient was tolerating a full liquid diet which he preferred to continue. His diet consumed mostly of Glucerna pta. Thanks.      Janusz Amaya M.S. CCC-SLP

## 2022-07-18 NOTE — TELEPHONE ENCOUNTER
The patient is a current inpatient. His wife is calling to obtain info regarding [facility].  # [phone].

## 2022-07-18 NOTE — TELEPHONE ENCOUNTER
Returned wife's call. HIPAA verified by two patient identifiers. Discussed that oxygen will be arranged by hospital prior to discharge if needed. Dr. Qian Lewis will see patient during this admission.

## 2022-07-18 NOTE — PROGRESS NOTES
Pharmacy Antimicrobial Kinetic Dosing    Indication for Antimicrobials: CAP    Current Regimen of Each Antimicrobial:  Vancomycin - Pharmacy to Dose  (Start Date ; Day # 2)  Cefepime 2g IV Q8H (Start Date ; Day # 2)    Goal Level: AUC: 400-600 mg/hr/Liter/day    Date Dose & Interval Measured (mcg/mL) Predicted AUC/SALVATORE                       Date & time of next level:  0300    Dosing calculator used: [employer] calculator    Significant Positive Cultures:    Blood - Pending   covid-19 rapid test positive    Conditions for Dosing Consideration: None    Labs:  Recent Labs     22  0337 22  1027   CREA 0.89 0.94   BUN 32* 28*     Recent Labs     22  0337 22  1027   WBC 11.2* 12.2*     Temp (24hrs), Av.3 °F (36.8 °C), Min:97.9 °F (36.6 °C), Max:98.7 °F (37.1 °C)    Creatinine Clearance (mL/min):   CrCl (Ideal Body Weight): 69.5    Impression/Plan:   Vancomycin dosed for estimated AUC of 535  Continue cefepime 2 gm IV q8h  Antimicrobial stop date TBD for vancomycin, 7 days for cefepime     Pharmacy will follow daily and adjust medications as appropriate for renal function and/or serum levels.     Thank you,  Rosaline Osler, PHARMD

## 2022-07-18 NOTE — PROGRESS NOTES
Pharmacist Consult    Consult reason:   Dosing of baricitinib or actemra    Assessment:  Covid-19 test: Covid-19 rapid test positive 7/17  Oxygenation requirement: 4L NC  eGFR: >60   Lab Results   Component Value Date/Time    CRP 9.24 (H) 07/18/2022 03:37 AM     Plan:   Baricitinib 4 mg daily x 14 days  CMP and CBC w/ diff ordered       Thank you,  Jonathan Saeed, PHARMD

## 2022-07-18 NOTE — TELEPHONE ENCOUNTER
Patient is in the hospital with COVID and pneumonia. They need to get oxygen for him. Please call to discuss.

## 2022-07-18 NOTE — PROGRESS NOTES
Patient L AC IV infiltrated. IV removed. MD notified. Asked MD to access port due to multiple attempts to gain access without success.

## 2022-07-18 NOTE — CONSULTS
Pulmonary, Critical Care, and Sleep Medicine    Initial Patient Consult    Name: Jagdish Yu MRN: [id number]   : 1945 Hospital: [place] [address]   Date: 2022        IMPRESSION:   · Acute respiratory failure  · COVID +  · Pneumonia  · Bilateral pleural effusions      RECOMMENDATIONS:   · O2 to keep sats > 90%  · On IV decadron  · Will consult pharmacy for baricitinb dosing  · Empiric abx  · Diureses   · DVT prophylaxis  · DNR     Subjective: This patient has been seen and evaluated at the request of Dr. Duarte Castillo for acute respiratory failure. Patient is a 68 y.o. male presented with sob for 3 days  Found to be covid +, hypoxic, abnormal cxr  Started on O2, empiric abx, IV decadron  Pt today on 4L NC  No severe distress      Past Medical History:   Diagnosis Date    AAA (abdominal aortic aneurysm) ([facility] [address]     22 CT: 3.7 cm; stable    Alcohol abuse     20 drinks/week    At risk for sleep apnea 2019    during phone assessment for PAT, LEVI 6    Colon polyp     Elevated CPK     Esophageal cancer ([facility] [address]     *PROVISIONAL DIAGNOSIS* - MRCP finding with esophageal mass, hepatic, bone, LN mets. LN bx pending. H/O Referral placed 22    Former smoker, stopped smoking in distant past     Hyperlipidemia     Hypertension     Obesity     Psoriasis     Recurrent pancreatitis     2/2 ETOH use    Retroperitoneal lymphadenopathy     CT 22 - most prominent around celiac axis; concern for lymphoma    Rosacea       Past Surgical History:   Procedure Laterality Date    COLONOSCOPY N/A 2019    COLONOSCOPY performed by Yeny Islas MD at [facility] HX COLONOSCOPY  2014    HX HERNIA REPAIR      umbilical    IR INSERT TUNL CVC W PORT OVER 5 YEARS  2022      Prior to Admission medications    Medication Sig Start Date End Date Taking? Authorizing Provider   bumetanide (BUMEX) 0.5 mg tablet Take 2 Tablets by mouth daily.   Patient not taking: Reported on 7/17/2022 7/8/22   Amina Gregg MD   oxyCODONE IR (ROXICODONE) 5 mg immediate release tablet Take 1 Tablet by mouth every four (4) hours as needed for Pain for up to 14 days. Max Daily Amount: 30 mg. 7/8/22 7/22/22  Amina Gregg MD   ondansetron (ZOFRAN ODT) 4 mg disintegrating tablet Take 1 Tablet by mouth every eight (8) hours as needed for Nausea or Vomiting. 6/15/22   Sharon Cruz MD   prochlorperazine (COMPAZINE) 10 mg tablet Take 0.5 Tablets by mouth every six (6) hours as needed for Nausea or Vomiting. Patient not taking: Reported on 7/17/2022 6/15/22   Sharon Cruz MD   lidocaine-prilocaine (EMLA) topical cream Apply  to affected area as needed for Pain (Apply a quarter size cream over port site 1 hour prior to chemotherapy). Patient not taking: Reported on 7/17/2022 6/15/22   Sharon Cruz MD   metroNIDAZOLE (METROCREAM) 0.75 % topical cream Apply  to affected area two (2) times a day. Use a thin layer to affected areas after washing  Patient not taking: Reported on 7/17/2022    Provider, Historical   verapamil ER (CALAN-SR) 240 mg CR tablet TAKE ONE TABLET BY MOUTH EVERY DAY  Patient not taking: Reported on 7/17/2022 3/5/22   Rafa Bucio MD   lisinopriL (PRINIVIL, ZESTRIL) 20 mg tablet TAKE ONE TABLET BY MOUTH EVERY DAY 3/5/22   Rafa Bucio MD   fenofibrate (LOFIBRA) 160 mg tablet TAKE ONE TABLET BY MOUTH EVERY DAY  Patient not taking: Reported on 7/17/2022 3/5/22   Rafa Bucio MD   magnesium oxide (MAG-OX) 400 mg tablet Take 400 mg by mouth daily. Patient not taking: Reported on 7/17/2022    Provider, Historical   omega-3 fatty acids Cap Take 500 mg by mouth daily. Patient not taking: Reported on 7/17/2022    Jeevan Castaneda MD   aspirin 81 mg tablet Take 81 mg by mouth four (4) days a week.   Patient not taking: Reported on 7/17/2022    Jeevan Castaneda MD     Allergies   Allergen Reactions    Prohance [Gadoteridol] Shortness of Breath and Nausea and Vomiting     Pt has had MRI contrast twice now with allergic reaction      Social History     Tobacco Use    Smoking status: Former Smoker     Packs/day: 1.00     Years: 20.00     Pack years: 20.00     Quit date: 26     Years since quittin.5    Smokeless tobacco: Never Used   Substance Use Topics    Alcohol use:  Yes     Alcohol/week: 20.0 standard drinks     Types: 20 Cans of beer per week     Comment: not much since throat issues      Family History   Problem Relation Age of Onset    Cancer Mother         colon        Current Facility-Administered Medications   Medication Dose Route Frequency    cefepime (MAXIPIME) 2 g in 0.9% sodium chloride (MBP/ADV) 100 mL MBP  2 g IntraVENous Q8H    aspirin delayed-release tablet 81 mg  81 mg Oral Once per day on     fenofibrate nanocrystallized (TRICOR) tablet 145 mg  145 mg Oral DAILY    lisinopriL (PRINIVIL, ZESTRIL) tablet 20 mg  20 mg Oral DAILY    magnesium oxide (MAG-OX) tablet 400 mg  400 mg Oral DAILY    verapamil ER (CALAN-SR) tablet 240 mg  240 mg Oral DAILY    sodium chloride (NS) flush 5-40 mL  5-40 mL IntraVENous Q8H    enoxaparin (LOVENOX) injection 40 mg  40 mg SubCUTAneous DAILY    bumetanide (BUMEX) injection 1 mg  1 mg IntraVENous BID    dexamethasone (DECADRON) 4 mg/mL injection 6 mg  6 mg IntraVENous Q24H    vancomycin (VANCOCIN) 1,000 mg in 0.9% sodium chloride 250 mL (VIAL-MATE)  1,000 mg IntraVENous Q12H       Review of Systems:  A comprehensive review of systems was negative except for: Respiratory: positive for cough or dyspnea on exertion    Objective:   Vital Signs:    Visit Vitals  BP (!) 146/70   Pulse (!) 101   Temp 97.9 °F (36.6 °C)   Resp 22   Ht 5' 9\" (1.753 m)   Wt 94.6 kg (208 lb 8.9 oz)   SpO2 90%   BMI 30.80 kg/m²       O2 Device: Nasal cannula   O2 Flow Rate (L/min): 4 l/min   Temp (24hrs), Av.3 °F (36.8 °C), Min:97.9 °F (36.6 °C), Max:98.7 °F (37.1 °C)       Intake/Output:   Last shift:      No intake/output data recorded. Last 3 shifts: 07/16 1901 - 07/18 0700  In: 250 [I.V.:250]  Out: 1050 [Urine:1050]    Intake/Output Summary (Last 24 hours) at 7/18/2022 1019  Last data filed at 7/18/2022 0650  Gross per 24 hour   Intake 250 ml   Output 1050 ml   Net -800 ml      Physical Exam:   General:  Alert, cooperative, no distress, appears stated age. Head:  Normocephalic, without obvious abnormality, atraumatic. Chest wall:  No deformity. No accessory muscle use                           Neurologic: Grossly nonfocal     Data review:     Recent Results (from the past 24 hour(s))   EKG, 12 LEAD, INITIAL    Collection Time: 07/17/22 10:24 AM   Result Value Ref Range    Ventricular Rate 110 BPM    Atrial Rate 110 BPM    P-R Interval 144 ms    QRS Duration 96 ms    Q-T Interval 360 ms    QTC Calculation (Bezet) 487 ms    Calculated P Axis -20 degrees    Calculated R Axis 13 degrees    Calculated T Axis 111 degrees    Diagnosis       Sinus tachycardia with frequent premature ventricular complexes  ST & T wave abnormality, consider lateral ischemia  When compared with ECG of 04-JUL-2022 12:20,  Criteria for Inferior infarct are no longer present  Inverted T waves have replaced nonspecific T wave abnormality in Lateral   leads     CBC WITH AUTOMATED DIFF    Collection Time: 07/17/22 10:27 AM   Result Value Ref Range    WBC 12.2 (H) 4.1 - 11.1 K/uL    RBC 4.23 4. 10 - 5.70 M/uL    HGB 12.6 12.1 - 17.0 g/dL    HCT 37.3 36.6 - 50.3 %    MCV 88.2 80.0 - 99.0 FL    MCH 29.8 26.0 - 34.0 PG    MCHC 33.8 30.0 - 36.5 g/dL    RDW 13.7 11.5 - 14.5 %    PLATELET 564 388 - 236 K/uL    MPV 8.4 (L) 8.9 - 12.9 FL    NRBC 0.0 0  WBC    ABSOLUTE NRBC 0.00 0.00 - 0.01 K/uL    NEUTROPHILS 76 (H) 32 - 75 %    LYMPHOCYTES 10 (L) 12 - 49 %    MONOCYTES 11 5 - 13 %    EOSINOPHILS 1 0 - 7 %    BASOPHILS 1 0 - 1 %    IMMATURE GRANULOCYTES 1 (H) 0.0 - 0.5 %    ABS. NEUTROPHILS 9.4 (H) 1.8 - 8.0 K/UL    ABS. LYMPHOCYTES 1.2 0.8 - 3.5 K/UL    ABS. MONOCYTES 1.3 (H) 0.0 - 1.0 K/UL    ABS. EOSINOPHILS 0.1 0.0 - 0.4 K/UL    ABS. BASOPHILS 0.1 0.0 - 0.1 K/UL    ABS. IMM. GRANS. 0.1 (H) 0.00 - 0.04 K/UL    DF AUTOMATED     METABOLIC PANEL, COMPREHENSIVE    Collection Time: 07/17/22 10:27 AM   Result Value Ref Range    Sodium 130 (L) 136 - 145 mmol/L    Potassium 3.9 3.5 - 5.1 mmol/L    Chloride 94 (L) 97 - 108 mmol/L    CO2 29 21 - 32 mmol/L    Anion gap 7 5 - 15 mmol/L    Glucose 116 (H) 65 - 100 mg/dL    BUN 28 (H) 6 - 20 MG/DL    Creatinine 0.94 0.70 - 1.30 MG/DL    BUN/Creatinine ratio 30 (H) 12 - 20      GFR est AA >60 >60 ml/min/1.73m2    GFR est non-AA >60 >60 ml/min/1.73m2    Calcium 8.6 8.5 - 10.1 MG/DL    Bilirubin, total 1.4 (H) 0.2 - 1.0 MG/DL    ALT (SGPT) 34 12 - 78 U/L    AST (SGOT) 64 (H) 15 - 37 U/L    Alk.  phosphatase 204 (H) 45 - 117 U/L    Protein, total 6.6 6.4 - 8.2 g/dL    Albumin 2.3 (L) 3.5 - 5.0 g/dL    Globulin 4.3 (H) 2.0 - 4.0 g/dL    A-G Ratio 0.5 (L) 1.1 - 2.2     TROPONIN-HIGH SENSITIVITY    Collection Time: 07/17/22 10:27 AM   Result Value Ref Range    Troponin-High Sensitivity 8 0 - 76 ng/L   NT-PRO BNP    Collection Time: 07/17/22 10:27 AM   Result Value Ref Range    NT pro- <450 PG/ML   COVID-19 RAPID TEST    Collection Time: 07/17/22 11:14 AM   Result Value Ref Range    Specimen source Nasopharyngeal      COVID-19 rapid test Detected (A) NOTD     CULTURE, BLOOD, PAIRED    Collection Time: 07/17/22 11:20 AM    Specimen: Blood   Result Value Ref Range    Special Requests: NO SPECIAL REQUESTS      Culture result: NO GROWTH AFTER 18 HOURS     POC LACTIC ACID    Collection Time: 07/17/22 11:57 AM   Result Value Ref Range    Lactic Acid (POC) 1.25 0.40 - 8.99 mmol/L   METABOLIC PANEL, BASIC    Collection Time: 07/18/22  3:37 AM   Result Value Ref Range    Sodium 134 (L) 136 - 145 mmol/L    Potassium 4.2 3.5 - 5.1 mmol/L    Chloride 99 97 - 108 mmol/L    CO2 25 21 - 32 mmol/L    Anion gap 10 5 - 15 mmol/L    Glucose 141 (H) 65 - 100 mg/dL    BUN 32 (H) 6 - 20 MG/DL    Creatinine 0.89 0.70 - 1.30 MG/DL    BUN/Creatinine ratio 36 (H) 12 - 20      GFR est AA >60 >60 ml/min/1.73m2    GFR est non-AA >60 >60 ml/min/1.73m2    Calcium 8.0 (L) 8.5 - 10.1 MG/DL   CBC W/O DIFF    Collection Time: 07/18/22  3:37 AM   Result Value Ref Range    WBC 11.2 (H) 4.1 - 11.1 K/uL    RBC 4.06 (L) 4.10 - 5.70 M/uL    HGB 11.9 (L) 12.1 - 17.0 g/dL    HCT 35.1 (L) 36.6 - 50.3 %    MCV 86.5 80.0 - 99.0 FL    MCH 29.3 26.0 - 34.0 PG    MCHC 33.9 30.0 - 36.5 g/dL    RDW 13.7 11.5 - 14.5 %    PLATELET 750 342 - 206 K/uL    MPV 8.1 (L) 8.9 - 12.9 FL    NRBC 0.0 0  WBC    ABSOLUTE NRBC 0.00 0.00 - 0.01 K/uL   D DIMER    Collection Time: 07/18/22  3:37 AM   Result Value Ref Range    D-dimer 6.05 (H) 0.00 - 0.65 mg/L FEU   C REACTIVE PROTEIN, QT    Collection Time: 07/18/22  3:37 AM   Result Value Ref Range    C-Reactive protein 9.24 (H) 0.00 - 0.60 mg/dL   LD    Collection Time: 07/18/22  3:37 AM   Result Value Ref Range     (H) 85 - 241 U/L       Imaging:  I have personally reviewed the patients radiographs and have reviewed the reports:  CXR reviewed        Umu Cervantes MD

## 2022-07-18 NOTE — TELEPHONE ENCOUNTER
----- Message from Óscar Stoner sent at 7/18/2022  8:57 AM EDT -----  Subject: Message to Provider    QUESTIONS  Information for Provider? Patients wife Mundo Sanchez called in and wanted to make   sure Dr Kymberly Russell is aware that patient is in the hospital admitted. Mundo Sanchez also   wanted to potentially get the ball rolling on help at home like home care   for when he is discharged if possible. Please call and advise. [facility]   is listed. ---------------------------------------------------------------------------  --------------  Agueda GARCIA  [phone]; Do not leave any message, patient will call back for answer  ---------------------------------------------------------------------------  --------------  SCRIPT ANSWERS  Relationship to Patient? Other  Representative Name? Mundo Sanchez  Is the Representative on the appropriate HIPAA document in Epic?  Yes

## 2022-07-18 NOTE — PROGRESS NOTES
SPEECH PATHOLOGY BEDSIDE SWALLOW EVALUATION/DISCHARGE  Patient: Maureen Stringer (79 y.o. male)  Date: 7/18/2022  Primary Diagnosis: HCAP (healthcare-associated pneumonia) [J18.9]       Precautions:        ASSESSMENT :  Patient familiar to this discipline from admission earlier this month. Patient with history of esophageal cancer with \"distal esophageal mass lesion and known metastasis to liver, pericardial bone\". On recent admission, patient's daughter report he was on \"full liquids\" at home. Patient was tolerating a full liquid diet ~1.5 weeks ago, though tolerated all consistencies with SLP. He had complained of globus sensation in chest with more solid consistencies. Presentation today consistent with recent evaluation/admission. Patient with suspected functional oropharyngeal swallow and suspected esophageal deficits consistent with his metastatic gastroesophageal adenocarcinoma. This date, patient refusing to sit upright due to back pain, only leaning over to drink from his water jug. No overt s/s aspiration noted. Advised against the high aspiration of drinking/eating without sitting up. He brought up the possibility of needing a feeding tube due to inability to take in much PO. This was also a concern on recent admission. Skilled acute therapy provided by a speech-language pathologist is not indicated at this time. PLAN :  Recommendations:   Placed order for full liquids as this is what patient was on recent admission and patient was essentially taking pta. Ultimately po as tolerated/desired by patient. Full liquid is due to GI issues. -- encourage upright positioning with all PO  -- further discussion regarding goals and desire for alternative nutrition given minimal PO intake    Discharge Recommendations: None     SUBJECTIVE:   Patient stated It hurts too mch to sit up.     OBJECTIVE:     Past Medical History:   Diagnosis Date    AAA (abdominal aortic aneurysm) ([facility] [address]     4/4/22 CT: 3.7 cm; stable    Alcohol abuse     20 drinks/week    At risk for sleep apnea 05/22/2019    during phone assessment for PAT, LEVI 6    Colon polyp     Elevated CPK     Esophageal cancer ([facility])     *PROVISIONAL DIAGNOSIS* - MRCP finding with esophageal mass, hepatic, bone, LN mets. LN bx pending. H/O Referral placed 5/24/22    Former smoker, stopped smoking in distant past     Hyperlipidemia     Hypertension     Obesity     Psoriasis     Recurrent pancreatitis     2/2 ETOH use    Retroperitoneal lymphadenopathy     CT 4/4/22 - most prominent around celiac axis; concern for lymphoma    Rosacea      Past Surgical History:   Procedure Laterality Date    COLONOSCOPY N/A 5/29/2019    COLONOSCOPY performed by Trey Apple MD at [place] [address] HX COLONOSCOPY  02/20/2014    HX HERNIA REPAIR      umbilical    IR INSERT TUNL CVC W PORT OVER 5 YEARS  6/6/2022     Prior Level of Function/Home Situation:   Home Situation  Patient Expects to be Discharged to[de-identified] Home  Diet prior to admission: mostly liquids  Current Diet:  npo        P.O. Trials:  Patient Position: flat in bed  Vocal quality prior to P.O.: Hoarse  Consistency Presented: Thin liquid        Bolus Acceptance: No impairment  Bolus Formation/Control: No impairment     Propulsion: No impairment  Oral Residue: None  Initiation of Swallow: No impairment     Aspiration Signs/Symptoms: None  Pharyngeal Phase Characteristics: No impairment, issues, or problems   Effective Modifications: None  Cues for Modifications: None       Oral Phase Severity: No impairment  Pharyngeal Phase Severity : No impairment  NOMS:   The NOMS functional outcome measure was used to quantify this patient's level of swallowing impairment.   Based on the NOMS, the patient was determined to be at level 7 for swallow function     NOMS Swallowing Levels:  Level 1 (CN): NPO  Level 2 (CM): NPO but takes consistency in therapy  Level 3 (CL): Takes less than 50% of nutrition p.o. and continues with nonoral feedings; and/or safe with mod cues; and/or max diet restriction  Level 4 (CK): Safe swallow but needs mod cues; and/or mod diet restriction; and/or still requires some nonoral feeding/supplements  Level 5 (CJ): Safe swallow with min diet restriction; and/or needs min cues  Level 6 (CI): Independent with p.o.; rare cues; usually self cues; may need to avoid some foods or needs extra time  Level 7 ([address] [place] [address]): Independent for all p.o.  EDWARDO. (2003). National Outcomes Measurement System (NOMS): Adult Speech-Language Pathology User's Guide. Pain:  Pain Scale 1: Numeric (0 - 10)  Pain Intensity 1: 0     After treatment:   Patient left in no apparent distress in bed, Call bell within reach and Nursing notified    COMMUNICATION/EDUCATION:     The patient's plan of care including recommendations, planned interventions, and recommended diet changes were discussed with: Registered nurse.      Thank you for this referral.  Eileen Carson, SLP

## 2022-07-18 NOTE — PROGRESS NOTES
Physical Therapy    Received PT order, chart reviewed. Pt cleared for session by RN but RN states that pt's tolerance for any movement is very limited and has only rolled to this point. Began donning PPE for session, but doctor and imaging arrived to complete thoracentesis. Will defer at this time and follow up for eval tomorrow as appropriate.     Belem Neal, PT

## 2022-07-19 NOTE — PROGRESS NOTES
Pharmacy Note     Zosyn 2250mg IV q8h ordered for treatment of CAP. Per [facility] Policy Renal / Extended Infusion B Lactam    , Zosyn will be changed to 4500 mg IV x 1 over 30 min, followed by 3375mg IV q8h over 4 hours. Estimated Creatinine Clearance: Estimated Creatinine Clearance: 60.6 mL/min (based on SCr of 1.16 mg/dL). Dialysis Status, [place], CKD:      BMI:  Body mass index is 30.8 kg/m². Rationale for Adjustment:  Extended infusion policy. Pharmacy will continue to monitor and adjust dose as necessary. Please call with any questions.     Thank you,  [employer]

## 2022-07-19 NOTE — PROGRESS NOTES
[facility] at [address], [place], [address] [facility]  W: [phone] F: [phone]    Patient currently admitted with Aric. Chart reviewed and discussed with Dr. Masoud Barnes. Will continue to follow peripherally, noted hospice consult. In agreement for hospice given overall poor prognosis given advanced gastric cancer. Please call if patient or family has concerns related to malignancy.

## 2022-07-19 NOTE — HOSPICE
[facility]  [phone]     Patient Name: Silvia Leggett  YOB: 1945  Age: 68 y.o. [address] [facility] RN Note:  Hospice consult received, reviewing chart. Will follow up with Unit Nurse and Care Manager to discuss plan of care, patient status and discharge disposition     Plan to meet with family around 02:30 today to provide hospice information    Thank you for the opportunity to be of service to this patient.

## 2022-07-19 NOTE — PROGRESS NOTES
Chart reviewed and noted that pt is interested in hospice care. Will defer OT evaluation until final decisions are made regarding pt's plan of care.

## 2022-07-19 NOTE — PROGRESS NOTES
Chart reviewed, patient known to Palliative Care, I see hospice consult is also placed, I spoke to Otto/primary team who is progressing with goals of care discussion with patient and family, for now we decided to hold off on Palliative involvement until further update from Otto .    Thank you for your kind consult .

## 2022-07-19 NOTE — PROGRESS NOTES
Physical Therapy    Chart reviewed and noted that pt is interested in hospice care. Will defer PT evaluation until final decisions are made regarding pt's plan of care.

## 2022-07-19 NOTE — PROGRESS NOTES
Pulmonary, Critical Care, and Sleep Medicine      Name: Denia Dukes MRN: [id number]   : 1945 Hospital: [facility]   Date: 2022        IMPRESSION:   · Acute respiratory failure  · COVID +  · Pneumonia  · Bilateral pleural effusions      RECOMMENDATIONS:   · O2 to keep sats > 90%  · On IV decadron  · On baricitinb   · Empiric abx  · Diureses   · DVT prophylaxis  · DNR  · Pt wants hospice care per nursing report  · Will sign off     Subjective:     No acute events overnight  Pt today on 4L NC  No severe distress  No acute complaints       Current Facility-Administered Medications   Medication Dose Route Frequency    baricitinib (OLUMIANT) tablet 4 mg  4 mg Oral DAILY    famotidine (PEPCID) tablet 20 mg  20 mg Oral DAILY    polyethylene glycol (MIRALAX) packet 17 g  17 g Oral DAILY    ascorbic acid (vitamin C) (VITAMIN C) tablet 500 mg  500 mg Oral DAILY    cholecalciferol (VITAMIN D3) (1000 Units /25 mcg) tablet 2,000 Units  2,000 Units Oral DAILY    0.9% sodium chloride infusion  25 mL/hr IntraVENous CONTINUOUS    aspirin delayed-release tablet 81 mg  81 mg Oral Once per day on     fenofibrate nanocrystallized (TRICOR) tablet 145 mg  145 mg Oral DAILY    lisinopriL (PRINIVIL, ZESTRIL) tablet 20 mg  20 mg Oral DAILY    magnesium oxide (MAG-OX) tablet 400 mg  400 mg Oral DAILY    verapamil ER (CALAN-SR) tablet 240 mg  240 mg Oral DAILY    sodium chloride (NS) flush 5-40 mL  5-40 mL IntraVENous Q8H    enoxaparin (LOVENOX) injection 40 mg  40 mg SubCUTAneous DAILY    bumetanide (BUMEX) injection 1 mg  1 mg IntraVENous BID    dexamethasone (DECADRON) 4 mg/mL injection 6 mg  6 mg IntraVENous Q24H       Review of Systems:  A comprehensive review of systems was negative except for: Respiratory: positive for cough or dyspnea on exertion    Objective:   Vital Signs:    Visit Vitals  /66   Pulse 77   Temp 97.6 °F (36.4 °C)   Resp 18   Ht 5' 9\" (1.753 m) Wt 94.6 kg (208 lb 8.9 oz)   SpO2 94%   BMI 30.80 kg/m²       O2 Device: Nasal cannula   O2 Flow Rate (L/min): 5 l/min   Temp (24hrs), Av.8 °F (36.6 °C), Min:97.6 °F (36.4 °C), Max:98.1 °F (36.7 °C)       Intake/Output:   Last shift:      No intake/output data recorded. Last 3 shifts:  190 -  0700  In: 100 [P.O.:100]  Out: 1150 [Urine:1150]    Intake/Output Summary (Last 24 hours) at 2022 1007  Last data filed at 2022 0351  Gross per 24 hour   Intake --   Output 300 ml   Net -300 ml      Physical Exam:   General:  Alert, cooperative, no distress, appears stated age. Head:  Normocephalic, without obvious abnormality, atraumatic. Chest wall:  No deformity. No accessory muscle use                           Neurologic: Grossly nonfocal     Data review:     Recent Results (from the past 24 hour(s))   PROCALCITONIN    Collection Time: 22 12:54 PM   Result Value Ref Range    Procalcitonin 0.19 ng/mL   D DIMER    Collection Time: 22  3:43 AM   Result Value Ref Range    D-dimer 5.05 (H) 0.00 - 0.65 mg/L FEU   CBC WITH AUTOMATED DIFF    Collection Time: 22  3:43 AM   Result Value Ref Range    WBC 11.3 (H) 4.1 - 11.1 K/uL    RBC 3.75 (L) 4.10 - 5.70 M/uL    HGB 10.9 (L) 12.1 - 17.0 g/dL    HCT 32.5 (L) 36.6 - 50.3 %    MCV 86.7 80.0 - 99.0 FL    MCH 29.1 26.0 - 34.0 PG    MCHC 33.5 30.0 - 36.5 g/dL    RDW 14.1 11.5 - 14.5 %    PLATELET 853 824 - 726 K/uL    MPV 8.4 (L) 8.9 - 12.9 FL    NRBC 0.0 0  WBC    ABSOLUTE NRBC 0.00 0.00 - 0.01 K/uL    NEUTROPHILS 83 (H) 32 - 75 %    LYMPHOCYTES 8 (L) 12 - 49 %    MONOCYTES 8 5 - 13 %    EOSINOPHILS 0 0 - 7 %    BASOPHILS 0 0 - 1 %    IMMATURE GRANULOCYTES 1 (H) 0.0 - 0.5 %    ABS. NEUTROPHILS 9.3 (H) 1.8 - 8.0 K/UL    ABS. LYMPHOCYTES 0.9 0.8 - 3.5 K/UL    ABS. MONOCYTES 0.9 0.0 - 1.0 K/UL    ABS. EOSINOPHILS 0.0 0.0 - 0.4 K/UL    ABS. BASOPHILS 0.0 0.0 - 0.1 K/UL    ABS. IMM.  GRANS. 0.1 (H) 0.00 - 0.04 K/UL    DF AUTOMATED     METABOLIC PANEL, COMPREHENSIVE    Collection Time: 07/19/22  3:43 AM   Result Value Ref Range    Sodium 134 (L) 136 - 145 mmol/L    Potassium 4.0 3.5 - 5.1 mmol/L    Chloride 98 97 - 108 mmol/L    CO2 27 21 - 32 mmol/L    Anion gap 9 5 - 15 mmol/L    Glucose 128 (H) 65 - 100 mg/dL    BUN 41 (H) 6 - 20 MG/DL    Creatinine 1.16 0.70 - 1.30 MG/DL    BUN/Creatinine ratio 35 (H) 12 - 20      GFR est AA >60 >60 ml/min/1.73m2    GFR est non-AA >60 >60 ml/min/1.73m2    Calcium 8.0 (L) 8.5 - 10.1 MG/DL    Bilirubin, total 1.0 0.2 - 1.0 MG/DL    ALT (SGPT) 37 12 - 78 U/L    AST (SGOT) 53 (H) 15 - 37 U/L    Alk.  phosphatase 180 (H) 45 - 117 U/L    Protein, total 5.5 (L) 6.4 - 8.2 g/dL    Albumin 2.2 (L) 3.5 - 5.0 g/dL    Globulin 3.3 2.0 - 4.0 g/dL    A-G Ratio 0.7 (L) 1.1 - 2.2     C REACTIVE PROTEIN, QT    Collection Time: 07/19/22  3:43 AM   Result Value Ref Range    C-Reactive protein 4.79 (H) 0.00 - 0.60 mg/dL       Imaging:  I have personally reviewed the patients radiographs and have reviewed the reports:  CXR reviewed        Diana Zaman MD

## 2022-07-19 NOTE — PROGRESS NOTES
Transition of Care Plan:     RUR: 19%  Disposition: SNF vs home with Ambrose Mosley   Follow up appointments: PCP, specialists as indicated   DME needed: TBD, has RW, wheelchair at home   Transportation at Discharge: Family vs medical transport   [address] or means to access home: yes       IM Medicare Letter: to be reviewed prior to d/c   Is patient a BCPI-A Bundle:  No              If yes, was Bundle Letter given?:    Is patient a [employer] and connected with the [place]? No              If yes, was [employer] transfer form completed and VA notified? Caregiver Contact: Tatyana Mendoza (spouse) [phone], Dewey Ruano (daughter) [phone]  Discharge Caregiver contacted prior to discharge? Spoke with spouse  Care Conference needed?: No     Reason for Readmission:     HCAP         RUR Score/Risk Level:   19 Moderate       PCP: First and Last name:  Jo Ann Juarez    Name of Practice:    Are you a current patient: Yes/No: Yes   Approximate date of last visit: April 2022   Can you participate in a virtual visit with your PCP:     Is a Care Conference indicated:  IDR will discuss       Did you attend your follow up appointment (s): If not, why not:    No, pt was d/c to SNF and he was return to home        Resources/supports as identified by patient/family:   Pt lives with her spouse , son and daughter lives near to [employer] facing patient (as identified by patient/family and CM): Finances/Medication cost?     Not an issue   Transportation    Family will transport   Support system or lack thereof? Pt has family support    Living arrangements? Lives with his spouse      Self-care/ADLs/Cognition? Pt was independent before hospital admissions        Current Advanced Directive/Advance Care Plan: DNR           Plan for utilizing home health: Yes             Transition of Care Plan:    Based on readmission, the patient's previous Plan of Care SNF   has been evaluated and/or modified.  The current Transition of Care Plan is:  SNF or [facility]    Care Management Interventions  PCP Verified by CM:  Yes  Mode of Transport at Discharge: S  Transition of Care Consult (CM Consult): Discharge Planning (From last admission Pt was d/c to [facility], only stayed 4 days and retun to home .)  Physical Therapy Consult: Yes  Occupational Therapy Consult: Yes  Speech Therapy Consult: Yes  Support Systems: Spouse/Significant Other,Child(frida) (Pt lives with her spouse , son and daughter lives closer to [address] [facility].)  Confirm Follow Up Transport: Family  Discharge Location  Patient Expects to be Discharged to[de-identified] Home with home health    Readmission Assessment  Number of days since last admission?: 8-30 days  Previous disposition: SNF  Who is being interviewed?: Caregiver  What was the patient's/caregiver's perception as to why they think they needed to return back to the hospital?: Did not realize care needs would be so extensive  Did you visit your Primary Care Physician after you left the hospital, before you returned this time?: No (contacted PCP office for a Virtual visit ,not hear from them)  Did you see a specialist, such as Cardiac, Pulmonary, Orthopedic Physician, etc. after you left the hospital?: Yes  Who advised the patient to return to the hospital?: Self-referral  In our efforts to provide the best possible care to you and others like you, can you think of anything that we could have done to help you after you left the hospital the first time, so that you might not have needed to return so soon?: Arrange for more help when leaving the hospital,Additional Community resources available for illness support      Anthony Cristobal (ACP) Conversation      Date of Conversation: 7/17/2022  Conducted with: Patient with Decision Making Capacity    Healthcare Decision Maker:     Primary Decision Maker: Shae Estrellita - Spouse - [phone]  Click here to complete HealthCare Decision Makers including selection of the Healthcare Decision Maker Relationship (ie \"Primary\")        Content/Action Overview: Has ACP document(s) on file - reflects the patient's care preferences  Reviewed DNR/DNI and patient confirms current DNR status - completed forms on file (place new order if needed)    Length of Voluntary ACP Conversation in minutes:  16 minutes    Ger Isabel CM spoke to pt's spouse Minor Redfield, she reported that pt wasn't get any much help from SNF so he d/c back to home. Pt is following oncology for his cancer treatment. Pt don't have much support if he need TF or suctions. Spouse was also asking does pt will d/c with O2 ? Cristobal Casper Pt is waiting for PT/OT/Speech assessment for final disposition plan. Floor Cm will follow up.     Ger Isabel MSW     Ext [phone]

## 2022-07-19 NOTE — HOSPICE
[facility] RN note: In to meet with pt, wife Bradly King and son at bedside. Pt very tearful, clearly expressed desire to be home with the support of hospice for end of life care. Pt has severe back pain, lies flat in bed, reluctant to take opioids as they make him sleepy. Son concerned about food and hydration. Lengthy discussion about anorexia with cancer, pro's and con's of medically provided nutrition and hydration. Pt not interested in PEG or IV fluids, son understanding cognitively but struggling emotionally. Per NP Otto pt completes treatment for covid and PNA later this week. Hospice to admit at home, will order DME the day before discharge, Will need 02 with humidification, bed with bob mattress (pt is bedbound), obt. Information packet with 24hr contact information provided. Discussed pt with NP leopoldo Menjivar requesting that alll unnecessary po medications be discontinued as it is difficult to swallow pills. Thank you for the opportunity to care for this pt and family. Please contact hospice at [phone] with any questions or concerns.

## 2022-07-19 NOTE — PROGRESS NOTES
Transition of Care Plan:     RUR: 21% high risk  Disposition: hospice? ? (info session requested)  Follow up appointments: PCP, specialists as indicated   DME needed: TBD, has RW, wheelchair at [facility] vs medical transport   [address] or means to access home: yes       IM Medicare Letter: to be reviewed prior to d/c   Is patient a BCPI-A Bundle:  No              If yes, was Bundle Letter given?:    Is patient a  and connected with the  [employer]  If yes, was [employer] transfer form completed and VA notified? Caregiver Contact: Blessing Jay (spouse) [phone], Christian Romero [phone]  Discharge Caregiver contacted prior to discharge? Spoke with spouse  Care Conference needed?: No      CM spoke with attending, request for hospice information session sent. CM will continue to follow for discharge planning needs.      Herminio Oviedo, [facility], JUAN Hoffmann

## 2022-07-19 NOTE — PROGRESS NOTES
Hospitalist Progress Note    Subjective:   Daily Progress Note: 7/19/2022 3:56 PM    Hospital Course:  Pt admitted for acute dyspnea, found to be covid positive, CXR showing bilateral lung infiltrates concerning for pneumonia, right worse and a moderate volume right side pleural effusion. PMH includes hypertension, esophageal cancer with metastases, was on chemotherapy, chronic systolic heart failure EF 40-45%, and back pain. MRI showing pathological vertebral compression fractures at L3 and L4. Subjective: Pt seen in room this afternoon, wife present at bedside. Discussed hospice consult for home discharge.      Current Facility-Administered Medications   Medication Dose Route Frequency    piperacillin-tazobactam (ZOSYN) 4.5 g in 0.9% sodium chloride (MBP/ADV) 100 mL MBP  4.5 g IntraVENous NOW    Followed by    piperacillin-tazobactam (ZOSYN) 3.375 g in 0.9% sodium chloride (MBP/ADV) 100 mL MBP  3.375 g IntraVENous Q8H    benzonatate (TESSALON) capsule 100 mg  100 mg Oral TID PRN    albuterol (PROVENTIL HFA, VENTOLIN HFA, PROAIR HFA) inhaler 2 Puff  2 Puff Inhalation Q4H PRN    melatonin tablet 3 mg  3 mg Oral QHS PRN    baricitinib (OLUMIANT) tablet 4 mg  4 mg Oral DAILY    famotidine (PEPCID) tablet 20 mg  20 mg Oral DAILY    oxyCODONE IR (ROXICODONE) tablet 10 mg  10 mg Oral Q4H PRN    polyethylene glycol (MIRALAX) packet 17 g  17 g Oral DAILY    ascorbic acid (vitamin C) (VITAMIN C) tablet 500 mg  500 mg Oral DAILY    cholecalciferol (VITAMIN D3) (1000 Units /25 mcg) tablet 2,000 Units  2,000 Units Oral DAILY    guaiFENesin-dextromethorphan (ROBITUSSIN DM) 100-10 mg/5 mL syrup 5 mL  5 mL Oral Q4H PRN    0.9% sodium chloride infusion  25 mL/hr IntraVENous CONTINUOUS    heparin (porcine) pf 300 Units  300 Units InterCATHeter PRN    morphine injection 2 mg  2 mg IntraVENous Q3H PRN    ALPRAZolam (XANAX) tablet 0.25 mg  0.25 mg Oral BID PRN    aspirin delayed-release tablet 81 mg  81 mg Oral Once per day on     fenofibrate nanocrystallized (TRICOR) tablet 145 mg  145 mg Oral DAILY    lisinopriL (PRINIVIL, ZESTRIL) tablet 20 mg  20 mg Oral DAILY    magnesium oxide (MAG-OX) tablet 400 mg  400 mg Oral DAILY    verapamil ER (CALAN-SR) tablet 240 mg  240 mg Oral DAILY    sodium chloride (NS) flush 5-40 mL  5-40 mL IntraVENous Q8H    sodium chloride (NS) flush 5-40 mL  5-40 mL IntraVENous PRN    acetaminophen (TYLENOL) tablet 650 mg  650 mg Oral Q6H PRN    Or    acetaminophen (TYLENOL) suppository 650 mg  650 mg Rectal Q6H PRN    ondansetron (ZOFRAN ODT) tablet 4 mg  4 mg Oral Q8H PRN    Or    ondansetron (ZOFRAN) injection 4 mg  4 mg IntraVENous Q6H PRN    enoxaparin (LOVENOX) injection 40 mg  40 mg SubCUTAneous DAILY    bumetanide (BUMEX) injection 1 mg  1 mg IntraVENous BID    dexamethasone (DECADRON) 4 mg/mL injection 6 mg  6 mg IntraVENous Q24H        Review of Systems:    Review of Systems   Constitutional: Negative for chills and fever. HENT: Positive for sore throat. Negative for congestion. Respiratory: Negative for cough and shortness of breath. Cardiovascular: Negative for chest pain and palpitations. Gastrointestinal: Negative for abdominal pain, heartburn, nausea and vomiting. Musculoskeletal: Positive for back pain. Objective:     Visit Vitals  /70   Pulse 82   Temp 98.6 °F (37 °C)   Resp 18   Ht 5' 9\" (1.753 m)   Wt 94.6 kg (208 lb 8.9 oz)   SpO2 95%   BMI 30.80 kg/m²    O2 Flow Rate (L/min): 5 l/min O2 Device: Nasal cannula    Temp (24hrs), Av.9 °F (36.6 °C), Min:97.6 °F (36.4 °C), Max:98.6 °F (37 °C)      No intake/output data recorded.  1901 -  0700  In: 100 [P.O.:100]  Out: 1150 [Urine:1150]    PHYSICAL EXAM:    Physical Exam  Constitutional:       General: He is not in acute distress. Appearance: He is obese. Cardiovascular:      Rate and Rhythm: Normal rate and regular rhythm. Pulses: Normal pulses.       Heart sounds: Normal heart sounds. Pulmonary:      Effort: Pulmonary effort is normal.      Breath sounds: Rhonchi present. Abdominal:      General: Bowel sounds are normal. There is distension. Palpations: Abdomen is soft. Musculoskeletal:      Comments: Decreased ROM in lower legs. Skin:     General: Skin is warm and dry. Neurological:      Mental Status: He is oriented to person, place, and time. Psychiatric:         Speech: Speech normal.         Behavior: Behavior is cooperative. Data Review    Recent Results (from the past 24 hour(s))   D DIMER    Collection Time: 07/19/22  3:43 AM   Result Value Ref Range    D-dimer 5.05 (H) 0.00 - 0.65 mg/L FEU   CBC WITH AUTOMATED DIFF    Collection Time: 07/19/22  3:43 AM   Result Value Ref Range    WBC 11.3 (H) 4.1 - 11.1 K/uL    RBC 3.75 (L) 4.10 - 5.70 M/uL    HGB 10.9 (L) 12.1 - 17.0 g/dL    HCT 32.5 (L) 36.6 - 50.3 %    MCV 86.7 80.0 - 99.0 FL    MCH 29.1 26.0 - 34.0 PG    MCHC 33.5 30.0 - 36.5 g/dL    RDW 14.1 11.5 - 14.5 %    PLATELET 167 271 - 975 K/uL    MPV 8.4 (L) 8.9 - 12.9 FL    NRBC 0.0 0  WBC    ABSOLUTE NRBC 0.00 0.00 - 0.01 K/uL    NEUTROPHILS 83 (H) 32 - 75 %    LYMPHOCYTES 8 (L) 12 - 49 %    MONOCYTES 8 5 - 13 %    EOSINOPHILS 0 0 - 7 %    BASOPHILS 0 0 - 1 %    IMMATURE GRANULOCYTES 1 (H) 0.0 - 0.5 %    ABS. NEUTROPHILS 9.3 (H) 1.8 - 8.0 K/UL    ABS. LYMPHOCYTES 0.9 0.8 - 3.5 K/UL    ABS. MONOCYTES 0.9 0.0 - 1.0 K/UL    ABS. EOSINOPHILS 0.0 0.0 - 0.4 K/UL    ABS. BASOPHILS 0.0 0.0 - 0.1 K/UL    ABS. IMM.  GRANS. 0.1 (H) 0.00 - 0.04 K/UL    DF AUTOMATED     METABOLIC PANEL, COMPREHENSIVE    Collection Time: 07/19/22  3:43 AM   Result Value Ref Range    Sodium 134 (L) 136 - 145 mmol/L    Potassium 4.0 3.5 - 5.1 mmol/L    Chloride 98 97 - 108 mmol/L    CO2 27 21 - 32 mmol/L    Anion gap 9 5 - 15 mmol/L    Glucose 128 (H) 65 - 100 mg/dL    BUN 41 (H) 6 - 20 MG/DL    Creatinine 1.16 0.70 - 1.30 MG/DL    BUN/Creatinine ratio 35 (H) 12 - 20      GFR est AA >60 >60 ml/min/1.73m2    GFR est non-AA >60 >60 ml/min/1.73m2    Calcium 8.0 (L) 8.5 - 10.1 MG/DL    Bilirubin, total 1.0 0.2 - 1.0 MG/DL    ALT (SGPT) 37 12 - 78 U/L    AST (SGOT) 53 (H) 15 - 37 U/L    Alk. phosphatase 180 (H) 45 - 117 U/L    Protein, total 5.5 (L) 6.4 - 8.2 g/dL    Albumin 2.2 (L) 3.5 - 5.0 g/dL    Globulin 3.3 2.0 - 4.0 g/dL    A-G Ratio 0.7 (L) 1.1 - 2.2     C REACTIVE PROTEIN, QT    Collection Time: 07/19/22  3:43 AM   Result Value Ref Range    C-Reactive protein 4.79 (H) 0.00 - 0.60 mg/dL       US CHEST   Final Result   Small right pleural effusion. XR CHEST PORT   Final Result      Bilateral lung infiltrates concerning for pneumonia, worse on the RIGHT, and a   suspected moderate volume right-sided pleural effusion. Active Problems:    HCAP (healthcare-associated pneumonia) (7/17/2022)        Assessment/Plan:   1. Covid 19 pneumonia- on olumiant, decadron,IV bumex, IV zosyn  Wean O2 as tolerated,     2. Advanced gastric cancer- wife requesting hospice consult today, d/c palliative consult. Oncology following as outpatient,no current recommendations for treatment,   Due to poor prognosis and advanced disease.      3. Hx of dysphagia- speech recommending full liquid diet     4. Back pain- pathological compression fracture at L3 and L4, prn oxycodone.      5. Hypertension- controlled, on lisinopril.       7. GI prophylaxis- pepcid. 8. Discharge plan- hospice consulted for home, will follow during hospital stay.   Anticipate 48-72 hrs depending on clinical course.                 DVT Prophylaxis: sequential compresssion  Code Status:  DNR  POA: NOK wife Gerry Sanchez , son Amee Ear , daughter Sonali Justice [phone] discussed with:   ___patient, staff nurse, CM, hospice, wife____________________________________________________________    Alpesh Greenberg NP

## 2022-07-20 NOTE — PROGRESS NOTES
Physical Therapy note:    Chart reviewed and noted patient transitioning home with hospice support. Noted all required DME listed in hospice note. Will complete order and sign off as PT services not required.     Loida García, PT

## 2022-07-20 NOTE — HOSPICE
[facility]  [phone]    Hospice Nursing PRE-Admission   Discharge Summary  Patient Name: Beronica Okeefe  YOB: 1945  Age: 68 y.o. Date of PLANNED Hospice Admission: 2022  Hospice Attending: Bryon Grady MD  Primary Care Physician: Kolton Mandujano MD     Home Hospice Address: [address] [place] [address]    Primary Contact and Phone: Janna Ugalde Irmen/wife [phone]      ADVANCE CARE PLANNING    Code Status: DNR  Durable DNR: [x]  Yes  []  No  Advance Care Planning 2022   Patient's Healthcare Decision Maker is: -   Confirm Advance Directive None   Patient Would Like to Complete Advance Directive -       Ad[facility]: [employer] [facility]   Home: TBD    HOSPICE SUMMARY     Verbal CTI of terminal diagnosis with life expectancy of 6 months or less received from: Dr Jones Alaniz    For the Hospice Diagnosis of: Metastatic Gastric Cancer    NCD: Requested/Declined (TBD)      CLINICAL INFORMATION   Allergies: Allergies   Allergen Reactions    Prohance [Gadoteridol] Shortness of Breath and Nausea and Vomiting     Pt has had MRI contrast twice now with allergic reaction         Currently this patient has:  [x] Supplemental O2 [] Peripheral IV  [] PICC    [] PORT   [] Townsend Catheter [] NG Tube   [] PEG Tube [] Ostomy    [] AICD: Has ICD been deactivated? [] Yes [] Wounds      COVID Screening: Positive   Negative      ASSESSMENT & PLAN     1. Symptom Issues Identified: none at this time    2. Spiritual Issues  Identified: none at this time    3. Psych/ Social/ Emotional Issues Identified: Patient lives with wife, Janna Ugalde, and granddaughter is present in the home            [address]: to be signed tomorrow morning, will scanned once signed    2. DME Ordered/Company/Delivery Plan: bed with gel overlay, OBT, 10L oxygenator, patient on 5L/nc continuous      3.    Unique home needs for safety: Bariatric patient - No    4. Symptom Kit and other Medications Needs: standard SRK ordered via [employer] to be delivered in the PM    5. Home Admission Reservation: 3pm    6. Transportation by: [facility]   Scheduled for 2pm        7. Verbal Report/Handoff given to: via this report     8. Phone number to LIOR FARAH [phone]    9.  Supplies/Wound Care: TBD/no wounds per chart           d

## 2022-07-20 NOTE — PROGRESS NOTES
Hospitalist Progress Note    NAME: Aidan Singh   :  1945   MRN:  [id number]       Assessment / Plan:    Acute hypoxic respiratory failure  COVID-19 pneumonia  Advanced gastric cancer  Dysphagia  Chronic back pain with pathologic compression fracture L3/L4  Hypertension  - Diagnosed with COVID upon admission  - On oxygen 5 L via nasal cannula  - Was started on Decadron and other standard of care medications  - Given other significant morbidities patient's family decided to proceed with hospice care  - Discussed with hospice, plans for discharge home hospice tomorrow afternoon  - Continue comfort care measures only and supportive care      30.0 - 39.9 Obese / Body mass index is 30.8 kg/m². Estimated discharge date:   Barriers:    Code status: DNR  Prophylaxis: Lovenox  Recommended Disposition: Home hospice     Subjective:   Patient was seen and examined. No acute events overnight. Discussed with RN overnight events. All patient's questions were answered. \"Feeling okay\"      Objective:     VITALS:   Last 24hrs VS reviewed since prior progress note. Most recent are:  Patient Vitals for the past 24 hrs:   Temp Pulse Resp BP SpO2   22 1133 98 °F (36.7 °C) 100 20 136/87 95 %   22 0847 98.3 °F (36.8 °C) 93 20 120/83 90 %   22 0435 98 °F (36.7 °C) 81 16 117/64 92 %   22 2028 98 °F (36.7 °C) 83 18 112/62 91 %     No intake or output data in the 24 hours ending 22 1601     I had a face to face encounter and independently examined this patient on 2022, as outlined below:  PHYSICAL EXAM:  General: WD, WN. Alert, cooperative, no acute distress    EENT:  EOMI. Anicteric sclerae. MMM  Resp:  CTA bilaterally, no wheezing or rales. No accessory muscle use  CV:  Regular  rhythm,  No edema  GI:  Soft, Non distended, Non tender. +Bowel sounds  Neurologic:  Alert, oriented x3.   Moving 4 extremities with generalized decreased range of motion patient reported that has been there for a while. Pupils are reactive bilaterally  Psych:   Good insight. Not anxious nor agitated  Skin:  No rashes. No jaundice    Reviewed most current lab test results and cultures  YES  Reviewed most current radiology test results   YES  Review and summation of old records today    NO  Reviewed patient's current orders and MAR    YES  PMH/SH reviewed - no change compared to H&P  ________________________________________________________________________  Care Plan discussed with:    Comments   Patient x    Family  x    RN x    Care Manager     Consultant                        Multidiciplinary team rounds were held today with , nursing, pharmacist and clinical coordinator. Patient's plan of care was discussed; medications were reviewed and discharge planning was addressed. ________________________________________________________________________      Comments   >50% of visit spent in counseling and coordination of care     ________________________________________________________________________  Kim Atkins MD     Procedures: see electronic medical records for all procedures/Xrays and details which were not copied into this note but were reviewed prior to creation of Plan. LABS:  I reviewed today's most current labs and imaging studies.   Pertinent labs include:  Recent Labs     07/19/22 0343 07/18/22  0337   WBC 11.3* 11.2*   HGB 10.9* 11.9*   HCT 32.5* 35.1*    323     Recent Labs     07/20/22  0025 07/19/22  0343 07/18/22  0337   * 134* 134*   K 3.8 4.0 4.2    98 99   CO2 26 27 25   GLU 97 128* 141*   BUN 44* 41* 32*   CREA 1.10 1.16 0.89   CA 8.2* 8.0* 8.0*   ALB 2.1* 2.2*  --    TBILI 1.2* 1.0  --    ALT 46 37  --        Signed: Kim Atkins MD

## 2022-07-20 NOTE — HOSPICE
[facility]  [phone]    Nursing Note   Patient Name: Yuliana Holt  YOB: 1945  Age: 68 y.o. [facility] RN Note:      Per Dr John Chong, patient is ready for discharge. Called and spoke with Alexandria Manzano/wife by phone and she is in agreement with patient coming home with [id number] [facility] services. Will meet with wife at [facility] tomorrow morning to sign consents, DMEs to be delivered tomorrow between [place] [address] with Sebastian/JUAN who will arrange transport for 2pm.  Medications/SRK to be ordered and delivered to the home. If there are any questions, please call our main number at [phone]. Thank you for the opportunity to be of service to this patient and his family.

## 2022-07-20 NOTE — PROGRESS NOTES
Occupational Therapy note:    Chart reviewed and noted patient transitioning home with hospice support. Noted all required DME listed in hospice note. Will complete order and sign off at OT services not required.     Ami Face, OTR/L

## 2022-07-20 NOTE — PROGRESS NOTES
Pt refused zosyn and insisted to stopped the IV fluids, pt stated that sleep is more important than the antibiotic. MD notified.

## 2022-07-21 PROBLEM — J18.9 HCAP (HEALTHCARE-ASSOCIATED PNEUMONIA): Status: RESOLVED | Noted: 2022-01-01 | Resolved: 2022-01-01

## 2022-07-21 PROBLEM — J18.9 HCAP (HEALTHCARE-ASSOCIATED PNEUMONIA): Status: RESOLVED | Noted: 2022-07-17 | Resolved: 2022-07-21

## 2022-07-21 NOTE — DISCHARGE SUMMARY
Hospitalist Discharge Summary     Patient ID:  Jasbir Hooks  [id number]  68 y.o.  1945  7/17/2022    PCP on record: Jazmin Espinoza MD    Admit date: 7/17/2022  Discharge date and time: 7/21/2022    DISCHARGE DIAGNOSIS:    Acute hypoxic respiratory failure  COVID-19 pneumonia  Healthcare associated pneumonia  Sepsis  Advanced gastric cancer  Dysphagia  Chronic back pain with pathologic compression fracture L3/L4  Hypertension    Hypertension  Dyslipidemia  History of AAA          Mild elevation of AST chronic         Systolic congestive heart failure with mild decompensation    CONSULTATIONS:  IP CONSULT TO HOSPITALIST  IP CONSULT TO ONCOLOGY  IP CONSULT TO PULMONOLOGY    Excerpted HPI from H&P of Mohan Pandey MD:      ______________________________________________________________________  DISCHARGE SUMMARY/HOSPITAL COURSE:  for full details see H&P, daily progress notes, labs, consult notes.          Acute hypoxic respiratory failure  COVID-19 pneumonia  Healthcare associated pneumonia  Advanced gastric cancer  Dysphagia  Chronic back pain with pathologic compression fracture L3/L4  Hypertension  - Diagnosed with COVID upon admission  - On oxygen 5 L via nasal cannula  - Was started on Decadron and other standard of care medications  - Given other significant morbidities patient's family decided to proceed with hospice care  - Discussed with hospice, plans for discharge home hospice this afternoon  - Continue comfort care measures only and supportive care       Systolic congestive heart failure with mild decompensation  -cont home meds    Hypertension  Dyslipidemia  History of AAA  -Continue verapamil, lisinopril, Bumex  -Continue home Tricor  -Avoid quinolones due to AAA due to risk of rupture     History of bilateral upper extremity weakness  -Evaluated by neurology extensively during last admission and recommended to have a EMG/nerve conduction study on outpatient basis     Mild elevation of AST chronic  -AST is trending down to 64 recheck in few days  _______________________________________________________________________  Patient seen and examined by me on discharge day. Pertinent Findings:  Gen:    Not in distress  Chest: Clear lungs  CVS:   Regular rhythm. No edema  Abd:  Soft, not distended, not tender  Neuro:  Alert, awake, tired looking, moving all extremities  _______________________________________________________________________  DISCHARGE MEDICATIONS:   Current Discharge Medication List        CONTINUE these medications which have NOT CHANGED    Details   bumetanide (BUMEX) 0.5 mg tablet Take 2 Tablets by mouth daily. Qty: 30 Tablet, Refills: 0      oxyCODONE IR (ROXICODONE) 5 mg immediate release tablet Take 1 Tablet by mouth every four (4) hours as needed for Pain for up to 14 days. Max Daily Amount: 30 mg.  Qty: 30 Tablet, Refills: 0    Associated Diagnoses: Cancer related pain      ondansetron (ZOFRAN ODT) 4 mg disintegrating tablet Take 1 Tablet by mouth every eight (8) hours as needed for Nausea or Vomiting. Qty: 40 Tablet, Refills: 1      prochlorperazine (COMPAZINE) 10 mg tablet Take 0.5 Tablets by mouth every six (6) hours as needed for Nausea or Vomiting. Qty: 60 Tablet, Refills: 3      lidocaine-prilocaine (EMLA) topical cream Apply  to affected area as needed for Pain (Apply a quarter size cream over port site 1 hour prior to chemotherapy). Qty: 30 g, Refills: 1      metroNIDAZOLE (METROCREAM) 0.75 % topical cream Apply  to affected area two (2) times a day.  Use a thin layer to affected areas after washing      verapamil ER (CALAN-SR) 240 mg CR tablet TAKE ONE TABLET BY MOUTH EVERY DAY  Qty: 90 Tablet, Refills: 0    Associated Diagnoses: Essential hypertension      lisinopriL (PRINIVIL, ZESTRIL) 20 mg tablet TAKE ONE TABLET BY MOUTH EVERY DAY  Qty: 90 Tablet, Refills: 0    Associated Diagnoses: Essential hypertension      fenofibrate (LOFIBRA) 160 mg tablet TAKE ONE TABLET BY MOUTH EVERY DAY  Qty: 90 Tablet, Refills: 0    Associated Diagnoses: Mixed hyperlipidemia      magnesium oxide (MAG-OX) 400 mg tablet Take 400 mg by mouth daily. omega-3 fatty acids Cap Take 500 mg by mouth daily. aspirin 81 mg tablet Take 81 mg by mouth four (4) days a week. Patient Follow Up Instructions:    Activity: Activity as tolerated  Diet: Resume previous diet      Follow-up Information       Follow up With Specialties Details Why Contact Info    Estella Ji MD Internal Medicine Physician   [facility] [address] [place] [address]  [phone]            ________________________________________________________________    Risk of deterioration: Low    Condition at Discharge:  Stable  __________________________________________________________________    Disposition  Home with hospice services    ____________________________________________________________________    Code Status: DNR/DNI  ___________________________________________________________________      Total time in minutes spent coordinating this discharge (includes going over instructions, follow-up, prescriptions, and preparing report for sign off to her PCP) :  >30 minutes    Signed:  Miguel Ángel Lyons MD

## 2022-07-22 NOTE — HOSPICE
Gavinclaudia Neff  1945  77     Principle Hospice Diagnosis: Metastatic Gastric Cancer  Diagnoses RELATED to the terminal prognosis: Dysphagia  Unrelated Diagnosis: Abdominal Aortic Aneurysm, Hypertension, Hyperlipidemia. Pathological compression fractures L3/4    Date of Hospice Admission: 7/21/22  Hospice Attending Elected by Patient: Dr. Justine Momin  Primary Care Physician:      Admitting RN: Johnny Traore CM: Yosi  : Kevin Shook   : Clari Hernandez DNR: Yes/copy in the home     Service:     Ramirezsandrinejuana 141: Not discussed     Direct Observation:    Arrived at patient's home prior to patient as hospital discharge was delayed due to transportation. Patient's wife Gallo Julien who will be patient's primary care giver at home. Reviewed hospice plan of care and goals with her as well as comfort medications while waiting for patient. This has been a big change for the family, and she is tearful at times. Patient arrived home via ambulance, on arrival noted to be tachypneic, complaining of shortness of breath O2 at 5L via nasal cannula. Patient is alert and oriented x4, and bed bound. Patient reported back pain during transfer to hospital bed, which resolved once patient comfortable laying flat in bed. Patient reports that laying flat in the bed is the only thing that relives his pain and pain is 8/10 if sits up. Family reports that patient usually denies pain even when in pain, patient states he has high tolerance for pain. Patient currently is only able to swallow liquids and most routine medications have been discontinued due to difficulty swallowing. Advised patient and family about aspiration precautions and the importance of sitting up during intake. Patient's wife asked about godoy during visit patient declines at this time but will think about it. At the end of visit patient's respiratory is much improved with normal effort and patient reporting improvement with shortness of breath.   Patient's wife encouraged to call hospice with any questions, concerns and change in patient condition. Palliative Performance Scale: 20%        ER Visits/ Hospitalizations in past year: 2    Onset Date of Hospice Diagnosis: 5/2022   Summary of Disease Progression Leading to Hospice Diagnosis:   Advanced gastric cancer Oncology following as outpatient, no current recommendations for treatment,  Due to poor prognosis and advanced disease. Use LCD Guidelines and list features:     _____x___  A. Disease with distant metastases at presentation OR    ______x__  B. Progression from an earlier stage of disease to metastatic disease with either:                          ___x_____  1. continued decline in spite of therapy                          ___x_____  2. patient declines further disease directed therapy          SPIRITUAL/Social/Emotional/psych:     Dr. Rebecca Puente contacted orders for medications and plan of treatment received. Medication reconciliation completed.   Dr. Reny Moore contacted, agrees to serve as attending provider for hospice and provided verbal certification of terminal illness with prognosis of 6 months or less life expectancy        Currently this patient has:  Supplemental O2: 5L NC  PORT:  Right Chest              MEDS:  I have reviewed the patient's medication list with MD and identified the following:  Nonformulary medications: n/a  Unrelated medications: n/a     IDT communication to include MD, SN, SW, [address] [place] [address] and support team.

## 2022-07-22 NOTE — HOSPICE
Patient rec'd in bed. Wife present  Patient is alert and oriented x 4  Patient is unable to describe or quantify pain. He states it is a discomfort. He has had nausea. Using zofran. 1055: 4 mg zofran q 8 h  Patient is only eating bites for the last month. He has difficulty swallowing.

## 2022-07-22 NOTE — PROGRESS NOTES
No transition of care outreach indicated due to patient discharge to hospice care.  Tahmina Echavarria RN, CPN - Care Transition Nurse- [phone]

## 2022-07-23 NOTE — HOSPICE
PRN visit made for catheter placement for comfort and prolonged immobilization. Patient received laying in hospital bed, talking on phone with sister. Patient tachpneic at baseline, oxygen at 5L via nasal cannula. Patient denies bowel movement today. Patient states he isn't eating much, spouse confirms. Spouse states patient has had productive cough and nausea producing white \"solid\" phlegm. Patient administered antiemetic during visit. Patient with excessive secretions during visit and administered hyoscyamine tablet. Catheter placed without complication, 80L with 10 mL balloon, draining tea color, clear liquid, approximately 250 mL at time of insertion. Patient diaper changed and repositioned. Patient prefers to lay on left side, HOB in down position. Educated patient of elevation of head to reduce symptoms of dyspnea as well as help with nausea. Patient verbalized understanding and refused further repositioning. Spouse educated on catheter care and emptying of drainage bag.

## 2022-07-25 NOTE — HOSPICE
call initiated to provide comfort and spiritual guidance to the pt, family and CG as they make their journey towards EOL. The patient;s daughter spoke with the patient and wife while on the phone and they declined  support at this time. Per the daughter the patient is [employer] and has received the Doe Leon [address]. She will reach out of their needs change.

## 2022-07-26 NOTE — HOSPICE
Lor Pepper [facility]    Patient extremely agitated, restless, kicking lower extremities over edge of bed, pulling at godoy catheter and 02 tubing, labored respirations, audible secretions, moaning out, facial grimacing and clenched fists    Spouse, Blessing and daughter, Ignacio Contreras at bedside and report that patient has been uncomfortable all day and symptoms and behaviors are worsening  Patient keeps pulling at godoy catheter and says get it out   RN removed godoy catheter without difficulty and will leave out related to pain  Adult brief placed on patient   RN placed call to Dr Lor Pepper with update on patient's status   New MD orders per Dr Cody Martinez    Give Morphine 20 mg Q 3 hours scheduled and Q 15 minutes as needed for pain.  shortness of breath   Lorazepam 2 mg Q 3 hours scheduled and Q 15 min as needed for anxiety, agitation shortness of breath   Education provided the spouse, Zamzam Garces and daughter, Ignacio Contreras on new plan of RX    Understanding verbalized and demonstrated administering new doses   Morphine 20 mg and Lorazepam 2 mg given at 5:30p  Both doses given again at 5:53p     Patient began to settle down, eyes closed, body relaxing, respirations decreased from 28 to 20 using abdominal accessory muscles    Audible secretions, levsin to be given Q 4 hours  Dose given at 6pm    Triage notified of patient status and plan of care   Patient now 0-7 days    Written instructions for new medication orders left for spouse  Informed caregiver, Zamzam Garces, to call hospice 24/7 for any concerns / needs   Understanding verbalized    Informed SN Kwabena visit tomorrow am for follow up                                            Morphine 20 mg with Lorazepam 2 mg given at 5:30pm and again 15 min later per MD order at 5:53pm

## 2022-07-27 NOTE — HOSPICE
Visit made for EOL assessment and symptom management. Patient received laying in hospital bed. Patient is obtunded with agonal breathing, and unresponsive to painful stimuli. Spouse Vera at bedside. Urinary catheter was removed by visiting nurse yesterday as family felt it was causing distress, patient was anxious and agitated. Per spouse, patient finally began resting last evening and has been unresponsive since that time. Patient changed and repositioned, moaning during turning and repositioning. Patient warm to touch, afebrile, acetaminophen suppository administered during diaper change. Patient with audible secretions, medicated with SL hyoscyamine and placed on left side with washrag under chin/cheek to absorb excess secretions. Spouse expressed concerns about nausea and agitation recurring. Educated spouse on expectations and progression of dying process. Spouse having trouble rationalizing quick progression of illness, stated her father was in hospice for 6 months. Focused conversation on lianet and relief from pain and discomfort in setting of cancer. Reviewed  arrangements (Taft/cremation).

## 2022-07-27 NOTE — HOSPICE
Patient pronounced at 5:06 AM. Patient's wife and his son are at bedside. They report death was peaceful. Post-Mortem care done. Medications wasted. [place]  home notified to remove body. Wife requested  and An Perry present to offer support. Ran notified to  equipment. Dr. Nathalia Ramachandran notified by email. Dr. Lucas Barber and RADHA Rose both notified by [employer] messaging. Offered support and encouraged family to call hospice anytime with any questions or concerns.

## 2022-08-01 ENCOUNTER — APPOINTMENT (OUTPATIENT)
Dept: INFUSION THERAPY | Age: 77
End: 2022-08-01

## 2022-08-01 ENCOUNTER — HOME CARE VISIT (OUTPATIENT)
Dept: HOSPICE | Facility: HOSPICE | Age: 77
End: 2022-08-01
Payer: MEDICARE

## 2022-08-02 ENCOUNTER — APPOINTMENT (OUTPATIENT)
Dept: INFUSION THERAPY | Age: 77
End: 2022-08-02

## 2022-08-04 ENCOUNTER — APPOINTMENT (OUTPATIENT)
Dept: INFUSION THERAPY | Age: 77
End: 2022-08-04

## 2022-08-16 ENCOUNTER — APPOINTMENT (OUTPATIENT)
Dept: INFUSION THERAPY | Age: 77
End: 2022-08-16

## 2022-08-18 ENCOUNTER — APPOINTMENT (OUTPATIENT)
Dept: INFUSION THERAPY | Age: 77
End: 2022-08-18

## 2022-08-30 ENCOUNTER — APPOINTMENT (OUTPATIENT)
Dept: INFUSION THERAPY | Age: 77
End: 2022-08-30

## 2022-09-01 ENCOUNTER — APPOINTMENT (OUTPATIENT)
Dept: INFUSION THERAPY | Age: 77
End: 2022-09-01

## 2022-09-13 ENCOUNTER — APPOINTMENT (OUTPATIENT)
Dept: INFUSION THERAPY | Age: 77
End: 2022-09-13

## 2022-09-15 ENCOUNTER — APPOINTMENT (OUTPATIENT)
Dept: INFUSION THERAPY | Age: 77
End: 2022-09-15

## 2022-09-27 ENCOUNTER — APPOINTMENT (OUTPATIENT)
Dept: INFUSION THERAPY | Age: 77
End: 2022-09-27

## 2022-09-29 ENCOUNTER — APPOINTMENT (OUTPATIENT)
Dept: INFUSION THERAPY | Age: 77
End: 2022-09-29

## 2022-10-13 ENCOUNTER — APPOINTMENT (OUTPATIENT)
Dept: INFUSION THERAPY | Age: 77
End: 2022-10-13

## 2025-01-23 NOTE — DISCHARGE INSTRUCTIONS
Patient Education        ACE Inhibitors: Care Instructions  Your Care Instructions     ACE (angiotensin-converting enzyme) inhibitors lower blood pressure. They also treat heart failure and prevent heart attacks and strokes. They block an enzyme that makes blood vessels narrow. As a result, the blood vessels relax and widen. This lowers blood pressure. These medicines also put more water and salt into the urine. This lowers blood pressure too. These medicines are a good choice for people with diabetes. They don't affect blood sugar levels, and they may protect the kidneys. Examples include:  · Benazepril (Lotensin). · Lisinopril (Prinivil, Zestril). · Ramipril (Altace). Before you start taking this medicine, make sure your doctor knows if you take other medicines, especially water pills (diuretics) or potassium tablets. And tell your doctor if you use a salt substitute. You should not take an ACE inhibitor if you are pregnant or planning to become pregnant. You may need regular blood tests. Follow-up care is a key part of your treatment and safety. Be sure to make and go to all appointments, and call your doctor if you are having problems. It's also a good idea to know your test results and keep a list of the medicines you take. How can you care for yourself at home? · Take your medicines exactly as prescribed. Be sure to take your medicine every day. Call your doctor if you think you are having a problem with your medicine. · ACE inhibitors can cause a dry cough. If the cough is bad, talk to your doctor. You may need to try a different medicine. · ACE inhibitors can cause swelling of your lips, tongue, or face. If the swelling is severe, you may need treatment right away. Severe swelling can make it hard to breathe, but this is very rare. · Check with your doctor or pharmacist before you use any other medicines. This includes over-the-counter medicines.  Make sure your doctor knows all of the Reason for consult  Optic nerve evaluation     History Of Present Illness  Jonathan Ayala is a 47 y.o. female with history of left non-arteritic anterior ischemic optic neuropathy, bilateral sequential vision loss with right eye improvement 11/13/2019, idiopathic intracranial hypertension status post ventriculoperitoneal shunt last revised 11/15/2024, seizures, scleroderma, Sjogren, CVID on monthly IVIG, POTS, HTN, DM2, hypothyroidism, BRENT on CPAP, migraine presenting with headaches (pressure over entire head) and lightheadedness for the past 2 days. Ophthalmology consulted for optic nerve evaluation. Patient denies any new ocular symptoms. She follows with Dr. Mederos (neuro-ophthalmology) and was last seen on 12/16/24. Last evaluated by ophthalmology (consult service) on 1/10.     ROS   Patient denies pain, redness, discharge, decreased vision, double vision, blind spots, flashes, curtain coming over vision, or new floaters.     Past Medical History  She has a past medical history of Abnormal findings on diagnostic imaging of other abdominal regions, including retroperitoneum (10/14/2020), Acquired deformity of nose (03/24/2022), Acute upper respiratory infection, unspecified (10/16/2019), Adrenal disease (Multi), Allergic, Allergy status to unspecified drugs, medicaments and biological substances (05/22/2020), Allergy status to unspecified drugs, medicaments and biological substances (11/13/2020), Anemia, Anxiety (2005), Asthma, Benign intracranial hypertension (01/27/2022), Bipolar disorder, unspecified (Multi), Breast calcification, right (08/21/2018), Cellulitis of abdominal wall (09/28/2022), Cervicalgia (07/01/2020), Chronic maxillary sinusitis (01/04/2022), Chronic sialoadenitis (03/16/2020), COVID-19 (01/06/2022), Decreased white blood cell count, unspecified (11/04/2019), Disease of thyroid gland, Disturbances of salivary secretion (03/16/2020), Dry eye syndrome of bilateral lacrimal glands (10/07/2022),  Encounter for preprocedural cardiovascular examination (02/01/2022), Food additives allergy status (06/11/2020), Fracture of nasal bones, initial encounter for closed fracture (03/03/2022), GERD (gastroesophageal reflux disease) (13 years old), Granuloma of right orbit (10/07/2021), History of endometrial ablation (11/09/2017), Hyperlipidemia, Hypertension, Hyperthyroidism, Hypoglycemia, Hypothyroidism, Immunocompromised, Localized swelling, mass and lump, head (03/24/2022), Major depressive disorder, recurrent, in full remission (CMS-HCC) (10/07/2021), Mammary duct ectasia of left breast (08/24/2022), Meningitis ([facility]) (2008), Migraine, Nipple discharge (08/24/2022), Ocular pain, right eye (10/07/2022), Optic atrophy, Other abnormal and inconclusive findings on diagnostic imaging of breast (07/06/2020), Other anomalies of pupillary function (05/31/2019), Other chest pain (05/18/2020), Other conditions influencing health status (08/01/2022), Other conditions influencing health status (08/03/2021), Other specified disorders of eustachian tube, left ear (11/18/2019), Other specified disorders of nose and nasal sinuses (03/24/2022), Other specified disorders of nose and nasal sinuses (03/24/2022), Pelvic and perineal pain (07/06/2020), Personal history of other diseases of the circulatory system (04/07/2020), Personal history of other diseases of the circulatory system (04/07/2020), Personal history of other diseases of the circulatory system, Personal history of other diseases of the digestive system, Personal history of other diseases of the digestive system (03/02/2020), Personal history of other diseases of the musculoskeletal system and connective tissue (01/19/2022), Personal history of other diseases of the musculoskeletal system and connective tissue (03/02/2021), Personal history of other diseases of the musculoskeletal system and connective tissue (06/16/2020), Personal history of other diseases of the  medicines, vitamins, herbal products, and supplements you take. Taking some medicines together can cause problems. When should you call for help? Call your doctor now or seek immediate medical care if:    · You have swelling of your lips, tongue, or face.     · You think you are having problems with your medicine. Watch closely for changes in your health, and be sure to contact your doctor if you have any problems. Where can you learn more? Go to http://ricci-hector.info/  Enter [id number] in the search box to learn more about \"ACE Inhibitors: Care Instructions. \"  Current as of: January 10, 2022               Content Version: 13.2  © 1947-5766 "[employer]. Care instructions adapted under license by [employer] (which disclaims liability or warranty for this information). If you have questions about a medical condition or this instruction, always ask your healthcare professional. [place] [address] any warranty or liability for your use of this information. HOSPITALIST DISCHARGE INSTRUCTIONS    NAME: Dami Gomez   :  1945   MRN:  [id number]     Date/Time:  2022 1:30 PM    ADMIT DATE: 2022     DISCHARGE DATE: 2022     DISCHARGE DIAGNOSIS:  Left-sided weakness UE>RLE, progressive POA- short term Rehab opted by pt before considering ?  Hospice  Lower back pain POA- controlled, not using anyt opioids in past few days  CVA ruled out  Gastroesophageal cancer w Mets  Dysphagia  Covid 19 infection POA- asymptomatic,supportive care as needed  History of AAA   Acute on chronic Systolic HF- increased PO Bumex to 1mg daily on discharge  Moderate bilateral pleural effusions  History of HLD  History of hypertension  Hypoxic resp failure, acute, d/t the above- needing 2L/m oxygen for now, taper as able  DNR    Active Problems:    Acute CVA (cerebrovascular accident) ([facility] [address] (2022)      Goals of care, counseling/discussion () DNR (do not resuscitate) discussion ()      Physical debility ()      Palliative care encounter ()         MEDICATIONS:  As per medication reconciliation  list  · It is important that you take the medication exactly as they are prescribed. · Keep your medication in the bottles provided by the pharmacist and keep a list of the medication names, dosages, and times to be taken in your wallet. · Do not take other medications without consulting your doctor. Pain Management: per above medications    What to do at Home    Recommended diet:  Liquid diet due to dysphagia    Recommended activity: Activity as tolerated    If you have questions regarding the hospital related prescriptions or hospital related issues please call at [phone]. If you experience any of the following symptoms then please call your primary care physician or return to the emergency room if you cannot get hold of your doctor:  Fever, chills, nausea, vomiting, diarrhea, change in mentation, falling, bleeding, shortness of breath,     Follow Up:  Dr. Tiffany Quintero MD  you are to call and set up an appointment to see them in 7-10 days. Information obtained by :  I understand that if any problems occur once I am at home I am to contact my physician. I understand and acknowledge receipt of the instructions indicated above.                                                                                                                                            Physician's or R.N.'s Signature                                                                  Date/Time                                                                                                                                              Patient or Representative Signature                                                          Date/Time nervous system and sense organs (11/18/2019), Personal history of other diseases of the nervous system and sense organs (09/21/2022), Personal history of other diseases of the respiratory system (04/14/2021), Personal history of other endocrine, nutritional and metabolic disease (02/17/2021), Personal history of other mental and behavioral disorders (05/27/2021), Personal history of other specified conditions (09/07/2022), Personal history of other specified conditions (10/16/2019), Personal history of other specified conditions (09/28/2022), Personal history of other specified conditions (09/16/2021), Personal history of other specified conditions (02/01/2022), Personal history of other specified conditions (03/09/2022), Personal history of other specified conditions (02/12/2014), Personal history of other specified conditions (10/27/2021), Personal history of other specified conditions (10/16/2019), Personal history of other specified conditions (02/26/2021), Personal history of other specified conditions (02/22/2021), Personal history of urinary calculi, Polycystic ovary syndrome, Postural orthostatic tachycardia syndrome (POTS), Rash and other nonspecific skin eruption (03/15/2022), Repeated falls (06/23/2021), Right lower quadrant pain (10/14/2020), Seizures (Multi), Sjogren syndrome, unspecified (Multi), Sjogren's syndrome, Sleep apnea, Slow transit constipation (07/09/2020), Subarachnoid hemorrhage, traumatic (Multi) (04/19/2023), Thyroid nodule, Traumatic subarachnoid hemorrhage with loss of consciousness of unspecified duration, subsequent encounter (03/15/2022), Traumatic subarachnoid hemorrhage without loss of consciousness, subsequent encounter, Type 2 diabetes mellitus, Unspecified disorder of refraction (10/07/2022), Unspecified optic neuritis (11/06/2020), Unspecified optic neuritis (11/06/2020), Unspecified visual loss (09/25/2019), Varicella (As a child), Venous insufficiency (chronic) (peripheral)  (10/18/2021), Viral infection, unspecified (01/11/2022), Vitamin D deficiency, and Vitamin D deficiency, unspecified (09/28/2022).    Allergies  Allergies   Allergen Reactions    Acetazolamide Hives, Rash, Shortness of breath and Swelling     oral hives, redness, ABD pain    Atorvastatin Unknown     Causes muscle pain    Cefdinir Itching, Rash, Shortness of breath and Anaphylaxis     Itchy throat    Throat closing / difficulty breathing.  Took Benadryl at home.    Itchy throat      Throat closing / difficulty breathing.  Took Benadryl at home.    Ceftriaxone Anaphylaxis, Rash, Shortness of breath and Swelling     SOB    SOB hives turned red during gallbladder    Doxepin Anaphylaxis, Hives, Swelling, Angioedema and Rash     Itchy sore throat problems with swallowing    facial swelling    Itchy sore throat problems with swallowing      facial swelling    Duloxetine Anaphylaxis, Hallucinations and Rash     suicidal ideation    suicidal ideation     Other reaction(s): suicidal ideation    suicidal    Suicidal ideation    Fd And C Red No.40 Anaphylaxis    Levofloxacin Angioedema, Swelling and Rash     eyelid swelling    eyelid swelling. Patient tolerates Avelox    Levofloxacin In D5w Anaphylaxis     Moon face lips swelling just Levaquin tolerated D5W)    Nutritional Supplements Anaphylaxis     Grapes ( red dye    Ozempic [Semaglutide] Nausea/vomiting     Severe gastroparesis worsening     Prochlorperazine Anaphylaxis     HIGH Compazine involuntary muscle spasms low doses tolerated)    Red Dye Anaphylaxis    Becky Anaphylaxis and Swelling     Becky    SOB facial swelling    Rosemary Oil Anaphylaxis, Itching and Swelling     Becky  SOB facial swelling      SOB facial swelling    Strawberry Shortness of breath     White blisters in mouth      Other reaction(s): white blisters in mouth, shortness of breath    Sulfa (Sulfonamide Antibiotics) Anaphylaxis, Rash, Shortness of breath and Swelling     SOB itchy  "hives    Other Reaction(s): rash, SOB      SOB itchy hives    Topiramate Anaphylaxis, Swelling and Angioedema     eyelid swelling    Throat swelling    eyelid swelling  Throat swelling      Throat swelling      eyelid swelling    Tree Pollen-Black [place] Shortness of breath     Other Reaction(s): Other: See Comments      White blisters in mouth      blisters in mouth-carries an EPIPEN-\"I have gotten short of breath\"    Tree Pollen-Pecan Shortness of breath     pecans    [place] Shortness of breath    Aripiprazole Unknown, Fever and Other     fever and tremors    Fevers and Tremors    Tremor    Aspartame Diarrhea     Blood sugar [place], Diarrhea    Aspartame (Bulk) Diarrhea, Nausea Only and Nausea/vomiting     Other Reaction(s): nausea, diarrhea, abdominal pain      Blood sugar [place], Diarrhea    Fenofibrate Other     Double vision    Gluten Itching, Other and Rash     Rashes constipation gastric discomfort    Hydrochlorothiazide Hives, Itching and Rash     hctz removed as free-text allergy and entered as [facility] allergy,1455 10/6/2007JO      itchy hives    Hydromorphone Unknown, GI intolerance and Nausea Only     Intolerance nausea instant    Iron Hives and Rash     ichy hive ( can tolerate ferrous gluconate)    Meclizine Angioedema, Other and Swelling     eyelid swelling    Swelling of the eyelids    Eyelids and face    Metformin Other     Other reaction(s): Dyspepsia, Dyspepsia    Propoxyphene Unknown and Hives     Darvocet itchy hives    Statins-Hmg-Coa Reductase Inhibitors Unknown     Double vision    Tetracyclines Hives, Itching and Rash     sulfa    Rash itching    Itchy  rash    Thiazides Hives, Itching and Rash     itchy hives    Venlafaxine Unknown and Fever     Fevers chills    Wheat Unknown     oral blisters    Adhesive Tape-Silicones Itching and Other    Barbiturates Unknown    Ciprofloxacin Hives    Dhe Unknown     Raynaud's disease    Diet Foods Diarrhea     Aspartame allergy only. Removes to many foods " "to interface.    Farxiga [Dapagliflozin] Other     Frequent yeast infections and UTI    Ferrous Sulfate Hives    Keflex [Cephalexin] Itching    L.Acidoph-L.Bulg-B.Bif-S.Therm GI intolerance    Nsaids (Non-Steroidal Anti-Inflammatory Drug) Unknown and Nausea/vomiting    Other Unknown    Sulfonylureas Unknown    Tobramycin Unknown    Vancomycin Unknown and Hives     Niyah syndrome    Other reaction(s): Other    Red man syndrome if given fast, benadryl with.     Niyah syndrome  Other reaction(s): Other      Other reaction(s): Other      Red man syndrome if given fast, benadryl with.    Adhesive Rash    Azithromycin Hives, Itching and Rash    Betamethasone Nausea And Vomiting, Other, GI intolerance and Diarrhea     N/V/D    House Dust Rash    Metoclopramide Hcl Other    Prednisone Rash    Propoxyphene-Acetaminophen Hives and Rash    Sulfacetamide Sodium Rash and Swelling    Nlsbmnwk-[id number]-Ot1 Antimigraine Agents Nausea Only         None due to Raynaud's  ( Triptans cause a stroke)    Zolpidem Other and Hallucinations     Sleep walk    Other Reaction(s): insomnia and agitation      Sleep walk        Last Recorded Vitals  Blood pressure 131/80, pulse 85, temperature 36.7 °C (98 °F), temperature source Temporal, resp. rate 16, height 1.575 m (5' 2\"), weight 115 kg (253 lb), SpO2 94%.    Examination   Base Eye Exam       Visual Acuity (Snellen - Linear)         Right Left    Near sc 20/50 ph 20/30+2 20/70 ph 20/25+1              Tonometry (Tonopen, 6:22 AM)         Right Left    Pressure 19 21              Pupils         Pupils Dark Light Shape React APD    Right PERRL, No APD 4 2 Round Brisk -    Left PERRL, No APD 4 2 Round Brisk -              Visual Fields         Left Right                                Extraocular Movement         Right Left     Full Full              Neuro/Psych       Oriented x3: Yes    Mood/Affect: Normal              Dilation       Both eyes: 1% Mydriacyl & 2.5% Cuco  @ 6:22 AM              "     Additional Tests       Color         Right Left    Ish[place]ara 4/11 3/11                  Slit Lamp and Fundus Exam       External Exam         Right Left    External Normal Normal              Slit Lamp Exam         Right Left    Lids/Lashes Normal Normal    Conjunctiva/Sclera White and quiet White and quiet    Cornea Clear Clear    Anterior Chamber Deep and quiet Deep and quiet    Iris Round and reactive Round and reactive    Lens Clear Clear    Anterior Vitreous Normal Normal              Fundus Exam         Right Left    Disc pallor temporally Pallor    C/D Ratio 0.15 0.15    Macula Normal Normal    Vessels Normal Normal    Periphery Normal Normal                    Relevant Results  XR SHUNT SERIES; ; 1/22/2025   IMPRESSION:  1. Shunt catheter tubing demonstrates no obvious kinking,  discontinuity, or calcifications.  2. No acute cardiopulmonary process.  3. Nonspecific nonobstructive bowel gas pattern. Moderate colonic  stool burden.    CT HEAD WO IV CONTRAST; 1/22/2025   IMPRESSION:  1. Right frontal approach ventriculostomy shunt catheter with distal  tip unchanged in position when compared to prior. No significant  interval change in size of the ventricles.  2. No acute intracranial abnormality.    Assessment & Plan  #Idiopathic intracranial hypertension  #NAION, both eyes   #S/p  shunt  - 47 y.o. female with PMH of left non-arteritic anterior ischemic optic neuropathy, bilateral sequential vision loss with right eye improvement 11/13/2019, idiopathic intracranial hypertension status post ventriculoperitoneal shunt , seizures, scleroderma, Sjogren, CVID on monthly IVIG, POTS, HTN, DM2, hypothyroidism, BRENT on CPAP, migraine  recently s/p  shunt revision on 10/30, s/p  shunt explantation and replacement of fractured valve and revision of cranial incision 11/15/24, presenting for headache and lightheadedness, without new vision changes   - Neurosurgery consultation was recommended. Shunt studies normal  and CTH without evidence of ventriculomegaly.   - Entrance testing similar to prior exam; no optic disc edema appreciated on exam today  - Absence of optic disc edema does not rule out elevated ICP. If clinical suspicion remains high, further workup including but not limited to imaging, lumbar puncture with opening pressure potentially indicated  - Defer remainder of management to primary team  - Patient has upcoming appt with Dr. Mederos (neuro-ophthalmology) on 2/6/25         Discussed with Dr. Rob Mederos, neuro-ophthalmology attending.      Johnathon Magallanes MD   Ophthalmology PGY-2    -----------------------------  Ophthalmology Adult Pager - [phone]  Ophthalmology Pediatrics Pager - [phone] (weekdays 8 am - 5 pm)     For adult follow-up appointments, call: [phone]  For pediatric follow-up appointments, call: [phone]    Note not finalized until attending signature.    Brief Key of Common Ophthalmology Abbreviations:  OD: right eye  OS: left eye  OU: both eyes  VA: visual acuity   IOP: intraocular pressure  EOMs: extraocular movements  CVF: confrontational visual fields  DFE: dilated fundus exam  DBH: dot blot hemorrhage  CWS: cotton wool spot  AC: anterior chamber  RD: retinal detachment  PVD: posterior vitreous detachment  CEIOL: cataract extraction with intraocular lens insertion  POAG: primary open-angle glaucoma

## 2025-06-10 NOTE — PROGRESS NOTES
- NRT   OT note:  OT orders received and chart was reviewed, and pt was cleared to be seen, then doctor and imaging came to perform a thoracentesis on pt. Will defer at this time and continue to follow .   Will continue to follow

## (undated) DEVICE — BASIN EMSIS 16OZ GRAPHITE PLAS KID SHP MOLD GRAD FOR ORAL

## (undated) DEVICE — ENDO CARRY-ON PROCEDURE KIT INCLUDES ENZYMATIC SPONGE, GAUZE, BIOHAZARD LABEL, TRAY, LUBRICANT, DIRTY SCOPE LABEL, WATER LABEL, TRAY, DRAWSTRING PAD, AND DEFENDO 4-PIECE KIT.: Brand: ENDO CARRY-ON PROCEDURE KIT

## (undated) DEVICE — HIGH FLOW RATE EXTENSION SET, LUER LOCK ADAPTERS

## (undated) DEVICE — KENDALL DL ECG CABLE AND LEAD WIRE SYSTEM, 3-LEAD, SINGLE PATIENT USE: Brand: KENDALL

## (undated) DEVICE — SOLIDIFIER MEDC 1200ML -- CONVERT TO 356117

## (undated) DEVICE — SOLUTION LACTATED RINGERS INJECTION USP

## (undated) DEVICE — 1200 GUARD II KIT W/5MM TUBE W/O VAC TUBE: Brand: GUARDIAN